# Patient Record
Sex: FEMALE | Race: WHITE | NOT HISPANIC OR LATINO | Employment: UNEMPLOYED | URBAN - METROPOLITAN AREA
[De-identification: names, ages, dates, MRNs, and addresses within clinical notes are randomized per-mention and may not be internally consistent; named-entity substitution may affect disease eponyms.]

---

## 2017-01-09 ENCOUNTER — HOSPITAL ENCOUNTER (EMERGENCY)
Facility: CLINIC | Age: 37
Discharge: HOME | End: 2017-01-09
Attending: EMERGENCY MEDICINE | Admitting: EMERGENCY MEDICINE
Payer: COMMERCIAL

## 2017-01-09 ENCOUNTER — APPOINTMENT (OUTPATIENT)
Dept: GENERAL RADIOLOGY | Facility: CLINIC | Age: 37
End: 2017-01-09
Attending: EMERGENCY MEDICINE
Payer: COMMERCIAL

## 2017-01-09 VITALS
TEMPERATURE: 98.6 F | HEART RATE: 75 BPM | DIASTOLIC BLOOD PRESSURE: 78 MMHG | SYSTOLIC BLOOD PRESSURE: 136 MMHG | RESPIRATION RATE: 18 BRPM | OXYGEN SATURATION: 100 %

## 2017-01-09 DIAGNOSIS — R07.0 THROAT PAIN: ICD-10-CM

## 2017-01-09 DIAGNOSIS — E07.9 THYROID DYSFUNCTION: ICD-10-CM

## 2017-01-09 LAB
DEPRECATED S PYO AG THROAT QL EIA: NORMAL
MICRO REPORT STATUS: NORMAL
SPECIMEN SOURCE: NORMAL

## 2017-01-09 PROCEDURE — 99284 EMERGENCY DEPT VISIT MOD MDM: CPT

## 2017-01-09 PROCEDURE — 87880 STREP A ASSAY W/OPTIC: CPT | Performed by: EMERGENCY MEDICINE

## 2017-01-09 PROCEDURE — 25000132 ZZH RX MED GY IP 250 OP 250 PS 637: Performed by: EMERGENCY MEDICINE

## 2017-01-09 PROCEDURE — 70360 X-RAY EXAM OF NECK: CPT

## 2017-01-09 PROCEDURE — 87081 CULTURE SCREEN ONLY: CPT | Performed by: EMERGENCY MEDICINE

## 2017-01-09 RX ORDER — PREDNISONE 20 MG/1
TABLET ORAL
Qty: 10 TABLET | Refills: 0 | Status: SHIPPED | OUTPATIENT
Start: 2017-01-09 | End: 2017-01-17

## 2017-01-09 RX ORDER — IBUPROFEN 200 MG
400 TABLET ORAL ONCE
Status: COMPLETED | OUTPATIENT
Start: 2017-01-09 | End: 2017-01-09

## 2017-01-09 RX ADMIN — IBUPROFEN 400 MG: 200 TABLET, FILM COATED ORAL at 19:39

## 2017-01-09 ASSESSMENT — ENCOUNTER SYMPTOMS
VOMITING: 0
TROUBLE SWALLOWING: 1
RHINORRHEA: 0
FEVER: 0
SINUS PRESSURE: 0
COUGH: 0
SORE THROAT: 1
SHORTNESS OF BREATH: 0
APNEA: 0
FACIAL SWELLING: 1
ABDOMINAL PAIN: 0
NAUSEA: 0
CHILLS: 0

## 2017-01-09 NOTE — ED AVS SNAPSHOT
[facility] Emergency Department    [address] [facility] [address]    Phone:  [phone]    Fax:  [phone]                                       Randee Millard   MRN: [id number]    Department:  [facility] Emergency Department   Date of Visit:  1/9/2017           Patient Information     Date Of Birth          1980        Your diagnoses for this visit were:     Throat pain     Thyroid dysfunction        You were seen by Smith Brown MD.      Follow-up Information     Follow up with Michelle Nascimento MD. Schedule an appointment as soon as possible for a visit in 3 days.    Specialty:  Internal Medicine    Contact information:    [facility]  [address] [facility] [address]  [phone]          Discharge Instructions       Discharge Instructions  Sore Throat  You were seen today for a sore throat.  Most sore throats are caused by a virus. Antibiotics do not help with viral infections, but you can fight off the virus on your own.  In this case, your sore throat would be treated with medications for your pain and fever.    Strep throat is a kind of sore throat caused by Group A streptococcus bacteria.  This type of sore throat is treated with antibiotics.  If you had a rapid test done today for strep throat and it did not show infection, we always do a culture. If the culture shows you have strep throat, we will call you and get you a prescription for antibiotics.    Return to the Emergency Department if:    If you have difficulty breathing.    If you are drooling because you are unable to swallow.    You become dehydrated due to difficulty drinking. Signs of dehydration include weakness, dry mouth, and urinating less than 3 times per day.    If you develop swelling of the neck or tongue.    If you develop a high fever with either headache or stiff neck.    Treatment:      Pain relief -- Non-prescription pain medications,  "such as Tylenol  (acetaminophen) or Motrin , Advil  (ibuprofen) are usually recommended for pain.  Do not use a medicine that you are allergic to, or if your doctor has told you not to use it.   If you have been given a narcotic such as Vicodin  (hydrocodone with acetaminophen), Percocet  (oxycodone with acetaminophen), codeine, do not drive for four hours after you have taken it.  If the narcotic contains Tylenol  (acetaminophen), do not take Tylenol  with it. All narcotics will cause constipation, so eat a high fiber diet.      If you have been placed on antibiotics, watch for signs of allergic reaction.  These include rash, lip swelling, difficulty breathing, wheezing, and dizziness.  If you develop any of these symptoms, stop the antibiotic immediately and go to an Emergency Department or Urgent Care for evaluation.    Probiotics: If you have been given an antibiotic, you may want to also take a probiotic pill or eat yogurt with live cultures. Probiotics have \"good bacteria\" to help your intestines stay healthy. Studies have shown that probiotics help prevent diarrhea and other intestine problems (including C. diff infection) when you take antibiotics. You can buy these without a prescription in the pharmacy section of the store.   If you were given a prescription for medicine here today, be sure to read all of the information (including the package insert) that comes with your prescription.  This will include important information about the medicine, its side effects, and any warnings that you need to know about.  The pharmacist who fills the prescription can provide more information and answer questions you may have about the medicine.  If you have questions or concerns that the pharmacist cannot address, please call or return to the Emergency Department.           Opioid Medication Information    Pain medications are among the most commonly prescribed medicines, so we are including this information for all " our patients. If you did not receive pain medication or get a prescription for pain medicine, you can ignore it.     You may have been given a prescription for an opioid (narcotic) pain medicine and/or have received a pain medicine while here in the Emergency Department. These medicines can make you drowsy or impaired. You must not drive, operate dangerous equipment, or engage in any other dangerous activities while taking these medications. If you drive while taking these medications, you could be arrested for DUI, or driving under the influence. Do not drink any alcohol while you are taking these medications.     Opioid pain medications can cause addiction. If you have a history of chemical dependency of any type, you are at a higher risk of becoming addicted to pain medications.  Only take these prescribed medications to treat your pain when all other options have been tried. Take it for as short a time and as few doses as possible. Store your pain pills in a secure place, as they are frequently stolen and provide a dangerous opportunity for children or visitors in your house to start abusing these powerful medications. We will not replace any lost or stolen medicine.  As soon as your pain is better, you should flush all your remaining medication.     Many prescription pain medications contain Tylenol  (acetaminophen), including Vicodin , Tylenol #3 , Norco , Lortab , and Percocet .  You should not take any extra pills of Tylenol  if you are using these prescription medications or you can get very sick.  Do not ever take more than 3000 mg of acetaminophen in any 24 hour period.    All opioids tend to cause constipation. Drink plenty of water and eat foods that have a lot of fiber, such as fruits, vegetables, prune juice, apple juice and high fiber cereal.  Take a laxative if you don t move your bowels at least every other day. Miralax , Milk of Magnesia, Colace , or Senna  can be used to keep you regular.       Remember that you can always come back to the Emergency Department if you are not able to see your regular doctor in the amount of time listed above, if you get any new symptoms, or if there is anything that worries you.          24 Hour Appointment Hotline       To make an appointment at any [facility], call [phone] ([phone]). If you don't have a family doctor or clinic, we will help you find one. [place] clinics are conveniently located to serve the needs of you and your family.             Review of your medicines      START taking        Dose / Directions Last dose taken    predniSONE 20 MG tablet   Commonly known as:  DELTASONE   Quantity:  10 tablet        Take two tablets (= 40mg) each day for 5 (five) days   Refills:  0          Our records show that you are taking the medicines listed below. If these are incorrect, please call your family doctor or clinic.        Dose / Directions Last dose taken    acetaminophen 500 MG tablet   Commonly known as:  TYLENOL   Dose:  500 mg   Quantity:  20 tablet        Take 1 tablet (500 mg) by mouth every 6 hours as needed for pain or fever   Refills:  0        ferrous sulfate 325 (65 FE) MG tablet   Commonly known as:  IRON   Dose:  325 mg   Quantity:  30 tablet        Take 1 tablet (325 mg) by mouth daily (with breakfast)   Refills:  2        folic acid 400 MCG tablet   Commonly known as:  FOLVITE   Dose:  400 mcg   Quantity:  30 tablet        Take 1 tablet (400 mcg) by mouth daily   Refills:  2        HYDROcodone-acetaminophen 5-325 MG per tablet   Commonly known as:  NORCO   Dose:  1-2 tablet        Take 1-2 tablets by mouth   Refills:  0        ibuprofen 200 MG tablet   Commonly known as:  ADVIL/MOTRIN   Dose:  600 mg   Quantity:  60 tablet        Take 3 tablets (600 mg) by mouth every 8 hours as needed for mild pain   Refills:  0                Prescriptions were sent or printed at these locations (1 Prescription)                   Other  Prescriptions                Printed at Department/Unit printer (1 of 1)         predniSONE (DELTASONE) 20 MG tablet                Procedures and tests performed during your visit     Beta strep group A culture    Neck soft tissue XR    Rapid strep screen      Orders Needing Specimen Collection     None      Pending Results     Date and Time Order Name Status Description    1/9/2017 2007 Beta strep group A culture In process             Pending Culture Results     Date and Time Order Name Status Description    1/9/2017 2007 Beta strep group A culture In process        Test Results from your hospital stay           1/9/2017  8:25 PM - Interface, Radiant Ib      Narrative     XR NECK SOFT TISSUE 1/9/2017 8:08 PM    HISTORY: pain, fullness, unimmunized, eval for epiglottis    COMPARISON: 4/11/2016        Impression     IMPRESSION:   Epiglottis appears normal. No convincing prevertebral swelling,  lateral cervical spine unremarkable..    DEBBIE RUSSELL MD         1/9/2017  8:27 PM - Interface, Flexilab Results      Component Results     Component    Specimen Description    Throat    Rapid Strep A Screen    NEGATIVE: No Group A streptococcal antigen detected by immunoassay, await   culture report.      Micro Report Status    FINAL 01/09/2017 1/9/2017  8:27 PM - Interface, Flexilab Results                Clinical Quality Measure: Blood Pressure Screening     Your blood pressure was checked while you were in the emergency department today. The last reading we obtained was  BP: 136/78 mmHg . Please read the guidelines below about what these numbers mean and what you should do about them.  If your systolic blood pressure (the top number) is less than 120 and your diastolic blood pressure (the bottom number) is less than 80, then your blood pressure is normal. There is nothing more that you need to do about it.  If your systolic blood pressure (the top number) is 120-139 or your diastolic blood pressure (the bottom  number) is 80-89, your blood pressure may be higher than it should be. You should have your blood pressure rechecked within a year by a primary care provider.  If your systolic blood pressure (the top number) is 140 or greater or your diastolic blood pressure (the bottom number) is 90 or greater, you may have high blood pressure. High blood pressure is treatable, but if left untreated over time it can put you at risk for heart attack, stroke, or kidney failure. You should have your blood pressure rechecked by a primary care provider within the next 4 weeks.  If your provider in the emergency department today gave you specific instructions to follow-up with your doctor or provider even sooner than that, you should follow that instruction and not wait for up to 4 weeks for your follow-up visit.        Thank you for choosing [facility]       Thank you for choosing [facility] for your care. Our goal is always to provide you with excellent care. Hearing back from our patients is one way we can continue to improve our services. Please take a few minutes to complete the written survey that you may receive in the mail after you visit with us. Thank you!        [employer] Information     [employer] gives you secure access to your electronic health record. If you see a primary care provider, you can also send messages to your care team and make appointments. If you have questions, please call your primary care clinic.  If you do not have a primary care provider, please call [phone] and they will assist you.        Care EveryWhere ID     This is your Care EveryWhere ID. This could be used by other organizations to access your [facility] medical records  [id number]        After Visit Summary       This is your record. Keep this with you and show to your community pharmacist(s) and doctor(s) at your next visit.

## 2017-01-09 NOTE — ED AVS SNAPSHOT
[facility] Emergency Department    [address]    [facility] [address]    Phone:  [phone]    Fax:  [phone]                                       Randee Millard   MRN: [id number]    Department:  [facility] Emergency Department   Date of Visit:  1/9/2017           After Visit Summary Signature Page     I have received my discharge instructions, and my questions have been answered. I have discussed any challenges I see with this plan with the nurse or doctor.    ..........................................................................................................................................  Patient/Patient Representative Signature      ..........................................................................................................................................  Patient Representative Print Name and Relationship to Patient    ..................................................               ................................................  Date                                            Time    ..........................................................................................................................................  Reviewed by Signature/Title    ...................................................              ..............................................  Date                                                            Time

## 2017-01-10 NOTE — DISCHARGE INSTRUCTIONS
Discharge Instructions  Sore Throat  You were seen today for a sore throat.  Most sore throats are caused by a virus. Antibiotics do not help with viral infections, but you can fight off the virus on your own.  In this case, your sore throat would be treated with medications for your pain and fever.    Strep throat is a kind of sore throat caused by Group A streptococcus bacteria.  This type of sore throat is treated with antibiotics.  If you had a rapid test done today for strep throat and it did not show infection, we always do a culture. If the culture shows you have strep throat, we will call you and get you a prescription for antibiotics.    Return to the Emergency Department if:    If you have difficulty breathing.    If you are drooling because you are unable to swallow.    You become dehydrated due to difficulty drinking. Signs of dehydration include weakness, dry mouth, and urinating less than 3 times per day.    If you develop swelling of the neck or tongue.    If you develop a high fever with either headache or stiff neck.    Treatment:      Pain relief -- Non-prescription pain medications, such as Tylenol  (acetaminophen) or Motrin , Advil  (ibuprofen) are usually recommended for pain.  Do not use a medicine that you are allergic to, or if your doctor has told you not to use it.   If you have been given a narcotic such as Vicodin  (hydrocodone with acetaminophen), Percocet  (oxycodone with acetaminophen), codeine, do not drive for four hours after you have taken it.  If the narcotic contains Tylenol  (acetaminophen), do not take Tylenol  with it. All narcotics will cause constipation, so eat a high fiber diet.      If you have been placed on antibiotics, watch for signs of allergic reaction.  These include rash, lip swelling, difficulty breathing, wheezing, and dizziness.  If you develop any of these symptoms, stop the antibiotic immediately and go to an Emergency Department or Urgent Care for  "evaluation.    Probiotics: If you have been given an antibiotic, you may want to also take a probiotic pill or eat yogurt with live cultures. Probiotics have \"good bacteria\" to help your intestines stay healthy. Studies have shown that probiotics help prevent diarrhea and other intestine problems (including C. diff infection) when you take antibiotics. You can buy these without a prescription in the pharmacy section of the store.   If you were given a prescription for medicine here today, be sure to read all of the information (including the package insert) that comes with your prescription.  This will include important information about the medicine, its side effects, and any warnings that you need to know about.  The pharmacist who fills the prescription can provide more information and answer questions you may have about the medicine.  If you have questions or concerns that the pharmacist cannot address, please call or return to the Emergency Department.           Opioid Medication Information    Pain medications are among the most commonly prescribed medicines, so we are including this information for all our patients. If you did not receive pain medication or get a prescription for pain medicine, you can ignore it.     You may have been given a prescription for an opioid (narcotic) pain medicine and/or have received a pain medicine while here in the Emergency Department. These medicines can make you drowsy or impaired. You must not drive, operate dangerous equipment, or engage in any other dangerous activities while taking these medications. If you drive while taking these medications, you could be arrested for DUI, or driving under the influence. Do not drink any alcohol while you are taking these medications.     Opioid pain medications can cause addiction. If you have a history of chemical dependency of any type, you are at a higher risk of becoming addicted to pain medications.  Only take these prescribed " medications to treat your pain when all other options have been tried. Take it for as short a time and as few doses as possible. Store your pain pills in a secure place, as they are frequently stolen and provide a dangerous opportunity for children or visitors in your house to start abusing these powerful medications. We will not replace any lost or stolen medicine.  As soon as your pain is better, you should flush all your remaining medication.     Many prescription pain medications contain Tylenol  (acetaminophen), including Vicodin , Tylenol #3 , Norco , Lortab , and Percocet .  You should not take any extra pills of Tylenol  if you are using these prescription medications or you can get very sick.  Do not ever take more than 3000 mg of acetaminophen in any 24 hour period.    All opioids tend to cause constipation. Drink plenty of water and eat foods that have a lot of fiber, such as fruits, vegetables, prune juice, apple juice and high fiber cereal.  Take a laxative if you don t move your bowels at least every other day. Miralax , Milk of Magnesia, Colace , or Senna  can be used to keep you regular.      Remember that you can always come back to the Emergency Department if you are not able to see your regular doctor in the amount of time listed above, if you get any new symptoms, or if there is anything that worries you.

## 2017-01-10 NOTE — ED PROVIDER NOTES
History     Chief Complaint:  Neck Pain    HPI   Randee Millard is a 36 year old female with no significant past medical history who presents with complaint of neck pain and fullness as well as throat pain. The patient reports that she has had pain in her neck for the past 3 days. She states she is scheduled to have her wisdom teeth extracted tomorrow and wanted to have her pain evaluated before she went into surgery. On arrival to the ED, the patient reports she has pain and fullness in her anterior neck including on both sides of her neck. She states she has had difficultly swallowing due to the pain and also notes some throat soreness. The patient states that there was a streak of blood in her saliva today but denies any other bleeding. She denies any fever, nausea, vomiting, cough, congestion, or difficulty breathing. The patient states she is from [place] and used to follow with a thyroid doctor but has not recently. Her thyroid function was normal 2 years ago. Of note, the patient is not fully immunized.     Allergies:  Amoxicillin  Clindamycin      Medications:    Norco  Tylenol  Ibuprofen  Folic acid  Iron    Past Medical History:    Reflux esophagitis  Anemia      Past Surgical History:    History reviewed. No pertinent surgical history.    Family History:    Hypertension  Diabetes     Social History:  Smoking status: No  Alcohol use: No  Marital Status:   [2]     Review of Systems   Constitutional: Negative for fever and chills.   HENT: Positive for facial swelling (neck ), sore throat and trouble swallowing. Negative for congestion, drooling, rhinorrhea and sinus pressure.         Neck pain   Respiratory: Negative for apnea, cough and shortness of breath.    Gastrointestinal: Negative for nausea, vomiting and abdominal pain.   All other systems reviewed and are negative.      Physical Exam   First Vitals:  BP: 136/78 mmHg  Pulse: 75  Heart Rate: 75  Temp: 98.6  F (37  C)  Resp: 18  SpO2: 100 %       Physical Exam  General: The patient is alert, in no respiratory distress.    HENT: Mucous membranes moist. Posterior pharynx normal.     Cardiovascular: Regular rate and rhythm. Good pulses in all four extremities. Normal capillary refill and skin turgor.     Respiratory: Lungs are clear. No nasal flaring. No retractions. No wheezing, no crackles.    Gastrointestinal: Abdomen soft. No guarding, no rebound. No palpable hernias.     Musculoskeletal: No gross deformity.     Skin: No rashes or petechiae. Mild fullness of the anterior neck.     Neurologic: The patient is alert and oriented x3. GCS 15. No testable cranial nerve deficit. Follows commands with clear and appropriate speech. Gives appropriate answers. Good strength in all extremities. No gross neurologic deficit. Gross sensation intact. Pupils are round and reactive. No meningismus.     Lymphatic: No cervical adenopathy. No lower extremity swelling.    Psychiatric: The patient is non-tearful.    Emergency Department Course   Imaging:  Radiographic findings were communicated with the patient who voiced understanding of the findings.    X-ray Neck soft tissue:  Epiglottis appears normal. No convincing prevertebral swelling,  lateral cervical spine unremarkable..  Result per radiology.     Laboratory:  Rapid strep: negative  Beta strep culture: in process    Interventions:  1939: Ibuprofen 400 mg oral     Emergency Department Course:  Past medical records, nursing notes, and vitals reviewed.  1932: I performed an exam of the patient and obtained history, as documented above.  Rapid strep ordered, results above.   The patient was sent for a neck soft tissue x-ray while in the emergency department, findings above.    2053: I rechecked the patient. Explained findings to the patient. Patient is agreeable with the plan for discharge.     I rechecked the patient.  Findings and plan explained to the Patient. Patient discharged home with instructions regarding  supportive care, medications, and reasons to return. The importance of close follow-up was reviewed.     Impression & Plan      Medical Decision Making:  The patient reports that when she lived in [place] she had been having thyroid problems but was not on medication and since she has moved to [place] has not been followed. I originally considered that potential causes for her sore throat could be things such as obstruction however she is having no problems swallowing here. I considered epiglottitis in that she is not immunized, pharyngitis, esophageal web, vascular issues. Meningitis would be unlikely as the pain is all in the front. Allergic reaction would be possible. Also considered external causes such as thyroid dysfunction. However, on her lateral neck film the airway is patent. I felt that most likely this is pharyngitis. I started her on steroids to help with this and encouraged her to follow up with her primary care doctor to get thyroid studies but she did not have symptoms to suggest hyperthyroidism and her airway was intact and she was discharged in good condition.     Diagnosis:    ICD-10-CM   1. Throat pain R07.0   2. Thyroid dysfunction E07.9     Disposition: Discharged to home    Discharge Medications:   Details   predniSONE (DELTASONE) 20 MG tablet Take two tablets (= 40mg) each day for 5 (five) days, Disp-10 tablet, R-0, Local Print     Amira Barfield  1/9/2017   [facility] EMERGENCY DEPARTMENT    I, Amira Barfield, am serving as a scribe at 7:32 PM on 1/9/2017 to document services personally performed by Smith Brown MD based on my observations and the provider's statements to me.       Smith Brown MD  01/10/17 0106

## 2017-01-10 NOTE — ED NOTES
Pt reports sore throat for three days with difficulty swallowing. Pt noticed some blood in mucous. Denies cough or fevers. ABCs intact.

## 2017-01-11 ENCOUNTER — HOSPITAL ENCOUNTER (EMERGENCY)
Facility: CLINIC | Age: 37
Discharge: HOME | End: 2017-01-11
Attending: EMERGENCY MEDICINE | Admitting: EMERGENCY MEDICINE
Payer: COMMERCIAL

## 2017-01-11 VITALS
RESPIRATION RATE: 18 BRPM | OXYGEN SATURATION: 99 % | SYSTOLIC BLOOD PRESSURE: 119 MMHG | DIASTOLIC BLOOD PRESSURE: 101 MMHG | TEMPERATURE: 97.3 F

## 2017-01-11 DIAGNOSIS — G89.18 ACUTE POST-OPERATIVE PAIN: ICD-10-CM

## 2017-01-11 LAB
BACTERIA SPEC CULT: NORMAL
MICRO REPORT STATUS: NORMAL
SPECIMEN SOURCE: NORMAL

## 2017-01-11 PROCEDURE — 99283 EMERGENCY DEPT VISIT LOW MDM: CPT

## 2017-01-11 PROCEDURE — 25000132 ZZH RX MED GY IP 250 OP 250 PS 637: Performed by: EMERGENCY MEDICINE

## 2017-01-11 RX ORDER — IBUPROFEN 200 MG
200 TABLET ORAL ONCE
Status: COMPLETED | OUTPATIENT
Start: 2017-01-11 | End: 2017-01-11

## 2017-01-11 RX ORDER — IBUPROFEN 200 MG
400 TABLET ORAL ONCE
Status: COMPLETED | OUTPATIENT
Start: 2017-01-11 | End: 2017-01-11

## 2017-01-11 RX ORDER — HYDROCODONE BITARTRATE AND ACETAMINOPHEN 5; 325 MG/1; MG/1
2 TABLET ORAL ONCE
Status: COMPLETED | OUTPATIENT
Start: 2017-01-11 | End: 2017-01-11

## 2017-01-11 RX ADMIN — IBUPROFEN 400 MG: 200 TABLET, FILM COATED ORAL at 15:57

## 2017-01-11 RX ADMIN — IBUPROFEN 200 MG: 200 TABLET, FILM COATED ORAL at 15:57

## 2017-01-11 RX ADMIN — HYDROCODONE BITARTRATE AND ACETAMINOPHEN 2 TABLET: 5; 325 TABLET ORAL at 16:26

## 2017-01-11 ASSESSMENT — ENCOUNTER SYMPTOMS
SHORTNESS OF BREATH: 0
COLOR CHANGE: 0
FACIAL SWELLING: 0
FEVER: 0

## 2017-01-11 NOTE — ED AVS SNAPSHOT
[facility] Emergency Department    [address]    [facility] [address]    Phone:  [phone]    Fax:  [phone]                                       Randee Millard   MRN: [id number]    Department:  [facility] Emergency Department   Date of Visit:  1/11/2017           After Visit Summary Signature Page     I have received my discharge instructions, and my questions have been answered. I have discussed any challenges I see with this plan with the nurse or doctor.    ..........................................................................................................................................  Patient/Patient Representative Signature      ..........................................................................................................................................  Patient Representative Print Name and Relationship to Patient    ..................................................               ................................................  Date                                            Time    ..........................................................................................................................................  Reviewed by Signature/Title    ...................................................              ..............................................  Date                                                            Time

## 2017-01-11 NOTE — DISCHARGE INSTRUCTIONS
Please follow up closely with your regular physician. Please return to the ED if your symptoms worsen or if you develop new or concerning symptoms.         Pain Management After Surgery  Once you re home after surgery, you may have some pain, since even minor surgery causes swelling and breakdown of tissue. When it comes to effective pain management, the tips you may have learned in the hospital also work at home. To get the best pain relief possible, remember these points:  Special note: Be sure to follow any specific post-operative instructions from your surgeon or nurse.     Use your medicine only as directed    If your pain is not relieved or if it gets worse, call your health care provider.    If pain lessens, try taking your medicine less often or in smaller doses.  Remember that medicines need time to work    Most pain relievers taken by mouth need at least 20 to 30 minutes to take effect. They may not reach their maximum effect for close to an hour.     Take pain medicine at regular times as directed. Don t wait until the pain gets bad to take it.  Time your medicine    Try to time your medicine so that you take it before beginning an activity, such as dressing or sitting at the table for dinner.    Taking your medicine at night may help you get a good night s rest.  Eat lots of fruit and vegetables    Constipation is a common side effect with some pain medicines. Eating fruit, vegetables, and other foods high in fiber can help.    Drink lots of fluids.    Talk to your health care provider about taking a preventive bowel regimen.  Avoid drinking alcohol while taking pain medicine    Mixing alcohol and pain medicine can cause dizziness and slow your respiratory system. It can even be fatal.  Avoid driving or operating machinery while taking pain medicines that can cause drowsiness.    5628-4726 [employer]. [address] [place] [address], [place] [address]. All rights reserved. This information is not  intended as a substitute for professional medical care. Always follow your healthcare professional's instructions.

## 2017-01-11 NOTE — ED AVS SNAPSHOT
[facility] Emergency Department    [address]    [facility] [address]    Phone:  [phone]    Fax:  [phone]                                       Randee Millard   MRN: [id number]    Department:  [facility] Emergency Department   Date of Visit:  1/11/2017           Patient Information     Date Of Birth          1980        Your diagnoses for this visit were:     Acute post-operative pain        You were seen by Christy Truong MD.      Follow-up Information     Follow up with Abe Hou MD In 3 days.    Specialty:  Internal Medicine    Contact information:    [facility]  [address]  [facility] [address]  [phone]          Discharge Instructions       Please follow up closely with your regular physician. Please return to the ED if your symptoms worsen or if you develop new or concerning symptoms.         Pain Management After Surgery  Once you re home after surgery, you may have some pain, since even minor surgery causes swelling and breakdown of tissue. When it comes to effective pain management, the tips you may have learned in the hospital also work at home. To get the best pain relief possible, remember these points:  Special note: Be sure to follow any specific post-operative instructions from your surgeon or nurse.     Use your medicine only as directed    If your pain is not relieved or if it gets worse, call your health care provider.    If pain lessens, try taking your medicine less often or in smaller doses.  Remember that medicines need time to work    Most pain relievers taken by mouth need at least 20 to 30 minutes to take effect. They may not reach their maximum effect for close to an hour.     Take pain medicine at regular times as directed. Don t wait until the pain gets bad to take it.  Time your medicine    Try to time your medicine so that you take it before beginning an activity, such as dressing or  sitting at the table for dinner.    Taking your medicine at night may help you get a good night s rest.  Eat lots of fruit and vegetables    Constipation is a common side effect with some pain medicines. Eating fruit, vegetables, and other foods high in fiber can help.    Drink lots of fluids.    Talk to your health care provider about taking a preventive bowel regimen.  Avoid drinking alcohol while taking pain medicine    Mixing alcohol and pain medicine can cause dizziness and slow your respiratory system. It can even be fatal.  Avoid driving or operating machinery while taking pain medicines that can cause drowsiness.    8591-7355 [employer]. [address] [place] [address]. All rights reserved. This information is not intended as a substitute for professional medical care. Always follow your healthcare professional's instructions.          Future Appointments        Provider Department Dept Phone Center    1/17/2017 1:20 PM Abe Hou MD [facility] [phone] [place]      24 Hour Appointment Hotline       To make an appointment at any [facility], call [phone] ([phone]). If you don't have a family doctor or clinic, we will help you find one. [facility] clinics are conveniently located to serve the needs of you and your family.             Review of your medicines      Our records show that you are taking the medicines listed below. If these are incorrect, please call your family doctor or clinic.        Dose / Directions Last dose taken    acetaminophen 500 MG tablet   Commonly known as:  TYLENOL   Dose:  500 mg   Quantity:  20 tablet        Take 1 tablet (500 mg) by mouth every 6 hours as needed for pain or fever   Refills:  0        ferrous sulfate 325 (65 FE) MG tablet   Commonly known as:  IRON   Dose:  325 mg   Quantity:  30 tablet        Take 1 tablet (325 mg) by mouth daily (with breakfast)   Refills:  2        folic acid 400 MCG  tablet   Commonly known as:  FOLVITE   Dose:  400 mcg   Quantity:  30 tablet        Take 1 tablet (400 mcg) by mouth daily   Refills:  2        HYDROcodone-acetaminophen 5-325 MG per tablet   Commonly known as:  NORCO   Dose:  1-2 tablet        Take 1-2 tablets by mouth   Refills:  0        ibuprofen 200 MG tablet   Commonly known as:  ADVIL/MOTRIN   Dose:  600 mg   Quantity:  60 tablet        Take 3 tablets (600 mg) by mouth every 8 hours as needed for mild pain   Refills:  0        predniSONE 20 MG tablet   Commonly known as:  DELTASONE   Quantity:  10 tablet        Take two tablets (= 40mg) each day for 5 (five) days   Refills:  0                Orders Needing Specimen Collection     None      Pending Results     No orders found from 1/10/2017 to 1/12/2017.            Pending Culture Results     No orders found from 1/10/2017 to 1/12/2017.             Test Results from your hospital stay            Clinical Quality Measure: Blood Pressure Screening     Your blood pressure was checked while you were in the emergency department today. The last reading we obtained was  BP: (!) 119/101 mmHg . Please read the guidelines below about what these numbers mean and what you should do about them.  If your systolic blood pressure (the top number) is less than 120 and your diastolic blood pressure (the bottom number) is less than 80, then your blood pressure is normal. There is nothing more that you need to do about it.  If your systolic blood pressure (the top number) is 120-139 or your diastolic blood pressure (the bottom number) is 80-89, your blood pressure may be higher than it should be. You should have your blood pressure rechecked within a year by a primary care provider.  If your systolic blood pressure (the top number) is 140 or greater or your diastolic blood pressure (the bottom number) is 90 or greater, you may have high blood pressure. High blood pressure is treatable, but if left untreated over time it can put  you at risk for heart attack, stroke, or kidney failure. You should have your blood pressure rechecked by a primary care provider within the next 4 weeks.  If your provider in the emergency department today gave you specific instructions to follow-up with your doctor or provider even sooner than that, you should follow that instruction and not wait for up to 4 weeks for your follow-up visit.        Thank you for choosing [facility]       Thank you for choosing [facility] for your care. Our goal is always to provide you with excellent care. Hearing back from our patients is one way we can continue to improve our services. Please take a few minutes to complete the written survey that you may receive in the mail after you visit with us. Thank you!        [employer] Information     [employer] gives you secure access to your electronic health record. If you see a primary care provider, you can also send messages to your care team and make appointments. If you have questions, please call your primary care clinic.  If you do not have a primary care provider, please call [phone] and they will assist you.        Care EveryWhere ID     This is your Care EveryWhere ID. This could be used by other organizations to access your [facility] medical records  [id number]        After Visit Summary       This is your record. Keep this with you and show to your community pharmacist(s) and doctor(s) at your next visit.

## 2017-01-11 NOTE — ED PROVIDER NOTES
History     Chief Complaint:  Dental Pain    HPI   Randee Millard is a 36 year old female who presents with dental pain. The patient had her wisdom teeth extracted approximately 2 hours ago. She presents with worsening pain. According to the patient's  they have not filled her prescription for vicodin or ibuprofen, and she has not taken any pain medication. She is afebrile, denies other physical issues or pain.     Allergies:  Amoxicillin  Clindamycin      Medications:    predniSONE (DELTASONE) 20 MG tablet   HYDROcodone-acetaminophen (NORCO) 5-325 MG per tablet   ibuprofen (ADVIL/MOTRIN) 200 MG tablet   acetaminophen (TYLENOL) 500 MG tablet   ferrous sulfate (IRON) 325 (65 FE) MG tablet   folic acid (FOLVITE) 400 MCG tablet     Past Medical History:    Reflux esophagitis  Anemia     Past Surgical History:    Us breast cyst aspiration initial rt   Ent surgery    Family History:    HTN  Diabetes     Social History:  The patient was accompanied to the ED by .  Smoking Status: No  Smokeless Tobacco: No  Alcohol Use: No   Marital Status:   [2]     Review of Systems   Constitutional: Negative for fever.   HENT: Positive for dental problem. Negative for facial swelling.    Respiratory: Negative for shortness of breath.    Skin: Negative for color change.   All other systems reviewed and are negative.    Physical Exam   Vitals:  Patient Vitals for the past 24 hrs:   BP Temp Temp src Heart Rate Resp SpO2   01/11/17 1551 (!) 119/101 mmHg 97.3  F (36.3  C) Temporal 74 24 99 %       Physical Exam  Constitutional: The patient is oriented to person, place, and time. Alert and cooperative.   HENT:   Right Ear: External ear normal.   Left Ear: External ear normal.   Nose: Nose normal.    Mouth: Recent R lower wisdom tooth extraction. No active bleeding. No purulent drainage. No significant swelling. The posterior orophharynx is without erythema. There is no tonsillar swelling or exudates. Uvula is  midline.  Eyes: Conjunctivae, EOM and lids are normal. Pupils are equal, round, and reactive to light.   Cardiovascular: RRR, no murmurs, rubs, or gallops  Pulmonary/Chest: Effort normal.   Musculoskeletal: Normal range of motion.  Neurological: Normal strength. No cranial nerve deficit or sensory deficit.   Skin: No rash noted.   Psychiatric:The patient has a normal mood and affect.      Emergency Department Course     Interventions:  1557 Ibuprofen 600 mg PO   1626 Norco 2 tablets PO     Emergency Department Course:  Nursing notes and vitals reviewed.  I performed an exam of the patient as documented above.     I discussed the treatment plan with the patient. They expressed understanding of this plan and consented to discharge. They will be discharged home with instructions for care and follow up. In addition, the patient will return to the emergency department if their symptoms persist, worsen, if new symptoms arise or if there is any concern.  All questions were answered.    I personally reviewed the laboratory results with the Patient and answered all related questions prior to discharge.    Impression & Plan      Medical Decision Making:  Rnadee Millard is a 36 year old female who presents to the emergency department today with right lower dental pain following recent wisdom tooth extraction. On presentation to the ED, the patient is non-toxic appearing. She is hypertensive, but vitals are otherwise within normal limits and stable. On exam, she is well appearing. She is alert and moves all extremities equally. Cardiopulmonary exam is unremarkable. On exam of the oropharynx, the patient does have a recent right lower wisdom tooth extraction. There is no active bleeding from the site. There is no signigicant swelling. The posterior oropharynx is unremarkable. The rest of the exam is as above. She was given a dose or norco. Given the patient's history and presentation I do believe her symptoms are likely due to her  wisdom tooth extraction that occurred earlier today. The patient denies filling her analgesics that she was prescribed by her dentist yet and has not taken this and therefore this is likely whey she is having pain. No evidence of infection at this time. There is no evidence of pharyngitis, peritonsillar abscess, retropharyngeal abscess, Taqueria's angina or other deep space infection. Overall, given that she is well appearing, I do feel that she can be discharged to home. I did discuss that I recommenced filling her pain medication prescribed by her dentist today along with close follow up with her dentist. She is in understanding and agreement with this plan. Return instructions were given. Patient was stable/improved at the time of discharge.     Diagnosis:    ICD-10-CM    1. Acute post-operative pain G89.18       Disposition:   Discharge to home    Scribe Disclosure:  Keyur CARRINGTON, am serving as a scribe at 4:12 PM on 1/11/2017 to document services personally performed by Christy Truong MD, based on my observations and the provider's statements to me.   1/11/2017   [facility] EMERGENCY DEPARTMENT        Christy Truong MD  01/12/17 0840

## 2017-01-12 ENCOUNTER — HOSPITAL ENCOUNTER (EMERGENCY)
Facility: CLINIC | Age: 37
Discharge: HOME | End: 2017-01-12
Attending: EMERGENCY MEDICINE | Admitting: EMERGENCY MEDICINE
Payer: COMMERCIAL

## 2017-01-12 VITALS
OXYGEN SATURATION: 99 % | RESPIRATION RATE: 16 BRPM | DIASTOLIC BLOOD PRESSURE: 78 MMHG | SYSTOLIC BLOOD PRESSURE: 134 MMHG | TEMPERATURE: 97.9 F | HEART RATE: 76 BPM

## 2017-01-12 DIAGNOSIS — K08.89 PAIN, DENTAL: ICD-10-CM

## 2017-01-12 PROCEDURE — 64400 NJX AA&/STRD TRIGEMINAL NRV: CPT

## 2017-01-12 PROCEDURE — 99283 EMERGENCY DEPT VISIT LOW MDM: CPT | Mod: 25

## 2017-01-12 RX ORDER — BUPIVACAINE HYDROCHLORIDE 5 MG/ML
INJECTION, SOLUTION PERINEURAL
Status: DISCONTINUED
Start: 2017-01-12 | End: 2017-01-12 | Stop reason: HOSPADM

## 2017-01-12 ASSESSMENT — ENCOUNTER SYMPTOMS: FEVER: 0

## 2017-01-12 NOTE — ED AVS SNAPSHOT
[facility] Emergency Department    [address] [place] [address]    Phone:  [phone]    Fax:  [phone]                                       Randee Millard   MRN: [id number]    Department:  [facility] Emergency Department   Date of Visit:  1/12/2017           Patient Information     Date Of Birth          1980        Your diagnoses for this visit were:     Pain, dental        You were seen by Moo Rodriguez MD.      Follow-up Information     Follow up with [facility] Emergency Department.    Specialty:  EMERGENCY MEDICINE    Why:  If symptoms worsen    Contact information:    [address] [place] [id number] [phone]        Discharge Instructions       Follow-up:  Please follow-up with Dentistry later today for re-evaluation and discussion of your visit to the emergency department today.    Home treatments:  Recommended home therapies include continue with your previously prescribed pain medications and follow-up with dentistry.    New prescriptions:  None    Return precautions:  Warning signs which should prompt you to return to the ER include worsening pain, swelling, fevers, vomiting, or any other new or troubling symptoms.  We are always happy to see you again.      Discharge Instructions  Dental Pain    You have been seen today for a toothache. Your pain may be caused by an exposed nerve, an infection (pulpitis), a root abscess, or other problems. You will need to see a dentist for a solution to your tooth problem. Emergency Department care is only to help control your problem until you can see a dentist.  Today, we did not find any sign that your toothache was caused by a serious condition, but sometimes symptoms develop over time and cannot be found during an emergency visit, so it is very important that you follow up with your dentist.      Return to the Emergency Department if:    You develop a fever over  101 degrees Fahrenheit.    You can t open your mouth normally, can t move your tongue well, or can t swallow.    You have new or increased swelling of your face or neck.    You develop drainage of pus or foul smelling material from around your tooth.  What can I do to help myself?    Take any antibiotic the doctor may have prescribed for you today.    Avoid very hot or very cold foods as both can cause pain.    Make an appointment to see a dentist as soon as possible. If you wish, we can provide you with a list of low-cost dental clinics.   If you were given a prescription for medicine here today, be sure to read all of the information (including the package insert) that comes with your prescription.  This will include important information about the medicine, its side effects, and any warnings that you need to know about.  The pharmacist who fills the prescription can provide more information and answer questions you may have about the medicine.  If you have questions or concerns that the pharmacist cannot address, please call or return to the Emergency Department.   Opioid Medication Information    Pain medications are among the most commonly prescribed medicines, so we are including this information for all our patients. If you did not receive pain medication or get a prescription for pain medicine, you can ignore it.     You may have been given a prescription for an opioid (narcotic) pain medicine and/or have received a pain medicine while here in the Emergency Department. These medicines can make you drowsy or impaired. You must not drive, operate dangerous equipment, or engage in any other dangerous activities while taking these medications. If you drive while taking these medications, you could be arrested for DUI, or driving under the influence. Do not drink any alcohol while you are taking these medications.     Opioid pain medications can cause addiction. If you have a history of chemical dependency of any  type, you are at a higher risk of becoming addicted to pain medications.  Only take these prescribed medications to treat your pain when all other options have been tried. Take it for as short a time and as few doses as possible. Store your pain pills in a secure place, as they are frequently stolen and provide a dangerous opportunity for children or visitors in your house to start abusing these powerful medications. We will not replace any lost or stolen medicine.  As soon as your pain is better, you should flush all your remaining medication.     Many prescription pain medications contain Tylenol  (acetaminophen), including Vicodin , Tylenol #3 , Norco , Lortab , and Percocet .  You should not take any extra pills of Tylenol  if you are using these prescription medications or you can get very sick.  Do not ever take more than 3000 mg of acetaminophen in any 24 hour period.    All opioids tend to cause constipation. Drink plenty of water and eat foods that have a lot of fiber, such as fruits, vegetables, prune juice, apple juice and high fiber cereal.  Take a laxative if you don t move your bowels at least every other day. Miralax , Milk of Magnesia, Colace , or Senna  can be used to keep you regular.      Remember that you can always come back to the Emergency Department if you are not able to see your regular doctor in the amount of time listed above, if you get any new symptoms, or if there is anything that worries you.        Future Appointments        Provider Department Dept Phone Center    1/17/2017 1:20 PM Abe Hou MD [facility] [phone] [place]      24 Hour Appointment Hotline       To make an appointment at any [facility], call [phone] ([phone]). If you don't have a family doctor or clinic, we will help you find one. [facility] are conveniently located to serve the needs of you and your family.             Review of your medicines      Our records  show that you are taking the medicines listed below. If these are incorrect, please call your family doctor or clinic.        Dose / Directions Last dose taken    acetaminophen 500 MG tablet   Commonly known as:  TYLENOL   Dose:  500 mg   Quantity:  20 tablet        Take 1 tablet (500 mg) by mouth every 6 hours as needed for pain or fever   Refills:  0        ferrous sulfate 325 (65 FE) MG tablet   Commonly known as:  IRON   Dose:  325 mg   Quantity:  30 tablet        Take 1 tablet (325 mg) by mouth daily (with breakfast)   Refills:  2        folic acid 400 MCG tablet   Commonly known as:  FOLVITE   Dose:  400 mcg   Quantity:  30 tablet        Take 1 tablet (400 mcg) by mouth daily   Refills:  2        HYDROcodone-acetaminophen 5-325 MG per tablet   Commonly known as:  NORCO   Dose:  1-2 tablet        Take 1-2 tablets by mouth   Refills:  0        ibuprofen 200 MG tablet   Commonly known as:  ADVIL/MOTRIN   Dose:  600 mg   Quantity:  60 tablet        Take 3 tablets (600 mg) by mouth every 8 hours as needed for mild pain   Refills:  0        predniSONE 20 MG tablet   Commonly known as:  DELTASONE   Quantity:  10 tablet        Take two tablets (= 40mg) each day for 5 (five) days   Refills:  0                Orders Needing Specimen Collection     None      Pending Results     No orders found from 1/11/2017 to 1/13/2017.            Pending Culture Results     No orders found from 1/11/2017 to 1/13/2017.             Test Results from your hospital stay            Clinical Quality Measure: Blood Pressure Screening     Your blood pressure was checked while you were in the emergency department today. The last reading we obtained was  BP: 121/64 mmHg . Please read the guidelines below about what these numbers mean and what you should do about them.  If your systolic blood pressure (the top number) is less than 120 and your diastolic blood pressure (the bottom number) is less than 80, then your blood pressure is normal. There  is nothing more that you need to do about it.  If your systolic blood pressure (the top number) is 120-139 or your diastolic blood pressure (the bottom number) is 80-89, your blood pressure may be higher than it should be. You should have your blood pressure rechecked within a year by a primary care provider.  If your systolic blood pressure (the top number) is 140 or greater or your diastolic blood pressure (the bottom number) is 90 or greater, you may have high blood pressure. High blood pressure is treatable, but if left untreated over time it can put you at risk for heart attack, stroke, or kidney failure. You should have your blood pressure rechecked by a primary care provider within the next 4 weeks.  If your provider in the emergency department today gave you specific instructions to follow-up with your doctor or provider even sooner than that, you should follow that instruction and not wait for up to 4 weeks for your follow-up visit.        Thank you for choosing [facility]       Thank you for choosing [facility] for your care. Our goal is always to provide you with excellent care. Hearing back from our patients is one way we can continue to improve our services. Please take a few minutes to complete the written survey that you may receive in the mail after you visit with us. Thank you!        [employer] Information     [facility] [employer] gives you secure access to your electronic health record. If you see a primary care provider, you can also send messages to your care team and make appointments. If you have questions, please call your primary care clinic.  If you do not have a primary care provider, please call [phone] and they will assist you.        Care EveryWhere ID     This is your Care EveryWhere ID. This could be used by other organizations to access your [facility] medical records  [id number]        After Visit Summary       This is your record. Keep this with you and show to your community pharmacist(s) and  doctor(s) at your next visit.

## 2017-01-12 NOTE — DISCHARGE INSTRUCTIONS
Follow-up:  Please follow-up with Dentistry later today for re-evaluation and discussion of your visit to the emergency department today.    Home treatments:  Recommended home therapies include continue with your previously prescribed pain medications and follow-up with dentistry.    New prescriptions:  None    Return precautions:  Warning signs which should prompt you to return to the ER include worsening pain, swelling, fevers, vomiting, or any other new or troubling symptoms.  We are always happy to see you again.      Discharge Instructions  Dental Pain    You have been seen today for a toothache. Your pain may be caused by an exposed nerve, an infection (pulpitis), a root abscess, or other problems. You will need to see a dentist for a solution to your tooth problem. Emergency Department care is only to help control your problem until you can see a dentist.  Today, we did not find any sign that your toothache was caused by a serious condition, but sometimes symptoms develop over time and cannot be found during an emergency visit, so it is very important that you follow up with your dentist.      Return to the Emergency Department if:    You develop a fever over 101 degrees Fahrenheit.    You can t open your mouth normally, can t move your tongue well, or can t swallow.    You have new or increased swelling of your face or neck.    You develop drainage of pus or foul smelling material from around your tooth.  What can I do to help myself?    Take any antibiotic the doctor may have prescribed for you today.    Avoid very hot or very cold foods as both can cause pain.    Make an appointment to see a dentist as soon as possible. If you wish, we can provide you with a list of low-cost dental clinics.   If you were given a prescription for medicine here today, be sure to read all of the information (including the package insert) that comes with your prescription.  This will include important information about the  medicine, its side effects, and any warnings that you need to know about.  The pharmacist who fills the prescription can provide more information and answer questions you may have about the medicine.  If you have questions or concerns that the pharmacist cannot address, please call or return to the Emergency Department.   Opioid Medication Information    Pain medications are among the most commonly prescribed medicines, so we are including this information for all our patients. If you did not receive pain medication or get a prescription for pain medicine, you can ignore it.     You may have been given a prescription for an opioid (narcotic) pain medicine and/or have received a pain medicine while here in the Emergency Department. These medicines can make you drowsy or impaired. You must not drive, operate dangerous equipment, or engage in any other dangerous activities while taking these medications. If you drive while taking these medications, you could be arrested for DUI, or driving under the influence. Do not drink any alcohol while you are taking these medications.     Opioid pain medications can cause addiction. If you have a history of chemical dependency of any type, you are at a higher risk of becoming addicted to pain medications.  Only take these prescribed medications to treat your pain when all other options have been tried. Take it for as short a time and as few doses as possible. Store your pain pills in a secure place, as they are frequently stolen and provide a dangerous opportunity for children or visitors in your house to start abusing these powerful medications. We will not replace any lost or stolen medicine.  As soon as your pain is better, you should flush all your remaining medication.     Many prescription pain medications contain Tylenol  (acetaminophen), including Vicodin , Tylenol #3 , Norco , Lortab , and Percocet .  You should not take any extra pills of Tylenol  if you are using these  prescription medications or you can get very sick.  Do not ever take more than 3000 mg of acetaminophen in any 24 hour period.    All opioids tend to cause constipation. Drink plenty of water and eat foods that have a lot of fiber, such as fruits, vegetables, prune juice, apple juice and high fiber cereal.  Take a laxative if you don t move your bowels at least every other day. Miralax , Milk of Magnesia, Colace , or Senna  can be used to keep you regular.      Remember that you can always come back to the Emergency Department if you are not able to see your regular doctor in the amount of time listed above, if you get any new symptoms, or if there is anything that worries you.

## 2017-01-12 NOTE — ED AVS SNAPSHOT
[facility] [place] Emergency Department    [address]    [facility] [address]    Phone:  [phone]    Fax:  [phone]                                       Randee Millard   MRN: [id number]    Department:  [facility] [place] Emergency Department   Date of Visit:  1/12/2017           After Visit Summary Signature Page     I have received my discharge instructions, and my questions have been answered. I have discussed any challenges I see with this plan with the nurse or doctor.    ..........................................................................................................................................  Patient/Patient Representative Signature      ..........................................................................................................................................  Patient Representative Print Name and Relationship to Patient    ..................................................               ................................................  Date                                            Time    ..........................................................................................................................................  Reviewed by Signature/Title    ...................................................              ..............................................  Date                                                            Time

## 2017-01-12 NOTE — ED PROVIDER NOTES
History     Chief Complaint:  Dental pain     HPI   Randee Millard is a healthy 36 year old female who presents for evaluation of dental pain. The patient states that she had her right lower wisdom tooth removed yesterday afternoon. Two hours after the procedure, she presented to [facility] complaining of significant pain. In the ED, she was given a 2 doses of norco and ibuprofen. Since discharge, she has been taking ibuprofen 600 mg every 6 hours and norco every 4 hours. Despite these interventions, she has continued to have unremitting pain, which prompted her visit to the ED this morning. She denies any fever. No swelling or discharge about the face.    Allergies:  Amoxicillin  Clindamycin      Medications:    Deltasone  Norco  Ibuprofen  Tylenol  Iron  Folvite      Past Medical History:    Reflux esophagitis     Past Surgical History:    Right breat cyst aspiration ENT surgery     Family History:    Hypertension (father)  Diabetes (father)     Social History:  Marital Status:   Presents to the ED alone  Tobacco Use: no  Alcohol Use: no  PCP: Abe Hou     Review of Systems   Constitutional: Negative for fever.   HENT: Positive for dental problem.    All other systems reviewed and are negative.      Physical Exam   First Vitals:  BP: 121/64 mmHg  Pulse: 76  Temp: 97.9  F (36.6  C)  Resp: 16  SpO2: 100 %      Physical Exam  General:              Well-nourished              Speaking in full sentences  Eyes:              Conjunctiva without injection or scleral icterus  ENT:              Tooth extracted from site #32              No bleeding or drainage              No fluctuance              Socket is open approximately 2 mm              Remainder of dentition reveal carious disease to tooth #1              No trismus              Controlling secretions without difficulty  Resp:              Even, non-labored respirations  CV:                    RRR  Skin:              Warm, dry, well  perfused              No rashes or open wounds on exposed skin  MSK:              Moves all extremities              No focal deformities or swelling  Neuro:              Alert              Answers questions appropriately              Moves all extremities equally              Gait stable  Psych:              Normal affect, normal mood      Emergency Department Course     Procedures:     Dental Block     PROCEDURE:  Inferior alveolar nerve block  LOCATION:  Right lower mandible  ANESTHESIA: Digital block using 0.5% bupivicaine, total of 3.5 mLs  PROCEDURE NOTE: I applied dry socket paste after the dental block. The patient tolerated the procedure well with good relief of discomfort and there were no complications.       Emergency Department Course:  Nursing notes and vitals reviewed.  I performed an exam of the patient as documented above. GCS 15.    I performed a dental block as documented above.    Findings and plan explained to the patient. Patient discharged home with instructions regarding supportive care, medications, and reasons to return. The importance of close follow-up was reviewed.       Impression & Plan      Medical Decision Making:  Randee Millard is a 36 year old female POD #1 from a right lower wisdom tooth extraction presenting to the ED with recurrent pain. History, exam and ED course as above. Pain likely secondary to post-operative pain versus dry socket. Physical exam reveals an opening ~2 mm from the previous dental extraction site. The patient was provided an inferior alveolar block for which she noted significant improvement in pain. Dry socket paste was also applied to the affected area with continued improvements in pain. I appreciate no current signs of infection, such as erythema, discharge, drainage of pus or fluctuance about the buccal or lingual gingiva. Clinical impression was discussed with the patient. I recommended follow-up with dentistry later today. She is already using ibuprofen  and hydrocodone as needed for pain, which I have counseled them to continue with, as well as discussed appropriate dosing. Both patient and  felt comfortable with this plan of care. They were discharged home in stable and improved condition. Return to ED with worsening pain, discharge, difficulty eating, swelling or any other concerns. All questions were answered prior to discharge.       Diagnosis:    ICD-10-CM    1. Pain, dental K08.89        Disposition:  discharged to home    IBruce, am serving as a scribe on 1/12/2017 at 6:57 AM to personally document services performed by Dr. Jennifer MD based on my observations and the provider's statements to me.     1/12/2017   [facility] EMERGENCY DEPARTMENT        Moo Rodriguez MD  01/12/17 1545

## 2017-01-12 NOTE — ED NOTES
Pt here yesterday for same pain. Had wisdom teeth removed and is in pain. norco not helping     Pt A&O x 3, CMS x 3, ABCD's adequate in triage

## 2017-01-12 NOTE — ED NOTES
Reports taking prescribed norco and  Ibuprofen as directed without relief. Reports not being seen by dentist after tooth being pulled for pain.  ABC intact. A/O x4  Patient educated on importance follow up with dentist for further evaluation. Patient verbalized understanding. Will continue to monitor

## 2017-01-17 ENCOUNTER — OFFICE VISIT (OUTPATIENT)
Dept: INTERNAL MEDICINE | Facility: CLINIC | Age: 37
End: 2017-01-17
Payer: COMMERCIAL

## 2017-01-17 VITALS
OXYGEN SATURATION: 98 % | WEIGHT: 204 LBS | SYSTOLIC BLOOD PRESSURE: 100 MMHG | BODY MASS INDEX: 30.92 KG/M2 | HEIGHT: 68 IN | HEART RATE: 100 BPM | DIASTOLIC BLOOD PRESSURE: 70 MMHG | TEMPERATURE: 97.9 F

## 2017-01-17 DIAGNOSIS — Z00.00 ENCOUNTER FOR ROUTINE ADULT HEALTH EXAMINATION WITHOUT ABNORMAL FINDINGS: Primary | ICD-10-CM

## 2017-01-17 PROCEDURE — 99395 PREV VISIT EST AGE 18-39: CPT | Performed by: INTERNAL MEDICINE

## 2017-01-17 NOTE — PROGRESS NOTES
SUBJECTIVE:     CC: Randee Millard is an 36 year old woman who presents for preventive health visit.     Healthy Habits:    Do you get at least three servings of calcium containing foods daily (dairy, green leafy vegetables, etc.)? yes    Amount of exercise or daily activities, outside of work: no    Problems taking medications regularly No    Medication side effects: No    Have you had an eye exam in the past two years? yes    Do you see a dentist twice per year? yes    Do you have sleep apnea, excessive snoring or daytime drowsiness?no       Pt recently had rt lower tooth extracted about 1 wk ago and was started on abx about 5 days ago,( not sure what abx) .  Had h/o thyroid dz when was in [place], but was advised to discontinue the med by her provider in [place]. .      Today's PHQ-2 Score:   PHQ-2 ( 1999 [employer]) 1/17/2017 9/2/2016   Q1: Little interest or pleasure in doing things 0 0   Q2: Feeling down, depressed or hopeless 0 0   PHQ-2 Score 0 0       Abuse: Current or Past(Physical, Sexual or Emotional)- No  Do you feel safe in your environment - Yes      Past Medical History   Diagnosis Date     Reflux esophagitis      Anemia        Past Surgical History   Procedure Laterality Date     Us breast cyst aspiration initial rt       Ent surgery         Current Outpatient Prescriptions   Medication Sig Dispense Refill     acetaminophen (TYLENOL) 500 MG tablet Take 1 tablet (500 mg) by mouth every 6 hours as needed for pain or fever 20 tablet 0     ferrous sulfate (IRON) 325 (65 FE) MG tablet Take 1 tablet (325 mg) by mouth daily (with breakfast) 30 tablet 2       Family History   Problem Relation Age of Onset     Hypertension Father      DIABETES Father 63     Family History Negative Mother        Social History   Substance Use Topics     Smoking status: Never Smoker      Smokeless tobacco: Never Used     Alcohol Use: No         Recent Labs   Lab Test  11/30/15   0929   CHOL  167   HDL  46*   LDL  106*   TRIG   "73   [facility]  121       Reviewed orders with patient.  Reviewed health maintenance and updated orders accordingly - Yes    Mammo Decision Support:  Mammogram not appropriate for this patient based on age.    History of abnormal Pap smear: NO - age 30- 65 PAP every 3 years recommended  All Histories reviewed and updated in Epic.      ROS:  C: NEGATIVE for fever, chills, change in weight  I: NEGATIVE for worrisome rashes, moles or lesions  E: NEGATIVE for vision changes or irritation  ENT: NEGATIVE for ear, mouth and throat problems  R: NEGATIVE for significant cough or SOB  B: NEGATIVE for masses, tenderness or discharge  CV: NEGATIVE for chest pain, palpitations or peripheral edema  GI: NEGATIVE for nausea, abdominal pain, heartburn, or change in bowel habits  : NEGATIVE for unusual urinary or vaginal symptoms. Periods are regular.  M: NEGATIVE for significant arthralgias or myalgia  N: NEGATIVE for weakness, dizziness or paresthesias  P: NEGATIVE for changes in mood or affect    Problem list, Medication list, Allergies, and Medical/Social/Surgical histories reviewed in [facility] and updated as appropriate.  OBJECTIVE:     /70 mmHg  Pulse 100  Temp(Src) 97.9  F (36.6  C) (Oral)  Ht 5' 8\" (1.727 m)  Wt 204 lb (92.534 kg)  BMI 31.03 kg/m2  SpO2 98%  LMP 01/14/2017  EXAM:  GENERAL: healthy, alert and no distress  EYES: Eyes grossly normal to inspection, PERRL and conjunctivae and sclerae normal  HENT: ear canals and TM's normal, nose and mouth without ulcers or lesions  RESP: lungs clear to auscultation - no rales, rhonchi or wheezes  BREAST: normal without masses, tenderness or nipple discharge and no palpable axillary masses or adenopathy  CV: regular rate and rhythm, normal S1 S2, no S3 or S4, no murmur, click or rub, no peripheral edema and peripheral pulses strong  ABDOMEN: soft, nontender, no hepatosplenomegaly, no masses and bowel sounds normal  MS: no gross musculoskeletal defects noted, no edema  NEURO: " "Normal strength and tone, mentation intact and speech normal  PSYCH: mentation appears normal, affect normal/bright    ASSESSMENT/PLAN:        (Z00.00) Encounter for routine adult health examination without abnormal findings  (primary encounter diagnosis)  Plan: CBC with platelets, Comprehensive metabolic         panel, Lipid panel reflex to direct LDL, TSH         with free T4 reflex            COUNSELING:   Reviewed preventive health counseling, as reflected in patient instructions       Regular exercise       Healthy diet/nutrition       reports that she has never smoked. She has never used smokeless tobacco.    Estimated body mass index is 31.03 kg/(m^2) as calculated from the following:    Height as of this encounter: 5' 8\" (1.727 m).    Weight as of this encounter: 204 lb (92.534 kg).   Weight management plan: Discussed healthy diet and exercise guidelines and patient will follow up in 12 months in clinic to re-evaluate.    Counseling Resources:  ATP IV Guidelines  Pooled Cohorts Equation Calculator  Breast Cancer Risk Calculator  FRAX Risk Assessment  ICSI Preventive Guidelines  Dietary Guidelines for Americans, 2010  USDA's MyPlate  ASA Prophylaxis  Lung CA Screening    Abe Hou MD  [facility]  "

## 2017-01-17 NOTE — NURSING NOTE
"Chief Complaint   Patient presents with     Physical       Initial /70 mmHg  Pulse 100  Temp(Src) 97.9  F (36.6  C) (Oral)  Ht 5' 8\" (1.727 m)  Wt 204 lb (92.534 kg)  BMI 31.03 kg/m2  SpO2 98%  LMP 01/14/2017 Estimated body mass index is 31.03 kg/(m^2) as calculated from the following:    Height as of this encounter: 5' 8\" (1.727 m).    Weight as of this encounter: 204 lb (92.534 kg).  BP completed using cuff size: large    "

## 2017-01-18 DIAGNOSIS — Z00.00 ENCOUNTER FOR ROUTINE ADULT HEALTH EXAMINATION WITHOUT ABNORMAL FINDINGS: ICD-10-CM

## 2017-01-18 LAB
ERYTHROCYTE [DISTWIDTH] IN BLOOD BY AUTOMATED COUNT: 17.5 % (ref 10–15)
HCT VFR BLD AUTO: 34.1 % (ref 35–47)
HGB BLD-MCNC: 10.9 G/DL (ref 11.7–15.7)
MCH RBC QN AUTO: 24.2 PG (ref 26.5–33)
MCHC RBC AUTO-ENTMCNC: 32 G/DL (ref 31.5–36.5)
MCV RBC AUTO: 76 FL (ref 78–100)
PLATELET # BLD AUTO: 360 10E9/L (ref 150–450)
RBC # BLD AUTO: 4.5 10E12/L (ref 3.8–5.2)
WBC # BLD AUTO: 7.3 10E9/L (ref 4–11)

## 2017-01-18 PROCEDURE — 36415 COLL VENOUS BLD VENIPUNCTURE: CPT | Performed by: INTERNAL MEDICINE

## 2017-01-18 PROCEDURE — 80061 LIPID PANEL: CPT | Performed by: INTERNAL MEDICINE

## 2017-01-18 PROCEDURE — 84443 ASSAY THYROID STIM HORMONE: CPT | Performed by: INTERNAL MEDICINE

## 2017-01-18 PROCEDURE — 85027 COMPLETE CBC AUTOMATED: CPT | Performed by: INTERNAL MEDICINE

## 2017-01-18 PROCEDURE — 80053 COMPREHEN METABOLIC PANEL: CPT | Performed by: INTERNAL MEDICINE

## 2017-01-19 LAB
ALBUMIN SERPL-MCNC: 3.8 G/DL (ref 3.4–5)
ALP SERPL-CCNC: 72 U/L (ref 40–150)
ALT SERPL W P-5'-P-CCNC: 17 U/L (ref 0–50)
ANION GAP SERPL CALCULATED.3IONS-SCNC: 12 MMOL/L (ref 3–14)
AST SERPL W P-5'-P-CCNC: 11 U/L (ref 0–45)
BILIRUB SERPL-MCNC: 0.2 MG/DL (ref 0.2–1.3)
BUN SERPL-MCNC: 9 MG/DL (ref 7–30)
CALCIUM SERPL-MCNC: 8.5 MG/DL (ref 8.5–10.1)
CHLORIDE SERPL-SCNC: 109 MMOL/L (ref 94–109)
CHOLEST SERPL-MCNC: 158 MG/DL
CO2 SERPL-SCNC: 21 MMOL/L (ref 20–32)
CREAT SERPL-MCNC: 0.67 MG/DL (ref 0.52–1.04)
GFR SERPL CREATININE-BSD FRML MDRD: NORMAL ML/MIN/1.7M2
GLUCOSE SERPL-MCNC: 84 MG/DL (ref 70–99)
HDLC SERPL-MCNC: 41 MG/DL
LDLC SERPL CALC-MCNC: 100 MG/DL
NONHDLC SERPL-MCNC: 117 MG/DL
POTASSIUM SERPL-SCNC: 3.8 MMOL/L (ref 3.4–5.3)
PROT SERPL-MCNC: 7.4 G/DL (ref 6.8–8.8)
SODIUM SERPL-SCNC: 142 MMOL/L (ref 133–144)
TRIGL SERPL-MCNC: 87 MG/DL
TSH SERPL DL<=0.005 MIU/L-ACNC: 1.65 MU/L (ref 0.4–4)

## 2017-02-07 ENCOUNTER — TRANSFERRED RECORDS (OUTPATIENT)
Dept: HEALTH INFORMATION MANAGEMENT | Facility: CLINIC | Age: 37
End: 2017-02-07

## 2017-02-07 ENCOUNTER — HOSPITAL ENCOUNTER (EMERGENCY)
Facility: CLINIC | Age: 37
Discharge: HOME | End: 2017-02-07
Attending: EMERGENCY MEDICINE | Admitting: EMERGENCY MEDICINE
Payer: COMMERCIAL

## 2017-02-07 ENCOUNTER — APPOINTMENT (OUTPATIENT)
Dept: GENERAL RADIOLOGY | Facility: CLINIC | Age: 37
End: 2017-02-07
Attending: EMERGENCY MEDICINE
Payer: COMMERCIAL

## 2017-02-07 VITALS
OXYGEN SATURATION: 100 % | HEIGHT: 68 IN | WEIGHT: 190 LBS | DIASTOLIC BLOOD PRESSURE: 110 MMHG | TEMPERATURE: 98.1 F | RESPIRATION RATE: 18 BRPM | BODY MASS INDEX: 28.79 KG/M2 | SYSTOLIC BLOOD PRESSURE: 132 MMHG

## 2017-02-07 DIAGNOSIS — R07.9 ACUTE CHEST PAIN: ICD-10-CM

## 2017-02-07 LAB
ANION GAP SERPL CALCULATED.3IONS-SCNC: 9 MMOL/L (ref 3–14)
BUN SERPL-MCNC: 10 MG/DL (ref 7–30)
CALCIUM SERPL-MCNC: 8.4 MG/DL (ref 8.5–10.1)
CHLORIDE SERPL-SCNC: 108 MMOL/L (ref 94–109)
CO2 SERPL-SCNC: 24 MMOL/L (ref 20–32)
CREAT SERPL-MCNC: 0.6 MG/DL (ref 0.52–1.04)
D DIMER PPP FEU-MCNC: NORMAL UG/ML FEU (ref 0–0.5)
GFR SERPL CREATININE-BSD FRML MDRD: ABNORMAL ML/MIN/1.7M2
GLUCOSE SERPL-MCNC: 81 MG/DL (ref 70–99)
HCG SERPL QL: NEGATIVE
POTASSIUM SERPL-SCNC: 3.8 MMOL/L (ref 3.4–5.3)
SODIUM SERPL-SCNC: 141 MMOL/L (ref 133–144)
TROPONIN I SERPL-MCNC: NORMAL UG/L (ref 0–0.04)

## 2017-02-07 PROCEDURE — 96374 THER/PROPH/DIAG INJ IV PUSH: CPT

## 2017-02-07 PROCEDURE — 85025 COMPLETE CBC W/AUTO DIFF WBC: CPT | Performed by: EMERGENCY MEDICINE

## 2017-02-07 PROCEDURE — 84484 ASSAY OF TROPONIN QUANT: CPT | Performed by: EMERGENCY MEDICINE

## 2017-02-07 PROCEDURE — 84703 CHORIONIC GONADOTROPIN ASSAY: CPT | Performed by: EMERGENCY MEDICINE

## 2017-02-07 PROCEDURE — 71020 XR CHEST 2 VW: CPT

## 2017-02-07 PROCEDURE — 93005 ELECTROCARDIOGRAM TRACING: CPT

## 2017-02-07 PROCEDURE — 80048 BASIC METABOLIC PNL TOTAL CA: CPT | Performed by: EMERGENCY MEDICINE

## 2017-02-07 PROCEDURE — 25000128 H RX IP 250 OP 636: Performed by: EMERGENCY MEDICINE

## 2017-02-07 PROCEDURE — 99285 EMERGENCY DEPT VISIT HI MDM: CPT | Mod: 25

## 2017-02-07 PROCEDURE — 85379 FIBRIN DEGRADATION QUANT: CPT | Performed by: EMERGENCY MEDICINE

## 2017-02-07 RX ORDER — IBUPROFEN 600 MG/1
600 TABLET, FILM COATED ORAL EVERY 6 HOURS
Qty: 30 TABLET | Refills: 0 | Status: SHIPPED | OUTPATIENT
Start: 2017-02-07 | End: 2017-02-10

## 2017-02-07 RX ORDER — KETOROLAC TROMETHAMINE 15 MG/ML
15 INJECTION, SOLUTION INTRAMUSCULAR; INTRAVENOUS ONCE
Status: COMPLETED | OUTPATIENT
Start: 2017-02-07 | End: 2017-02-07

## 2017-02-07 RX ADMIN — KETOROLAC TROMETHAMINE 15 MG: 15 INJECTION, SOLUTION INTRAMUSCULAR; INTRAVENOUS at 23:42

## 2017-02-07 ASSESSMENT — ENCOUNTER SYMPTOMS
CHILLS: 0
SHORTNESS OF BREATH: 0
FEVER: 0
COUGH: 0
PALPITATIONS: 0

## 2017-02-07 NOTE — ED AVS SNAPSHOT
[facility] Emergency Department    [address] [place] [address]    Phone:  [phone]    Fax:  [phone]                                       Randee Millard   MRN: [id number]    Department:  [facility] Emergency Department   Date of Visit:  2/7/2017           Patient Information     Date Of Birth          1980        Your diagnoses for this visit were:     Acute chest pain        You were seen by Presley Curtis MD.        Discharge Instructions       Discharge Instructions  Chest Pain    You have been seen today for chest pain or discomfort.  At this time, your doctor has found no signs that your chest pain is due to a serious or life-threatening condition, (or you have declined more testing and/or admission to the hospital). However, sometimes there is a serious problem that does not show up right away. Your evaluation today may not be complete and you may need further testing and evaluation.     You need to follow-up with your regular doctor within 3 days.    Return to the Emergency Department if:    Your chest pain changes, gets worse, starts to happen more often, or comes with less activity.    You are short of breath.    You get very weak or tired.    You pass out or faint.    You have any new symptoms, like fever, cough, numb legs, or you cough up blood.    You have anything else that worries you.    Until you follow-up with your regular doctor please do the following:    Take one aspirin daily unless you have an allergy or are told not to by your doctor.    If a stress test appointment has been made, go to the appointment.    If you have questions, contact your regular doctor.    If your doctor today has told you to follow-up with your regular doctor, it is very important that you make an appointment with your clinic and go to the appointment.  If you do not follow-up with your primary doctor, it may result in missing an important development  which could result in permanent injury or disability and/or lasting pain.  If there is any problem keeping your appointment, call your doctor or return to the Emergency Department.    If you were given a prescription for medicine here today, be sure to read all of the information (including the package insert) that comes with your prescription.  This will include important information about the medicine, its side effects, and any warnings that you need to know about.  The pharmacist who fills the prescription can provide more information and answer questions you may have about the medicine.  If you have questions or concerns that the pharmacist cannot address, please call or return to the Emergency Department.             24 Hour Appointment Hotline       To make an appointment at any [facility], call [phone] ([phone]). If you don't have a family doctor or clinic, we will help you find one. [facility] clinics are conveniently located to serve the needs of you and your family.             Review of your medicines      START taking        Dose / Directions Last dose taken    ibuprofen 600 MG tablet   Commonly known as:  ADVIL/MOTRIN   Dose:  600 mg   Quantity:  30 tablet        Take 1 tablet (600 mg) by mouth every 6 hours for 3 days Take every 6 hours with food for three days.   Refills:  0          Our records show that you are taking the medicines listed below. If these are incorrect, please call your family doctor or clinic.        Dose / Directions Last dose taken    acetaminophen 500 MG tablet   Commonly known as:  TYLENOL   Dose:  500 mg   Quantity:  20 tablet        Take 1 tablet (500 mg) by mouth every 6 hours as needed for pain or fever   Refills:  0        ferrous sulfate 325 (65 FE) MG tablet   Commonly known as:  IRON   Dose:  325 mg   Quantity:  30 tablet        Take 1 tablet (325 mg) by mouth daily (with breakfast)   Refills:  2                Prescriptions were sent or printed at these  locations (1 Prescription)                   Other Prescriptions                Printed at Department/Unit printer (1 of 1)         ibuprofen (ADVIL/MOTRIN) 600 MG tablet                Procedures and tests performed during your visit     Basic metabolic panel    CBC with platelets differential    D dimer quantitative    EKG 12 lead    HCG qualitative    Troponin I    XR Chest 2 Views      Orders Needing Specimen Collection     None      Pending Results     Date and Time Order Name Status Description    2/7/2017 6837 EKG 12 lead Preliminary             Pending Culture Results     No orders found from 2/6/2017 to 2/8/2017.       Test Results from your hospital stay           2/7/2017 10:12 PM - Interface, Flexilab Results      Component Results     Component Value Ref Range & Units Status    Sodium 141 133 - 144 mmol/L Final    Potassium 3.8 3.4 - 5.3 mmol/L Final    Chloride 108 94 - 109 mmol/L Final    Carbon Dioxide 24 20 - 32 mmol/L Final    Anion Gap 9 3 - 14 mmol/L Final    Glucose 81 70 - 99 mg/dL Final    Urea Nitrogen 10 7 - 30 mg/dL Final    Creatinine 0.60 0.52 - 1.04 mg/dL Final    GFR Estimate >90  Non  GFR Calc   >60 mL/min/1.7m2 Final    GFR Estimate If Black >90   GFR Calc   >60 mL/min/1.7m2 Final    Calcium 8.4 (L) 8.5 - 10.1 mg/dL Final         2/7/2017 10:12 PM - Interface, Flexilab Results      Component Results     Component Value Ref Range & Units Status    Troponin I ES  0.000 - 0.045 ug/L Final    <0.015  The 99th percentile for upper reference range is 0.045 ug/L.  Troponin values in   the range of 0.045 - 0.120 ug/L may be associated with risks of adverse   clinical events.           2/7/2017 10:42 PM - Interface, Flexilab Results      Component Results     Component Value Ref Range & Units Status    WBC 11.9 (H) 4.0 - 11.0 10e9/L Final    RBC Count 4.34 3.8 - 5.2 10e12/L Final    Hemoglobin 10.6 (L) 11.7 - 15.7 g/dL Final    Hematocrit 33.4 (L) 35.0 - 47.0 %  Final    MCV 77 (L) 78 - 100 fl Final    MCH 24.4 (L) 26.5 - 33.0 pg Final    MCHC 31.7 31.5 - 36.5 g/dL Final    RDW 16.9 (H) 10.0 - 15.0 % Final    Platelet Count 361 150 - 450 10e9/L Final    Diff Method Automated Method  Final    % Neutrophils 49.2 % Final    % Lymphocytes 44.4 % Final    % Monocytes 5.0 % Final    % Eosinophils 0.8 % Final    % Basophils 0.3 % Final    % Immature Granulocytes 0.3 % Final    Nucleated RBCs 0 0 /100 Final    Absolute Neutrophil 5.8 1.6 - 8.3 10e9/L Final    Absolute Lymphocytes 5.3 0.8 - 5.3 10e9/L Final    Absolute Monocytes 0.6 0.0 - 1.3 10e9/L Final    Absolute Eosinophils 0.1 0.0 - 0.7 10e9/L Final    Absolute Basophils 0.0 0.0 - 0.2 10e9/L Final    Abs Immature Granulocytes 0.0 0 - 0.4 10e9/L Final    Absolute Nucleated RBC 0.0  Final    Anisocytosis Slight  Final    Microcytes Present  Final    Platelet Estimate Normal  Final         2/7/2017 10:02 PM - Interface, Flexilab Results      Component Results     Component Value Ref Range & Units Status    D Dimer  0.0 - 0.50 ug/ml FEU Final    <0.3  This D-dimer assay is intended for use in conjuntion with a clinical pretest   probability assessment model to exclude pulmonary embolism (PE) and as an aid   in the diagnosis of deep venous thrombosis (DVT) in outpatients suspected of PE   or DVT. The cut-off value is 0.5 g/mL FEU.           2/7/2017 11:32 PM - Interface, Radiant Ib      Narrative     XR CHEST 2 VW  2/7/2017 11:30 PM      HISTORY: Chest pain.     COMPARISON: None.    FINDINGS: The heart size is normal. The lungs are clear. No  pneumothorax or pleural effusion.        Impression     IMPRESSION:  No acute abnormality.    VASILIY JOHNSON MD         2/7/2017 11:31 PM - Interface, Flexilab Results      Component Results     Component Value Ref Range & Units Status    HCG Qualitative Serum Negative NEG Final                Clinical Quality Measure: Blood Pressure Screening     Your blood pressure was checked while you  were in the emergency department today. The last reading we obtained was  BP: 125/83 mmHg . Please read the guidelines below about what these numbers mean and what you should do about them.  If your systolic blood pressure (the top number) is less than 120 and your diastolic blood pressure (the bottom number) is less than 80, then your blood pressure is normal. There is nothing more that you need to do about it.  If your systolic blood pressure (the top number) is 120-139 or your diastolic blood pressure (the bottom number) is 80-89, your blood pressure may be higher than it should be. You should have your blood pressure rechecked within a year by a primary care provider.  If your systolic blood pressure (the top number) is 140 or greater or your diastolic blood pressure (the bottom number) is 90 or greater, you may have high blood pressure. High blood pressure is treatable, but if left untreated over time it can put you at risk for heart attack, stroke, or kidney failure. You should have your blood pressure rechecked by a primary care provider within the next 4 weeks.  If your provider in the emergency department today gave you specific instructions to follow-up with your doctor or provider even sooner than that, you should follow that instruction and not wait for up to 4 weeks for your follow-up visit.        Thank you for choosing [facility]       Thank you for choosing [facility] for your care. Our goal is always to provide you with excellent care. Hearing back from our patients is one way we can continue to improve our services. Please take a few minutes to complete the written survey that you may receive in the mail after you visit with us. Thank you!        [employer] Information     [employer] gives you secure access to your electronic health record. If you see a primary care provider, you can also send messages to your care team and make appointments. If you have questions, please call your primary care clinic.  If  you do not have a primary care provider, please call [phone] and they will assist you.        Care EveryWhere ID     This is your Care EveryWhere ID. This could be used by other organizations to access your [facility] medical records  [id number]        After Visit Summary       This is your record. Keep this with you and show to your community pharmacist(s) and doctor(s) at your next visit.

## 2017-02-07 NOTE — ED AVS SNAPSHOT
[facility] Emergency Department    [address]    [facility] [address]    Phone:  [phone]    Fax:  [phone]                                       Randee Millard   MRN: [id number]    Department:  [facility] Emergency Department   Date of Visit:  2/7/2017           After Visit Summary Signature Page     I have received my discharge instructions, and my questions have been answered. I have discussed any challenges I see with this plan with the nurse or doctor.    ..........................................................................................................................................  Patient/Patient Representative Signature      ..........................................................................................................................................  Patient Representative Print Name and Relationship to Patient    ..................................................               ................................................  Date                                            Time    ..........................................................................................................................................  Reviewed by Signature/Title    ...................................................              ..............................................  Date                                                            Time

## 2017-02-08 LAB
ANISOCYTOSIS BLD QL SMEAR: SLIGHT
BASOPHILS # BLD AUTO: 0 10E9/L (ref 0–0.2)
BASOPHILS NFR BLD AUTO: 0.3 %
DIFFERENTIAL METHOD BLD: ABNORMAL
EOSINOPHIL # BLD AUTO: 0.1 10E9/L (ref 0–0.7)
EOSINOPHIL NFR BLD AUTO: 0.8 %
ERYTHROCYTE [DISTWIDTH] IN BLOOD BY AUTOMATED COUNT: 16.9 % (ref 10–15)
HCT VFR BLD AUTO: 33.4 % (ref 35–47)
HGB BLD-MCNC: 10.6 G/DL (ref 11.7–15.7)
IMM GRANULOCYTES # BLD: 0 10E9/L (ref 0–0.4)
IMM GRANULOCYTES NFR BLD: 0.3 %
INTERPRETATION ECG - MUSE: NORMAL
LYMPHOCYTES # BLD AUTO: 5.3 10E9/L (ref 0.8–5.3)
LYMPHOCYTES NFR BLD AUTO: 44.4 %
MCH RBC QN AUTO: 24.4 PG (ref 26.5–33)
MCHC RBC AUTO-ENTMCNC: 31.7 G/DL (ref 31.5–36.5)
MCV RBC AUTO: 77 FL (ref 78–100)
MICROCYTES BLD QL SMEAR: PRESENT
MONOCYTES # BLD AUTO: 0.6 10E9/L (ref 0–1.3)
MONOCYTES NFR BLD AUTO: 5 %
NEUTROPHILS # BLD AUTO: 5.8 10E9/L (ref 1.6–8.3)
NEUTROPHILS NFR BLD AUTO: 49.2 %
NRBC # BLD AUTO: 0 10*3/UL
NRBC BLD AUTO-RTO: 0 /100
PLATELET # BLD AUTO: 361 10E9/L (ref 150–450)
PLATELET # BLD EST: NORMAL 10*3/UL
RBC # BLD AUTO: 4.34 10E12/L (ref 3.8–5.2)
WBC # BLD AUTO: 11.9 10E9/L (ref 4–11)

## 2017-02-08 NOTE — ED PROVIDER NOTES
"  History     Chief Complaint:  Chest Pain      HPI   Randee Millard is a 36 year old female who presents with chest pain. The patient reports the onset of sharp left mid chest pain that radiates through to her left mid back yesterday. She took tylenol for the pain with no improvement. She was seen at Urgent Care, and sent here for further evaluation. She denies any fevers, chills, cough, shortness of breath, palpitations, or leg swelling.     Cardiac Risk Factors:  CAD:    Negative  Hypertension:   Negative  Hyperlipidemia:  Negative  Diabetes:   Negative  Tobacco use:   Negative  Gender:   Female  Age:    36  Familial Hx of CAD:  Negative    PE/DVT Risk Factors:   Hx of PE/DVT:   Negative  Hx of clotting disorder:  Negative  Hx of cancer:    Negative  Tobacco use:    Negative  Hormone therapy:   Negative  Pregnancy:    Negative  Prolonged immobilization:  Negative  Recent surgery:   Negative  Recent travel:    Negative  Familial Hx of PE/DVT:  Negative      Allergies:  Amoxicillin, rash   Clindamycin, rash       Medications:    The patient is currently on no regular medications.      Past Medical History:    Reflux esophagitis   Anemia      Past Surgical History:    Breast Cyst Aspiration   ENT surgery      Family History:    Father: HTN. Diabetes      Social History:  Tobacco: Negative  Alcohol: Negative   Marital Status:   [2]     Review of Systems   Constitutional: Negative for fever and chills.   Respiratory: Negative for cough and shortness of breath.    Cardiovascular: Positive for chest pain. Negative for palpitations and leg swelling.   All other systems reviewed and are negative.      Physical Exam   First Vitals:  BP: 116/63 mmHg  Heart Rate: 74  Temp: 98.1  F (36.7  C)  Resp: 18  Height: 172.7 cm (5' 8\")  Weight: 86.183 kg (190 lb)  SpO2: 100 %      Physical Exam  General: Patient is alert and interactive when I enter the room  Head:  The scalp, face, and head appear normal  Eyes:  The pupils are " equal, round, and reactive to light    Conjunctivae and sclerae are normal  ENT:    External acoustic canals are normal    The oropharynx is normal without erythema.     Uvula is in the midline  Neck:  Normal range of motion  CV:  Regular rate. S1/S2. No murmurs.   Resp:  Lungs are clear without wheezes or rales. No distress  GI:  Abdomen is soft, no rigidity, guarding, or rebound    No distension. No tenderness to palpation in any quadrant.     MS:  Normal tone. Joints grossly normal without effusions.     No asymmetric leg swelling, calf or thigh tenderness.      Normal motor assessment of all extremities.  Tender chest wall left, no crepitance.   Skin:  No rash or lesions noted. Normal capillary refill noted  Neuro:  Speech is normal and fluent. Face is symmetric.     Moving all extremities well.   Psych:  Awake. Alert.  Normal affect.  Appropriate interactions.  Lymph: No anterior cervical lymphadenopathy noted      Emergency Department Course   ECG:  Time: 2130  Vent. Rate 72 bpm. IN interval 166. QRS duration 80. QT/QTc 402/440. P-R-T axis 24 17 31. Normal sinus rhythm with sinus arrhythmia. Normal ECG. Read time: 2132     Imaging:  Radiographic findings were communicated with the patient who voiced understanding of the findings.    Chest XR:  No acute abnormality. As per Radiology.     Laboratory:  CBC:  WBC 11.9 (H), HGB 10.6 (L),   CMP: Glucose 81, Calcium 8.4 (L), o/w WNL (Creat 0.60)    2143 Troponin: <0.015     D Dimer: <0.3    Interventions:  2342 Toradol 15mg PO    Emergency Department Course:  Nursing notes and vitals reviewed.  I performed an exam of the patient as documented above.   EKG obtained in the ED, see results above.   Blood drawn. This was sent to the lab for further testing, results above.   The patient was sent for the following imaging studies while in the emergency department: Chest XR.     2345 I rechecked the patient.     Findings and plan explained to the Patient. Patient  discharged home with instructions regarding supportive care, medications, and reasons to return. The importance of close follow-up was reviewed.      Impression & Plan      Medical Decision Making:  Randee Millard is a 36 year old female who presents with chest pain.  The work up in the Emergency Department is negative.  I considered a broad differential diagnosis in this patient including life-threatening etiologies such as acute coronary syndrome, myocardial infarction, pulmonary embolism, acute aortic dissection, myocarditis, pericarditis, acute valvular insufficiency amongst others.  Other causes considered for this patient included pneumonia, pneumothorax, chest wall source, pericarditis, pleurisy, esophageal spasm, etc.  No serious etiology for the chest pain were detected today during this visit.  Close follow up with primary care is indicated should the pain continue, as further work up may be performed; this was made clear to the patient, who understands.     Diagnosis:    ICD-10-CM    1. Acute chest pain R07.9        Disposition:  Discharged home.     Discharge Medications:  New Prescriptions    IBUPROFEN (ADVIL/MOTRIN) 600 MG TABLET    Take 1 tablet (600 mg) by mouth every 6 hours for 3 days Take every 6 hours with food for three days.     I, Kelsea Simpson, am serving as a scribe on 2/7/2017 at 11:08 PM to personally document services performed by Dr. Curtis based on my observations and the provider's statements to me.     Kelsea Simpson  2/7/2017   [facility] EMERGENCY DEPARTMENT        Presley Curtis MD  02/07/17 5627

## 2017-02-08 NOTE — DISCHARGE INSTRUCTIONS
Discharge Instructions  Chest Pain    You have been seen today for chest pain or discomfort.  At this time, your doctor has found no signs that your chest pain is due to a serious or life-threatening condition, (or you have declined more testing and/or admission to the hospital). However, sometimes there is a serious problem that does not show up right away. Your evaluation today may not be complete and you may need further testing and evaluation.     You need to follow-up with your regular doctor within 3 days.    Return to the Emergency Department if:    Your chest pain changes, gets worse, starts to happen more often, or comes with less activity.    You are short of breath.    You get very weak or tired.    You pass out or faint.    You have any new symptoms, like fever, cough, numb legs, or you cough up blood.    You have anything else that worries you.    Until you follow-up with your regular doctor please do the following:    Take one aspirin daily unless you have an allergy or are told not to by your doctor.    If a stress test appointment has been made, go to the appointment.    If you have questions, contact your regular doctor.    If your doctor today has told you to follow-up with your regular doctor, it is very important that you make an appointment with your clinic and go to the appointment.  If you do not follow-up with your primary doctor, it may result in missing an important development which could result in permanent injury or disability and/or lasting pain.  If there is any problem keeping your appointment, call your doctor or return to the Emergency Department.    If you were given a prescription for medicine here today, be sure to read all of the information (including the package insert) that comes with your prescription.  This will include important information about the medicine, its side effects, and any warnings that you need to know about.  The pharmacist who fills the prescription can  provide more information and answer questions you may have about the medicine.  If you have questions or concerns that the pharmacist cannot address, please call or return to the Emergency Department.

## 2017-02-08 NOTE — ED NOTES
A&Ox4. ABC's intact. Pt c/o chest pain that radiates to her left shoulder and back. States she took tylenol earlier.  Pain 5/10 at this time. Denies SOB.    Seen at  and sent to ED.

## 2017-05-01 ENCOUNTER — APPOINTMENT (OUTPATIENT)
Dept: GENERAL RADIOLOGY | Facility: CLINIC | Age: 37
End: 2017-05-01
Attending: EMERGENCY MEDICINE
Payer: COMMERCIAL

## 2017-05-01 ENCOUNTER — HOSPITAL ENCOUNTER (EMERGENCY)
Facility: CLINIC | Age: 37
Discharge: HOME | End: 2017-05-01
Attending: EMERGENCY MEDICINE | Admitting: EMERGENCY MEDICINE
Payer: COMMERCIAL

## 2017-05-01 VITALS
BODY MASS INDEX: 31.21 KG/M2 | HEART RATE: 94 BPM | RESPIRATION RATE: 18 BRPM | SYSTOLIC BLOOD PRESSURE: 104 MMHG | OXYGEN SATURATION: 92 % | WEIGHT: 205.25 LBS | TEMPERATURE: 97.5 F | DIASTOLIC BLOOD PRESSURE: 47 MMHG

## 2017-05-01 DIAGNOSIS — R50.9 FEVER, UNSPECIFIED: ICD-10-CM

## 2017-05-01 DIAGNOSIS — R05.9 COUGH: ICD-10-CM

## 2017-05-01 LAB
DEPRECATED S PYO AG THROAT QL EIA: NORMAL
FLUAV+FLUBV AG SPEC QL: NEGATIVE
FLUAV+FLUBV AG SPEC QL: NORMAL
MICRO REPORT STATUS: NORMAL
SPECIMEN SOURCE: NORMAL
SPECIMEN SOURCE: NORMAL

## 2017-05-01 PROCEDURE — 87081 CULTURE SCREEN ONLY: CPT | Performed by: EMERGENCY MEDICINE

## 2017-05-01 PROCEDURE — 87804 INFLUENZA ASSAY W/OPTIC: CPT | Performed by: EMERGENCY MEDICINE

## 2017-05-01 PROCEDURE — 25000132 ZZH RX MED GY IP 250 OP 250 PS 637: Performed by: EMERGENCY MEDICINE

## 2017-05-01 PROCEDURE — 40000985 XR CHEST 2 VW

## 2017-05-01 PROCEDURE — 99284 EMERGENCY DEPT VISIT MOD MDM: CPT

## 2017-05-01 PROCEDURE — 87880 STREP A ASSAY W/OPTIC: CPT | Performed by: EMERGENCY MEDICINE

## 2017-05-01 RX ORDER — IBUPROFEN 600 MG/1
600 TABLET, FILM COATED ORAL ONCE
Status: COMPLETED | OUTPATIENT
Start: 2017-05-01 | End: 2017-05-01

## 2017-05-01 RX ORDER — IBUPROFEN 200 MG
600 TABLET ORAL EVERY 8 HOURS PRN
Qty: 20 TABLET | Refills: 0 | Status: SHIPPED | OUTPATIENT
Start: 2017-05-01 | End: 2017-06-12

## 2017-05-01 RX ORDER — BENZONATATE 100 MG/1
100 CAPSULE ORAL 3 TIMES DAILY PRN
Qty: 15 CAPSULE | Refills: 0 | Status: SHIPPED | OUTPATIENT
Start: 2017-05-01 | End: 2017-06-07

## 2017-05-01 RX ADMIN — IBUPROFEN 600 MG: 600 TABLET ORAL at 18:27

## 2017-05-01 ASSESSMENT — ENCOUNTER SYMPTOMS
SHORTNESS OF BREATH: 0
FEVER: 1
COUGH: 1
HEADACHES: 1
SORE THROAT: 1
APPETITE CHANGE: 0
MYALGIAS: 1

## 2017-05-01 NOTE — ED AVS SNAPSHOT
[facility]    [address] [facility] [address]    Phone:  [phone]    Fax:  [phone]                                       Randee Millard   MRN: [id number]    Department:  [facility]   Date of Visit:  5/1/2017           Patient Information     Date Of Birth          1980        Your diagnoses for this visit were:     Cough     Fever, unspecified        You were seen by Ignacio Conley MD.      Follow-up Information     Follow up with Abe Hou MD In 3 days.    Specialty:  Internal Medicine    Why:  As needed.  Return to the ER right away if you have any worsening symptoms, trouble breathing, fever over 102, coughing up blood or green sputum, or if you have any concerns    Contact information:    [facility]  [address] [facility] [address]  [phone]          Discharge Instructions       Discharge Instructions  Upper Respiratory Infection    The upper respiratory tract includes the sinuses, nasal passages, pharynx, and larynx. A URI, or upper respiratory infection, is an infection of any of the parts of the upper airway. Symptoms include runny nose, congestion, sore throat, cough, and fever. URIs are almost always caused by a virus. Antibiotics do not help with virus infections, so are not used for an ordinary URI. A URI is very contagious through coughing and nasal secretions; make sure you wash your hands often and clean surfaces after sneezing, coughing or touching them.  Viruses can live on surfaces for up to 3 days.      Return to the Emergency Department if:    Any of the symptoms you have get much worse.    You seem very sick, like being too weak to get up.    You have any new symptoms, especially serious things like chest pain.     You are short of breath.     You have a severe headache.    You are vomiting so much you can t keep fluids or medicines down.    You  have confusion or seem unusually drowsy.    You have a seizure or convulsion.    Follow-up:      You should start to improve in 3 - 5 days.  A cough can linger for up to six weeks, but overall you should be feeling much better.  See your doctor if you have a fever for more than 3 days, or if you are not feeling better within 5 days.      What can I do to help myself?    Fill any prescriptions the doctor gave you and take them right away    If you have a fever, get plenty of rest and drink lots of fluids, especially water. Using a humidifier or saline nose spray will also help loosen secretions.     What clothes or blankets you have on won t change your fever. Do what is comfortable for you.    Bathing or sponging in lukewarm water may help you feel better.    Tylenol  (acetaminophen), Motrin  (ibuprofen), or Advil  (ibuprofen) help bring fever down and may help you feel more comfortable. Be sure to read and follow the package directions, and ask your doctor if you have questions.    Do not drink alcohol.    Decongestants may help you feel better. You may use decongestant nose sprays Afrin  (oxymetazoline) or Diego-Synephrine  (phenylephrine hydrochloride) for up to 3 days, or may use a decongestant tablet like Sudafed  (pseudoephedrine).  If you were given a prescription for medicine here today, be sure to read all of the information (including the package insert) that comes with your prescription.  This will include important information about the medicine, its side effects, and any warnings that you need to know about.  The pharmacist who fills the prescription can provide more information and answer questions you may have about the medicine.  If you have questions or concerns that the pharmacist cannot address, please call or return to the Emergency Department.   Opioid Medication Information    Pain medications are among the most commonly prescribed medicines, so we are including this information for all our  patients. If you did not receive pain medication or get a prescription for pain medicine, you can ignore it.     You may have been given a prescription for an opioid (narcotic) pain medicine and/or have received a pain medicine while here in the Emergency Department. These medicines can make you drowsy or impaired. You must not drive, operate dangerous equipment, or engage in any other dangerous activities while taking these medications. If you drive while taking these medications, you could be arrested for DUI, or driving under the influence. Do not drink any alcohol while you are taking these medications.     Opioid pain medications can cause addiction. If you have a history of chemical dependency of any type, you are at a higher risk of becoming addicted to pain medications.  Only take these prescribed medications to treat your pain when all other options have been tried. Take it for as short a time and as few doses as possible. Store your pain pills in a secure place, as they are frequently stolen and provide a dangerous opportunity for children or visitors in your house to start abusing these powerful medications. We will not replace any lost or stolen medicine.  As soon as your pain is better, you should flush all your remaining medication.     Many prescription pain medications contain Tylenol  (acetaminophen), including Vicodin , Tylenol #3 , Norco , Lortab , and Percocet .  You should not take any extra pills of Tylenol  if you are using these prescription medications or you can get very sick.  Do not ever take more than 3000 mg of acetaminophen in any 24 hour period.    All opioids tend to cause constipation. Drink plenty of water and eat foods that have a lot of fiber, such as fruits, vegetables, prune juice, apple juice and high fiber cereal.  Take a laxative if you don t move your bowels at least every other day. Miralax , Milk of Magnesia, Colace , or Senna  can be used to keep you regular.       Remember that you can always come back to the Emergency Department if you are not able to see your regular doctor in the amount of time listed above, if you get any new symptoms, or if there is anything that worries you.          24 Hour Appointment Hotline       To make an appointment at any [facility], call [phone] ([phone]). If you don't have a family doctor or clinic, we will help you find one. [facility] clinics are conveniently located to serve the needs of you and your family.             Review of your medicines      START taking        Dose / Directions Last dose taken    benzonatate 100 MG capsule   Commonly known as:  TESSALON   Dose:  100 mg   Quantity:  15 capsule        Take 1 capsule (100 mg) by mouth 3 times daily as needed for cough   Refills:  0        ibuprofen 200 MG tablet   Commonly known as:  ADVIL/MOTRIN   Dose:  600 mg   Quantity:  20 tablet        Take 3 tablets (600 mg) by mouth every 8 hours as needed for mild pain   Refills:  0          Our records show that you are taking the medicines listed below. If these are incorrect, please call your family doctor or clinic.        Dose / Directions Last dose taken    acetaminophen 500 MG tablet   Commonly known as:  TYLENOL   Dose:  500 mg   Quantity:  20 tablet        Take 1 tablet (500 mg) by mouth every 6 hours as needed for pain or fever   Refills:  0        ferrous sulfate 325 (65 FE) MG tablet   Commonly known as:  IRON   Dose:  325 mg   Quantity:  30 tablet        Take 1 tablet (325 mg) by mouth daily (with breakfast)   Refills:  2                Prescriptions were sent or printed at these locations (2 Prescriptions)                   Other Prescriptions                Printed at Department/Unit printer (2 of 2)         benzonatate (TESSALON) 100 MG capsule               ibuprofen (ADVIL/MOTRIN) 200 MG tablet                Procedures and tests performed during your visit     Beta strep group A culture    Influenza A/B  antigen    Rapid strep screen    XR Chest 2 Views      Orders Needing Specimen Collection     None      Pending Results     Date and Time Order Name Status Description    5/1/2017 1750 Beta strep group A culture In process     5/1/2017 1731 XR Chest 2 Views Preliminary             Pending Culture Results     Date and Time Order Name Status Description    5/1/2017 1750 Beta strep group A culture In process             Test Results From Your Hospital Stay        5/1/2017  6:20 PM      Narrative     CHEST TWO VIEWS  5/1/2017 6:11 PM     COMPARISON: Two-view chest x-ray 2/7/2017.    HISTORY: Cough, fever.    FINDINGS: The cardiac silhouette, pulmonary vasculature, lungs and  pleural spaces are within normal limits.        Impression     IMPRESSION: Clear lungs.         5/1/2017  6:33 PM      Component Results     Component Value Ref Range & Units Status    Influenza A/B Agn Specimen Nasal  Final    Influenza A Negative NEG Final    Influenza B  NEG Final    Negative   Test results must be correlated with clinical data. If necessary, results   should be confirmed by a molecular assay or viral culture.           5/1/2017  6:23 PM      Component Results     Component    Specimen Description    Throat    Rapid Strep A Screen    NEGATIVE: No Group A streptococcal antigen detected by immunoassay, await   culture report.      Micro Report Status    FINAL 05/01/2017 5/1/2017  6:23 PM                Clinical Quality Measure: Blood Pressure Screening     Your blood pressure was checked while you were in the emergency department today. The last reading we obtained was  BP: 131/75 . Please read the guidelines below about what these numbers mean and what you should do about them.  If your systolic blood pressure (the top number) is less than 120 and your diastolic blood pressure (the bottom number) is less than 80, then your blood pressure is normal. There is nothing more that you need to do about it.  If your systolic blood  pressure (the top number) is 120-139 or your diastolic blood pressure (the bottom number) is 80-89, your blood pressure may be higher than it should be. You should have your blood pressure rechecked within a year by a primary care provider.  If your systolic blood pressure (the top number) is 140 or greater or your diastolic blood pressure (the bottom number) is 90 or greater, you may have high blood pressure. High blood pressure is treatable, but if left untreated over time it can put you at risk for heart attack, stroke, or kidney failure. You should have your blood pressure rechecked by a primary care provider within the next 4 weeks.  If your provider in the emergency department today gave you specific instructions to follow-up with your doctor or provider even sooner than that, you should follow that instruction and not wait for up to 4 weeks for your follow-up visit.        Thank you for choosing [facility]       Thank you for choosing [facility] for your care. Our goal is always to provide you with excellent care. Hearing back from our patients is one way we can continue to improve our services. Please take a few minutes to complete the written survey that you may receive in the mail after you visit with us. Thank you!        [facility] Information     [facility] gives you secure access to your electronic health record. If you see a primary care provider, you can also send messages to your care team and make appointments. If you have questions, please call your primary care clinic.  If you do not have a primary care provider, please call [phone] and they will assist you.        Care EveryWhere ID     This is your Care EveryWhere ID. This could be used by other organizations to access your [facility] medical records  [id number]        After Visit Summary       This is your record. Keep this with you and show to your community pharmacist(s) and doctor(s) at your next visit.

## 2017-05-01 NOTE — ED AVS SNAPSHOT
[facility] Emergency Department    [address]    [facility] [address]    Phone:  [phone]    Fax:  [phone]                                       Randee Millard   MRN: [id number]    Department:  [facility] Emergency Department   Date of Visit:  5/1/2017           After Visit Summary Signature Page     I have received my discharge instructions, and my questions have been answered. I have discussed any challenges I see with this plan with the nurse or doctor.    ..........................................................................................................................................  Patient/Patient Representative Signature      ..........................................................................................................................................  Patient Representative Print Name and Relationship to Patient    ..................................................               ................................................  Date                                            Time    ..........................................................................................................................................  Reviewed by Signature/Title    ...................................................              ..............................................  Date                                                            Time

## 2017-05-01 NOTE — ED PROVIDER NOTES
History     Chief Complaint:  Fever    HPI   Randee Millard is a 36 year old female with a history of Anemia who presents to the emergency department today for evaluation of a fever. The patient states that for the last 2 days he has been experiencing a cough, headache, fever, sore throat, and body aches. She also states that on Saturday she had some swelling in her lower extremities. The patient states that she does not know of anything specific that brought on her symptoms and has not been exposed to anyone who is sick. The patient denies any trouble breathing. The patient denies any chance that she could be pregnant. The patient has been taking Tylenol for her symptoms and her last dose was around 1400.    Allergies:  Amoxicillin, rash  Clindamycin, rash     Medications:    Tylenol  Iron     Past Medical History:    Anemia  Reflux Esophagitis    Past Surgical History:    ENT Surgery  US Breast Cyst Aspiration    Family History:    Father positive for Hypertension and Diabetes    Social History:  Smoking Status: negative  Smokeless Tobacco: negative  Alcohol Use: negative    Marital Status:   [2]     Review of Systems   Constitutional: Positive for fever. Negative for appetite change.   HENT: Positive for sore throat.    Respiratory: Positive for cough. Negative for shortness of breath.    Musculoskeletal: Positive for myalgias.   Neurological: Positive for headaches.   All other systems reviewed and are negative.      Physical Exam   First Vitals:  BP: 131/75  Pulse: 94  Temp: 97.5  F (36.4  C)  Resp: 18  Weight: 93.1 kg (205 lb 4 oz)  SpO2: 99 %    Physical Exam  Constitutional:  Appears well-developed and well-nourished. Alert. Conversant. Non toxic.  HENT:   Head: Atraumatic.   Nose: Nose normal.  Mucosa normal.  No purulent rhinorrhea.  TMs normal  Mouth/Throat: Oral mucosa is clear and moist. no trismus. Pharynx erythematous without exudates or petechiae.  Uvula midline.  Phonation normal.. Tonsils  symmetric.  Minimal, symmetric tonsillar enlargement.  Eyes: Conjunctivae normal. EOM normal. Pupils equal, round, and reactive to light. No scleral icterus.   Neck: Normal range of motion.  No stiffness or meningismus.  Neck supple. No tracheal deviation present.   Cardiovascular: Normal rate, regular rhythm. No gallop. No friction rub. No murmur heard. Symmetric radial artery pulses   Pulmonary/Chest: Effort normal. No stridor. No respiratory distress. No wheezes. No rales. No rhonchi . No tenderness.   Abdominal: Soft. Bowel sounds normal. No distension. No mass. No tenderness.  No hepatosplenomegaly.  No rebound. No guarding.   Musculoskeletal: Normal range of motion. No edema. No tenderness. No deformity   Lymph: No cervical adenopathy.   Neurological: Alert and oriented to person, place, and time. Normal strength. CN II-VII intact. No sensory deficit. GCS eye subscore is 4. GCS verbal subscore is 5. GCS motor subscore is 6. Normal coordination   Skin: Skin is warm and dry. No rash noted. No pallor. Normal capillary refill.  Psychiatric:  Normal mood. Normal affect.     Emergency Department Course   Imaging:  Radiographic findings were communicated with the patient who voiced understanding of the findings.    XR Chest 2 Views:  Clear lungs. As per radiology.    Laboratory:  Influenza A/B antigen: Both A/B negative  1823 Rapid Strep Screen: Negative  Beta strep group A culture (In process)    Interventions:  1827 Ibuprofen 600 mg Oral    Emergency Department Course:  Nursing notes and vitals reviewed. I performed an exam of the patient as documented above.     I swabbed the patient's throat to test for strep throat and Influenza. Lab results above.     The patient was sent for the following imaging studies while in the emergency department: Chest XR.    2024 I reevaluated the patient and provided an update in regards to her ED course.      Findings and plan explained to the Patient. Patient discharged home with  "instructions regarding supportive care, medications, and reasons to return. The importance of close follow-up was reviewed.     Impression & Plan    Medical Decision Making:  Randee Millard is a 36 year old female who presents for evaluation of cough fever and sore throat and body aches and headache.  This is consistent with an influenza-like illness or other respiratory tract infection.  There is no signs at this point of serious bacterial infection such as OM, RPA, epiglottitis, PTA, strep pharyngitis, pneumonia, sinusitis, meningitis, bacteremia, serious bacterial infection. CXR is negative for pneumonia.  Influenza screen is negative.  Although sensitivity of that test is not 100%, she would already be outside the \"window\" for treatment with Tamiflu.  She does have sore throat and clinical evidence for mild pharyngitis but no exudates or petechiae.  No peritonsillar abscess, uvulitis or airway compromise.  Rapid strep is negative.  Patient understands that strep culture is pending and if it comes back positive we'll call her in 1-2 days..   There are no gastrointestinal symptoms at this point and no signs of dehydration.  Close followup with primary care physician is indicated.  Return to ED for fever > 103, protracted vomiting, confusion.      Diagnosis:    ICD-10-CM    1. Cough R05    2. Fever, unspecified R50.9       Disposition:  Discharged to home.    Discharge Medications:  New Prescriptions    BENZONATATE (TESSALON) 100 MG CAPSULE    Take 1 capsule (100 mg) by mouth 3 times daily as needed for cough    IBUPROFEN (ADVIL/MOTRIN) 200 MG TABLET    Take 3 tablets (600 mg) by mouth every 8 hours as needed for mild pain     Aleida CARRINGTON, am serving as a scribe on 5/1/2017 at 5:44 PM to personally document services performed by Ignacio Conley, * based on my observations and the provider's statements to me.     Aleida Herndon  5/1/2017   [facility] EMERGENCY DEPARTMENT       Jarvis, " Ignacio Cuello MD  05/02/17 0126

## 2017-05-02 NOTE — DISCHARGE INSTRUCTIONS
Discharge Instructions  Upper Respiratory Infection    The upper respiratory tract includes the sinuses, nasal passages, pharynx, and larynx. A URI, or upper respiratory infection, is an infection of any of the parts of the upper airway. Symptoms include runny nose, congestion, sore throat, cough, and fever. URIs are almost always caused by a virus. Antibiotics do not help with virus infections, so are not used for an ordinary URI. A URI is very contagious through coughing and nasal secretions; make sure you wash your hands often and clean surfaces after sneezing, coughing or touching them.  Viruses can live on surfaces for up to 3 days.      Return to the Emergency Department if:    Any of the symptoms you have get much worse.    You seem very sick, like being too weak to get up.    You have any new symptoms, especially serious things like chest pain.     You are short of breath.     You have a severe headache.    You are vomiting so much you can t keep fluids or medicines down.    You have confusion or seem unusually drowsy.    You have a seizure or convulsion.    Follow-up:      You should start to improve in 3 - 5 days.  A cough can linger for up to six weeks, but overall you should be feeling much better.  See your doctor if you have a fever for more than 3 days, or if you are not feeling better within 5 days.      What can I do to help myself?    Fill any prescriptions the doctor gave you and take them right away    If you have a fever, get plenty of rest and drink lots of fluids, especially water. Using a humidifier or saline nose spray will also help loosen secretions.     What clothes or blankets you have on won t change your fever. Do what is comfortable for you.    Bathing or sponging in lukewarm water may help you feel better.    Tylenol  (acetaminophen), Motrin  (ibuprofen), or Advil  (ibuprofen) help bring fever down and may help you feel more comfortable. Be sure to read and follow the package  directions, and ask your doctor if you have questions.    Do not drink alcohol.    Decongestants may help you feel better. You may use decongestant nose sprays Afrin  (oxymetazoline) or Diego-Synephrine  (phenylephrine hydrochloride) for up to 3 days, or may use a decongestant tablet like Sudafed  (pseudoephedrine).  If you were given a prescription for medicine here today, be sure to read all of the information (including the package insert) that comes with your prescription.  This will include important information about the medicine, its side effects, and any warnings that you need to know about.  The pharmacist who fills the prescription can provide more information and answer questions you may have about the medicine.  If you have questions or concerns that the pharmacist cannot address, please call or return to the Emergency Department.   Opioid Medication Information    Pain medications are among the most commonly prescribed medicines, so we are including this information for all our patients. If you did not receive pain medication or get a prescription for pain medicine, you can ignore it.     You may have been given a prescription for an opioid (narcotic) pain medicine and/or have received a pain medicine while here in the Emergency Department. These medicines can make you drowsy or impaired. You must not drive, operate dangerous equipment, or engage in any other dangerous activities while taking these medications. If you drive while taking these medications, you could be arrested for DUI, or driving under the influence. Do not drink any alcohol while you are taking these medications.     Opioid pain medications can cause addiction. If you have a history of chemical dependency of any type, you are at a higher risk of becoming addicted to pain medications.  Only take these prescribed medications to treat your pain when all other options have been tried. Take it for as short a time and as few doses as possible.  Store your pain pills in a secure place, as they are frequently stolen and provide a dangerous opportunity for children or visitors in your house to start abusing these powerful medications. We will not replace any lost or stolen medicine.  As soon as your pain is better, you should flush all your remaining medication.     Many prescription pain medications contain Tylenol  (acetaminophen), including Vicodin , Tylenol #3 , Norco , Lortab , and Percocet .  You should not take any extra pills of Tylenol  if you are using these prescription medications or you can get very sick.  Do not ever take more than 3000 mg of acetaminophen in any 24 hour period.    All opioids tend to cause constipation. Drink plenty of water and eat foods that have a lot of fiber, such as fruits, vegetables, prune juice, apple juice and high fiber cereal.  Take a laxative if you don t move your bowels at least every other day. Miralax , Milk of Magnesia, Colace , or Senna  can be used to keep you regular.      Remember that you can always come back to the Emergency Department if you are not able to see your regular doctor in the amount of time listed above, if you get any new symptoms, or if there is anything that worries you.

## 2017-05-03 LAB
BACTERIA SPEC CULT: NORMAL
MICRO REPORT STATUS: NORMAL
SPECIMEN SOURCE: NORMAL

## 2017-06-07 ENCOUNTER — OFFICE VISIT (OUTPATIENT)
Dept: INTERNAL MEDICINE | Facility: CLINIC | Age: 37
End: 2017-06-07
Payer: COMMERCIAL

## 2017-06-07 VITALS
BODY MASS INDEX: 30.46 KG/M2 | OXYGEN SATURATION: 98 % | HEIGHT: 68 IN | HEART RATE: 101 BPM | TEMPERATURE: 98.7 F | SYSTOLIC BLOOD PRESSURE: 110 MMHG | DIASTOLIC BLOOD PRESSURE: 72 MMHG | WEIGHT: 201 LBS

## 2017-06-07 DIAGNOSIS — R35.0 URINARY FREQUENCY: ICD-10-CM

## 2017-06-07 DIAGNOSIS — M54.41 ACUTE RIGHT-SIDED LOW BACK PAIN WITH RIGHT-SIDED SCIATICA: Primary | ICD-10-CM

## 2017-06-07 DIAGNOSIS — D64.89 ANEMIA DUE TO OTHER CAUSE: ICD-10-CM

## 2017-06-07 PROBLEM — D64.9 LOW HEMOGLOBIN: Status: ACTIVE | Noted: 2017-06-07

## 2017-06-07 LAB
ALBUMIN UR-MCNC: NEGATIVE MG/DL
APPEARANCE UR: CLEAR
BILIRUB UR QL STRIP: NEGATIVE
COLOR UR AUTO: YELLOW
ERYTHROCYTE [DISTWIDTH] IN BLOOD BY AUTOMATED COUNT: 17.4 % (ref 10–15)
GLUCOSE UR STRIP-MCNC: NEGATIVE MG/DL
HCT VFR BLD AUTO: 32.9 % (ref 35–47)
HGB BLD-MCNC: 10.4 G/DL (ref 11.7–15.7)
HGB UR QL STRIP: NEGATIVE
KETONES UR STRIP-MCNC: NEGATIVE MG/DL
LEUKOCYTE ESTERASE UR QL STRIP: NEGATIVE
MCH RBC QN AUTO: 23.6 PG (ref 26.5–33)
MCHC RBC AUTO-ENTMCNC: 31.6 G/DL (ref 31.5–36.5)
MCV RBC AUTO: 75 FL (ref 78–100)
NITRATE UR QL: NEGATIVE
PH UR STRIP: 5 PH (ref 5–7)
PLATELET # BLD AUTO: 327 10E9/L (ref 150–450)
RBC # BLD AUTO: 4.4 10E12/L (ref 3.8–5.2)
RBC #/AREA URNS AUTO: NORMAL /HPF (ref 0–2)
SP GR UR STRIP: 1.02 (ref 1–1.03)
URN SPEC COLLECT METH UR: NORMAL
UROBILINOGEN UR STRIP-ACNC: 0.2 EU/DL (ref 0.2–1)
WBC # BLD AUTO: 9.1 10E9/L (ref 4–11)
WBC #/AREA URNS AUTO: NORMAL /HPF (ref 0–2)

## 2017-06-07 PROCEDURE — 99214 OFFICE O/P EST MOD 30 MIN: CPT | Performed by: INTERNAL MEDICINE

## 2017-06-07 PROCEDURE — 85027 COMPLETE CBC AUTOMATED: CPT | Performed by: INTERNAL MEDICINE

## 2017-06-07 PROCEDURE — 36415 COLL VENOUS BLD VENIPUNCTURE: CPT | Performed by: INTERNAL MEDICINE

## 2017-06-07 PROCEDURE — 82306 VITAMIN D 25 HYDROXY: CPT | Performed by: INTERNAL MEDICINE

## 2017-06-07 PROCEDURE — 81001 URINALYSIS AUTO W/SCOPE: CPT | Performed by: INTERNAL MEDICINE

## 2017-06-07 RX ORDER — METHYLPREDNISOLONE 4 MG
TABLET, DOSE PACK ORAL
Qty: 21 TABLET | Refills: 0 | Status: SHIPPED | OUTPATIENT
Start: 2017-06-07 | End: 2017-06-11

## 2017-06-07 NOTE — NURSING NOTE
"Chief Complaint   Patient presents with     Back Pain     for 2 weeks, low mid back pain, left front pain, right hip pain       Initial /72 (BP Location: Right arm, Cuff Size: Adult Regular)  Pulse 101  Temp 98.7  F (37.1  C) (Oral)  Ht 5' 8\" (1.727 m)  Wt 201 lb (91.2 kg)  LMP 05/31/2017  SpO2 98%  Breastfeeding? No  BMI 30.56 kg/m2 Estimated body mass index is 30.56 kg/(m^2) as calculated from the following:    Height as of this encounter: 5' 8\" (1.727 m).    Weight as of this encounter: 201 lb (91.2 kg).  Medication Reconciliation: complete   Bibiana Fonseca CMA      "

## 2017-06-07 NOTE — PROGRESS NOTES
"  SUBJECTIVE:                                                    Randee Millard is a 36 year old female who presents to clinic today for the following health issues:    Pt is a 36 year old female who is seen here to day  with complains of low back pain since 2 wks, radiating to bilateral hips but more to Rt and also radiating to rt leg , There is no numbness/tingling in bilateral LE.No bladder or bowel incontinence.no weakness,  pt taking occ Tylenol/ibupreofen for the pain.    Pt c/o frequency of urination since 1wk, no dysuria, no blood in urine. No urgency. No fever/chills.     Pt is also here to recheck on hemoglobin, pt did not increase her iron pills as recommended in 01/17 as pt did not check the result note in My chart, pt does have heavy bleeding during menses.    Requesting Vit D check.      Past Medical History:   Diagnosis Date     Anemia      Reflux esophagitis             Current Outpatient Prescriptions   Medication Sig Dispense Refill     ibuprofen (ADVIL/MOTRIN) 200 MG tablet Take 3 tablets (600 mg) by mouth every 8 hours as needed for mild pain 20 tablet 0     acetaminophen (TYLENOL) 500 MG tablet Take 1 tablet (500 mg) by mouth every 6 hours as needed for pain or fever 20 tablet 0     ferrous sulfate (IRON) 325 (65 FE) MG tablet Take 1 tablet (325 mg) by mouth daily (with breakfast) 30 tablet 2         Ros;  General;negative  MS;back pain.  ; frequency of urination   Resp;negative  Cns;negative    Blood pressure 110/72, pulse 101, temperature 98.7  F (37.1  C), temperature source Oral, height 5' 8\" (1.727 m), weight 201 lb (91.2 kg), last menstrual period 05/31/2017, SpO2 98 %, not currently breastfeeding.  GENERAL:healthy, alert and no distress  HEENT-pupils equal and reactive to light and accommodation, oropharynx clear  RESP: Normal - Clear to auscultation without rales, rhonchi, or wheezing.  CV: regular rate and rhythm  with normal S1, S2 ; no murmur, rub or gallops  MS:Lumbo-sacral spine " area reveals no tenderness on lumbar spine.Tenderness present on Rt gluteus muscles.Straight leg raise positive at 40 degrees rt side   CNS: DTR's 2+, motor strength and sensation normal,  Ext: no LE edema, no calf tenderness.Peripheral pulses are palpable.       ASSESSMENT AND PLAN:        (M54.41) Acute right-sided low back pain with right-sided sciatica  (primary encounter diagnosis)  Plan:explained about the condition started on methylPREDNISolone (MEDROL DOSEPAK) 4 MG tablet as directed.explained clearly about the medication,insructions and side effects.   advised OTC Ibuprofen as directed.Intermittent rest, proper lifting with avoidance of heavy lifting and intermittent use of heat discussed - avoid sleeping on a heating pad.  Call or return to clinic prn if these symtoms worsen, fail to improve as anticipated, or if new symptoms develop.  Check for  Vitamin D Deficiency           (R35.0) Urinary frequency  Plan: UA with Microscopic reflex to Culture.pt was told I will contact her after results and proceed accordingly.    (D64.89) Anemia due to other cause  Plan: check CBC.pt was told I will contact her after results and proceed accordingly.

## 2017-06-07 NOTE — MR AVS SNAPSHOT
"              After Visit Summary   6/7/2017    Randee Millard    MRN: [id number]           Patient Information     Date Of Birth          1980        Visit Information        Provider Department      6/7/2017 1:40 PM Abe Hou MD [facility]        Today's Diagnoses     Acute right-sided low back pain with right-sided sciatica    -  1    Urinary frequency        Anemia due to other cause           Follow-ups after your visit        Who to contact     If you have questions or need follow up information about today's clinic visit or your schedule please contact [facility] directly at [phone].  Normal or non-critical lab and imaging results will be communicated to you by [employer], letter or phone within 4 business days after the clinic has received the results. If you do not hear from us within 7 days, please contact the clinic through [employer] or phone. If you have a critical or abnormal lab result, we will notify you by phone as soon as possible.  Submit refill requests through [employer] or call your pharmacy and they will forward the refill request to us. Please allow 3 business days for your refill to be completed.          Additional Information About Your Visit        MyChart Information     [employer] gives you secure access to your electronic health record. If you see a primary care provider, you can also send messages to your care team and make appointments. If you have questions, please call your primary care clinic.  If you do not have a primary care provider, please call [phone] and they will assist you.        Care EveryWhere ID     This is your Care EveryWhere ID. This could be used by other organizations to access your [facility] medical records  [id number]        Your Vitals Were     Pulse Temperature Height Last Period Pulse Oximetry Breastfeeding?    101 98.7  F (37.1  C) (Oral) 5' 8\" (1.727 m) 05/31/2017 98% No    BMI (Body Mass Index)       "             30.56 kg/m2            Blood Pressure from Last 3 Encounters:   06/07/17 110/72   05/01/17 104/47   02/07/17 (!) 132/110    Weight from Last 3 Encounters:   06/07/17 201 lb (91.2 kg)   05/01/17 205 lb 4 oz (93.1 kg)   02/07/17 190 lb (86.2 kg)              We Performed the Following     CBC with platelets     UA with Microscopic reflex to Culture     Vitamin D Deficiency          Today's Medication Changes          These changes are accurate as of: 6/7/17  2:19 PM.  If you have any questions, ask your nurse or doctor.               Start taking these medicines.        Dose/Directions    methylPREDNISolone 4 MG tablet   Commonly known as:  MEDROL DOSEPAK   Used for:  Acute right-sided low back pain with right-sided sciatica   Started by:  Abe Hou MD        Follow package instructions   Quantity:  21 tablet   Refills:  0            Where to get your medicines      These medications were sent to [id number]  [place], [place] - [address] AT [place] [address]  [address], [place] [address]     Phone:  [phone]     methylPREDNISolone 4 MG tablet                Primary Care Provider Office Phone # Fax #    Abe Hou [phone]       [facility] [address] [place] [address]        Thank you!     Thank you for choosing [facility]  for your care. Our goal is always to provide you with excellent care. Hearing back from our patients is one way we can continue to improve our services. Please take a few minutes to complete the written survey that you may receive in the mail after your visit with us. Thank you!             Your Updated Medication List - Protect others around you: Learn how to safely use, store and throw away your medicines at www.disposemymeds.org.          This list is accurate as of: 6/7/17  2:19 PM.  Always use your most recent med list.                   Brand Name Dispense  Instructions for use    acetaminophen 500 MG tablet    TYLENOL    20 tablet    Take 1 tablet (500 mg) by mouth every 6 hours as needed for pain or fever       ferrous sulfate 325 (65 FE) MG tablet    IRON    30 tablet    Take 1 tablet (325 mg) by mouth daily (with breakfast)       ibuprofen 200 MG tablet    ADVIL/MOTRIN    20 tablet    Take 3 tablets (600 mg) by mouth every 8 hours as needed for mild pain       methylPREDNISolone 4 MG tablet    MEDROL DOSEPAK    21 tablet    Follow package instructions

## 2017-06-08 LAB — DEPRECATED CALCIDIOL+CALCIFEROL SERPL-MC: 19 UG/L (ref 20–75)

## 2017-06-11 ENCOUNTER — HOSPITAL ENCOUNTER (EMERGENCY)
Facility: CLINIC | Age: 37
Discharge: HOME | End: 2017-06-12
Attending: EMERGENCY MEDICINE | Admitting: EMERGENCY MEDICINE
Payer: COMMERCIAL

## 2017-06-11 DIAGNOSIS — R11.0 NAUSEA: ICD-10-CM

## 2017-06-11 DIAGNOSIS — R07.89 ATYPICAL CHEST PAIN: ICD-10-CM

## 2017-06-11 LAB — TROPONIN I BLD-MCNC: 0 UG/L (ref 0–0.1)

## 2017-06-11 PROCEDURE — 83690 ASSAY OF LIPASE: CPT | Performed by: EMERGENCY MEDICINE

## 2017-06-11 PROCEDURE — 99285 EMERGENCY DEPT VISIT HI MDM: CPT

## 2017-06-11 PROCEDURE — 80048 BASIC METABOLIC PNL TOTAL CA: CPT | Performed by: EMERGENCY MEDICINE

## 2017-06-11 PROCEDURE — 96374 THER/PROPH/DIAG INJ IV PUSH: CPT

## 2017-06-11 PROCEDURE — 84484 ASSAY OF TROPONIN QUANT: CPT

## 2017-06-11 PROCEDURE — 81001 URINALYSIS AUTO W/SCOPE: CPT | Performed by: EMERGENCY MEDICINE

## 2017-06-11 PROCEDURE — 85025 COMPLETE CBC W/AUTO DIFF WBC: CPT | Performed by: EMERGENCY MEDICINE

## 2017-06-11 RX ORDER — KETOROLAC TROMETHAMINE 30 MG/ML
30 INJECTION, SOLUTION INTRAMUSCULAR; INTRAVENOUS ONCE
Status: COMPLETED | OUTPATIENT
Start: 2017-06-11 | End: 2017-06-12

## 2017-06-11 ASSESSMENT — ENCOUNTER SYMPTOMS
ABDOMINAL PAIN: 1
DYSURIA: 1
NAUSEA: 1
FREQUENCY: 1

## 2017-06-11 NOTE — ED AVS SNAPSHOT
[facility] Emergency Department    [address]    [facility] [address]    Phone:  [phone]    Fax:  [phone]                                       Randee Millard   MRN: [id number]    Department:  [facility] Emergency Department   Date of Visit:  6/11/2017           After Visit Summary Signature Page     I have received my discharge instructions, and my questions have been answered. I have discussed any challenges I see with this plan with the nurse or doctor.    ..........................................................................................................................................  Patient/Patient Representative Signature      ..........................................................................................................................................  Patient Representative Print Name and Relationship to Patient    ..................................................               ................................................  Date                                            Time    ..........................................................................................................................................  Reviewed by Signature/Title    ...................................................              ..............................................  Date                                                            Time

## 2017-06-11 NOTE — ED AVS SNAPSHOT
[facility] Emergency Department    [address]    [facility] [address]    Phone:  [phone]    Fax:  [phone]                                       Randee Millard   MRN: [id number]    Department:  [facility] Emergency Department   Date of Visit:  6/11/2017           Patient Information     Date Of Birth          1980        Your diagnoses for this visit were:     Atypical chest pain     Nausea        You were seen by Remington Multani MD.      Follow-up Information     Follow up with Abe Hou MD In 2 days.    Specialty:  Internal Medicine    Contact information:    [facility]  [address] [facility] [address]  [phone]          Follow up with [facility] Emergency Department.    Specialty:  EMERGENCY MEDICINE    Why:  If symptoms worsen    Contact information:    [address] [facility] [address]  [phone]        Discharge Instructions         Chest Wall Pain: Costochondritis    The chest pain that you have had today is caused by costochondritis. This condition is caused by an inflammation of the cartilage joining your ribs to your breastbone. It is not caused by heart or lung problems. The inflammation may have been brought on by a blow to the chest, lifting heavy objects, intense exercise, or an illness that made you cough and sneeze. It often occurs during times of emotional stress. It can be painful, but it is not dangerous. It usually goes away in 1 to 2 weeks. But it may happen again. Rarely, a more serious condition may cause symptoms similar to costochondritis. That s why it s important to watch for the warning signs listed below.  Home care  Follow these guidelines when caring for yourself at home:    If you feel that emotional stress is a cause of your condition, try to figure out the sources of that stress. It may not be obvious! Learn ways to deal with the stress in your  life. This can include regular exercise, muscle relaxation, meditation, or simply taking time out for yourself. For more information about this, talk with your health care provider. Or go to your local library and look at books on  stress reduction.     You may use acetaminophen or ibuprofen to control pain, unless another pain medicine was prescribed. If you have liver disease or ever had a stomach ulcer, talk with your health care provider before using these medicines.    You can also help ease pain by using a hot, wet compress or heating pad. Use this with or without a medicated skin cream that helps relieves pain.    Do stretching exercise as advised by your provider.    Take any prescribed medicines as directed.  Follow-up care  Follow up with your health care provider, or as advised, if you do not start to get better in the next 2 days.  When to seek medical advice  Call your health care provider right away if any of these occur:    A change in the type of pain. Call if it feels different, becomes more serious, lasts longer, or spreads into your shoulder, arm, neck, jaw, or back.    Shortness of breath or pain gets worse when you breathe    Weakness, dizziness, or fainting    Cough with dark-colored sputum (phlegm) or blood    Abdominal pain    Dark red or black stools    Fever of 100.4 F (38 C) or higher, or as directed by your health care provider    3478-3521 The "[employer] [address] [place], [place] [address]. All rights reserved. This information is not intended as a substitute for professional medical care. Always follow your healthcare professional's instructions.          Discharge References/Attachments     NAUSEA AND VOMITING, HOW TO CONTROL (ENGLISH)      24 Hour Appointment Hotline       To make an appointment at any [facility], call [phone] ([phone]). If you don't have a family doctor or clinic, we will help you find one. [facility] are conveniently  located to serve the needs of you and your family.             Review of your medicines      START taking        Dose / Directions Last dose taken    ondansetron 4 MG tablet   Commonly known as:  ZOFRAN   Dose:  4 mg   Quantity:  8 tablet        Take 1 tablet (4 mg) by mouth every 6 hours   Refills:  0          CONTINUE these medicines which may have CHANGED, or have new prescriptions. If we are uncertain of the size of tablets/capsules you have at home, strength may be listed as something that might have changed.        Dose / Directions Last dose taken    ibuprofen 600 MG tablet   Commonly known as:  ADVIL/MOTRIN   Dose:  600 mg   What changed:    - medication strength  - when to take this  - reasons to take this   Quantity:  20 tablet        Take 1 tablet (600 mg) by mouth every 6 hours as needed for moderate pain   Refills:  1          Our records show that you are taking the medicines listed below. If these are incorrect, please call your family doctor or clinic.        Dose / Directions Last dose taken    acetaminophen 500 MG tablet   Commonly known as:  TYLENOL   Dose:  500 mg   Quantity:  20 tablet        Take 1 tablet (500 mg) by mouth every 6 hours as needed for pain or fever   Refills:  0        ferrous sulfate 325 (65 FE) MG tablet   Commonly known as:  IRON   Dose:  325 mg   Quantity:  30 tablet        Take 1 tablet (325 mg) by mouth daily (with breakfast)   Refills:  2                Prescriptions were sent or printed at these locations (2 Prescriptions)                   Other Prescriptions                Printed at Department/Unit printer (2 of 2)         ibuprofen (ADVIL/MOTRIN) 600 MG tablet               ondansetron (ZOFRAN) 4 MG tablet                Procedures and tests performed during your visit     Basic metabolic panel (BMP)    CBC + differential    EKG 12 lead    ISTAT troponin    IV access    Lipase    Troponin POCT    UA with Microscopic    XR Chest 2 Views      Orders Needing Specimen  Collection     None      Pending Results     Date and Time Order Name Status Description    6/11/2017 2324 EKG 12 lead Preliminary             Pending Culture Results     No orders found for last 3 day(s).            Pending Results Instructions     If you had any lab results that were not finalized at the time of your Discharge, you can call the ED Lab Result RN at [phone]. You will be contacted by this team for any positive Lab results or changes in treatment. The nurses are available 7 days a week from 10A to 6:30P.  You can leave a message 24 hours per day and they will return your call.        Test Results From Your Hospital Stay        6/12/2017 12:22 AM      Component Results     Component Value Ref Range & Units Status    WBC 13.5 (H) 4.0 - 11.0 10e9/L Final    RBC Count 4.28 3.8 - 5.2 10e12/L Final    Hemoglobin 10.2 (L) 11.7 - 15.7 g/dL Final    Hematocrit 32.5 (L) 35.0 - 47.0 % Final    MCV 76 (L) 78 - 100 fl Final    MCH 23.8 (L) 26.5 - 33.0 pg Final    MCHC 31.4 (L) 31.5 - 36.5 g/dL Final    RDW 17.1 (H) 10.0 - 15.0 % Final    Platelet Count 351 150 - 450 10e9/L Final    Diff Method Automated Method  Final    % Neutrophils 55.3 % Final    % Lymphocytes 37.6 % Final    % Monocytes 5.8 % Final    % Eosinophils 0.7 % Final    % Basophils 0.3 % Final    % Immature Granulocytes 0.3 % Final    Nucleated RBCs 0 0 /100 Final    Absolute Neutrophil 7.4 1.6 - 8.3 10e9/L Final    Absolute Lymphocytes 5.1 0.8 - 5.3 10e9/L Final    Absolute Monocytes 0.8 0.0 - 1.3 10e9/L Final    Absolute Eosinophils 0.1 0.0 - 0.7 10e9/L Final    Absolute Basophils 0.0 0.0 - 0.2 10e9/L Final    Abs Immature Granulocytes 0.0 0 - 0.4 10e9/L Final    Absolute Nucleated RBC 0.0  Final    Anisocytosis Slight  Final    Elliptocytes Slight  Final    Platelet Estimate Normal  Final         6/12/2017 12:01 AM      Component Results     Component Value Ref Range & Units Status    Sodium 137 133 - 144 mmol/L Final    Potassium 3.4 3.4 -  5.3 mmol/L Final    Chloride 107 94 - 109 mmol/L Final    Carbon Dioxide 25 20 - 32 mmol/L Final    Anion Gap 5 3 - 14 mmol/L Final    Glucose 87 70 - 99 mg/dL Final    Urea Nitrogen 8 7 - 30 mg/dL Final    Creatinine 0.77 0.52 - 1.04 mg/dL Final    GFR Estimate 84 >60 mL/min/1.7m2 Final    Non  GFR Calc    GFR Estimate If Black >90   GFR Calc   >60 mL/min/1.7m2 Final    Calcium 8.6 8.5 - 10.1 mg/dL Final         6/12/2017 12:43 AM      Narrative     XR CHEST 2 VW  6/12/2017 12:40 AM      HISTORY: Chest Pain, Shortness of Breath     COMPARISON: 5/1/2017.    FINDINGS: The heart size is normal. The lungs are clear. No  pneumothorax or pleural effusion.        Impression     IMPRESSION:  No acute abnormality.    VASILIY JOHNSON MD         6/12/2017 12:37 AM      Component Results     Component Value Ref Range & Units Status    Color Urine Straw  Final    Appearance Urine Clear  Final    Glucose Urine Negative NEG mg/dL Final    Bilirubin Urine Negative NEG Final    Ketones Urine Negative NEG mg/dL Final    Specific Gravity Urine 1.000 (L) 1.003 - 1.035 Final    Blood Urine Negative NEG Final    pH Urine 7.0 5.0 - 7.0 pH Final    Protein Albumin Urine Negative NEG mg/dL Final    Urobilinogen mg/dL 0.0 0.0 - 2.0 mg/dL Final    Nitrite Urine Negative NEG Final    Leukocyte Esterase Urine Small (A) NEG Final    Source Midstream Urine  Final    WBC Urine 1 0 - 2 /HPF Final    RBC Urine <1 0 - 2 /HPF Final    Squamous Epithelial /HPF Urine 2 (H) 0 - 1 /HPF Final         6/12/2017 12:01 AM      Component Results     Component Value Ref Range & Units Status    Lipase 127 73 - 393 U/L Final         6/11/2017 11:55 PM      Component Results     Component Value Ref Range & Units Status    Troponin I 0.00 0.00 - 0.10 ug/L Final                Clinical Quality Measure: Blood Pressure Screening     Your blood pressure was checked while you were in the emergency department today. The last reading we  obtained was  BP: 107/52 . Please read the guidelines below about what these numbers mean and what you should do about them.  If your systolic blood pressure (the top number) is less than 120 and your diastolic blood pressure (the bottom number) is less than 80, then your blood pressure is normal. There is nothing more that you need to do about it.  If your systolic blood pressure (the top number) is 120-139 or your diastolic blood pressure (the bottom number) is 80-89, your blood pressure may be higher than it should be. You should have your blood pressure rechecked within a year by a primary care provider.  If your systolic blood pressure (the top number) is 140 or greater or your diastolic blood pressure (the bottom number) is 90 or greater, you may have high blood pressure. High blood pressure is treatable, but if left untreated over time it can put you at risk for heart attack, stroke, or kidney failure. You should have your blood pressure rechecked by a primary care provider within the next 4 weeks.  If your provider in the emergency department today gave you specific instructions to follow-up with your doctor or provider even sooner than that, you should follow that instruction and not wait for up to 4 weeks for your follow-up visit.        Thank you for choosing [facility]       Thank you for choosing [facility] for your care. Our goal is always to provide you with excellent care. Hearing back from our patients is one way we can continue to improve our services. Please take a few minutes to complete the written survey that you may receive in the mail after you visit with us. Thank you!        [employer] Information     [facility] gives you secure access to your electronic health record. If you see a primary care provider, you can also send messages to your care team and make appointments. If you have questions, please call your primary care clinic.  If you do not have a primary care provider, please call [phone]  and they will assist you.        Care EveryWhere ID     This is your Care EveryWhere ID. This could be used by other organizations to access your [facility] medical records  [id number]        After Visit Summary       This is your record. Keep this with you and show to your community pharmacist(s) and doctor(s) at your next visit.

## 2017-06-12 ENCOUNTER — APPOINTMENT (OUTPATIENT)
Dept: GENERAL RADIOLOGY | Facility: CLINIC | Age: 37
End: 2017-06-12
Attending: EMERGENCY MEDICINE
Payer: COMMERCIAL

## 2017-06-12 VITALS
DIASTOLIC BLOOD PRESSURE: 52 MMHG | SYSTOLIC BLOOD PRESSURE: 110 MMHG | RESPIRATION RATE: 18 BRPM | WEIGHT: 200 LBS | HEART RATE: 86 BPM | TEMPERATURE: 98.2 F | OXYGEN SATURATION: 99 % | BODY MASS INDEX: 30.31 KG/M2 | HEIGHT: 68 IN

## 2017-06-12 LAB
ALBUMIN UR-MCNC: NEGATIVE MG/DL
ANION GAP SERPL CALCULATED.3IONS-SCNC: 5 MMOL/L (ref 3–14)
ANISOCYTOSIS BLD QL SMEAR: SLIGHT
APPEARANCE UR: CLEAR
BASOPHILS # BLD AUTO: 0 10E9/L (ref 0–0.2)
BASOPHILS NFR BLD AUTO: 0.3 %
BILIRUB UR QL STRIP: NEGATIVE
BUN SERPL-MCNC: 8 MG/DL (ref 7–30)
CALCIUM SERPL-MCNC: 8.6 MG/DL (ref 8.5–10.1)
CHLORIDE SERPL-SCNC: 107 MMOL/L (ref 94–109)
CO2 SERPL-SCNC: 25 MMOL/L (ref 20–32)
COLOR UR AUTO: ABNORMAL
CREAT SERPL-MCNC: 0.77 MG/DL (ref 0.52–1.04)
DIFFERENTIAL METHOD BLD: ABNORMAL
ELLIPTOCYTES BLD QL SMEAR: SLIGHT
EOSINOPHIL # BLD AUTO: 0.1 10E9/L (ref 0–0.7)
EOSINOPHIL NFR BLD AUTO: 0.7 %
ERYTHROCYTE [DISTWIDTH] IN BLOOD BY AUTOMATED COUNT: 17.1 % (ref 10–15)
GFR SERPL CREATININE-BSD FRML MDRD: 84 ML/MIN/1.7M2
GLUCOSE SERPL-MCNC: 87 MG/DL (ref 70–99)
GLUCOSE UR STRIP-MCNC: NEGATIVE MG/DL
HCT VFR BLD AUTO: 32.5 % (ref 35–47)
HGB BLD-MCNC: 10.2 G/DL (ref 11.7–15.7)
HGB UR QL STRIP: NEGATIVE
IMM GRANULOCYTES # BLD: 0 10E9/L (ref 0–0.4)
IMM GRANULOCYTES NFR BLD: 0.3 %
INTERPRETATION ECG - MUSE: NORMAL
KETONES UR STRIP-MCNC: NEGATIVE MG/DL
LEUKOCYTE ESTERASE UR QL STRIP: ABNORMAL
LIPASE SERPL-CCNC: 127 U/L (ref 73–393)
LYMPHOCYTES # BLD AUTO: 5.1 10E9/L (ref 0.8–5.3)
LYMPHOCYTES NFR BLD AUTO: 37.6 %
MCH RBC QN AUTO: 23.8 PG (ref 26.5–33)
MCHC RBC AUTO-ENTMCNC: 31.4 G/DL (ref 31.5–36.5)
MCV RBC AUTO: 76 FL (ref 78–100)
MONOCYTES # BLD AUTO: 0.8 10E9/L (ref 0–1.3)
MONOCYTES NFR BLD AUTO: 5.8 %
NEUTROPHILS # BLD AUTO: 7.4 10E9/L (ref 1.6–8.3)
NEUTROPHILS NFR BLD AUTO: 55.3 %
NITRATE UR QL: NEGATIVE
NRBC # BLD AUTO: 0 10*3/UL
NRBC BLD AUTO-RTO: 0 /100
PH UR STRIP: 7 PH (ref 5–7)
PLATELET # BLD AUTO: 351 10E9/L (ref 150–450)
PLATELET # BLD EST: NORMAL 10*3/UL
POTASSIUM SERPL-SCNC: 3.4 MMOL/L (ref 3.4–5.3)
RBC # BLD AUTO: 4.28 10E12/L (ref 3.8–5.2)
RBC #/AREA URNS AUTO: <1 /HPF (ref 0–2)
SODIUM SERPL-SCNC: 137 MMOL/L (ref 133–144)
SP GR UR STRIP: 1 (ref 1–1.03)
SQUAMOUS #/AREA URNS AUTO: 2 /HPF (ref 0–1)
URN SPEC COLLECT METH UR: ABNORMAL
UROBILINOGEN UR STRIP-MCNC: 0 MG/DL (ref 0–2)
WBC # BLD AUTO: 13.5 10E9/L (ref 4–11)
WBC #/AREA URNS AUTO: 1 /HPF (ref 0–2)

## 2017-06-12 PROCEDURE — 71020 XR CHEST 2 VW: CPT

## 2017-06-12 PROCEDURE — 96374 THER/PROPH/DIAG INJ IV PUSH: CPT

## 2017-06-12 PROCEDURE — 25000128 H RX IP 250 OP 636: Performed by: EMERGENCY MEDICINE

## 2017-06-12 RX ORDER — ONDANSETRON 4 MG/1
4 TABLET, FILM COATED ORAL EVERY 6 HOURS
Qty: 8 TABLET | Refills: 0 | Status: SHIPPED | OUTPATIENT
Start: 2017-06-12 | End: 2018-04-11

## 2017-06-12 RX ORDER — IBUPROFEN 600 MG/1
600 TABLET, FILM COATED ORAL EVERY 6 HOURS PRN
Qty: 20 TABLET | Refills: 1 | Status: SHIPPED | OUTPATIENT
Start: 2017-06-12 | End: 2018-04-11

## 2017-06-12 RX ADMIN — KETOROLAC TROMETHAMINE 30 MG: 30 INJECTION, SOLUTION INTRAMUSCULAR at 00:03

## 2017-06-12 NOTE — ED NOTES
Pt arrives to the ED for evaluation of chest pain/back pain as well as abdominal pain. Pt has been fasting and states feeling nauseous but could not throw up anything. Pt states that in the last week she has been having pain with urination and has had to go to the bathroom more frequently. ABCD's intact. Pt has not taken anything for pain.

## 2017-06-12 NOTE — ED PROVIDER NOTES
"  History     Chief Complaint:  Chest Pain    HPI   Randee Millard is a 36 year old female who presents with chest pain since yesterday. The patient states that she had an onset of left side chest pain, located under her breast, yesterday that radiates to her back. The patient has associated epigastric abdominal pain. The patient has been fasting and feeling nauseous. The patient notes that she did break her fast this evening. The patient also complains of increased urinary frequency.  She has not had any vomiting, diarrhea, cough, shortness of breath, or fever.    Cardiac Risk Factors   Sex: Female   Tobacco: Negative   Hypertension: Negative  Diabetes: Negative  Hyperlipidemia: Negative  Family History: Positive    Allergies:  Amoxicillin  Clindamycin     Medications:    Ibuprofen  Tylenol  Ferrous sulfate     Past Medical History:    Anemia  Reflux esophagitis    Past Surgical History:    US breast cyst aspiration initial rt    Family History:    Father: Hypertension, diabetes    Social History:  Marital Status:   Presents to the ED alone  PCP: Abe Hou     Review of Systems   Cardiovascular: Positive for chest pain.   Gastrointestinal: Positive for abdominal pain and nausea.   Genitourinary: Positive for dysuria and frequency.   All other systems reviewed and are negative.    Physical Exam   First Vitals:  BP: (!) 129/109  Pulse: 86  Heart Rate: 86  Temp: 98.2  F (36.8  C)  Resp: 16  Height: 172.7 cm (5' 8\")  Weight: 90.7 kg (200 lb)  SpO2: 100 %    Physical Exam  Vital signs and nursing notes reviewed.     Constitutional: laying on gurney appears comfortable  HENT: Oropharynx is clear and moist  Eyes: Conjunctivae are normal bilaterally. Pupils equal  Neck: normal range of motion  Cardiovascular: Normal rate, regular rhythm, normal heart sounds. Strong radial pulses bilaterally. No lower leg swelling.  Pulmonary/Chest: Effort normal and breath sounds normal and equal bilaterally. No " respiratory distress. No rales or rhonchi.  Abdominal: Soft. Bowel sounds are normal. No tenderness to palpation. No rebound or guarding.   Musculoskeletal: No joint swelling or edema. No calf tenderness or unilateral leg swelling  Neurological: Alert and oriented. No focal weakness  Skin: Skin is warm and dry. No rash noted.   Psych: normal affect    Emergency Department Course   ECG:  Performed at 2320 / Indication: Chest pain  Vent. Rate 80 bpm.  ND interval 162 ms.  QRS duration 80 ms.  QT/QTc 370/426 ms.  P-R-T axis 28 24 36.     Normal Sinus Rhythm.  Normal ECG.  Read @ 2322 by Dr. Multani.    Imaging:  Radiographic findings were communicated with the patient who voiced understanding of the findings.    XR Chest 2 Views   Final Result   IMPRESSION:  No acute abnormality.      VASILIY JOHNSON MD        All Readings Per Radiology Unless Otherwise Noted.    Laboratory:  CBC: WBC 13.5 (H), HGB 10.2 (L),   BMP: Rest WNL (Creatinine 0.77)  Lipase: 127  (2342) Troponin POCT: 0.00    UA: Clear yellow urine, Specific gravity 1.000 (L), Leukocyte Esterase Small, Squamous Epithelial Cells/HPF 2 (H), otherwise WNL    Interventions:  0003: Toradol 30 mg, IV injection    ED Course:  Nursing notes and past medical history reviewed.   I performed a physical examination of the patient as documented above.  I explained the plan with the patient who consents to this.   EKG was done, interpretation as above.  The patient was sent for a chest x-ray while in the emergency department, findings above.  Blood was drawn from the patient. This was sent for laboratory testing, findings above.   Urine sample was obtained and sent for laboratory analysis, findings above.   I personally reviewed the laboratory and imaging results with the patient and answered all related questions prior to discharge.  Findings and plan explained to the patient. Patient discharged home with instructions regarding supportive care, medications, and  reasons to return. The importance of close follow-up was reviewed.     Impression & Plan    Medical Decision Making:  Randee Millard is a 36 year old female who presents with symptoms of almost pleuritic sharp pain in her left chest just below her breast and also in her back. She has no hypoxia, tachycardia, or concerning findings, she has not had any infectious symptoms either. She is currently fasting for Ramadan but states that she has had this pain even since yesterday and it is fairly consistent.  Her EKG, Troponin, and chest x-ray all appear unremarkable, there is no indication that she has a pulmonary embolism as she is low risk with Wells criteria and is PERC negative. I suspect this is likely a musculoskeletal type pain and I recommended antiinflammatories and nausea medication as needed. She is to avoid any heavy lifting or bending in case this is a musculoskeletal pain. Patient had improvement of her symptoms here in the ED, she did have a mildly elevated white blood cell count of 13,500 of uncertain significance but otherwise her tests are unremarkable. Patient is to follow up with her primary care physician and is discharged home.    Diagnosis:    ICD-10-CM    1. Atypical chest pain R07.89    2. Nausea R11.0        Disposition:   Discharge to home.     Discharge Medications:   New Prescriptions    IBUPROFEN (ADVIL/MOTRIN) 600 MG TABLET    Take 1 tablet (600 mg) by mouth every 6 hours as needed for moderate pain    ONDANSETRON (ZOFRAN) 4 MG TABLET    Take 1 tablet (4 mg) by mouth every 6 hours         Bryce CARRINGTON, omid serving as a scribe on 6/11/2017 at 11:34 PM to personally document services performed by Remington Multani MD, based on my observations and the provider's statements to me.     Remington Multani MD  06/12/17 [id number]

## 2017-06-12 NOTE — DISCHARGE INSTRUCTIONS
Chest Wall Pain: Costochondritis    The chest pain that you have had today is caused by costochondritis. This condition is caused by an inflammation of the cartilage joining your ribs to your breastbone. It is not caused by heart or lung problems. The inflammation may have been brought on by a blow to the chest, lifting heavy objects, intense exercise, or an illness that made you cough and sneeze. It often occurs during times of emotional stress. It can be painful, but it is not dangerous. It usually goes away in 1 to 2 weeks. But it may happen again. Rarely, a more serious condition may cause symptoms similar to costochondritis. That s why it s important to watch for the warning signs listed below.  Home care  Follow these guidelines when caring for yourself at home:    If you feel that emotional stress is a cause of your condition, try to figure out the sources of that stress. It may not be obvious! Learn ways to deal with the stress in your life. This can include regular exercise, muscle relaxation, meditation, or simply taking time out for yourself. For more information about this, talk with your health care provider. Or go to your local library and look at books on  stress reduction.     You may use acetaminophen or ibuprofen to control pain, unless another pain medicine was prescribed. If you have liver disease or ever had a stomach ulcer, talk with your health care provider before using these medicines.    You can also help ease pain by using a hot, wet compress or heating pad. Use this with or without a medicated skin cream that helps relieves pain.    Do stretching exercise as advised by your provider.    Take any prescribed medicines as directed.  Follow-up care  Follow up with your health care provider, or as advised, if you do not start to get better in the next 2 days.  When to seek medical advice  Call your health care provider right away if any of these occur:    A change in the type of pain. Call  if it feels different, becomes more serious, lasts longer, or spreads into your shoulder, arm, neck, jaw, or back.    Shortness of breath or pain gets worse when you breathe    Weakness, dizziness, or fainting    Cough with dark-colored sputum (phlegm) or blood    Abdominal pain    Dark red or black stools    Fever of 100.4 F (38 C) or higher, or as directed by your health care provider    5679-7917 [employer]. [address] [place] [address], [place] [id number]. All rights reserved. This information is not intended as a substitute for professional medical care. Always follow your healthcare professional's instructions.

## 2017-06-12 NOTE — ED NOTES
A/O. VSS. PIV removed. Pt verbalized understanding of d/c instructions and ambulated to lobby steady gait.   Script rx ZOFRAN AND ibuprofen given to pt at time of d/c

## 2017-08-22 ENCOUNTER — OFFICE VISIT (OUTPATIENT)
Dept: INTERNAL MEDICINE | Facility: CLINIC | Age: 37
End: 2017-08-22
Payer: COMMERCIAL

## 2017-08-22 VITALS
HEIGHT: 68 IN | SYSTOLIC BLOOD PRESSURE: 100 MMHG | BODY MASS INDEX: 31.01 KG/M2 | HEART RATE: 109 BPM | DIASTOLIC BLOOD PRESSURE: 72 MMHG | TEMPERATURE: 98.2 F | OXYGEN SATURATION: 97 % | WEIGHT: 204.6 LBS

## 2017-08-22 DIAGNOSIS — M79.10 MYALGIA: Primary | ICD-10-CM

## 2017-08-22 DIAGNOSIS — D64.89 ANEMIA DUE TO OTHER CAUSE: ICD-10-CM

## 2017-08-22 DIAGNOSIS — R53.83 OTHER FATIGUE: ICD-10-CM

## 2017-08-22 DIAGNOSIS — E55.9 VITAMIN D DEFICIENCY: ICD-10-CM

## 2017-08-22 LAB
ERYTHROCYTE [DISTWIDTH] IN BLOOD BY AUTOMATED COUNT: 17.3 % (ref 10–15)
ERYTHROCYTE [SEDIMENTATION RATE] IN BLOOD BY WESTERGREN METHOD: 37 MM/H (ref 0–20)
HCT VFR BLD AUTO: 31 % (ref 35–47)
HGB BLD-MCNC: 9.8 G/DL (ref 11.7–15.7)
MCH RBC QN AUTO: 23.3 PG (ref 26.5–33)
MCHC RBC AUTO-ENTMCNC: 31.6 G/DL (ref 31.5–36.5)
MCV RBC AUTO: 74 FL (ref 78–100)
PLATELET # BLD AUTO: 367 10E9/L (ref 150–450)
RBC # BLD AUTO: 4.21 10E12/L (ref 3.8–5.2)
WBC # BLD AUTO: 10 10E9/L (ref 4–11)

## 2017-08-22 PROCEDURE — 86038 ANTINUCLEAR ANTIBODIES: CPT | Performed by: INTERNAL MEDICINE

## 2017-08-22 PROCEDURE — 85652 RBC SED RATE AUTOMATED: CPT | Performed by: INTERNAL MEDICINE

## 2017-08-22 PROCEDURE — 85027 COMPLETE CBC AUTOMATED: CPT | Performed by: INTERNAL MEDICINE

## 2017-08-22 PROCEDURE — 99000 SPECIMEN HANDLING OFFICE-LAB: CPT | Performed by: INTERNAL MEDICINE

## 2017-08-22 PROCEDURE — 86747 PARVOVIRUS ANTIBODY: CPT | Mod: 90 | Performed by: INTERNAL MEDICINE

## 2017-08-22 PROCEDURE — 36415 COLL VENOUS BLD VENIPUNCTURE: CPT | Performed by: INTERNAL MEDICINE

## 2017-08-22 PROCEDURE — 82306 VITAMIN D 25 HYDROXY: CPT | Performed by: INTERNAL MEDICINE

## 2017-08-22 PROCEDURE — 83540 ASSAY OF IRON: CPT | Performed by: INTERNAL MEDICINE

## 2017-08-22 PROCEDURE — 83550 IRON BINDING TEST: CPT | Performed by: INTERNAL MEDICINE

## 2017-08-22 PROCEDURE — 99214 OFFICE O/P EST MOD 30 MIN: CPT | Performed by: INTERNAL MEDICINE

## 2017-08-22 PROCEDURE — 82550 ASSAY OF CK (CPK): CPT | Performed by: INTERNAL MEDICINE

## 2017-08-22 PROCEDURE — 84443 ASSAY THYROID STIM HORMONE: CPT | Performed by: INTERNAL MEDICINE

## 2017-08-22 NOTE — MR AVS SNAPSHOT
"              After Visit Summary   8/22/2017    Randee Millard    MRN: [id number]           Patient Information     Date Of Birth          1980        Visit Information        Provider Department      8/22/2017 1:40 PM [facility]        Today's Diagnoses     Myalgia    -  1    Other fatigue        Anemia due to other cause        Vitamin D deficiency           Follow-ups after your visit        Who to contact     If you have questions or need follow up information about today's clinic visit or your schedule please contact [facility] directly at [phone].  Normal or non-critical lab and imaging results will be communicated to you by [employer], letter or phone within 4 business days after the clinic has received the results. If you do not hear from us within 7 days, please contact the clinic through [employer] or phone. If you have a critical or abnormal lab result, we will notify you by phone as soon as possible.  Submit refill requests through [employer] or call your pharmacy and they will forward the refill request to us. Please allow 3 business days for your refill to be completed.          Additional Information About Your Visit        MyChart Information     [employer] gives you secure access to your electronic health record. If you see a primary care provider, you can also send messages to your care team and make appointments. If you have questions, please call your primary care clinic.  If you do not have a primary care provider, please call [phone] and they will assist you.        Care EveryWhere ID     This is your Care EveryWhere ID. This could be used by other organizations to access your [facility] medical records  [id number]        Your Vitals Were     Pulse Temperature Height Last Period Pulse Oximetry BMI (Body Mass Index)    109 98.2  F (36.8  C) (Oral) 5' 8\" (1.727 m) 08/17/2017 97% 31.11 kg/m2       Blood Pressure from Last 3 " Encounters:   08/22/17 100/72   06/12/17 110/52   06/07/17 110/72    Weight from Last 3 Encounters:   08/22/17 204 lb 9.6 oz (92.8 kg)   06/11/17 200 lb (90.7 kg)   06/07/17 201 lb (91.2 kg)              We Performed the Following     Antinuclear antibody screen by EIA     CBC with platelets     CK total     ESR: Erythrocyte sedimentation rate     Iron and iron binding capacity     Parvovirus B19 antibodies IgG IgM     TSH with free T4 reflex     Vitamin D Deficiency          Today's Medication Changes          These changes are accurate as of: 8/22/17 11:59 PM.  If you have any questions, ask your nurse or doctor.               Start taking these medicines.        Dose/Directions    cholecalciferol 68632 UNITS capsule   Commonly known as:  VITAMIN D3   Used for:  Vitamin D deficiency   Started by:  Abe Hou MD        Dose:  1 capsule   Take 1 capsule (50,000 Units) by mouth once a week   Quantity:  8 capsule   Refills:  0       ferrous sulfate 325 (65 FE) MG tablet   Commonly known as:  IRON   Used for:  Anemia due to other cause   Started by:  Abe Hou MD        Dose:  325 mg   Take 1 tablet (325 mg) by mouth daily (with breakfast)   Quantity:  90 tablet   Refills:  1            Where to get your medicines      These medications were sent to [id number] - [place], [place] - [address] AT [facility] [address], [place] [address]     Phone:  [phone]     cholecalciferol 97389 UNITS capsule    ferrous sulfate 325 (65 FE) MG tablet                Primary Care Provider Office Phone # Fax #    Abe Hou [phone]       [address] [facility] [place] [address]        Equal Access to Services     VALERIE MCKEON AH: Francesco Addison, cameron garcia, qaanthonyta kaalsmiley alarcon, apple kaplan. [facility] [phone].    ATENCIÓN: Si habla español, tiene a santiago disposición servicios  ishaan de asistencia lingüística. Tim little [phone].    We comply with applicable federal civil rights laws and [place] laws. We do not discriminate on the basis of race, color, national origin, age, disability sex, sexual orientation or gender identity.            Thank you!     Thank you for choosing [facility]  for your care. Our goal is always to provide you with excellent care. Hearing back from our patients is one way we can continue to improve our services. Please take a few minutes to complete the written survey that you may receive in the mail after your visit with us. Thank you!             Your Updated Medication List - Protect others around you: Learn how to safely use, store and throw away your medicines at www.disposemymeds.org.          This list is accurate as of: 8/22/17 11:59 PM.  Always use your most recent med list.                   Brand Name Dispense Instructions for use Diagnosis    acetaminophen 500 MG tablet    TYLENOL    20 tablet    Take 1 tablet (500 mg) by mouth every 6 hours as needed for pain or fever        cholecalciferol 92935 UNITS capsule    VITAMIN D3    8 capsule    Take 1 capsule (50,000 Units) by mouth once a week    Vitamin D deficiency       ferrous sulfate 325 (65 FE) MG tablet    IRON    90 tablet    Take 1 tablet (325 mg) by mouth daily (with breakfast)    Anemia due to other cause       ibuprofen 600 MG tablet    ADVIL/MOTRIN    20 tablet    Take 1 tablet (600 mg) by mouth every 6 hours as needed for moderate pain        ondansetron 4 MG tablet    ZOFRAN    8 tablet    Take 1 tablet (4 mg) by mouth every 6 hours

## 2017-08-22 NOTE — NURSING NOTE
"Chief Complaint   Patient presents with     Pain     Feeling pain throughout whole body for about two weeks - all pt wants to do is lay down       Initial /72 (BP Location: Left arm, Patient Position: Chair, Cuff Size: Adult Large)  Pulse 109  Temp 98.2  F (36.8  C) (Oral)  Ht 5' 8\" (1.727 m)  Wt 204 lb 9.6 oz (92.8 kg)  LMP 08/17/2017  SpO2 97%  BMI 31.11 kg/m2 Estimated body mass index is 31.11 kg/(m^2) as calculated from the following:    Height as of this encounter: 5' 8\" (1.727 m).    Weight as of this encounter: 204 lb 9.6 oz (92.8 kg).  Medication Reconciliation: complete   Citlaly Carreon MA    "

## 2017-08-22 NOTE — PROGRESS NOTES
SUBJECTIVE:   Randee Millard is a 37 year old female who presents to clinic today for the following health issues:     Pt is a 37 year old female who is seen here to day with c/o fatigue, feeling sleepy all the time since 2 mths, pt also c/o pain in all of her bones, muscles and joints sine 2 mths. No wt gain, no constipation. Pt is taking OTC Tylenol prn. Pt has regular periods and LMP was 08/17/17  Pt had TSH in 04/17 ans was normal.   Pt has h/o anemia but has not been taking iron pills.   Has h/o vit D deficiency but has not been taking any vit D supplements.       Patient Active Problem List   Diagnosis     NO ACTIVE PROBLEMS     Anemia due to other cause     Past Surgical History:   Procedure Laterality Date     US BREAST CYST ASPIRATION INITIAL RT         Social History   Substance Use Topics     Smoking status: Never Smoker     Smokeless tobacco: Never Used     Alcohol use No     Family History   Problem Relation Age of Onset     Hypertension Father      DIABETES Father 63     Family History Negative Mother          Current Outpatient Prescriptions   Medication Sig Dispense Refill     acetaminophen (TYLENOL) 500 MG tablet Take 1 tablet (500 mg) by mouth every 6 hours as needed for pain or fever 20 tablet 0     ibuprofen (ADVIL/MOTRIN) 600 MG tablet Take 1 tablet (600 mg) by mouth every 6 hours as needed for moderate pain (Patient not taking: Reported on 8/22/2017) 20 tablet 1     ondansetron (ZOFRAN) 4 MG tablet Take 1 tablet (4 mg) by mouth every 6 hours (Patient not taking: Reported on 8/22/2017) 8 tablet 0         Reviewed and updated as needed this visit by clinical staffTobacco  Allergies  Med Hx  Surg Hx  Fam Hx  Soc Hx      Reviewed and updated as needed this visit by Provider         ROS:  C: NEGATIVE for fever, chills, change in weight  E/M: NEGATIVE for ear, mouth and throat problems  R: NEGATIVE for significant cough or SOB  CV: NEGATIVE for chest pain, palpitations or peripheral  "edema  MUSCULOSKELETAL: muscle aches and joint aches,   NEURO: NEGATIVE for weakness, dizziness or paresthesias  ENDOCRINE: fatigue and sleepiness  Heme; anemia      OBJECTIVE:                                                    /72 (BP Location: Left arm, Patient Position: Chair, Cuff Size: Adult Large)  Pulse 109  Temp 98.2  F (36.8  C) (Oral)  Ht 5' 8\" (1.727 m)  Wt 204 lb 9.6 oz (92.8 kg)  LMP 08/17/2017  SpO2 97%  BMI 31.11 kg/m2  Body mass index is 31.11 kg/(m^2).   GENERAL: healthy, alert, well nourished, well hydrated, no distress  EYES: Eyes grossly normal to inspection, extraocular movements - intact, and PERRL  HENT: ear canals- normal; TMs- normal; Nose- normal; Mouth- no ulcers, no lesions  NECK: no tenderness, no adenopathy, no asymmetry, no masses, no stiffness; thyroid- normal to palpation  RESP: lungs clear to auscultation - no rales, no rhonchi, no wheezes  CV: regular rates and rhythm, normal S1 S2, no S3 or S4 and no murmur, no click or rub -  MS: no joint swellings, no joint tenderness, has pain all muscles.   Extremities- no gross deformities noted, no edema  NEURO: strength and tone- normal, sensory exam- grossly normal, mentation- intact, speech- normal, reflexes- symmetric       ASSESSMENT/PLAN:                                                      1. Myalgia  - CK total  - ESR: Erythrocyte sedimentation rate  - Antinuclear antibody screen by EIA  - Parvovirus B19 antibodies IgG IgM    2. Other fatigue  - Vitamin D Deficiency  - TSH with free T4 reflex    3. Anemia due to other cause  - CBC with platelets  - Iron and iron binding capacity     4.Vitamin D deficiency  --check Vit D level. pt was told I will contact her after results and proceed accordingly.  Vit D 18 , started on  vit D 50,000 units po q wk for 8 wks and then OTC 2000 units daily . Rpt Vit D level in 3 mths. .      Abe Hou MD  [facility]    "

## 2017-08-23 LAB
ANA SER QL IA: <1
B19V IGG SER IA-ACNC: 0.31 IV
B19V IGM SER IA-ACNC: 0.16 IV
CK SERPL-CCNC: 94 U/L (ref 30–225)
DEPRECATED CALCIDIOL+CALCIFEROL SERPL-MC: 18 UG/L (ref 20–75)
IRON SATN MFR SERPL: 10 % (ref 15–46)
IRON SERPL-MCNC: 41 UG/DL (ref 35–180)
TIBC SERPL-MCNC: 393 UG/DL (ref 240–430)
TSH SERPL DL<=0.005 MIU/L-ACNC: 1.79 MU/L (ref 0.4–4)

## 2017-08-24 ENCOUNTER — TELEPHONE (OUTPATIENT)
Dept: INTERNAL MEDICINE | Facility: CLINIC | Age: 37
End: 2017-08-24

## 2017-08-24 DIAGNOSIS — E55.9 VITAMIN D DEFICIENCY: Primary | ICD-10-CM

## 2017-08-24 DIAGNOSIS — D64.89 ANEMIA DUE TO OTHER CAUSE: ICD-10-CM

## 2017-08-24 RX ORDER — FERROUS SULFATE 325(65) MG
325 TABLET ORAL
Qty: 90 TABLET | Refills: 1 | Status: SHIPPED | OUTPATIENT
Start: 2017-08-24 | End: 2018-04-11

## 2017-08-25 NOTE — TELEPHONE ENCOUNTER
Pt calls back. Advised. Patient understands, agrees to plan of care, and has no further questions.

## 2017-08-25 NOTE — TELEPHONE ENCOUNTER
Has anemia, hemoglobin lower than before, I have faxed Iron tab 1 tab daily and repeat hemoglobin in 3 mths.   Also Has vit D deficiency. Please  start vit D 50,000 units by mouth once a week for 8 wks and then over the counter 2000 units daily . Rpt Vit D level and CBC in 3 mths. Rx faxed.

## 2017-12-06 ENCOUNTER — HOSPITAL ENCOUNTER (EMERGENCY)
Facility: CLINIC | Age: 37
Discharge: HOME | End: 2017-12-06
Attending: EMERGENCY MEDICINE | Admitting: EMERGENCY MEDICINE
Payer: COMMERCIAL

## 2017-12-06 VITALS
DIASTOLIC BLOOD PRESSURE: 74 MMHG | SYSTOLIC BLOOD PRESSURE: 121 MMHG | RESPIRATION RATE: 18 BRPM | OXYGEN SATURATION: 98 % | HEART RATE: 75 BPM | TEMPERATURE: 98.9 F

## 2017-12-06 DIAGNOSIS — R10.13 ABDOMINAL PAIN, EPIGASTRIC: ICD-10-CM

## 2017-12-06 LAB
ALBUMIN SERPL-MCNC: 3.4 G/DL (ref 3.4–5)
ALBUMIN UR-MCNC: NEGATIVE MG/DL
ALP SERPL-CCNC: 71 U/L (ref 40–150)
ALT SERPL W P-5'-P-CCNC: 17 U/L (ref 0–50)
ANION GAP SERPL CALCULATED.3IONS-SCNC: 8 MMOL/L (ref 3–14)
ANISOCYTOSIS BLD QL SMEAR: SLIGHT
APPEARANCE UR: CLEAR
AST SERPL W P-5'-P-CCNC: 13 U/L (ref 0–45)
BASOPHILS # BLD AUTO: 0.1 10E9/L (ref 0–0.2)
BASOPHILS NFR BLD AUTO: 0.3 %
BILIRUB SERPL-MCNC: 0.3 MG/DL (ref 0.2–1.3)
BILIRUB UR QL STRIP: NEGATIVE
BUN SERPL-MCNC: 8 MG/DL (ref 7–30)
CALCIUM SERPL-MCNC: 8 MG/DL (ref 8.5–10.1)
CHLORIDE SERPL-SCNC: 107 MMOL/L (ref 94–109)
CO2 SERPL-SCNC: 24 MMOL/L (ref 20–32)
COLOR UR AUTO: COLORLESS
CREAT SERPL-MCNC: 0.69 MG/DL (ref 0.52–1.04)
DIFFERENTIAL METHOD BLD: ABNORMAL
EOSINOPHIL # BLD AUTO: 0.1 10E9/L (ref 0–0.7)
EOSINOPHIL NFR BLD AUTO: 0.8 %
ERYTHROCYTE [DISTWIDTH] IN BLOOD BY AUTOMATED COUNT: 16.2 % (ref 10–15)
GFR SERPL CREATININE-BSD FRML MDRD: >90 ML/MIN/1.7M2
GLUCOSE SERPL-MCNC: 87 MG/DL (ref 70–99)
GLUCOSE UR STRIP-MCNC: NEGATIVE MG/DL
HCG UR QL: NEGATIVE
HCT VFR BLD AUTO: 35.4 % (ref 35–47)
HGB BLD-MCNC: 11.4 G/DL (ref 11.7–15.7)
HGB UR QL STRIP: NEGATIVE
IMM GRANULOCYTES # BLD: 0.1 10E9/L (ref 0–0.4)
IMM GRANULOCYTES NFR BLD: 0.5 %
KETONES UR STRIP-MCNC: NEGATIVE MG/DL
LEUKOCYTE ESTERASE UR QL STRIP: NEGATIVE
LIPASE SERPL-CCNC: 160 U/L (ref 73–393)
LYMPHOCYTES # BLD AUTO: 5.5 10E9/L (ref 0.8–5.3)
LYMPHOCYTES NFR BLD AUTO: 35.7 %
MCH RBC QN AUTO: 25 PG (ref 26.5–33)
MCHC RBC AUTO-ENTMCNC: 32.2 G/DL (ref 31.5–36.5)
MCV RBC AUTO: 78 FL (ref 78–100)
MICROCYTES BLD QL SMEAR: PRESENT
MONOCYTES # BLD AUTO: 0.9 10E9/L (ref 0–1.3)
MONOCYTES NFR BLD AUTO: 5.6 %
NEUTROPHILS # BLD AUTO: 8.9 10E9/L (ref 1.6–8.3)
NEUTROPHILS NFR BLD AUTO: 57.1 %
NITRATE UR QL: NEGATIVE
NRBC # BLD AUTO: 0 10*3/UL
NRBC BLD AUTO-RTO: 0 /100
PH UR STRIP: 7 PH (ref 5–7)
PLATELET # BLD AUTO: 385 10E9/L (ref 150–450)
PLATELET # BLD EST: NORMAL 10*3/UL
POTASSIUM SERPL-SCNC: 3.5 MMOL/L (ref 3.4–5.3)
PROT SERPL-MCNC: 7.8 G/DL (ref 6.8–8.8)
RBC # BLD AUTO: 4.56 10E12/L (ref 3.8–5.2)
RBC #/AREA URNS AUTO: 1 /HPF (ref 0–2)
SODIUM SERPL-SCNC: 139 MMOL/L (ref 133–144)
SOURCE: ABNORMAL
SP GR UR STRIP: 1 (ref 1–1.03)
SQUAMOUS #/AREA URNS AUTO: 2 /HPF (ref 0–1)
UROBILINOGEN UR STRIP-MCNC: 0 MG/DL (ref 0–2)
WBC # BLD AUTO: 15.5 10E9/L (ref 4–11)
WBC #/AREA URNS AUTO: 1 /HPF (ref 0–2)

## 2017-12-06 PROCEDURE — 96360 HYDRATION IV INFUSION INIT: CPT

## 2017-12-06 PROCEDURE — 25000125 ZZHC RX 250: Performed by: PHYSICIAN ASSISTANT

## 2017-12-06 PROCEDURE — 83690 ASSAY OF LIPASE: CPT | Performed by: PHYSICIAN ASSISTANT

## 2017-12-06 PROCEDURE — 99284 EMERGENCY DEPT VISIT MOD MDM: CPT | Mod: 25

## 2017-12-06 PROCEDURE — 25000132 ZZH RX MED GY IP 250 OP 250 PS 637: Performed by: PHYSICIAN ASSISTANT

## 2017-12-06 PROCEDURE — 85025 COMPLETE CBC W/AUTO DIFF WBC: CPT | Performed by: PHYSICIAN ASSISTANT

## 2017-12-06 PROCEDURE — 25000128 H RX IP 250 OP 636: Performed by: PHYSICIAN ASSISTANT

## 2017-12-06 PROCEDURE — 81001 URINALYSIS AUTO W/SCOPE: CPT | Performed by: PHYSICIAN ASSISTANT

## 2017-12-06 PROCEDURE — 81025 URINE PREGNANCY TEST: CPT | Performed by: PHYSICIAN ASSISTANT

## 2017-12-06 PROCEDURE — 80053 COMPREHEN METABOLIC PANEL: CPT | Performed by: PHYSICIAN ASSISTANT

## 2017-12-06 RX ORDER — SUCRALFATE ORAL 1 G/10ML
1 SUSPENSION ORAL 4 TIMES DAILY
Qty: 420 ML | Refills: 1 | Status: SHIPPED | OUTPATIENT
Start: 2017-12-06 | End: 2018-04-11

## 2017-12-06 RX ORDER — SUCRALFATE 1 G/1
1 TABLET ORAL ONCE
Status: COMPLETED | OUTPATIENT
Start: 2017-12-06 | End: 2017-12-06

## 2017-12-06 RX ADMIN — LIDOCAINE HYDROCHLORIDE 30 ML: 20 SOLUTION ORAL; TOPICAL at 17:21

## 2017-12-06 RX ADMIN — SODIUM CHLORIDE 1000 ML: 9 INJECTION, SOLUTION INTRAVENOUS at 17:21

## 2017-12-06 RX ADMIN — SUCRALFATE 1 G: 1 TABLET ORAL at 18:34

## 2017-12-06 ASSESSMENT — ENCOUNTER SYMPTOMS
DYSURIA: 1
FREQUENCY: 0
FEVER: 0
ABDOMINAL PAIN: 1
VOMITING: 1
BLOOD IN STOOL: 0
NAUSEA: 1
FLANK PAIN: 1
DIARRHEA: 0
CONSTIPATION: 0

## 2017-12-06 NOTE — ED PROVIDER NOTES
Emergency Department Attending Supervision Note  12/6/2017  4:58 PM      I evaluated this patient in conjunction with AIDA Hood JUSTIN Millard is a 37 year old female presenting to the ED with a 1 day history of epigastric pain. The pain occurred after eating. It is a sharp pain that is constant and non-radiating. Pain is aggravated by eating or drinking. She denies any other associated symptoms.    On examination, she has focal tenderness to the epigastrium with no RUQ tenderness. No rebound, guarding, or rigidity.     Patient's labs were reassuring. She has no concerning features for biliary colic. She was given a GI cocktail with significant improvement of her pain. Her findings are most consistent with GERD, peptic ulcer disease, or gastritis. Patient will be stated on Carafate with her continued use of Zantac. She will follow up with her PCP to ensure resolution of pain.     Diagnosis    ICD-10-CM   1. Abdominal pain, epigastric R10.13       I, Nida Helms, am serving as a scribe on 12/6/2017 at 4:58 PM to personally document services performed by Vinnie Larson MD based on my observations and the provider's statements to me.      Vinnie Larson MD Matthews, Jeremiah R, MD  12/06/17 2048

## 2017-12-06 NOTE — ED AVS SNAPSHOT
[facility] Emergency Department    [address]    [facility] [address]    Phone:  [phone]    Fax:  [phone]                                       Randee Millard   MRN: [id number]    Department:  [facility] Emergency Department   Date of Visit:  12/6/2017           After Visit Summary Signature Page     I have received my discharge instructions, and my questions have been answered. I have discussed any challenges I see with this plan with the nurse or doctor.    ..........................................................................................................................................  Patient/Patient Representative Signature      ..........................................................................................................................................  Patient Representative Print Name and Relationship to Patient    ..................................................               ................................................  Date                                            Time    ..........................................................................................................................................  Reviewed by Signature/Title    ...................................................              ..............................................  Date                                                            Time

## 2017-12-06 NOTE — ED NOTES
"Abd pain began yesterday, \"the pain was so bad I was vomiting\". Pt alert and oriented, ABCs intact.   "

## 2017-12-06 NOTE — ED AVS SNAPSHOT
[facility]    [address]    [facility] [address]    Phone:  [phone]    Fax:  [phone]                                       Randee Millard   MRN: [id number]    Department:  [facility]   Date of Visit:  12/6/2017           Patient Information     Date Of Birth          1980        Your diagnoses for this visit were:     Abdominal pain, epigastric        You were seen by Vinnie Larson MD.      Follow-up Information     Follow up with Abe Hou MD. Schedule an appointment as soon as possible for a visit in 2 days.    Specialty:  Internal Medicine    Why:  recheck    Contact information:    [address] [facility] [address]  [phone]          Follow up with [facility].    Specialty:  EMERGENCY MEDICINE    Why:  If symptoms worsen    Contact information:    [address]  [facility] [address] [phone]        Discharge Instructions       Continue taking Zantac and add Carafate. Follow up close with PCP.     Discharge Instructions  Abdominal Pain    Abdominal pain (belly pain) can be caused by many things. Your evaluation today does not show the exact cause for your pain. Your provider today has decided that it is unlikely your pain is due to a life threatening problem, or a problem requiring surgery or hospital admission. Sometimes those problems cannot be found right away, so it is very important that you follow up as directed.  Sometimes only the changes which occur over time allow the cause of your pain to be found.    Generally, every Emergency Department visit should have a follow-up clinic visit with either a primary or a specialty clinic/provider. Please follow-up as instructed by your emergency provider today. With abdominal pain, we often recommend very close follow-up, such as the following day.    ADULTS:  Return to the  Emergency Department right away if:      You get an oral temperature above 102oF or as directed by your provider.    You have blood in your stools. This may be bright red or appear as black, tarry stools.      You keep vomiting (throwing up) or cannot drink liquids.    You see blood when you vomit.     You cannot have a bowel movement or you cannot pass gas.    Your stomach gets bloated or bigger.    Your skin or the whites of your eyes look yellow.    You faint.    You have bloody, frequent or painful urination (peeing).    You have new symptoms or anything that worries you.    CHILDREN:  Return to the Emergency Department right away if your child has any of the above-listed symptoms or the following:      Pushes your hand away or screams/cries when his/her belly is touched.    You notice your child is very fussy or weak.    Your child is very tired and is too tired to eat or drink.    Your child is dehydrated.  Signs of dehydration can be:  o Significant change in the amount of wet diapers/urine.  o Your infant or child starts to have dry mouth and lips, or no saliva (spit) or tears.    PREGNANT WOMEN:  Return to the Emergency Department right away if you have any of the above-listed symptoms or the following:      You have bleeding, leaking fluid or passing tissue from the vagina.    You have worse pain or cramping, or pain in your shoulder or back.    You have vomiting that will not stop.    You have a temperature of 100oF or more.    Your baby is not moving as much as usual.    You faint.    You get a bad headache with or without eye problems and abdominal pain.    You have a seizure.    You have unusual discharge from your vagina and abdominal pain.    Abdominal pain is pretty common during pregnancy.  Your pain may or may not be related to your pregnancy. You should follow-up closely with your OB provider so they can evaluate you and your baby.  Until you follow-up with your regular provider, do the  "following:       Avoid sex and do not put anything in your vagina.    Drink clear fluids.    Only take medications approved by your provider.    MORE INFORMATION:    Appendicitis:  A possible cause of abdominal pain in any person who still has their appendix is acute appendicitis. Appendicitis is often hard to diagnose.  Testing does not always rule out early appendicitis or other causes of abdominal pain. Close follow-up with your provider and re-evaluations may be needed to figure out the reason for your abdominal pain.    Follow-up:  It is very important that you make an appointment with your clinic and go to the appointment.  If you do not follow-up with your primary provider, it may result in missing an important development which could result in permanent injury or disability and/or lasting pain.  If there is any problem keeping your appointment, call your provider or return to the Emergency Department.    Medications:  Take your medications as directed by your provider today.  Before using over-the-counter medications, ask your provider and make sure to take the medications as directed.  If you have any questions about medications, ask your provider.    Diet:  Resume your normal diet as much as possible, but do not eat fried, fatty or spicy foods while you have pain.  Do not drink alcohol or have caffeine.  Do not smoke tobacco.    Probiotics: If you have been given an antibiotic, you may want to also take a probiotic pill or eat yogurt with live cultures. Probiotics have \"good bacteria\" to help your intestines stay healthy. Studies have shown that probiotics help prevent diarrhea (loose stools) and other intestine problems (including C. diff infection) when you take antibiotics. You can buy these without a prescription in the pharmacy section of the store.     If you were given a prescription for medicine here today, be sure to read all of the information (including the package insert) that comes with your " prescription.  This will include important information about the medicine, its side effects, and any warnings that you need to know about.  The pharmacist who fills the prescription can provide more information and answer questions you may have about the medicine.  If you have questions or concerns that the pharmacist cannot address, please call or return to the Emergency Department.       Remember that you can always come back to the Emergency Department if you are not able to see your regular provider in the amount of time listed above, if you get any new symptoms, or if there is anything that worries you.    Gastritis (Adult)    Gastritis is inflammation and irritation of the stomach lining. It can be present for a short time (acute) or be long lasting (chronic). Gastritis is often caused by infection with bacteria called H pylori. More than a third of people in the [place] have this bacteria in their bodies. In many cases, H pylori causes no problems or symptoms. In some people, though, the infection irritates the stomach lining and causes gastritis. Other causes of stomach irritation include drinking alcohol or taking pain-relieving medicines called NSAIDs (such as aspirin or ibuprofen).   Symptoms of gastritis can include:    Abdominal pain or bloating    Loss of appetite    Nausea or vomiting    Vomiting blood or having black stools    Feeling more tired than usual  An inflamed and irritated stomach lining is more likely to develop a sore called an ulcer. To help prevent this, gastritis should be treated.  Home care  If needed, medicines may be prescribed. If you have H pylori infection, treating it will likely relieve your symptoms. Other changes can help reduce stomach irritation and help it heal.    If you have been prescribed medicines for H pylori infection, take them as directed. Take all of the medicine until it is finished or your healthcare provider tells you to stop, even if you feel better.    Your  healthcare provider may recommend avoiding NSAIDs. If you take daily aspirin for your heart or other medical reasons, do not stop without talking to your healthcare provider first.    Avoid drinking alcohol.    Stop smoking. Smoking can irritate the stomach and delay healing. As much as possible, stay away from second hand smoke.  Follow-up care  Follow up with your healthcare provider, or as advised by our staff. Testing may be needed to check for inflammation or an ulcer.  When to seek medical advice  Call your healthcare provider for any of the following:    Stomach pain that gets worse or moves to the lower right abdomen (appendix area)    Chest pain that appears or gets worse, or spreads to the back, neck, shoulder, or arm    Frequent vomiting (can t keep down liquids)    Blood in the stool or vomit (red or black in color)    Feeling weak or dizzy    Fever of 100.4 F (38 C) or higher, or as directed by your healthcare provider  Date Last Reviewed: 6/22/2015 2000-2017 [employer]. [address] [place] [address]. All rights reserved. This information is not intended as a substitute for professional medical care. Always follow your healthcare professional's instructions.          Future Appointments        Provider Department Dept Phone Center    12/21/2017 12:30 PM Poly Mack MD [facility] [phone] [place]      24 Hour Appointment Hotline       To make an appointment at any [facility], call [phone] ([phone]). If you don't have a family doctor or clinic, we will help you find one. [facility] (formerly [facility]) are conveniently located to serve the needs of you and your family.             Review of your medicines      START taking        Dose / Directions Last dose taken    sucralfate 1 GM/10ML suspension   Commonly known as:  CARAFATE   Dose:  1 g   Quantity:  420 mL        Take 10 mLs (1 g) by mouth 4 times daily   Refills:  1          Our records show  that you are taking the medicines listed below. If these are incorrect, please call your family doctor or clinic.        Dose / Directions Last dose taken    acetaminophen 500 MG tablet   Commonly known as:  TYLENOL   Dose:  500 mg   Quantity:  20 tablet        Take 1 tablet (500 mg) by mouth every 6 hours as needed for pain or fever   Refills:  0        cholecalciferol 92615 UNITS capsule   Commonly known as:  VITAMIN D3   Dose:  1 capsule   Quantity:  8 capsule        Take 1 capsule (50,000 Units) by mouth once a week   Refills:  0        ferrous sulfate 325 (65 FE) MG tablet   Commonly known as:  IRON   Dose:  325 mg   Quantity:  90 tablet        Take 1 tablet (325 mg) by mouth daily (with breakfast)   Refills:  1        ibuprofen 600 MG tablet   Commonly known as:  ADVIL/MOTRIN   Dose:  600 mg   Quantity:  20 tablet        Take 1 tablet (600 mg) by mouth every 6 hours as needed for moderate pain   Refills:  1        ondansetron 4 MG tablet   Commonly known as:  ZOFRAN   Dose:  4 mg   Quantity:  8 tablet        Take 1 tablet (4 mg) by mouth every 6 hours   Refills:  0                Prescriptions were sent or printed at these locations (1 Prescription)                   Other Prescriptions                Printed at Department/Unit printer (1 of 1)         sucralfate (CARAFATE) 1 GM/10ML suspension                Procedures and tests performed during your visit     CBC with platelets differential    Comprehensive metabolic panel    HCG qualitative urine    Lipase    UA with Microscopic reflex to Culture      Orders Needing Specimen Collection     None      Pending Results     No orders found from 12/4/2017 to 12/7/2017.            Pending Culture Results     No orders found from 12/4/2017 to 12/7/2017.            Pending Results Instructions     If you had any lab results that were not finalized at the time of your Discharge, you can call the ED Lab Result RN at [phone]. You will be contacted by this team for  any positive Lab results or changes in treatment. The nurses are available 7 days a week from 10A to 6:30P.  You can leave a message 24 hours per day and they will return your call.        Test Results From Your Hospital Stay        12/6/2017  6:02 PM      Component Results     Component Value Ref Range & Units Status    WBC 15.5 (H) 4.0 - 11.0 10e9/L Final    RBC Count 4.56 3.8 - 5.2 10e12/L Final    Hemoglobin 11.4 (L) 11.7 - 15.7 g/dL Final    Hematocrit 35.4 35.0 - 47.0 % Final    MCV 78 78 - 100 fl Final    MCH 25.0 (L) 26.5 - 33.0 pg Final    MCHC 32.2 31.5 - 36.5 g/dL Final    RDW 16.2 (H) 10.0 - 15.0 % Final    Platelet Count 385 150 - 450 10e9/L Final    Diff Method Automated Method  Final    % Neutrophils 57.1 % Final    % Lymphocytes 35.7 % Final    % Monocytes 5.6 % Final    % Eosinophils 0.8 % Final    % Basophils 0.3 % Final    % Immature Granulocytes 0.5 % Final    Nucleated RBCs 0 0 /100 Final    Absolute Neutrophil 8.9 (H) 1.6 - 8.3 10e9/L Final    Absolute Lymphocytes 5.5 (H) 0.8 - 5.3 10e9/L Final    Absolute Monocytes 0.9 0.0 - 1.3 10e9/L Final    Absolute Eosinophils 0.1 0.0 - 0.7 10e9/L Final    Absolute Basophils 0.1 0.0 - 0.2 10e9/L Final    Abs Immature Granulocytes 0.1 0 - 0.4 10e9/L Final    Absolute Nucleated RBC 0.0  Final    Anisocytosis Slight  Final    Microcytes Present  Final    Platelet Estimate Normal  Final         12/6/2017  5:38 PM      Component Results     Component Value Ref Range & Units Status    Sodium 139 133 - 144 mmol/L Final    Potassium 3.5 3.4 - 5.3 mmol/L Final    Chloride 107 94 - 109 mmol/L Final    Carbon Dioxide 24 20 - 32 mmol/L Final    Anion Gap 8 3 - 14 mmol/L Final    Glucose 87 70 - 99 mg/dL Final    Urea Nitrogen 8 7 - 30 mg/dL Final    Creatinine 0.69 0.52 - 1.04 mg/dL Final    GFR Estimate >90 >60 mL/min/1.7m2 Final    Non  GFR Calc    GFR Estimate If Black >90 >60 mL/min/1.7m2 Final    African American GFR Calc    Calcium 8.0 (L) 8.5 -  10.1 mg/dL Final    Bilirubin Total 0.3 0.2 - 1.3 mg/dL Final    Albumin 3.4 3.4 - 5.0 g/dL Final    Protein Total 7.8 6.8 - 8.8 g/dL Final    Alkaline Phosphatase 71 40 - 150 U/L Final    ALT 17 0 - 50 U/L Final    AST 13 0 - 45 U/L Final         12/6/2017  5:38 PM      Component Results     Component Value Ref Range & Units Status    Lipase 160 73 - 393 U/L Final         12/6/2017  6:13 PM      Component Results     Component Value Ref Range & Units Status    Color Urine Colorless  Final    Appearance Urine Clear  Final    Glucose Urine Negative NEG^Negative mg/dL Final    Bilirubin Urine Negative NEG^Negative Final    Ketones Urine Negative NEG^Negative mg/dL Final    Specific Gravity Urine 1.003 1.003 - 1.035 Final    Blood Urine Negative NEG^Negative Final    pH Urine 7.0 5.0 - 7.0 pH Final    Protein Albumin Urine Negative NEG^Negative mg/dL Final    Urobilinogen mg/dL 0.0 0.0 - 2.0 mg/dL Final    Nitrite Urine Negative NEG^Negative Final    Leukocyte Esterase Urine Negative NEG^Negative Final    Source Midstream Urine  Final    WBC Urine 1 0 - 2 /HPF Final    RBC Urine 1 0 - 2 /HPF Final    Squamous Epithelial /HPF Urine 2 (H) 0 - 1 /HPF Final         12/6/2017  6:13 PM      Component Results     Component Value Ref Range & Units Status    HCG Qual Urine Negative NEG^Negative Final    This test is for screening purposes.  Results should be interpreted along with   the clinical picture.  Confirmation testing is available if warranted by   ordering [id number], HCG Quantitative Pregnancy.                  Clinical Quality Measure: Blood Pressure Screening     Your blood pressure was checked while you were in the emergency department today. The last reading we obtained was  BP: 121/74 . Please read the guidelines below about what these numbers mean and what you should do about them.  If your systolic blood pressure (the top number) is less than 120 and your diastolic blood pressure (the bottom number) is less than  80, then your blood pressure is normal. There is nothing more that you need to do about it.  If your systolic blood pressure (the top number) is 120-139 or your diastolic blood pressure (the bottom number) is 80-89, your blood pressure may be higher than it should be. You should have your blood pressure rechecked within a year by a primary care provider.  If your systolic blood pressure (the top number) is 140 or greater or your diastolic blood pressure (the bottom number) is 90 or greater, you may have high blood pressure. High blood pressure is treatable, but if left untreated over time it can put you at risk for heart attack, stroke, or kidney failure. You should have your blood pressure rechecked by a primary care provider within the next 4 weeks.  If your provider in the emergency department today gave you specific instructions to follow-up with your doctor or provider even sooner than that, you should follow that instruction and not wait for up to 4 weeks for your follow-up visit.        Thank you for choosing [facility]       Thank you for choosing [facility] for your care. Our goal is always to provide you with excellent care. Hearing back from our patients is one way we can continue to improve our services. Please take a few minutes to complete the written survey that you may receive in the mail after you visit with us. Thank you!        [facility] Information     [email or web address] gives you secure access to your electronic health record. If you see a primary care provider, you can also send messages to your care team and make appointments. If you have questions, please call your primary care clinic.  If you do not have a primary care provider, please call [phone] and they will assist you.        Care EveryWhere ID     This is your Care EveryWhere ID. This could be used by other organizations to access your [facility] medical records  [id number]        Equal Access to Services     VALERIE GAN: Francesco de  cameron Addison, elizabeth bishopmaapple lockhart. So [facility] [phone].    ATENCIÓN: Si habla español, tiene a santiago disposición servicios gratuitos de asistencia lingüística. Llame al [phone].    We comply with applicable federal civil rights laws and [place] laws. We do not discriminate on the basis of race, color, national origin, age, disability, sex, sexual orientation, or gender identity.            After Visit Summary       This is your record. Keep this with you and show to your community pharmacist(s) and doctor(s) at your next visit.

## 2017-12-06 NOTE — ED PROVIDER NOTES
History     Chief Complaint:  Flank Pain    HPI   Randee Millard is a 37 year old female with a history of reflux esophagitis who presents to the ED for evaluation of abdominal pain. Yesterday, she had 3 episodes of vomiting(none today) and developed some epigastric abdominal pain that radiates to her back. She denies bloody emesis. Abdominal pain has been constant and worse with eating and is sharp/stabbing in nature. She denies any other aggravating/alleviating factors. She notes this feels different from her past reflux. She also notes some dysuria for the last week, but denies frequency. BMs have been normal without blood. LMP was 2 weeks ago and normal in timing and duration. She does not think she is pregnant. She is sexually active, but uses condoms. No recent illness or travel. She states she has had chronic LLQ abdominal pain and has been told it was an ovarian cyst. No abdominal surgeries. She is not concerned about this today and this is unchanged from baseline. She denies a fever, chest pain, shortness of breath, vaginal discharge or vaginal bleeding.     Allergies:  Amoxicillin  Clindamycin    Medications:    Zofran  Zantac    Past Medical History:    Anemia  Reflux esophagitis    Past Surgical History:    US Breast Cyst aspiration    Family History:    HTN  Diabetes    Social History:  Marital Status:   [2]  Negative for tobacco use.  Negative for alcohol use.    Review of Systems   Constitutional: Negative for fever.   Gastrointestinal: Positive for abdominal pain, nausea and vomiting. Negative for blood in stool, constipation and diarrhea.   Genitourinary: Positive for dysuria and flank pain. Negative for frequency, vaginal bleeding and vaginal discharge.   All other systems reviewed and are negative.    Physical Exam   First Vitals:  BP: 121/74  Pulse: 75  Heart Rate: 75  Temp: 98.9  F (37.2  C)  Resp: 18  SpO2: 98 %    Physical Exam  General: Resting comfortably.  Alert and oriented.    Head:  The scalp, face, and head appear normal   Eyes:  Conjunctivae are normal    ENT:    The oropharynx is normal    Uvula is in the midline     Moist mucous membranes   CV:  Regular rate and rhythm     Normal S1/S2    No pathological murmur detected   Resp:  Lungs are clear to auscultation    Non-labored    No rales or wheezing   GI:  Abdomen is soft, non-distended    No rebound tenderness     Normal bowel sounds     Mild LLQ abdominal tenderness without rebound or guarding. Mild epigastric abdominal tenderness without rebound or guarding. No RUQ tenderness.   MS:  Normal muscular tone   Skin:  No rash or acute skin lesions noted   Neuro: Speech is normal and fluent.     Emergency Department Course     Laboratory:  CBC: WBC: 15.5 (H), HGB: 11.4 (L), PLT: 385  CMP: Calcium 8.0 (L), o/w WNL (Creatinine: 0.69)    Lipase: 160    HCG: negative    UA with Microscopic: squamous epithelial 2 (H), o/w WNL    Interventions:  1721 GI cocktail 30 mg PO   NS 1L IV  1834 Carafate 1 g PO    Emergency Department Course:  Nursing notes and vitals reviewed. 1657 I performed an exam of the patient as documented above.     IV inserted. Medicine administered as documented above. Blood drawn. This was sent to the lab for further testing, results above.    The patient provided a urine sample here in the emergency department. This was sent for laboratory testing, findings above.     1823 I rechecked the patient and discussed the results of her workup thus far.     Findings and plan explained to the Patient. Patient discharged home with instructions regarding supportive care, medications, and reasons to return. The importance of close follow-up was reviewed. The patient was prescribed Carafate.    I personally reviewed the laboratory results with the Patient and answered all related questions prior to discharge.     Impression & Plan      Medical Decision Making:  Randee Millard is a 37 year old who presents for evaluation of  epigastric abdominal pain and vomiting. I considered a broad differential diagnosis for this patient including gastritis, GERD, cholecystitis, choledocolithiasis, biliary colic, pancreatitis, colonic or small bowel pathology, vascular etiologies including aneurysm and ulcer. Signs and symptoms here are consistent with gastritis. No peritoneal signs. Tolerates PO. Labs are unremarkable.  I do not think CT imaging is needed at this time given her benign abdominal exam and reassuring vitals.  UA without infection and urine Hcg negative. Patient experienced relief here with above treatment. There are no signs of any serious etiologies as mentioned above or intraabdominal catastrophes. Doubt perforated ulcer at this point based on exam and history. Follow up with PCP or GI in 7-14 days. Consider endoscopy if further symptoms despite this. Gastritis precautions given for home. Patient will be discharged to home with a prescription for Carafate and to continue taking her at home ranitidine. All questions were answered prior to discharge. The patient understands and agrees to this plan.      Diagnosis:    ICD-10-CM   1. Abdominal pain, epigastric R10.13     Disposition:  discharged to home    Discharge Medications:  New Prescriptions    SUCRALFATE (CARAFATE) 1 GM/10ML SUSPENSION    Take 10 mLs (1 g) by mouth 4 times daily     INida, am serving as a scribe on 12/6/2017 at 4:50 PM to personally document services performed by Vinnie Larson MD based on my observations and the provider's statements to me.     Nida Helms  12/6/2017   [facility] EMERGENCY DEPARTMENT       Silvia Longo PA-C  12/06/17 [id number]

## 2017-12-07 NOTE — DISCHARGE INSTRUCTIONS
Continue taking Zantac and add Carafate. Follow up close with PCP.     Discharge Instructions  Abdominal Pain    Abdominal pain (belly pain) can be caused by many things. Your evaluation today does not show the exact cause for your pain. Your provider today has decided that it is unlikely your pain is due to a life threatening problem, or a problem requiring surgery or hospital admission. Sometimes those problems cannot be found right away, so it is very important that you follow up as directed.  Sometimes only the changes which occur over time allow the cause of your pain to be found.    Generally, every Emergency Department visit should have a follow-up clinic visit with either a primary or a specialty clinic/provider. Please follow-up as instructed by your emergency provider today. With abdominal pain, we often recommend very close follow-up, such as the following day.    ADULTS:  Return to the Emergency Department right away if:      You get an oral temperature above 102oF or as directed by your provider.    You have blood in your stools. This may be bright red or appear as black, tarry stools.      You keep vomiting (throwing up) or cannot drink liquids.    You see blood when you vomit.     You cannot have a bowel movement or you cannot pass gas.    Your stomach gets bloated or bigger.    Your skin or the whites of your eyes look yellow.    You faint.    You have bloody, frequent or painful urination (peeing).    You have new symptoms or anything that worries you.    CHILDREN:  Return to the Emergency Department right away if your child has any of the above-listed symptoms or the following:      Pushes your hand away or screams/cries when his/her belly is touched.    You notice your child is very fussy or weak.    Your child is very tired and is too tired to eat or drink.    Your child is dehydrated.  Signs of dehydration can be:  o Significant change in the amount of wet diapers/urine.  o Your infant or child  starts to have dry mouth and lips, or no saliva (spit) or tears.    PREGNANT WOMEN:  Return to the Emergency Department right away if you have any of the above-listed symptoms or the following:      You have bleeding, leaking fluid or passing tissue from the vagina.    You have worse pain or cramping, or pain in your shoulder or back.    You have vomiting that will not stop.    You have a temperature of 100oF or more.    Your baby is not moving as much as usual.    You faint.    You get a bad headache with or without eye problems and abdominal pain.    You have a seizure.    You have unusual discharge from your vagina and abdominal pain.    Abdominal pain is pretty common during pregnancy.  Your pain may or may not be related to your pregnancy. You should follow-up closely with your OB provider so they can evaluate you and your baby.  Until you follow-up with your regular provider, do the following:       Avoid sex and do not put anything in your vagina.    Drink clear fluids.    Only take medications approved by your provider.    MORE INFORMATION:    Appendicitis:  A possible cause of abdominal pain in any person who still has their appendix is acute appendicitis. Appendicitis is often hard to diagnose.  Testing does not always rule out early appendicitis or other causes of abdominal pain. Close follow-up with your provider and re-evaluations may be needed to figure out the reason for your abdominal pain.    Follow-up:  It is very important that you make an appointment with your clinic and go to the appointment.  If you do not follow-up with your primary provider, it may result in missing an important development which could result in permanent injury or disability and/or lasting pain.  If there is any problem keeping your appointment, call your provider or return to the Emergency Department.    Medications:  Take your medications as directed by your provider today.  Before using over-the-counter medications, ask  "your provider and make sure to take the medications as directed.  If you have any questions about medications, ask your provider.    Diet:  Resume your normal diet as much as possible, but do not eat fried, fatty or spicy foods while you have pain.  Do not drink alcohol or have caffeine.  Do not smoke tobacco.    Probiotics: If you have been given an antibiotic, you may want to also take a probiotic pill or eat yogurt with live cultures. Probiotics have \"good bacteria\" to help your intestines stay healthy. Studies have shown that probiotics help prevent diarrhea (loose stools) and other intestine problems (including C. diff infection) when you take antibiotics. You can buy these without a prescription in the pharmacy section of the store.     If you were given a prescription for medicine here today, be sure to read all of the information (including the package insert) that comes with your prescription.  This will include important information about the medicine, its side effects, and any warnings that you need to know about.  The pharmacist who fills the prescription can provide more information and answer questions you may have about the medicine.  If you have questions or concerns that the pharmacist cannot address, please call or return to the Emergency Department.       Remember that you can always come back to the Emergency Department if you are not able to see your regular provider in the amount of time listed above, if you get any new symptoms, or if there is anything that worries you.    Gastritis (Adult)    Gastritis is inflammation and irritation of the stomach lining. It can be present for a short time (acute) or be long lasting (chronic). Gastritis is often caused by infection with bacteria called H pylori. More than a third of people in the [place] have this bacteria in their bodies. In many cases, H pylori causes no problems or symptoms. In some people, though, the infection irritates the stomach lining and " causes gastritis. Other causes of stomach irritation include drinking alcohol or taking pain-relieving medicines called NSAIDs (such as aspirin or ibuprofen).   Symptoms of gastritis can include:    Abdominal pain or bloating    Loss of appetite    Nausea or vomiting    Vomiting blood or having black stools    Feeling more tired than usual  An inflamed and irritated stomach lining is more likely to develop a sore called an ulcer. To help prevent this, gastritis should be treated.  Home care  If needed, medicines may be prescribed. If you have H pylori infection, treating it will likely relieve your symptoms. Other changes can help reduce stomach irritation and help it heal.    If you have been prescribed medicines for H pylori infection, take them as directed. Take all of the medicine until it is finished or your healthcare provider tells you to stop, even if you feel better.    Your healthcare provider may recommend avoiding NSAIDs. If you take daily aspirin for your heart or other medical reasons, do not stop without talking to your healthcare provider first.    Avoid drinking alcohol.    Stop smoking. Smoking can irritate the stomach and delay healing. As much as possible, stay away from second hand smoke.  Follow-up care  Follow up with your healthcare provider, or as advised by our staff. Testing may be needed to check for inflammation or an ulcer.  When to seek medical advice  Call your healthcare provider for any of the following:    Stomach pain that gets worse or moves to the lower right abdomen (appendix area)    Chest pain that appears or gets worse, or spreads to the back, neck, shoulder, or arm    Frequent vomiting (can t keep down liquids)    Blood in the stool or vomit (red or black in color)    Feeling weak or dizzy    Fever of 100.4 F (38 C) or higher, or as directed by your healthcare provider  Date Last Reviewed: 6/22/2015 2000-2017 [employer]. [address], [place]  [address]. All rights reserved. This information is not intended as a substitute for professional medical care. Always follow your healthcare professional's instructions.

## 2017-12-21 ENCOUNTER — OFFICE VISIT (OUTPATIENT)
Dept: ENDOCRINOLOGY | Facility: CLINIC | Age: 37
End: 2017-12-21
Payer: COMMERCIAL

## 2017-12-21 VITALS
SYSTOLIC BLOOD PRESSURE: 110 MMHG | BODY MASS INDEX: 31.63 KG/M2 | DIASTOLIC BLOOD PRESSURE: 72 MMHG | WEIGHT: 208.7 LBS | HEART RATE: 119 BPM | TEMPERATURE: 98.2 F | OXYGEN SATURATION: 97 % | HEIGHT: 68 IN

## 2017-12-21 DIAGNOSIS — E01.0 THYROMEGALY: ICD-10-CM

## 2017-12-21 DIAGNOSIS — R53.82 CHRONIC FATIGUE: Primary | ICD-10-CM

## 2017-12-21 PROCEDURE — 99244 OFF/OP CNSLTJ NEW/EST MOD 40: CPT | Performed by: INTERNAL MEDICINE

## 2017-12-21 PROCEDURE — 36415 COLL VENOUS BLD VENIPUNCTURE: CPT | Performed by: INTERNAL MEDICINE

## 2017-12-21 PROCEDURE — 86376 MICROSOMAL ANTIBODY EACH: CPT | Performed by: INTERNAL MEDICINE

## 2017-12-21 PROCEDURE — 84443 ASSAY THYROID STIM HORMONE: CPT | Performed by: INTERNAL MEDICINE

## 2017-12-21 NOTE — NURSING NOTE
"Chief Complaint   Patient presents with     Referral     Dr Hou for thyroid.        Initial /72 (BP Location: Left arm, Patient Position: Chair, Cuff Size: Adult Large)  Pulse 119  Temp 98.2  F (36.8  C) (Oral)  Ht 1.727 m (5' 8\")  Wt 94.7 kg (208 lb 11.2 oz)  LMP 2017  SpO2 97%  BMI 31.73 kg/m2 Estimated body mass index is 31.73 kg/(m^2) as calculated from the following:    Height as of this encounter: 1.727 m (5' 8\").    Weight as of this encounter: 94.7 kg (208 lb 11.2 oz).  Medication Reconciliation: complete     ENDOCRINOLOGY INTAKE FORM    Patient Name:  Randee Millard  :  1980    Is patient Diabetic?   No  Does patient have non-diabetic or other endocrine issues?  Yes: thyroid    Vitals: /72 (BP Location: Left arm, Patient Position: Chair, Cuff Size: Adult Large)  Pulse 119  Temp 98.2  F (36.8  C) (Oral)  Ht 1.727 m (5' 8\")  Wt 94.7 kg (208 lb 11.2 oz)  LMP 2017  SpO2 97%  BMI 31.73 kg/m2  BMI= Body mass index is 31.73 kg/(m^2).    Flu vaccine:  No  Pneumonia vaccine:  No    Smoking and Alcohol use:  Social History   Substance Use Topics     Smoking status: Never Smoker     Smokeless tobacco: Never Used     Alcohol use No       Staff Signature:  Hoda Osei CMA ([facility])        "

## 2017-12-21 NOTE — PROGRESS NOTES
ENDOCRINOLOGY CLINIC NOTE:    Name: Randee Millard  Self referred for thyroid dysfunction?  Chief Complaint   Patient presents with     Referral     Dr Hou for thyroid.      HPI:  Randee Millard is a 37 year old female who presents for the evaluation of :    #1 Hypothyroidism.  Was diagnosed with Thyroid problem ? at age 27 and plan was to continue to monitor labs at that time.  This was done in [place].  Also complaining of tenderness in neck-- feels like something is stuck in throat X 1-2 months.  When in [place] she also had thyroid US and plan was to continue to monitor.  Never been on thyroid hormone replacement.  Feels tired.    Palpitations:  Yes: sometimes. On and off  Changes to hair or skin: No  Diarrhea/Constipation:Yes: + constipation  Changes in menses: Yes: regular. 5 kids. No problems with pregnancies.  Changes in vision:No  Diplopia/Blurryness:No  Dysphagia or Shortness of breath:No  Muscle aches or pain:Yes: sometimes  Tremors:No  Difficulty sleeping:not sleeping much.  Changes in weight: Yes: + wt gain  Wt Readings from Last 2 Encounters:   12/21/17 94.7 kg (208 lb 11.2 oz)   08/22/17 92.8 kg (204 lb 9.6 oz)     Heat or cold intolerance: + cold intolerance  History of Lithium or Amiodarone use:No  Head or neck surgery/radiation:No  Family History of Thyroid Problems: no  PMH/PSH:  Past Medical History:   Diagnosis Date     Anemia      Reflux esophagitis      Past Surgical History:   Procedure Laterality Date     US BREAST CYST ASPIRATION INITIAL RT       Family Hx:  Family History   Problem Relation Age of Onset     Hypertension Father      DIABETES Father 63     Family History Negative Mother      Social Hx:  Social History     Social History     Marital status:      Spouse name: N/A     Number of children: N/A     Years of education: N/A     Occupational History     Not on file.     Social History Main Topics     Smoking status: Never Smoker     Smokeless tobacco: Never Used     Alcohol  "use No     Drug use: No     Sexual activity: Yes     Partners: Male     Other Topics Concern     Not on file     Social History Narrative          MEDICATIONS:  has a current medication list which includes the following prescription(s): cholecalciferol, ferrous sulfate, sucralfate, ibuprofen, ondansetron, and acetaminophen.    ROS   ROS: 10 point ROS neg other than the symptoms noted above in the HPI.    Physical Exam   VS: /72 (BP Location: Left arm, Patient Position: Chair, Cuff Size: Adult Large)  Pulse 119  Temp 98.2  F (36.8  C) (Oral)  Ht 1.727 m (5' 8\")  Wt 94.7 kg (208 lb 11.2 oz)  LMP 11/29/2017  SpO2 97%  BMI 31.73 kg/m2  GENERAL: AXOX3, NAD, well dressed, answering questions appropriately, appears stated age.  HEENT: No exopthalmous, no proptosis, EOMI, no lig lag, no retraction  NECK: Thyroid moderately enlarged, non tender, no nodules were palpated.  CV: RRR, no rubs, gallops, no murmurs  LUNGS: CTAB, no wheezes, rales, or ronchi  ABDOMEN: soft, nontender, nondistended, +BS, no organomegaly  EXTREMITIES: no edema, +pulses, no rashes, no lesions  NEUROLOGY: CN grossly intact,  + DTR upper and lower extremity, no tremors  MSK: grossly intact  SKIN: no rashes, no lesions    LABS:  TFTs:  ENDO THYROID LABS-[facility] Latest Ref Rng & Units 8/22/2017 1/18/2017   TSH 0.40 - 4.00 mU/L 1.79 1.65   T4 FREE 0.76 - 1.46 ng/dL       ENDO THYROID LABS-[facility] Latest Ref Rng & Units 11/8/2016 5/18/2016   TSH 0.40 - 4.00 mU/L 3.10 1.87   T4 FREE 0.76 - 1.46 ng/dL       ENDO THYROID LABS-[facility] Latest Ref Rng & Units 11/30/2015   TSH 0.40 - 4.00 mU/L 3.89   T4 FREE 0.76 - 1.46 ng/dL 1.00     TG/TPO: NA    All pertinent notes, labs, and images personally reviewed by me.     A/P  Ms.Nimo JUSTIN Millard is a 37 year old here for the evaluation of hypothyroidism:    #1. Thyroid dysfunction?:  -- I discussed that thyroid hormone levels are in normal range.  TSH has been in normal range as noted above.  Clinically looks euthyroid. "  Chronic fatigue can be multifactorial.  Has moderate the enlarged thyroid  Plan  Repeat thyroid function test along with screening for Hashimoto B TPO antibodies  Thyroid ultrasound to evaluate thyroid size and anatomy further.    #2. Thyromegaly:  Moderately enlarged thyroid gland on physical examination  Sometimes get difficulty with swallowing  No history of radiation  No family history of thyroid cancer  Plan to get thyroid ultrasound to evaluate further.      Follow-up:  Based on labs    Poly Mack MD  Endocrinology  [facility]/[place]  CC: Abe Hou    More than 50% of face to face time spent with Ms. Millard on counseling / coordinating her care.  Total appointment times was 40 minutes.      All questions were answered.  The patient indicates understanding of the above issues and agrees with the plan set forth.

## 2017-12-21 NOTE — MR AVS SNAPSHOT
After Visit Summary   2017    Randee Millard    MRN: [id number]           Patient Information     Date Of Birth          1980        Visit Information        Provider Department      2017 12:30 PM Poly Mack MD [facility]        Care Instructions    [facility] & [place] locations   Dr Mack, Endocrinology Department      [facility]   [address]  [place] [address]  Appointment [phone]  Fax: [phone]  [place]: Monday and Tuesday         [facility]   [address]  [place] [address]  Appointment [phone]  Fax: [phone]  [place]: Wednesday and Thursday            Labs today  Follow up with radiology for thyroid ultrasound  Follow up based on that.     [facility] radiology scheduleing  [phone]   [facility] Radiology scheduling  [phone]     Please call and schedule the recommended test as discussed in clinic visit. These are the numbers to call.            Follow-ups after your visit        Who to contact     If you have questions or need follow up information about today's clinic visit or your schedule please contact [facility] directly at [phone].  Normal or non-critical lab and imaging results will be communicated to you by MyChart, letter or phone within 4 business days after the clinic has received the results. If you do not hear from us within 7 days, please contact the clinic through MyChart or phone. If you have a critical or abnormal lab result, we will notify you by phone as soon as possible.  Submit refill requests through [employer] or call your pharmacy and they will forward the refill request to us. Please allow 3 business days for your refill to be completed.          Additional Information About Your Visit        [employer] Information     [employer] gives you secure  "access to your electronic health record. If you see a primary care provider, you can also send messages to your care team and make appointments. If you have questions, please call your primary care clinic.  If you do not have a primary care provider, please call [phone] and they will assist you.        Care EveryWhere ID     This is your Care EveryWhere ID. This could be used by other organizations to access your [place] medical records  [id number]        Your Vitals Were     Pulse Temperature Height Last Period Pulse Oximetry BMI (Body Mass Index)    119 98.2  F (36.8  C) (Oral) 1.727 m (5' 8\") 11/29/2017 97% 31.73 kg/m2       Blood Pressure from Last 3 Encounters:   12/21/17 110/72   12/06/17 121/74   08/22/17 100/72    Weight from Last 3 Encounters:   12/21/17 94.7 kg (208 lb 11.2 oz)   08/22/17 92.8 kg (204 lb 9.6 oz)   06/11/17 90.7 kg (200 lb)              Today, you had the following     No orders found for display       Primary Care Provider Office Phone # Fax #    [facility]jenjoses[facility]izabeth[facility] KELSEY Hou [phone]       [address]  [facility] [address]        Equal Access to Services     VALERIE MCKEON : Hadii aad ku hadasho Soomaali, waaxda luqadaha, qaybta kaalmada adeegyada, apple idiin haynadian christo dolan . So [facility] [phone].    ATENCIÓN: Si habla español, tiene a santiago disposición servicios gratuitos de asistencia lingüística. Llame al [phone].    We comply with applicable federal civil rights laws and [place] laws. We do not discriminate on the basis of race, color, national origin, age, disability, sex, sexual orientation, or gender identity.            Thank you!     Thank you for choosing [facility]  for your care. Our goal is always to provide you with excellent care. Hearing back from our patients is one way we can continue to improve our services. Please take a few minutes to complete the written survey that you may receive in the mail after " your visit with us. Thank you!             Your Updated Medication List - Protect others around you: Learn how to safely use, store and throw away your medicines at www.disposemymeds.org.          This list is accurate as of: 12/21/17 12:59 PM.  Always use your most recent med list.                   Brand Name Dispense Instructions for use Diagnosis    acetaminophen 500 MG tablet    TYLENOL    20 tablet    Take 1 tablet (500 mg) by mouth every 6 hours as needed for pain or fever        cholecalciferol 74302 UNITS capsule    VITAMIN D3    8 capsule    Take 1 capsule (50,000 Units) by mouth once a week    Vitamin D deficiency       ferrous sulfate 325 (65 FE) MG tablet    IRON    90 tablet    Take 1 tablet (325 mg) by mouth daily (with breakfast)    Anemia due to other cause       ibuprofen 600 MG tablet    ADVIL/MOTRIN    20 tablet    Take 1 tablet (600 mg) by mouth every 6 hours as needed for moderate pain        ondansetron 4 MG tablet    ZOFRAN    8 tablet    Take 1 tablet (4 mg) by mouth every 6 hours        sucralfate 1 GM/10ML suspension    CARAFATE    420 mL    Take 10 mLs (1 g) by mouth 4 times daily

## 2017-12-21 NOTE — LETTER
12/21/2017         RE: Randee Millard  [address] [facility] [address]        Dear Colleague,    Thank you for referring your patient, Randee Millard, to the [facility]. Please see a copy of my visit note below.    ENDOCRINOLOGY CLINIC NOTE:    Name: Randee Millard  Self referred for thyroid dysfunction?  Chief Complaint   Patient presents with     Referral     Dr Hou for thyroid.      HPI:  Randee Millard is a 37 year old female who presents for the evaluation of :    #1 Hypothyroidism.  Was diagnosed with Thyroid problem ? at age 27 and plan was to continue to monitor labs at that time.  This was done in [place].  Also complaining of tenderness in neck-- feels like something is stuck in throat X 1-2 months.  When in [place] she also had thyroid US and plan was to continue to monitor.  Never been on thyroid hormone replacement.  Feels tired.    Palpitations:  Yes: sometimes. On and off  Changes to hair or skin: No  Diarrhea/Constipation:Yes: + constipation  Changes in menses: Yes: regular. 5 kids. No problems with pregnancies.  Changes in vision:No  Diplopia/Blurryness:No  Dysphagia or Shortness of breath:No  Muscle aches or pain:Yes: sometimes  Tremors:No  Difficulty sleeping:not sleeping much.  Changes in weight: Yes: + wt gain  Wt Readings from Last 2 Encounters:   12/21/17 94.7 kg (208 lb 11.2 oz)   08/22/17 92.8 kg (204 lb 9.6 oz)     Heat or cold intolerance: + cold intolerance  History of Lithium or Amiodarone use:No  Head or neck surgery/radiation:No  Family History of Thyroid Problems: no  PMH/PSH:  Past Medical History:   Diagnosis Date     Anemia      Reflux esophagitis      Past Surgical History:   Procedure Laterality Date     US BREAST CYST ASPIRATION INITIAL RT       Family Hx:  Family History   Problem Relation Age of Onset     Hypertension Father      DIABETES Father 63     Family History Negative Mother      Social Hx:  Social History     Social History     Marital status:  "     Spouse name: N/A     Number of children: N/A     Years of education: N/A     Occupational History     Not on file.     Social History Main Topics     Smoking status: Never Smoker     Smokeless tobacco: Never Used     Alcohol use No     Drug use: No     Sexual activity: Yes     Partners: Male     Other Topics Concern     Not on file     Social History Narrative          MEDICATIONS:  has a current medication list which includes the following prescription(s): cholecalciferol, ferrous sulfate, sucralfate, ibuprofen, ondansetron, and acetaminophen.    ROS   ROS: 10 point ROS neg other than the symptoms noted above in the HPI.    Physical Exam   VS: /72 (BP Location: Left arm, Patient Position: Chair, Cuff Size: Adult Large)  Pulse 119  Temp 98.2  F (36.8  C) (Oral)  Ht 1.727 m (5' 8\")  Wt 94.7 kg (208 lb 11.2 oz)  LMP 11/29/2017  SpO2 97%  BMI 31.73 kg/m2  GENERAL: AXOX3, NAD, well dressed, answering questions appropriately, appears stated age.  HEENT: No exopthalmous, no proptosis, EOMI, no lig lag, no retraction  NECK: Thyroid moderately enlarged, non tender, no nodules were palpated.  CV: RRR, no rubs, gallops, no murmurs  LUNGS: CTAB, no wheezes, rales, or ronchi  ABDOMEN: soft, nontender, nondistended, +BS, no organomegaly  EXTREMITIES: no edema, +pulses, no rashes, no lesions  NEUROLOGY: CN grossly intact,  + DTR upper and lower extremity, no tremors  MSK: grossly intact  SKIN: no rashes, no lesions    LABS:  TFTs:  ENDO THYROID LABS-P Latest Ref Rng & Units 8/22/2017 1/18/2017   TSH 0.40 - 4.00 mU/L 1.79 1.65   T4 FREE 0.76 - 1.46 ng/dL       ENDO THYROID LABS-RUST Latest Ref Rng & Units 11/8/2016 5/18/2016   TSH 0.40 - 4.00 mU/L 3.10 1.87   T4 FREE 0.76 - 1.46 ng/dL       ENDO THYROID LABS-RUST Latest Ref Rng & Units 11/30/2015   TSH 0.40 - 4.00 mU/L 3.89   T4 FREE 0.76 - 1.46 ng/dL 1.00     TG/TPO: NA    All pertinent notes, labs, and images personally reviewed by me.     A/P   S " Freeman is a 37 year old here for the evaluation of hypothyroidism:    #1. Thyroid dysfunction?:  -- I discussed that thyroid hormone levels are in normal range.  TSH has been in normal range as noted above.  Clinically looks euthyroid.  Chronic fatigue can be multifactorial.  Has moderate the enlarged thyroid  Plan  Repeat thyroid function test along with screening for Hashimoto B TPO antibodies  Thyroid ultrasound to evaluate thyroid size and anatomy further.    #2. Thyromegaly:  Moderately enlarged thyroid gland on physical examination  Sometimes get difficulty with swallowing  No history of radiation  No family history of thyroid cancer  Plan to get thyroid ultrasound to evaluate further.      Follow-up:  Based on labs    Poly Mack MD  Endocrinology  [facility]  CC: Abe Hou    More than 50% of face to face time spent with Ms. Millard on counseling / coordinating her care.  Total appointment times was 40 minutes.      All questions were answered.  The patient indicates understanding of the above issues and agrees with the plan set forth.         Again, thank you for allowing me to participate in the care of your patient.        Sincerely,        Poly Mack MD

## 2017-12-21 NOTE — PATIENT INSTRUCTIONS
[facility]   Dr Mack, Endocrinology Department      [facility]   [address]  [place] [address]  Appointment [phone]  Fax: [phone]  [place]: Monday and Tuesday         [facility]   [address]  [place] [address]  Appointment [phone]  Fax: [phone]  [place]: Wednesday and Thursday            Labs today  Follow up with radiology for thyroid ultrasound  Follow up based on that.     [facility] radiology scheduleing  [phone]   [facility] Radiology scheduling  [phone]     Please call and schedule the recommended test as discussed in clinic visit. These are the numbers to call.

## 2017-12-22 LAB
THYROPEROXIDASE AB SERPL-ACNC: <10 IU/ML
TSH SERPL DL<=0.005 MIU/L-ACNC: 1.47 MU/L (ref 0.4–4)

## 2018-01-10 ENCOUNTER — HOSPITAL ENCOUNTER (OUTPATIENT)
Dept: ULTRASOUND IMAGING | Facility: CLINIC | Age: 38
Discharge: HOME | End: 2018-01-10
Attending: INTERNAL MEDICINE | Admitting: INTERNAL MEDICINE
Payer: COMMERCIAL

## 2018-01-10 DIAGNOSIS — E01.0 THYROMEGALY: ICD-10-CM

## 2018-01-10 PROCEDURE — 76536 US EXAM OF HEAD AND NECK: CPT

## 2018-01-17 ENCOUNTER — TELEPHONE (OUTPATIENT)
Dept: INTERNAL MEDICINE | Facility: CLINIC | Age: 38
End: 2018-01-17

## 2018-01-17 NOTE — TELEPHONE ENCOUNTER
Randee Millard is a 37 year old female  who calls with abdominal pain.    NURSING ASSESSMENT:  The pain began 3 days ago.    Pain scale (0-10): 7/10  LMP  was  1/8/18.  The pain is described as aching and pressure and is located LLQ, which is without radiation.  Symptom associated with the abdominal pain: nausea and possible dysuria--unable to clarify whether the abdominal pain is worse or she actually has dysuria.  Denies fever.  Last BM last evening normal color, good sized.  Denies urinary frequency or urgency.    Patient has not had previous abdominal surgery, including none.  Pain is aggravated by sitting up and urination, and relieved by nothing.  Allergies:   Allergies   Allergen Reactions     Amoxicillin Rash     Clindamycin Rash       MEDICATIONS:   Taking medication(s) as prescribed? Yes  Taking over the counter medication(s)? No  Any medication side effects? No significant side effects    Any barriers to taking medication(s) as prescribed?  No  Medication(s) improving/managing symptoms?  N/A  Medication reconciliation completed: No    NURSING PLAN: Routed to provider Yes and Nursing advice to patient Be seen today.  Scheduled to be seen in clinbic today.    RECOMMENDED DISPOSITION:  Scheduled to be seen today in clinic  Will comply with recommendation: Yes  If further questions/concerns or if symptoms do not improve, worsen or new symptoms develop, call your PCP or [facility] as soon as possible.    Guideline used:  Telephone Triage Protocols for Nurses, Third Edition, Johanna Whitehead RN

## 2018-01-18 ENCOUNTER — OFFICE VISIT (OUTPATIENT)
Dept: INTERNAL MEDICINE | Facility: CLINIC | Age: 38
End: 2018-01-18
Payer: COMMERCIAL

## 2018-01-18 VITALS
OXYGEN SATURATION: 96 % | TEMPERATURE: 98.8 F | HEART RATE: 84 BPM | HEIGHT: 68 IN | BODY MASS INDEX: 31.78 KG/M2 | SYSTOLIC BLOOD PRESSURE: 94 MMHG | DIASTOLIC BLOOD PRESSURE: 66 MMHG | WEIGHT: 209.7 LBS

## 2018-01-18 DIAGNOSIS — R10.32 LLQ ABDOMINAL PAIN: ICD-10-CM

## 2018-01-18 DIAGNOSIS — N94.9 VAGINAL DISCOMFORT: Primary | ICD-10-CM

## 2018-01-18 LAB
ALBUMIN UR-MCNC: NEGATIVE MG/DL
APPEARANCE UR: CLEAR
BILIRUB UR QL STRIP: NEGATIVE
COLOR UR AUTO: YELLOW
GLUCOSE UR STRIP-MCNC: NEGATIVE MG/DL
HGB UR QL STRIP: NEGATIVE
KETONES UR STRIP-MCNC: 15 MG/DL
LEUKOCYTE ESTERASE UR QL STRIP: NEGATIVE
NITRATE UR QL: NEGATIVE
PH UR STRIP: 6 PH (ref 5–7)
SOURCE: ABNORMAL
SP GR UR STRIP: 1.02 (ref 1–1.03)
SPECIMEN SOURCE: NORMAL
UROBILINOGEN UR STRIP-ACNC: 0.2 EU/DL (ref 0.2–1)
WET PREP SPEC: NORMAL

## 2018-01-18 PROCEDURE — 87210 SMEAR WET MOUNT SALINE/INK: CPT | Performed by: INTERNAL MEDICINE

## 2018-01-18 PROCEDURE — 99214 OFFICE O/P EST MOD 30 MIN: CPT | Performed by: INTERNAL MEDICINE

## 2018-01-18 PROCEDURE — 87591 N.GONORRHOEAE DNA AMP PROB: CPT | Performed by: INTERNAL MEDICINE

## 2018-01-18 PROCEDURE — 87491 CHLMYD TRACH DNA AMP PROBE: CPT | Performed by: INTERNAL MEDICINE

## 2018-01-18 PROCEDURE — 81003 URINALYSIS AUTO W/O SCOPE: CPT | Performed by: INTERNAL MEDICINE

## 2018-01-18 RX ORDER — TRAMADOL HYDROCHLORIDE 50 MG/1
50 TABLET ORAL EVERY 6 HOURS PRN
Qty: 20 TABLET | Refills: 0 | Status: SHIPPED | OUTPATIENT
Start: 2018-01-18 | End: 2018-04-11

## 2018-01-18 NOTE — PATIENT INSTRUCTIONS
Plan:  1. Tramadol 50 mg every 6 hours as needed for severe pain  2. Continue Tylenol and Ibuprofen for pain  3. Pelvic ultrasound - To schedule this test you may call Scheduling center at [phone]

## 2018-01-18 NOTE — MR AVS SNAPSHOT
After Visit Summary   1/18/2018    Randee Millard    MRN: [id number]           Patient Information     Date Of Birth          1980        Visit Information        Provider Department      1/18/2018 3:20 PM Michelle Nascimento MD [facility]        Today's Diagnoses     Abdominal pain    -  1    Vaginal discomfort        LLQ abdominal pain          Care Instructions    Plan:  1. Tramadol 50 mg every 6 hours as needed for severe pain  2. Continue Tylenol and Ibuprofen for pain  3. Pelvic ultrasound - To schedule this test you may call Scheduling center at [phone]            Follow-ups after your visit        Future tests that were ordered for you today     Open Future Orders        Priority Expected Expires Ordered    US Pelvic Complete w Transvaginal Routine  1/18/2019 1/18/2018            Who to contact     If you have questions or need follow up information about today's clinic visit or your schedule please contact [facility] directly at [phone].  Normal or non-critical lab and imaging results will be communicated to you by YoBuckohart, letter or phone within 4 business days after the clinic has received the results. If you do not hear from us within 7 days, please contact the clinic through Wordinairet or phone. If you have a critical or abnormal lab result, we will notify you by phone as soon as possible.  Submit refill requests through [employer] or call your pharmacy and they will forward the refill request to us. Please allow 3 business days for your refill to be completed.          Additional Information About Your Visit        MyChart Information     [employer] gives you secure access to your electronic health record. If you see a primary care provider, you can also send messages to your care team and make appointments. If you have questions, please call your primary care clinic.  If you do not have a primary care provider, please call [phone]  "and they will assist you.        Care EveryWhere ID     This is your Care EveryWhere ID. This could be used by other organizations to access your [place] medical records  [id number]        Your Vitals Were     Pulse Temperature Height Pulse Oximetry Breastfeeding? BMI (Body Mass Index)    84 98.8  F (37.1  C) (Oral) 5' 8\" (1.727 m) 96% No 31.88 kg/m2       Blood Pressure from Last 3 Encounters:   01/18/18 94/66   12/21/17 110/72   12/06/17 121/74    Weight from Last 3 Encounters:   01/18/18 209 lb 11.2 oz (95.1 kg)   12/21/17 208 lb 11.2 oz (94.7 kg)   08/22/17 204 lb 9.6 oz (92.8 kg)              We Performed the Following     Chlamydia trachomatis PCR     Neisseria gonorrhoeae PCR     UA reflex to Microscopic and Culture     Wet prep          Today's Medication Changes          These changes are accurate as of: 1/18/18  4:19 PM.  If you have any questions, ask your nurse or doctor.               Start taking these medicines.        Dose/Directions    traMADol 50 MG tablet   Commonly known as:  ULTRAM   Used for:  Vaginal discomfort, LLQ abdominal pain   Started by:  Michelle Nascimento MD        Dose:  50 mg   Take 1 tablet (50 mg) by mouth every 6 hours as needed for pain maximum 4 tablet(s) per day   Quantity:  20 tablet   Refills:  0            Where to get your medicines      Some of these will need a paper prescription and others can be bought over the counter.  Ask your nurse if you have questions.     Bring a paper prescription for each of these medications     traMADol 50 MG tablet                Primary Care Provider Office Phone # Fax #    Krishnakumari G MD Savi [phone]       [address] [facility] [address]        Equal Access to Services     GAB MCKEON AH: Francesco Addison, cameron garcia, elizabeth kagregorio alarcon, apple kaplan. So [facility] [phone].    ATENCIÓN: Si habla español, tiene a santiago disposición servicios " ishaan de asistencia lingüística. Tim little [phone].    We comply with applicable federal civil rights laws and [place] laws. We do not discriminate on the basis of race, color, national origin, age, disability, sex, sexual orientation, or gender identity.            Thank you!     Thank you for choosing [facility]  for your care. Our goal is always to provide you with excellent care. Hearing back from our patients is one way we can continue to improve our services. Please take a few minutes to complete the written survey that you may receive in the mail after your visit with us. Thank you!             Your Updated Medication List - Protect others around you: Learn how to safely use, store and throw away your medicines at www.disposemymeds.org.          This list is accurate as of: 1/18/18  4:19 PM.  Always use your most recent med list.                   Brand Name Dispense Instructions for use Diagnosis    acetaminophen 500 MG tablet    TYLENOL    20 tablet    Take 1 tablet (500 mg) by mouth every 6 hours as needed for pain or fever        cholecalciferol 54600 UNITS capsule    VITAMIN D3    8 capsule    Take 1 capsule (50,000 Units) by mouth once a week    Vitamin D deficiency       ferrous sulfate 325 (65 FE) MG tablet    IRON    90 tablet    Take 1 tablet (325 mg) by mouth daily (with breakfast)    Anemia due to other cause       ibuprofen 600 MG tablet    ADVIL/MOTRIN    20 tablet    Take 1 tablet (600 mg) by mouth every 6 hours as needed for moderate pain        ondansetron 4 MG tablet    ZOFRAN    8 tablet    Take 1 tablet (4 mg) by mouth every 6 hours        sucralfate 1 GM/10ML suspension    CARAFATE    420 mL    Take 10 mLs (1 g) by mouth 4 times daily        traMADol 50 MG tablet    ULTRAM    20 tablet    Take 1 tablet (50 mg) by mouth every 6 hours as needed for pain maximum 4 tablet(s) per day    Vaginal discomfort, LLQ abdominal pain

## 2018-01-18 NOTE — PROGRESS NOTES
Dr Acosta's note      Patient's instructions / PLAN:                                                        Plan:  1. Tramadol 50 mg every 6 hours as needed for severe pain  2. Continue Tylenol and Ibuprofen for pain  3. Pelvic ultrasound - To schedule this test you may call Scheduling center at [phone]        ASSESSMENT & PLAN:                                                          (R10.9) Abdominal pain  (primary encounter diagnosis)  Comment: I suspect ruptured ovarian cyst since ( 14 day since the period)  Plan: UA reflex to Microscopic and Culture, Wet prep,        Neisseria gonorrhoeae PCR, Chlamydia         trachomatis PCR            (N94.9) Vaginal discomfort  Comment:   Plan: UA reflex to Microscopic and Culture, Wet prep,        Neisseria gonorrhoeae PCR, Chlamydia         trachomatis PCR, traMADol (ULTRAM) 50 MG tablet            (R10.32) LLQ abdominal pain  Comment:   Plan: traMADol (ULTRAM) 50 MG tablet, US Pelvic         Complete w Transvaginal               Chief complaint:                                                      Low abd pain    SUBJECTIVE:   Randee Millard is a 37 year old female who presents to clinic today for the following health issues:        *Abdominal Pain-  -- Pain in both right and left side of lower abdomen, radiates down her thighs. X 3 days. See 1-17-18 telephone encounter.     The pain became so severe that kept her awake at night. She has urine frequency, no blood in urine, no pain or burning with urination, She feels like sharp painful sensation in vagina. She has white discharge. No fever, but she feels chills. She has regular BM taking MIralax. No blood in stools. No pain with BM.      with 5 children. Noticed some white discharge.     Today is the 14th day of the cycle. Tylenol and ibuprofen didn't help much    Pelvic exam: circumsized, no abn discharge, cervix looks normal. Very tender vaginal wall     Review of Systems:                                    "                   ROS: negative for fever, chills, cough, wheezes, chest pain, shortness of breath, vomiting, leg swelling   Positive for abdominal pain, as above    OBJECTIVE:             Physical exam:  Blood pressure 94/66, pulse 84, temperature 98.8  F (37.1  C), temperature source Oral, height 5' 8\" (1.727 m), weight 209 lb 11.2 oz (95.1 kg), SpO2 96 %, not currently breastfeeding.   NAD, appears comfortable  Skin: no rashes   Neck: supple, no JVD, No thyroidmegaly. Lymph nodes nonpalpable cervical and supraclavicular.  Chest: clear to auscultation bilaterally, good respiratory effort  Heart: S1 S2, RRR, no mgr appreciated  Abdomen: soft,  Tender in the lower part , no hepatosplenomegaly or masses appreciated, no abdominal bruit, present bowel sounds  Extremities: no edema,  Neurologic: A, Ox3, no focal signs appreciated  Pelvic exam: Circumcised, otherwise external genitals, with no lesions.  Cervix looks clinically normal, vaginal discharge looks normal, I took samples.  On bimanual exam she is very tender on the vaginal walls especially posterior.    PMHx: reviewed  Past Medical History:   Diagnosis Date     Anemia      Reflux esophagitis       PSHx: reviewed  Past Surgical History:   Procedure Laterality Date     US BREAST CYST ASPIRATION INITIAL RT          Meds: reviewed  Current Outpatient Prescriptions   Medication Sig Dispense Refill     cholecalciferol (VITAMIN D3) 53650 UNITS capsule Take 1 capsule (50,000 Units) by mouth once a week 8 capsule 0     ferrous sulfate (IRON) 325 (65 FE) MG tablet Take 1 tablet (325 mg) by mouth daily (with breakfast) 90 tablet 1     sucralfate (CARAFATE) 1 GM/10ML suspension Take 10 mLs (1 g) by mouth 4 times daily (Patient not taking: Reported on 12/21/2017) 420 mL 1     ibuprofen (ADVIL/MOTRIN) 600 MG tablet Take 1 tablet (600 mg) by mouth every 6 hours as needed for moderate pain (Patient not taking: Reported on 8/22/2017) 20 tablet 1     ondansetron (ZOFRAN) 4 MG " tablet Take 1 tablet (4 mg) by mouth every 6 hours (Patient not taking: Reported on 8/22/2017) 8 tablet 0     acetaminophen (TYLENOL) 500 MG tablet Take 1 tablet (500 mg) by mouth every 6 hours as needed for pain or fever (Patient not taking: Reported on 12/21/2017) 20 tablet 0       Soc Hx: reviewed  Fam Hx: reviewed          Michelle Acosta MD  Internal Medicine

## 2018-01-18 NOTE — NURSING NOTE
"Chief Complaint   Patient presents with     Abdominal Pain       Initial BP 94/66 (BP Location: Right arm, Patient Position: Chair, Cuff Size: Adult Large)  Pulse 84  Temp 98.8  F (37.1  C) (Oral)  Ht 5' 8\" (1.727 m)  Wt 209 lb 11.2 oz (95.1 kg)  SpO2 96%  Breastfeeding? No  BMI 31.88 kg/m2 Estimated body mass index is 31.88 kg/(m^2) as calculated from the following:    Height as of this encounter: 5' 8\" (1.727 m).    Weight as of this encounter: 209 lb 11.2 oz (95.1 kg).  Medication Reconciliation: complete   Tamanna Mejias CMA    "

## 2018-01-19 LAB
C TRACH DNA SPEC QL NAA+PROBE: NEGATIVE
N GONORRHOEA DNA SPEC QL NAA+PROBE: NEGATIVE
SPECIMEN SOURCE: NORMAL
SPECIMEN SOURCE: NORMAL

## 2018-01-22 ENCOUNTER — HOSPITAL ENCOUNTER (EMERGENCY)
Facility: CLINIC | Age: 38
Discharge: HOME | End: 2018-01-22
Attending: INTERNAL MEDICINE | Admitting: INTERNAL MEDICINE
Payer: COMMERCIAL

## 2018-01-22 VITALS
TEMPERATURE: 100 F | SYSTOLIC BLOOD PRESSURE: 109 MMHG | DIASTOLIC BLOOD PRESSURE: 61 MMHG | RESPIRATION RATE: 16 BRPM | OXYGEN SATURATION: 100 % | HEART RATE: 105 BPM

## 2018-01-22 DIAGNOSIS — R10.13 EPIGASTRIC PAIN: ICD-10-CM

## 2018-01-22 DIAGNOSIS — J10.1 INFLUENZA A: ICD-10-CM

## 2018-01-22 LAB
FLUAV+FLUBV AG SPEC QL: NEGATIVE
FLUAV+FLUBV AG SPEC QL: POSITIVE
SPECIMEN SOURCE: ABNORMAL

## 2018-01-22 PROCEDURE — 87804 INFLUENZA ASSAY W/OPTIC: CPT | Mod: 91 | Performed by: EMERGENCY MEDICINE

## 2018-01-22 PROCEDURE — 99283 EMERGENCY DEPT VISIT LOW MDM: CPT

## 2018-01-22 PROCEDURE — 25000125 ZZHC RX 250: Performed by: INTERNAL MEDICINE

## 2018-01-22 PROCEDURE — 25000132 ZZH RX MED GY IP 250 OP 250 PS 637: Performed by: EMERGENCY MEDICINE

## 2018-01-22 RX ORDER — ONDANSETRON 4 MG/1
4 TABLET, ORALLY DISINTEGRATING ORAL ONCE
Status: COMPLETED | OUTPATIENT
Start: 2018-01-22 | End: 2018-01-22

## 2018-01-22 RX ORDER — ACETAMINOPHEN 500 MG
1000 TABLET ORAL EVERY 4 HOURS PRN
Status: DISCONTINUED | OUTPATIENT
Start: 2018-01-22 | End: 2018-01-22 | Stop reason: HOSPADM

## 2018-01-22 RX ORDER — OSELTAMIVIR PHOSPHATE 75 MG/1
75 CAPSULE ORAL 2 TIMES DAILY
Qty: 10 CAPSULE | Refills: 0 | Status: SHIPPED | OUTPATIENT
Start: 2018-01-22 | End: 2018-01-27

## 2018-01-22 RX ORDER — ONDANSETRON 4 MG/1
4 TABLET, ORALLY DISINTEGRATING ORAL EVERY 8 HOURS PRN
Qty: 10 TABLET | Refills: 0 | Status: SHIPPED | OUTPATIENT
Start: 2018-01-22 | End: 2018-01-25

## 2018-01-22 RX ADMIN — ONDANSETRON 4 MG: 4 TABLET, ORALLY DISINTEGRATING ORAL at 16:43

## 2018-01-22 RX ADMIN — ACETAMINOPHEN 1000 MG: 500 TABLET, FILM COATED ORAL at 15:20

## 2018-01-22 ASSESSMENT — ENCOUNTER SYMPTOMS
VOMITING: 1
COUGH: 1
ABDOMINAL PAIN: 1
FEVER: 1
BACK PAIN: 1

## 2018-01-22 NOTE — ED PROVIDER NOTES
History     Chief Complaint:    Flu Symptoms      HPI   Randee Millard is a 37 year old female who presents to the ED today with flu-like symptoms. The patient states that last night she had a fever, body aches, and a cough. This morning, however, she reports that her symptoms are worse. She also reports to some abdominal pain and one episode of vomiting this morning. The patient reports to a sharp pain that starts in her chest and radiates all the way to her back as well.     Allergies:  Amoxicillin - rash   Clindamycin - rash      Medications:    Ultram   Carafate   Zofran      Past Medical History:    Anemia   Reflux esophagitis     Past Surgical History:    Breast cyst aspiration     Family History:    Hypertension - father   Diabetes - father     Social History:  Marital Status:    Presents to the ED with    Tobacco Use: never smoker   Alcohol Use: no   PCP: Abe Hou     Review of Systems   Constitutional: Positive for fever.   Respiratory: Positive for cough.    Cardiovascular: Positive for chest pain.   Gastrointestinal: Positive for abdominal pain and vomiting.   Musculoskeletal: Positive for back pain.   All other systems reviewed and are negative.    Physical Exam   First Vitals:  BP: 140/73  Pulse: 105  Temp: 102.6  F (39.2  C)  Resp: 16  SpO2: 99 %      Physical Exam   Constitutional: She is cooperative.   HENT:   Right Ear: Tympanic membrane normal.   Left Ear: Tympanic membrane normal.   Mouth/Throat: Oropharynx is clear and moist and mucous membranes are normal. No trismus in the jaw. No uvula swelling. No tonsillar abscesses.   Neck: Phonation normal.   Cardiovascular: Normal heart sounds.    Pulmonary/Chest: Breath sounds normal. No stridor. She has no wheezes. She has no rhonchi.   Abdominal: There is tenderness in the epigastric area. There is no rebound and no guarding.   Lymphadenopathy:     She has no cervical adenopathy.   Neurological: She is alert.        Emergency Department Course   Laboratory:  Influenza A/B Antigen: Influenza A positive, Influenza B negative     Interventions:  (1520) Tylenol 1,000 mg, PO   (1643) Zofran 4 mg, PO     Emergency Department Course:  Nursing notes and vitals reviewed.  (1621) I performed an exam of the patient as documented above.    Findings and plan explained to the patient. Patient discharged home with instructions regarding supportive care, medications, and reasons to return. The importance of close follow-up was reviewed. The patient was prescribed Zofran, Tamiflu, and Zantac.    Impression & Plan    Medical Decision Making:  Randee Millard is a 37 year old female who is ill for about 12 hours with fever, prostration, and myalgias with influenza which is confirmed by antigen swab. She also mentions epigastric pain and nausea. This is improved, but not resolved with Zofran. These symptoms have been common in people with a diagnosis of influenza. She does no have any surgical findings on exam and I do not think needs any immediate testing in this regard. Given the short duration of symptoms, I offered her Tamiflu. I will discharge her with Zantac and Zofran to use as needed. Influenza instructions given, rerun if worse, or abdominal pain is not improved by tomorrow.      Diagnosis:    ICD-10-CM    1. Influenza A J10.1    2. Epigastric pain R10.13        Disposition:  discharged to home    Discharge Medications:  New Prescriptions    ONDANSETRON (ZOFRAN ODT) 4 MG ODT TAB    Take 1 tablet (4 mg) by mouth every 8 hours as needed for nausea    OSELTAMIVIR (TAMIFLU) 75 MG CAPSULE    Take 1 capsule (75 mg) by mouth 2 times daily for 5 days    RANITIDINE (ZANTAC) 150 MG TABLET    Take 1 tablet (150 mg) by mouth 2 times daily As needed for upper abdominal pain       IKirstin, am serving as a scribe on 1/22/2018 at 4:21 PM to personally document services performed by Dr. Cr based on my observations and the provider's  statements to me.    1/22/2018   [facility] EMERGENCY DEPARTMENT       Tamica Cr MD  01/22/18 7129

## 2018-01-22 NOTE — ED AVS SNAPSHOT
[facility] Emergency Department    [address]    [facility] [address]    Phone:  [phone]    Fax:  [phone]                                       Randee Millard   MRN: [id number]    Department:  [facility] Emergency Department   Date of Visit:  1/22/2018           After Visit Summary Signature Page     I have received my discharge instructions, and my questions have been answered. I have discussed any challenges I see with this plan with the nurse or doctor.    ..........................................................................................................................................  Patient/Patient Representative Signature      ..........................................................................................................................................  Patient Representative Print Name and Relationship to Patient    ..................................................               ................................................  Date                                            Time    ..........................................................................................................................................  Reviewed by Signature/Title    ...................................................              ..............................................  Date                                                            Time

## 2018-01-22 NOTE — ED NOTES
Pt arrives with fever, body aches cough ongoing 2 days. Flu swab sent tylenol given in triage. A/ox  3. ABC's intact.

## 2018-01-22 NOTE — ED AVS SNAPSHOT
[facility] Emergency Department    [address]    [facility] [address]    Phone:  [phone]    Fax:  [phone]                                       Randee Millard   MRN: [id number]    Department:  [facility] Emergency Department   Date of Visit:  1/22/2018           Patient Information     Date Of Birth          1980        Your diagnoses for this visit were:     Influenza A     Epigastric pain        You were seen by Tamica Cr MD.        Discharge Instructions       Discharge Instructions  Influenza    You were diagnosed today with influenza or influenza like illness.  Influenza is a respiratory illness caused by influenza A or B viruses.  Influenza causes fever, headache, muscle aches, and fatigue.  These symptoms start one to four days after you have been around a person with this illness.  People with influenza commonly have a dry cough, sore throat, and a runny nose.   Influenza is spread through sneezing and coughing and can live on surfaces for several days.  It is usually contagious for 5 days but in some cases up to 10 days and often affects several family members. If you have a family member who is less than 2 years old, older than 65 years old, pregnant or has a serious medical condition, they should be seen right away by a physician to decide if they should take preventative medications.      Return to the Emergency Department if:    You have trouble breathing.    You develop pain in your chest.    You have signs of being dehydrated, such as dizziness or unable to urinate at least three times daily.    You feel confused.    You cannot stop vomiting or you cannot drink enough fluids.    In children, you should seek help if the child has any of the above or if child:    Has blue or purplish skin color.    Is so irritable that he or she does not want to be held.    Does not have tears when crying (in infants) or does not urinate at least three  times daily.    Does not wake up easily.    Follow-up with your doctor if you are not improving after 5 days.    What can I do to help myself?    Rest.    Fluids -- Drink hydrating solutions such as Gatorade  or Pedialyte  as often as you can. If you are drinking enough, you should pass urine at least every eight hours.    Tylenol  (acetaminophen) and Advil  (ibuprofen) can relieve fever, headache, and muscle aches. Do not give aspirin to children under 18 years old.     Antiviral treatment -- Antiviral medicines do not make the flu symptoms go away immediately.  They have only been shown to make the symptoms go away 12 to 24 hours sooner than they would without treatment.       Antibiotics -- Antibiotics are NOT useful for treating viral illnesses such as influenza. Antibiotics should only be used if there is a bacterial complication of the flu such as bacterial pneumonia, ear infection, or sinusitis.    Because you were diagnosed with a flu like illness you are very contagious.  This means you cannot work, attend school or  for at least 24 hours or until you no longer have a fever.  If you were given a prescription for medicine here today, be sure to read all of the information (including the package insert) that comes with your prescription.  This will include important information about the medicine, its side effects, and any warnings that you need to know about.  The pharmacist who fills the prescription can provide more information and answer questions you may have about the medicine.  If you have questions or concerns that the pharmacist cannot address, please call or return to the Emergency Department.     Remember that you can always come back to the Emergency Department if you are not able to see your regular doctor in the amount of time listed above, if you get any new symptoms, or if there is anything that worries you.      Future Appointments        Provider Department Dept Phone Center     2/6/2018 2:00 PM [facility] ULTRASOUND ROOM 1 [facility] [phone] [place]      24 Hour Appointment Hotline       To make an appointment at any [facility], call [phone] ([phone]). If you don't have a family doctor or clinic, we will help you find one. [facility] are conveniently located to serve the needs of you and your family.             Review of your medicines      START taking        Dose / Directions Last dose taken    ondansetron 4 MG ODT tab   Commonly known as:  ZOFRAN ODT   Dose:  4 mg   Quantity:  10 tablet        Take 1 tablet (4 mg) by mouth every 8 hours as needed for nausea   Refills:  0        oseltamivir 75 MG capsule   Commonly known as:  TAMIFLU   Dose:  75 mg   Quantity:  10 capsule        Take 1 capsule (75 mg) by mouth 2 times daily for 5 days   Refills:  0        ranitidine 150 MG tablet   Commonly known as:  ZANTAC   Dose:  150 mg   Quantity:  15 tablet        Take 1 tablet (150 mg) by mouth 2 times daily As needed for upper abdominal pain   Refills:  0          Our records show that you are taking the medicines listed below. If these are incorrect, please call your family doctor or clinic.        Dose / Directions Last dose taken    acetaminophen 500 MG tablet   Commonly known as:  TYLENOL   Dose:  500 mg   Quantity:  20 tablet        Take 1 tablet (500 mg) by mouth every 6 hours as needed for pain or fever   Refills:  0        cholecalciferol 91719 UNITS capsule   Commonly known as:  VITAMIN D3   Dose:  1 capsule   Quantity:  8 capsule        Take 1 capsule (50,000 Units) by mouth once a week   Refills:  0        ferrous sulfate 325 (65 FE) MG tablet   Commonly known as:  IRON   Dose:  325 mg   Quantity:  90 tablet        Take 1 tablet (325 mg) by mouth daily (with breakfast)   Refills:  1        ibuprofen 600 MG tablet   Commonly known as:  ADVIL/MOTRIN   Dose:  600 mg   Quantity:  20 tablet        Take 1 tablet (600 mg) by mouth every 6  hours as needed for moderate pain   Refills:  1        ondansetron 4 MG tablet   Commonly known as:  ZOFRAN   Dose:  4 mg   Quantity:  8 tablet        Take 1 tablet (4 mg) by mouth every 6 hours   Refills:  0        sucralfate 1 GM/10ML suspension   Commonly known as:  CARAFATE   Dose:  1 g   Quantity:  420 mL        Take 10 mLs (1 g) by mouth 4 times daily   Refills:  1        traMADol 50 MG tablet   Commonly known as:  ULTRAM   Dose:  50 mg   Quantity:  20 tablet        Take 1 tablet (50 mg) by mouth every 6 hours as needed for pain maximum 4 tablet(s) per day   Refills:  0                Prescriptions were sent or printed at these locations (3 Prescriptions)                   Other Prescriptions                Printed at Department/Unit printer (3 of 3)         oseltamivir (TAMIFLU) 75 MG capsule               ondansetron (ZOFRAN ODT) 4 MG ODT tab               ranitidine (ZANTAC) 150 MG tablet                Procedures and tests performed during your visit     Influenza A/B antigen      Orders Needing Specimen Collection     None      Pending Results     No orders found from 1/20/2018 to 1/23/2018.            Pending Culture Results     No orders found from 1/20/2018 to 1/23/2018.            Pending Results Instructions     If you had any lab results that were not finalized at the time of your Discharge, you can call the ED Lab Result RN at [phone]. You will be contacted by this team for any positive Lab results or changes in treatment. The nurses are available 7 days a week from 10A to 6:30P.  You can leave a message 24 hours per day and they will return your call.        Test Results From Your Hospital Stay        1/22/2018  4:14 PM      Component Results     Component Value Ref Range & Units Status    Influenza A/B Agn Specimen Nasopharyngeal  Final    Influenza A Positive (A) NEG^Negative Final    Influenza B Negative NEG^Negative Final    Test results must be correlated with clinical data. If  necessary, results   should be confirmed by a molecular assay or viral culture.                  Clinical Quality Measure: Blood Pressure Screening     Your blood pressure was checked while you were in the emergency department today. The last reading we obtained was  BP: 140/73 . Please read the guidelines below about what these numbers mean and what you should do about them.  If your systolic blood pressure (the top number) is less than 120 and your diastolic blood pressure (the bottom number) is less than 80, then your blood pressure is normal. There is nothing more that you need to do about it.  If your systolic blood pressure (the top number) is 120-139 or your diastolic blood pressure (the bottom number) is 80-89, your blood pressure may be higher than it should be. You should have your blood pressure rechecked within a year by a primary care provider.  If your systolic blood pressure (the top number) is 140 or greater or your diastolic blood pressure (the bottom number) is 90 or greater, you may have high blood pressure. High blood pressure is treatable, but if left untreated over time it can put you at risk for heart attack, stroke, or kidney failure. You should have your blood pressure rechecked by a primary care provider within the next 4 weeks.  If your provider in the emergency department today gave you specific instructions to follow-up with your doctor or provider even sooner than that, you should follow that instruction and not wait for up to 4 weeks for your follow-up visit.        Thank you for choosing [facility]       Thank you for choosing [facility] for your care. Our goal is always to provide you with excellent care. Hearing back from our patients is one way we can continue to improve our services. Please take a few minutes to complete the written survey that you may receive in the mail after you visit with us. Thank you!        [employer] Information     [employer] gives you secure access to your  electronic health record. If you see a primary care provider, you can also send messages to your care team and make appointments. If you have questions, please call your primary care clinic.  If you do not have a primary care provider, please call [phone] and they will assist you.        Care EveryWhere ID     This is your Care EveryWhere ID. This could be used by other organizations to access your [place] medical records  [id number]        Equal Access to Services     VALERIE MCKEON : Francesco Addison, cameron garcia, elizabeth alarcon, apple dolan . So [facility] [phone].    ATENCIÓN: Si habla español, tiene a santiago disposición servicios gratuitos de asistencia lingüística. Llame al [phone].    We comply with applicable federal civil rights laws and [place] laws. We do not discriminate on the basis of race, color, national origin, age, disability, sex, sexual orientation, or gender identity.            After Visit Summary       This is your record. Keep this with you and show to your community pharmacist(s) and doctor(s) at your next visit.

## 2018-04-11 ENCOUNTER — OFFICE VISIT (OUTPATIENT)
Dept: INTERNAL MEDICINE | Facility: CLINIC | Age: 38
End: 2018-04-11
Payer: COMMERCIAL

## 2018-04-11 VITALS
SYSTOLIC BLOOD PRESSURE: 108 MMHG | RESPIRATION RATE: 17 BRPM | BODY MASS INDEX: 27.63 KG/M2 | WEIGHT: 182.3 LBS | HEART RATE: 114 BPM | HEIGHT: 68 IN | OXYGEN SATURATION: 98 % | DIASTOLIC BLOOD PRESSURE: 68 MMHG | TEMPERATURE: 98.1 F

## 2018-04-11 DIAGNOSIS — Z00.00 ENCOUNTER FOR ROUTINE ADULT HEALTH EXAMINATION WITHOUT ABNORMAL FINDINGS: Primary | ICD-10-CM

## 2018-04-11 PROCEDURE — 99395 PREV VISIT EST AGE 18-39: CPT | Performed by: INTERNAL MEDICINE

## 2018-04-11 NOTE — MR AVS SNAPSHOT
After Visit Summary   4/11/2018    Randee Millard    MRN: [id number]           Patient Information     Date Of Birth          1980        Visit Information        Provider Department      4/11/2018 3:00 PM Abe Hou MD [facility]        Today's Diagnoses     Encounter for routine adult health examination without abnormal findings    -  1      Care Instructions      Preventive Health Recommendations  Female Ages 26 - 39  Yearly exam:   See your health care provider every year in order to    Review health changes.     Discuss preventive care.      Review your medicines if you your doctor has prescribed any.    Until age 30: Get a Pap test every three years (more often if you have had an abnormal result).    After age 30: Talk to your doctor about whether you should have a Pap test every 3 years or have a Pap test with HPV screening every 5 years.   You do not need a Pap test if your uterus was removed (hysterectomy) and you have not had cancer.  You should be tested each year for STDs (sexually transmitted diseases), if you're at risk.   Talk to your provider about how often to have your cholesterol checked.  If you are at risk for diabetes, you should have a diabetes test (fasting glucose).  Shots: Get a flu shot each year. Get a tetanus shot every 10 years.   Nutrition:     Eat at least 5 servings of fruits and vegetables each day.    Eat whole-grain bread, whole-wheat pasta and brown rice instead of white grains and rice.    Talk to your provider about Calcium and Vitamin D.     Lifestyle    Exercise at least 150 minutes a week (30 minutes a day, 5 days of the week). This will help you control your weight and prevent disease.    Limit alcohol to one drink per day.    No smoking.     Wear sunscreen to prevent skin cancer.    See your dentist every six months for an exam and cleaning.            Follow-ups after your visit        Who to contact     If you have  "questions or need follow up information about today's clinic visit or your schedule please contact [facility] directly at [phone].  Normal or non-critical lab and imaging results will be communicated to you by [employer], letter or phone within 4 business days after the clinic has received the results. If you do not hear from us within 7 days, please contact the clinic through [employer] or phone. If you have a critical or abnormal lab result, we will notify you by phone as soon as possible.  Submit refill requests through [employer] or call your pharmacy and they will forward the refill request to us. Please allow 3 business days for your refill to be completed.          Additional Information About Your Visit        [employer] Information     [employer] gives you secure access to your electronic health record. If you see a primary care provider, you can also send messages to your care team and make appointments. If you have questions, please call your primary care clinic.  If you do not have a primary care provider, please call [phone] and they will assist you.        Care EveryWhere ID     This is your Care EveryWhere ID. This could be used by other organizations to access your [place] medical records  [id number]        Your Vitals Were     Pulse Temperature Respirations Height Last Period Pulse Oximetry    114 98.1  F (36.7  C) (Oral) 17 5' 8\" (1.727 m) 03/21/2018 98%    BMI (Body Mass Index)                   27.72 kg/m2            Blood Pressure from Last 3 Encounters:   04/11/18 108/68   01/22/18 109/61   01/18/18 94/66    Weight from Last 3 Encounters:   04/11/18 182 lb 4.8 oz (82.7 kg)   01/18/18 209 lb 11.2 oz (95.1 kg)   12/21/17 208 lb 11.2 oz (94.7 kg)              We Performed the Following     CBC with platelets     Comprehensive metabolic panel     Lipid panel reflex to direct LDL Fasting     TSH with free T4 reflex     Vitamin D Deficiency          Today's Medication Changes        "   These changes are accurate as of 4/11/18  3:20 PM.  If you have any questions, ask your nurse or doctor.               Stop taking these medicines if you haven't already. Please contact your care team if you have questions.     acetaminophen 500 MG tablet   Commonly known as:  TYLENOL   Stopped by:  Abe Hou MD           ondansetron 4 MG tablet   Commonly known as:  ZOFRAN   Stopped by:  Abe Hou MD           traMADol 50 MG tablet   Commonly known as:  ULTRAM   Stopped by:  Abe Hou MD                    Primary Care Provider Office Phone # Fax #    Abe Hou [phone]       [address] [facility] [address]        Equal Access to Services     [facility] HospitalFANNY : Hadii aad ku hadasho Soomaali, waaxda luqadaha, qaybta kaalmada adeegyada, apple dolan . So [facility] [phone].    ATENCIÓN: Si habla español, tiene a santiago disposición servicios gratuitos de asistencia lingüística. Llame al [phone].    We comply with applicable federal civil rights laws and [place] laws. We do not discriminate on the basis of race, color, national origin, age, disability, sex, sexual orientation, or gender identity.            Thank you!     Thank you for choosing [facility]  for your care. Our goal is always to provide you with excellent care. Hearing back from our patients is one way we can continue to improve our services. Please take a few minutes to complete the written survey that you may receive in the mail after your visit with us. Thank you!             Your Updated Medication List - Protect others around you: Learn how to safely use, store and throw away your medicines at www.disposemymeds.org.      Notice  As of 4/11/2018  3:20 PM    You have not been prescribed any medications.

## 2018-04-11 NOTE — NURSING NOTE
"Chief Complaint   Patient presents with     Physical       Initial /68  Pulse 114  Temp 98.1  F (36.7  C) (Oral)  Resp 17  Ht 5' 8\" (1.727 m)  Wt 182 lb 4.8 oz (82.7 kg)  LMP 03/21/2018  SpO2 98%  BMI 27.72 kg/m2 Estimated body mass index is 27.72 kg/(m^2) as calculated from the following:    Height as of this encounter: 5' 8\" (1.727 m).    Weight as of this encounter: 182 lb 4.8 oz (82.7 kg).  Medication Reconciliation: complete     More Mendieta CMA      "

## 2018-04-11 NOTE — PROGRESS NOTES
SUBJECTIVE:   CC: Randee Millard is an 37 year old woman who presents for preventive health visit.     Physical   Annual:     Getting at least 3 servings of Calcium per day::  Yes    Bi-annual eye exam::  Yes    Dental care twice a year::  Yes    Sleep apnea or symptoms of sleep apnea::  None             Today's PHQ-2 Score:   PHQ-2 ( 1999 [employer]) 4/11/2018   Q1: Little interest or pleasure in doing things 0   Q2: Feeling down, depressed or hopeless 0   PHQ-2 Score 0   Q1: Little interest or pleasure in doing things Not at all   Q2: Feeling down, depressed or hopeless Not at all   PHQ-2 Score 0       Abuse: Current or Past(Physical, Sexual or Emotional)- No  Do you feel safe in your environment - Yes      Past Medical History:   Diagnosis Date     Anemia      Reflux esophagitis        Past Surgical History:   Procedure Laterality Date     US BREAST CYST ASPIRATION INITIAL RT         No current outpatient prescriptions on file.       Family History   Problem Relation Age of Onset     Hypertension Father      DIABETES Father 63     Family History Negative Mother        Social History   Substance Use Topics     Smoking status: Never Smoker     Smokeless tobacco: Never Used     Alcohol use No     Alcohol Use 4/11/2018   If you drink alcohol do you typically have greater than 3 drinks per day OR greater than 7 drinks per week? No   No flowsheet data found.    Reviewed orders with patient.  Reviewed health maintenance and updated orders accordingly - Yes    Mammogram not appropriate for this patient based on age.   .  History of abnormal Pap smear: NO - age 30- 65 PAP every 3 years recommended    Reviewed and updated as needed this visit by clinical staff  Tobacco  Allergies  Meds  Med Hx  Surg Hx  Fam Hx  Soc Hx        Reviewed and updated as needed this visit by Provider            Review of Systems  C: NEGATIVE for fever, chills, change in weight  I: NEGATIVE for worrisome rashes, moles or lesions  E: NEGATIVE  "for vision changes or irritation  ENT: NEGATIVE for ear, mouth and throat problems  R: NEGATIVE for significant cough or SOB  B: NEGATIVE for masses, tenderness or discharge  CV: NEGATIVE for chest pain, palpitations or peripheral edema  GI: NEGATIVE for nausea, abdominal pain, heartburn, or change in bowel habits  : NEGATIVE for unusual urinary or vaginal symptoms. Periods are regular.  M: NEGATIVE for significant arthralgias or myalgia  N: NEGATIVE for weakness, dizziness or paresthesias  P: NEGATIVE for changes in mood or affect     OBJECTIVE:   /68  Pulse 114  Temp 98.1  F (36.7  C) (Oral)  Resp 17  Ht 5' 8\" (1.727 m)  Wt 182 lb 4.8 oz (82.7 kg)  LMP 03/21/2018  SpO2 98%  BMI 27.72 kg/m2  Physical Exam  GENERAL: healthy, alert and no distress  EYES: Eyes grossly normal to inspection, PERRL and conjunctivae and sclerae normal  HENT: ear canals and TM's normal, nose and mouth without ulcers or lesions  NECK: no adenopathy, no asymmetry, masses, or scars and thyroid normal to palpation  RESP: lungs clear to auscultation - no rales, rhonchi or wheezes  BREAST: normal without masses, tenderness or nipple discharge and no palpable axillary masses or adenopathy  CV: regular rate and rhythm, normal S1 S2, no S3 or S4, no murmur, click or rub, no peripheral edema and peripheral pulses strong  ABDOMEN: soft, nontender, no hepatosplenomegaly, no masses and bowel sounds normal  MS: no gross musculoskeletal defects noted, no edema  NEURO: Normal strength and tone, mentation intact and speech normal  PSYCH: mentation appears normal, affect normal/bright    ASSESSMENT/PLAN:       (Z00.00) Encounter for routine adult health examination without abnormal findings  (primary encounter diagnosis)  Plan: CBC with platelets, Comprehensive metabolic         panel, Lipid panel reflex to direct LDL         Fasting, TSH with free T4 reflex, Vitamin D         Deficiency            COUNSELING:  Reviewed preventive health " "counseling, as reflected in patient instructions       Regular exercise       Healthy diet/nutrition         reports that she has never smoked. She has never used smokeless tobacco.    Estimated body mass index is 27.72 kg/(m^2) as calculated from the following:    Height as of this encounter: 5' 8\" (1.727 m).    Weight as of this encounter: 182 lb 4.8 oz (82.7 kg).   Weight management plan: Discussed healthy diet and exercise guidelines and patient will follow up in 12 months in clinic to re-evaluate.    Counseling Resources:  ATP IV Guidelines  Pooled Cohorts Equation Calculator  Breast Cancer Risk Calculator  FRAX Risk Assessment  ICSI Preventive Guidelines  Dietary Guidelines for Americans, 2010  USDA's MyPlate  ASA Prophylaxis  Lung CA Screening    Abe Hou MD  [facility]  Answers for HPI/ROS submitted by the patient on 4/11/2018   PHQ-2 Score: 0    "

## 2018-04-13 DIAGNOSIS — Z00.00 ENCOUNTER FOR ROUTINE ADULT HEALTH EXAMINATION WITHOUT ABNORMAL FINDINGS: ICD-10-CM

## 2018-04-13 LAB
ERYTHROCYTE [DISTWIDTH] IN BLOOD BY AUTOMATED COUNT: 18 % (ref 10–15)
HCT VFR BLD AUTO: 34.2 % (ref 35–47)
HGB BLD-MCNC: 10.9 G/DL (ref 11.7–15.7)
MCH RBC QN AUTO: 25.1 PG (ref 26.5–33)
MCHC RBC AUTO-ENTMCNC: 31.9 G/DL (ref 31.5–36.5)
MCV RBC AUTO: 79 FL (ref 78–100)
PLATELET # BLD AUTO: 309 10E9/L (ref 150–450)
RBC # BLD AUTO: 4.34 10E12/L (ref 3.8–5.2)
WBC # BLD AUTO: 8.1 10E9/L (ref 4–11)

## 2018-04-13 PROCEDURE — 36415 COLL VENOUS BLD VENIPUNCTURE: CPT | Performed by: INTERNAL MEDICINE

## 2018-04-13 PROCEDURE — 85027 COMPLETE CBC AUTOMATED: CPT | Performed by: INTERNAL MEDICINE

## 2018-04-13 PROCEDURE — 82306 VITAMIN D 25 HYDROXY: CPT | Performed by: INTERNAL MEDICINE

## 2018-04-13 PROCEDURE — 80053 COMPREHEN METABOLIC PANEL: CPT | Performed by: INTERNAL MEDICINE

## 2018-04-13 PROCEDURE — 84443 ASSAY THYROID STIM HORMONE: CPT | Performed by: INTERNAL MEDICINE

## 2018-04-13 PROCEDURE — 80061 LIPID PANEL: CPT | Performed by: INTERNAL MEDICINE

## 2018-04-14 LAB
ALBUMIN SERPL-MCNC: 3.7 G/DL (ref 3.4–5)
ALP SERPL-CCNC: 45 U/L (ref 40–150)
ALT SERPL W P-5'-P-CCNC: 12 U/L (ref 0–50)
ANION GAP SERPL CALCULATED.3IONS-SCNC: 8 MMOL/L (ref 3–14)
AST SERPL W P-5'-P-CCNC: 8 U/L (ref 0–45)
BILIRUB SERPL-MCNC: 0.4 MG/DL (ref 0.2–1.3)
BUN SERPL-MCNC: 5 MG/DL (ref 7–30)
CALCIUM SERPL-MCNC: 8.7 MG/DL (ref 8.5–10.1)
CHLORIDE SERPL-SCNC: 114 MMOL/L (ref 94–109)
CHOLEST SERPL-MCNC: 151 MG/DL
CO2 SERPL-SCNC: 21 MMOL/L (ref 20–32)
CREAT SERPL-MCNC: 0.68 MG/DL (ref 0.52–1.04)
GFR SERPL CREATININE-BSD FRML MDRD: >90 ML/MIN/1.7M2
GLUCOSE SERPL-MCNC: 74 MG/DL (ref 70–99)
HDLC SERPL-MCNC: 37 MG/DL
LDLC SERPL CALC-MCNC: 102 MG/DL
NONHDLC SERPL-MCNC: 114 MG/DL
POTASSIUM SERPL-SCNC: 4 MMOL/L (ref 3.4–5.3)
PROT SERPL-MCNC: 6.9 G/DL (ref 6.8–8.8)
SODIUM SERPL-SCNC: 143 MMOL/L (ref 133–144)
TRIGL SERPL-MCNC: 59 MG/DL
TSH SERPL DL<=0.005 MIU/L-ACNC: 1.22 MU/L (ref 0.4–4)

## 2018-04-16 LAB — DEPRECATED CALCIDIOL+CALCIFEROL SERPL-MC: 26 UG/L (ref 20–75)

## 2018-05-08 ENCOUNTER — TELEPHONE (OUTPATIENT)
Dept: INTERNAL MEDICINE | Facility: CLINIC | Age: 38
End: 2018-05-08

## 2018-05-08 NOTE — TELEPHONE ENCOUNTER
Reason for Call:  Request for results:Results    Name of test or procedure: Visit labs    Date of test of procedure: 04/13/18      Location of the test or procedure: [facility]    OK to leave the result message on voice mail or with a family member? YES    Phone number Patient can be reached at:  Cell number on file:    Telephone Information:   [facility] [phone]       Additional comments: Pt wants to be called with results    Call taken on 5/8/2018 at 12:51 PM by STEPHANIE BAILEY

## 2018-05-23 ENCOUNTER — HOSPITAL ENCOUNTER (EMERGENCY)
Facility: CLINIC | Age: 38
Discharge: LEFT BEFORE DISCHARGE | End: 2018-05-23
Payer: COMMERCIAL

## 2018-05-23 VITALS
RESPIRATION RATE: 16 BRPM | BODY MASS INDEX: 26.82 KG/M2 | OXYGEN SATURATION: 97 % | SYSTOLIC BLOOD PRESSURE: 123 MMHG | WEIGHT: 176.37 LBS | DIASTOLIC BLOOD PRESSURE: 72 MMHG | TEMPERATURE: 98.1 F

## 2018-05-23 NOTE — ED TRIAGE NOTES
Pt presents with generalized body aches x1 week and headache x2 days. Denies nausea/ vomiting. States has no energy either for past week. Denies fevers at home. ABCs intact.    Last medication was tylenol yesterday.    Pt denies ibuprofen out in triage d/t fasting.

## 2018-05-24 ENCOUNTER — HOSPITAL ENCOUNTER (EMERGENCY)
Facility: CLINIC | Age: 38
Discharge: HOME | End: 2018-05-24
Attending: EMERGENCY MEDICINE | Admitting: EMERGENCY MEDICINE
Payer: COMMERCIAL

## 2018-05-24 VITALS
HEART RATE: 85 BPM | TEMPERATURE: 98.4 F | SYSTOLIC BLOOD PRESSURE: 108 MMHG | RESPIRATION RATE: 16 BRPM | OXYGEN SATURATION: 94 % | DIASTOLIC BLOOD PRESSURE: 77 MMHG

## 2018-05-24 DIAGNOSIS — R42 DIZZINESS: ICD-10-CM

## 2018-05-24 DIAGNOSIS — R53.83 FATIGUE, UNSPECIFIED TYPE: ICD-10-CM

## 2018-05-24 LAB
ALBUMIN SERPL-MCNC: 3.4 G/DL (ref 3.4–5)
ALBUMIN UR-MCNC: NEGATIVE MG/DL
ALP SERPL-CCNC: 56 U/L (ref 40–150)
ALT SERPL W P-5'-P-CCNC: 12 U/L (ref 0–50)
ANION GAP SERPL CALCULATED.3IONS-SCNC: 7 MMOL/L (ref 3–14)
APPEARANCE UR: ABNORMAL
AST SERPL W P-5'-P-CCNC: 12 U/L (ref 0–45)
BASOPHILS # BLD AUTO: 0 10E9/L (ref 0–0.2)
BASOPHILS NFR BLD AUTO: 0.2 %
BILIRUB SERPL-MCNC: 0.2 MG/DL (ref 0.2–1.3)
BILIRUB UR QL STRIP: NEGATIVE
BUN SERPL-MCNC: 5 MG/DL (ref 7–30)
CALCIUM SERPL-MCNC: 8.8 MG/DL (ref 8.5–10.1)
CHLORIDE SERPL-SCNC: 110 MMOL/L (ref 94–109)
CO2 SERPL-SCNC: 24 MMOL/L (ref 20–32)
COLOR UR AUTO: YELLOW
CREAT SERPL-MCNC: 0.56 MG/DL (ref 0.52–1.04)
DIFFERENTIAL METHOD BLD: ABNORMAL
EOSINOPHIL # BLD AUTO: 0.1 10E9/L (ref 0–0.7)
EOSINOPHIL NFR BLD AUTO: 0.8 %
ERYTHROCYTE [DISTWIDTH] IN BLOOD BY AUTOMATED COUNT: 16.4 % (ref 10–15)
GFR SERPL CREATININE-BSD FRML MDRD: >90 ML/MIN/1.7M2
GLUCOSE SERPL-MCNC: 82 MG/DL (ref 70–99)
GLUCOSE UR STRIP-MCNC: NEGATIVE MG/DL
HCG UR QL: NEGATIVE
HCT VFR BLD AUTO: 34.7 % (ref 35–47)
HGB BLD-MCNC: 11 G/DL (ref 11.7–15.7)
HGB UR QL STRIP: NEGATIVE
HYALINE CASTS #/AREA URNS LPF: 1 /LPF (ref 0–2)
IMM GRANULOCYTES # BLD: 0 10E9/L (ref 0–0.4)
IMM GRANULOCYTES NFR BLD: 0.3 %
KETONES UR STRIP-MCNC: NEGATIVE MG/DL
LEUKOCYTE ESTERASE UR QL STRIP: NEGATIVE
LYMPHOCYTES # BLD AUTO: 3.8 10E9/L (ref 0.8–5.3)
LYMPHOCYTES NFR BLD AUTO: 38.5 %
MCH RBC QN AUTO: 26.4 PG (ref 26.5–33)
MCHC RBC AUTO-ENTMCNC: 31.7 G/DL (ref 31.5–36.5)
MCV RBC AUTO: 83 FL (ref 78–100)
MONOCYTES # BLD AUTO: 0.5 10E9/L (ref 0–1.3)
MONOCYTES NFR BLD AUTO: 5.2 %
MUCOUS THREADS #/AREA URNS LPF: PRESENT /LPF
NEUTROPHILS # BLD AUTO: 5.4 10E9/L (ref 1.6–8.3)
NEUTROPHILS NFR BLD AUTO: 55 %
NITRATE UR QL: NEGATIVE
NRBC # BLD AUTO: 0 10*3/UL
NRBC BLD AUTO-RTO: 0 /100
PH UR STRIP: 5 PH (ref 5–7)
PLATELET # BLD AUTO: 300 10E9/L (ref 150–450)
POTASSIUM SERPL-SCNC: 3.7 MMOL/L (ref 3.4–5.3)
PROT SERPL-MCNC: 6.6 G/DL (ref 6.8–8.8)
RBC # BLD AUTO: 4.17 10E12/L (ref 3.8–5.2)
RBC #/AREA URNS AUTO: <1 /HPF (ref 0–2)
SODIUM SERPL-SCNC: 141 MMOL/L (ref 133–144)
SOURCE: ABNORMAL
SP GR UR STRIP: 1.01 (ref 1–1.03)
SQUAMOUS #/AREA URNS AUTO: 4 /HPF (ref 0–1)
UROBILINOGEN UR STRIP-MCNC: 0 MG/DL (ref 0–2)
WBC # BLD AUTO: 9.8 10E9/L (ref 4–11)
WBC #/AREA URNS AUTO: 1 /HPF (ref 0–5)

## 2018-05-24 PROCEDURE — 96361 HYDRATE IV INFUSION ADD-ON: CPT

## 2018-05-24 PROCEDURE — 96374 THER/PROPH/DIAG INJ IV PUSH: CPT

## 2018-05-24 PROCEDURE — 25000128 H RX IP 250 OP 636: Performed by: EMERGENCY MEDICINE

## 2018-05-24 PROCEDURE — 99284 EMERGENCY DEPT VISIT MOD MDM: CPT | Mod: 25

## 2018-05-24 PROCEDURE — 81025 URINE PREGNANCY TEST: CPT | Performed by: EMERGENCY MEDICINE

## 2018-05-24 PROCEDURE — 85025 COMPLETE CBC W/AUTO DIFF WBC: CPT | Performed by: EMERGENCY MEDICINE

## 2018-05-24 PROCEDURE — 80053 COMPREHEN METABOLIC PANEL: CPT | Performed by: EMERGENCY MEDICINE

## 2018-05-24 PROCEDURE — 81001 URINALYSIS AUTO W/SCOPE: CPT | Performed by: EMERGENCY MEDICINE

## 2018-05-24 RX ORDER — KETOROLAC TROMETHAMINE 15 MG/ML
15 INJECTION, SOLUTION INTRAMUSCULAR; INTRAVENOUS ONCE
Status: COMPLETED | OUTPATIENT
Start: 2018-05-24 | End: 2018-05-24

## 2018-05-24 RX ADMIN — KETOROLAC TROMETHAMINE 15 MG: 15 INJECTION, SOLUTION INTRAMUSCULAR; INTRAVENOUS at 15:46

## 2018-05-24 RX ADMIN — SODIUM CHLORIDE 2000 ML: 9 INJECTION, SOLUTION INTRAVENOUS at 15:46

## 2018-05-24 ASSESSMENT — ENCOUNTER SYMPTOMS
COUGH: 0
FEVER: 0
HEADACHES: 1
MYALGIAS: 1
DIZZINESS: 1
VOMITING: 0
RHINORRHEA: 0
NAUSEA: 0
DIARRHEA: 0

## 2018-05-24 NOTE — ED TRIAGE NOTES
Pt with dizziness and body aches for the past 5 days, along with headache. Denies cough or sore throat. ABC's intact, alert and oriented X3.

## 2018-05-24 NOTE — DISCHARGE INSTRUCTIONS
There are no signs that your symptoms are due to an emergency today. We suspect that your symptoms may be due to your fasting. We recommended starting with drinking water during the day, but it may be necessary to break the fast if symptoms aren't improving. Please return to your doctor or the emergency department if your symptoms are getting worse, you develop chest pain, shortness of breath, fevers, or for any other complaints.

## 2018-05-24 NOTE — ED PROVIDER NOTES
History     Chief Complaint:  Dizziness and general body aches.    TIFFANY Millard is a 37 year old female who presents with dizziness and generalized body aches. Starting 5 days ago, the patient began experiencing dizziness, generalized body aches, as well as a headache. Since the symptoms have not improved since that time, she decided to come to the ED for evaluation this afternoon. Upon arrival here, the patient describes experiencing these same symptoms, but denies any fever, nausea, vomiting, cough, runny nose, or any other symptoms at this time. She reports that the headache is the worst in the back of her head and radiates to the top and sides. Of note, the patient has been fasting for 5 days which is a similar time frame from when her symptoms began.     Allergies:  Amoxicillin  Clindamycin     Medications:    The patient is not currently taking any prescribed medications.    Past Medical History:    Anemia  Reflux    Past Surgical History:    US breast cyst aspiration initial RT    Family History:    Hypertension  Diabetes    Social History:  Smoking Status: No  Smokeless Tobacco: No  Alcohol Use: No  Marital Status:       Review of Systems   Constitutional: Negative for fever.   HENT: Negative for rhinorrhea.    Respiratory: Negative for cough.    Gastrointestinal: Negative for diarrhea, nausea and vomiting.   Musculoskeletal: Positive for myalgias.   Neurological: Positive for dizziness and headaches.   All other systems reviewed and are negative.    Physical Exam   Vitals:  Patient Vitals for the past 24 hrs:   BP Temp Temp src Pulse Heart Rate Resp SpO2   05/24/18 1340 108/77 98.4  F (36.9  C) Oral 85 85 16 94 %     Physical Exam  Nursing note and vitals reviewed.  Constitutional: Cooperative.   HENT:   Mouth/Throat: Moist mucous membranes.   Eyes: EOMI, nonicteric sclera  Cardiovascular: Normal rate, regular rhythm, no murmurs, rubs, or gallops  Pulmonary/Chest: Effort normal and breath  sounds normal. No respiratory distress. No wheezes. No rales.   Abdominal: Soft. Nontender, nondistended, no guarding or rigidity. BS present.   Musculoskeletal: Normal range of motion.   Neurological: Alert. Moves all extremities spontaneously.   Skin: Skin is warm and dry. No rash noted.   Psychiatric: Normal mood and affect.       Emergency Department Course     Laboratory:  Laboratory findings were communicated with the patient who voiced understanding of the findings.  CBC: HGB: 11.0 (L), o/w WNL. (WBC 9.8, )   CMP: Chloride: 110 (H), BUN: 5 (L), Protein total: 6.6 (L), o/w WNL (Creatinine 0.56)  UA with micro: Squamous Epithelial: 4 (H), Mucous: Present (A)  HCG Qualitative Urine: Negative    Interventions:  1546 Normal Saline 1000 mL IV  1546 Toradol, 15 mg IV     Emergency Department Course:  Nursing notes and vitals reviewed.  1520 I had my initial encounter with the patient.  I performed an exam of the patient as documented above.   IV was inserted and blood was drawn for laboratory testing, results above.  The patient provided a urine sample here in the emergency department. This was sent for laboratory testing, findings above.  1714 I rechecked the patient's condition, spoke with her  who arrived here in the ED, and updated her on the results of her labs.     1725 I discussed the treatment plan with the patient. They expressed understanding of this plan and consented to discharge. They will be discharged home with instructions for care and follow up. In addition, the patient will return to the emergency department with any new or worsening symptoms.  All questions were answered.  Impression & Plan      Medical Decision Making:  Pt presents with nonspecific complaints. Workup in ED negative. Pt feels symptomatically improved after IVF. No signs infection or metabolic derangement. Suspect pt's fasting is playing a role in her symptoms since symptoms began around the same time she started  fasting. Symptoms also seem to be worse late in the day. Discussed with pt that she's going to continue feeling like this during the remainder of the fast and recommended that she break her fast. She reports she isn't going to do that at this time, but will start drinking water during the day which I think is an important first step. Discussed with pt's  as well, and fasting was his suspicion for his wife's symptoms as well. No signs of any emergent ongoing process. She is in stable condition at the time of discharge, indications for return to the ED were discussed as well as follow up. All questions were answered and she and her  are in agreement with the plan.      Diagnosis:    ICD-10-CM    1. Dizziness R42    2. Fatigue, unspecified type R53.83      Disposition:   Discharge    Scribe Disclosure:  I, Negro Hidalgo, am serving as a scribe at 4:00 PM on 5/24/2018 to document services personally performed by Remington Jim MD, based on my observations and the provider's statements to me.  5/24/2018   [facility] EMERGENCY DEPARTMENT       Remington Jim MD  05/26/18 0250

## 2018-05-24 NOTE — ED NOTES
All patient questions have been answered, no further questions at this time. Discharge paperwork was gone over with patient. Patient verbalizes understanding of discharge teaching, medications and the importance of follow up.  Patient verbalizes feeling safe returning home at this time.

## 2018-05-24 NOTE — ED AVS SNAPSHOT
[facility] Emergency Department    [address]    [facility] [address]    Phone:  [phone]    Fax:  [phone]                                       Randee Millard   MRN: [id number]    Department:  [facility] Emergency Department   Date of Visit:  5/24/2018           Patient Information     Date Of Birth          1980        Your diagnoses for this visit were:     Dizziness     Fatigue, unspecified type        You were seen by Valeria Mercado MD and Remington Jim MD.      Follow-up Information     Follow up with Abe Hou MD. Schedule an appointment as soon as possible for a visit in 3 days.    Specialty:  Internal Medicine    Contact information:    [address] [facility] [address]  [phone]          Follow up with [facility] Emergency Department.    Specialty:  EMERGENCY MEDICINE    Why:  As needed, If symptoms worsen    Contact information:    [facility] [address] [phone]        Discharge Instructions       There are no signs that your symptoms are due to an emergency today. We suspect that your symptoms may be due to your fasting. We recommended starting with drinking water during the day, but it may be necessary to break the fast if symptoms aren't improving. Please return to your doctor or the emergency department if your symptoms are getting worse, you develop chest pain, shortness of breath, fevers, or for any other complaints.     24 Hour Appointment Hotline       To make an appointment at any [facility], call [phone] ([phone]). If you don't have a family doctor or clinic, we will help you find one. [facility] are conveniently located to serve the needs of you and your family.             Review of your medicines      Notice     You have not been prescribed any medications.            Procedures and tests performed during your visit      CBC with platelets differential    Comprehensive metabolic panel    HCG qualitative urine    UA with Microscopic reflex to Culture      Orders Needing Specimen Collection     None      Pending Results     No orders found from 5/22/2018 to 5/25/2018.            Pending Culture Results     No orders found from 5/22/2018 to 5/25/2018.            Pending Results Instructions     If you had any lab results that were not finalized at the time of your Discharge, you can call the ED Lab Result RN at [phone]. You will be contacted by this team for any positive Lab results or changes in treatment. The nurses are available 7 days a week from 10A to 6:30P.  You can leave a message 24 hours per day and they will return your call.        Test Results From Your Hospital Stay        5/24/2018  4:01 PM      Component Results     Component Value Ref Range & Units Status    WBC 9.8 4.0 - 11.0 10e9/L Final    RBC Count 4.17 3.8 - 5.2 10e12/L Final    Hemoglobin 11.0 (L) 11.7 - 15.7 g/dL Final    Hematocrit 34.7 (L) 35.0 - 47.0 % Final    MCV 83 78 - 100 fl Final    MCH 26.4 (L) 26.5 - 33.0 pg Final    MCHC 31.7 31.5 - 36.5 g/dL Final    RDW 16.4 (H) 10.0 - 15.0 % Final    Platelet Count 300 150 - 450 10e9/L Final    Diff Method Automated Method  Final    % Neutrophils 55.0 % Final    % Lymphocytes 38.5 % Final    % Monocytes 5.2 % Final    % Eosinophils 0.8 % Final    % Basophils 0.2 % Final    % Immature Granulocytes 0.3 % Final    Nucleated RBCs 0 0 /100 Final    Absolute Neutrophil 5.4 1.6 - 8.3 10e9/L Final    Absolute Lymphocytes 3.8 0.8 - 5.3 10e9/L Final    Absolute Monocytes 0.5 0.0 - 1.3 10e9/L Final    Absolute Eosinophils 0.1 0.0 - 0.7 10e9/L Final    Absolute Basophils 0.0 0.0 - 0.2 10e9/L Final    Abs Immature Granulocytes 0.0 0 - 0.4 10e9/L Final    Absolute Nucleated RBC 0.0  Final         5/24/2018  4:18 PM      Component Results     Component Value Ref Range & Units Status    Sodium 141 133 - 144 mmol/L Final     Potassium 3.7 3.4 - 5.3 mmol/L Final    Chloride 110 (H) 94 - 109 mmol/L Final    Carbon Dioxide 24 20 - 32 mmol/L Final    Anion Gap 7 3 - 14 mmol/L Final    Glucose 82 70 - 99 mg/dL Final    Urea Nitrogen 5 (L) 7 - 30 mg/dL Final    Creatinine 0.56 0.52 - 1.04 mg/dL Final    GFR Estimate >90 >60 mL/min/1.7m2 Final    Non  GFR Calc    GFR Estimate If Black >90 >60 mL/min/1.7m2 Final    African American GFR Calc    Calcium 8.8 8.5 - 10.1 mg/dL Final    Bilirubin Total 0.2 0.2 - 1.3 mg/dL Final    Albumin 3.4 3.4 - 5.0 g/dL Final    Protein Total 6.6 (L) 6.8 - 8.8 g/dL Final    Alkaline Phosphatase 56 40 - 150 U/L Final    ALT 12 0 - 50 U/L Final    AST 12 0 - 45 U/L Final         5/24/2018  4:13 PM      Component Results     Component Value Ref Range & Units Status    Color Urine Yellow  Final    Appearance Urine Slightly Cloudy  Final    Glucose Urine Negative NEG^Negative mg/dL Final    Bilirubin Urine Negative NEG^Negative Final    Ketones Urine Negative NEG^Negative mg/dL Final    Specific Gravity Urine 1.014 1.003 - 1.035 Final    Blood Urine Negative NEG^Negative Final    pH Urine 5.0 5.0 - 7.0 pH Final    Protein Albumin Urine Negative NEG^Negative mg/dL Final    Urobilinogen mg/dL 0.0 0.0 - 2.0 mg/dL Final    Nitrite Urine Negative NEG^Negative Final    Leukocyte Esterase Urine Negative NEG^Negative Final    Source Midstream Urine  Final    WBC Urine 1 0 - 5 /HPF Final    RBC Urine <1 0 - 2 /HPF Final    Squamous Epithelial /HPF Urine 4 (H) 0 - 1 /HPF Final    Mucous Urine Present (A) NEG^Negative /LPF Final    Hyaline Casts 1 0 - 2 /LPF Final         5/24/2018  4:12 PM      Component Results     Component Value Ref Range & Units Status    HCG Qual Urine Negative NEG^Negative Final    This test is for screening purposes.  Results should be interpreted along with   the clinical picture.  Confirmation testing is available if warranted by   ordering [id number], HCG Quantitative Pregnancy.                   Clinical Quality Measure: Blood Pressure Screening     Your blood pressure was checked while you were in the emergency department today. The last reading we obtained was  BP: 108/77 . Please read the guidelines below about what these numbers mean and what you should do about them.  If your systolic blood pressure (the top number) is less than 120 and your diastolic blood pressure (the bottom number) is less than 80, then your blood pressure is normal. There is nothing more that you need to do about it.  If your systolic blood pressure (the top number) is 120-139 or your diastolic blood pressure (the bottom number) is 80-89, your blood pressure may be higher than it should be. You should have your blood pressure rechecked within a year by a primary care provider.  If your systolic blood pressure (the top number) is 140 or greater or your diastolic blood pressure (the bottom number) is 90 or greater, you may have high blood pressure. High blood pressure is treatable, but if left untreated over time it can put you at risk for heart attack, stroke, or kidney failure. You should have your blood pressure rechecked by a primary care provider within the next 4 weeks.  If your provider in the emergency department today gave you specific instructions to follow-up with your doctor or provider even sooner than that, you should follow that instruction and not wait for up to 4 weeks for your follow-up visit.        Thank you for choosing [facility]       Thank you for choosing [facility] for your care. Our goal is always to provide you with excellent care. Hearing back from our patients is one way we can continue to improve our services. Please take a few minutes to complete the written survey that you may receive in the mail after you visit with us. Thank you!        [employer] Information     [employer] gives you secure access to your electronic health record. If you see a primary care provider, you can also send messages to  your care team and make appointments. If you have questions, please call your primary care clinic.  If you do not have a primary care provider, please call [phone] and they will assist you.        Care EveryWhere ID     This is your Care EveryWhere ID. This could be used by other organizations to access your [facility] medical records  [id number]        Equal Access to Services     VALERIE MCKEON : Francesco Addison, cameron garcia, apple jordan . So [facility] [phone].    ATENCIÓN: Si habla español, tiene a santiago disposición servicios gratuitos de asistencia lingüística. Llame al [phone].    We comply with applicable federal civil rights laws and [place] laws. We do not discriminate on the basis of race, color, national origin, age, disability, sex, sexual orientation, or gender identity.            After Visit Summary       This is your record. Keep this with you and show to your community pharmacist(s) and doctor(s) at your next visit.

## 2018-05-24 NOTE — ED AVS SNAPSHOT
[facility] Emergency Department    [address]    [facility] [address]    Phone:  [phone]    Fax:  [phone]                                       Randee Millard   MRN: [id number]    Department:  [facility] Emergency Department   Date of Visit:  5/24/2018           After Visit Summary Signature Page     I have received my discharge instructions, and my questions have been answered. I have discussed any challenges I see with this plan with the nurse or doctor.    ..........................................................................................................................................  Patient/Patient Representative Signature      ..........................................................................................................................................  Patient Representative Print Name and Relationship to Patient    ..................................................               ................................................  Date                                            Time    ..........................................................................................................................................  Reviewed by Signature/Title    ...................................................              ..............................................  Date                                                            Time

## 2018-07-01 ENCOUNTER — APPOINTMENT (OUTPATIENT)
Dept: ULTRASOUND IMAGING | Facility: CLINIC | Age: 38
End: 2018-07-01
Attending: EMERGENCY MEDICINE
Payer: COMMERCIAL

## 2018-07-01 ENCOUNTER — HOSPITAL ENCOUNTER (EMERGENCY)
Facility: CLINIC | Age: 38
Discharge: HOME | End: 2018-07-01
Attending: EMERGENCY MEDICINE | Admitting: EMERGENCY MEDICINE
Payer: COMMERCIAL

## 2018-07-01 VITALS
DIASTOLIC BLOOD PRESSURE: 64 MMHG | BODY MASS INDEX: 25.76 KG/M2 | OXYGEN SATURATION: 100 % | WEIGHT: 170 LBS | HEIGHT: 68 IN | TEMPERATURE: 98.3 F | RESPIRATION RATE: 16 BRPM | SYSTOLIC BLOOD PRESSURE: 104 MMHG

## 2018-07-01 DIAGNOSIS — R10.2 PELVIC PAIN IN FEMALE: ICD-10-CM

## 2018-07-01 LAB
ALBUMIN UR-MCNC: NEGATIVE MG/DL
ANION GAP SERPL CALCULATED.3IONS-SCNC: 5 MMOL/L (ref 3–14)
APPEARANCE UR: CLEAR
BASOPHILS # BLD AUTO: 0 10E9/L (ref 0–0.2)
BASOPHILS NFR BLD AUTO: 0.3 %
BILIRUB UR QL STRIP: NEGATIVE
BUN SERPL-MCNC: 7 MG/DL (ref 7–30)
CALCIUM SERPL-MCNC: 7.9 MG/DL (ref 8.5–10.1)
CHLORIDE SERPL-SCNC: 111 MMOL/L (ref 94–109)
CO2 SERPL-SCNC: 25 MMOL/L (ref 20–32)
COLOR UR AUTO: COLORLESS
CREAT SERPL-MCNC: 0.54 MG/DL (ref 0.52–1.04)
DIFFERENTIAL METHOD BLD: ABNORMAL
EOSINOPHIL # BLD AUTO: 0.1 10E9/L (ref 0–0.7)
EOSINOPHIL NFR BLD AUTO: 1.2 %
ERYTHROCYTE [DISTWIDTH] IN BLOOD BY AUTOMATED COUNT: 15.3 % (ref 10–15)
GFR SERPL CREATININE-BSD FRML MDRD: >90 ML/MIN/1.7M2
GLUCOSE SERPL-MCNC: 88 MG/DL (ref 70–99)
GLUCOSE UR STRIP-MCNC: NEGATIVE MG/DL
HCG UR QL: NEGATIVE
HCT VFR BLD AUTO: 31.7 % (ref 35–47)
HGB BLD-MCNC: 10 G/DL (ref 11.7–15.7)
HGB UR QL STRIP: NEGATIVE
IMM GRANULOCYTES # BLD: 0 10E9/L (ref 0–0.4)
IMM GRANULOCYTES NFR BLD: 0.3 %
KETONES UR STRIP-MCNC: NEGATIVE MG/DL
LEUKOCYTE ESTERASE UR QL STRIP: NEGATIVE
LYMPHOCYTES # BLD AUTO: 4.1 10E9/L (ref 0.8–5.3)
LYMPHOCYTES NFR BLD AUTO: 42 %
MCH RBC QN AUTO: 26.1 PG (ref 26.5–33)
MCHC RBC AUTO-ENTMCNC: 31.5 G/DL (ref 31.5–36.5)
MCV RBC AUTO: 83 FL (ref 78–100)
MONOCYTES # BLD AUTO: 0.7 10E9/L (ref 0–1.3)
MONOCYTES NFR BLD AUTO: 6.8 %
NEUTROPHILS # BLD AUTO: 4.8 10E9/L (ref 1.6–8.3)
NEUTROPHILS NFR BLD AUTO: 49.4 %
NITRATE UR QL: NEGATIVE
NRBC # BLD AUTO: 0 10*3/UL
NRBC BLD AUTO-RTO: 0 /100
PH UR STRIP: 6 PH (ref 5–7)
PLATELET # BLD AUTO: 312 10E9/L (ref 150–450)
POTASSIUM SERPL-SCNC: 3.7 MMOL/L (ref 3.4–5.3)
RBC # BLD AUTO: 3.83 10E12/L (ref 3.8–5.2)
SODIUM SERPL-SCNC: 141 MMOL/L (ref 133–144)
SOURCE: ABNORMAL
SP GR UR STRIP: 1 (ref 1–1.03)
SPECIMEN SOURCE: NORMAL
UROBILINOGEN UR STRIP-MCNC: 0 MG/DL (ref 0–2)
WBC # BLD AUTO: 9.6 10E9/L (ref 4–11)
WET PREP SPEC: NORMAL

## 2018-07-01 PROCEDURE — 85025 COMPLETE CBC W/AUTO DIFF WBC: CPT | Performed by: EMERGENCY MEDICINE

## 2018-07-01 PROCEDURE — 87591 N.GONORRHOEAE DNA AMP PROB: CPT | Performed by: EMERGENCY MEDICINE

## 2018-07-01 PROCEDURE — 96374 THER/PROPH/DIAG INJ IV PUSH: CPT

## 2018-07-01 PROCEDURE — 81003 URINALYSIS AUTO W/O SCOPE: CPT | Performed by: EMERGENCY MEDICINE

## 2018-07-01 PROCEDURE — 96375 TX/PRO/DX INJ NEW DRUG ADDON: CPT

## 2018-07-01 PROCEDURE — 87210 SMEAR WET MOUNT SALINE/INK: CPT | Performed by: EMERGENCY MEDICINE

## 2018-07-01 PROCEDURE — 25000128 H RX IP 250 OP 636: Performed by: EMERGENCY MEDICINE

## 2018-07-01 PROCEDURE — 93976 VASCULAR STUDY: CPT | Mod: XS

## 2018-07-01 PROCEDURE — 99285 EMERGENCY DEPT VISIT HI MDM: CPT | Mod: 25

## 2018-07-01 PROCEDURE — 81025 URINE PREGNANCY TEST: CPT | Performed by: EMERGENCY MEDICINE

## 2018-07-01 PROCEDURE — 87086 URINE CULTURE/COLONY COUNT: CPT | Performed by: EMERGENCY MEDICINE

## 2018-07-01 PROCEDURE — 80048 BASIC METABOLIC PNL TOTAL CA: CPT | Performed by: EMERGENCY MEDICINE

## 2018-07-01 PROCEDURE — 87491 CHLMYD TRACH DNA AMP PROBE: CPT | Performed by: EMERGENCY MEDICINE

## 2018-07-01 RX ORDER — KETOROLAC TROMETHAMINE 15 MG/ML
15 INJECTION, SOLUTION INTRAMUSCULAR; INTRAVENOUS ONCE
Status: COMPLETED | OUTPATIENT
Start: 2018-07-01 | End: 2018-07-01

## 2018-07-01 RX ORDER — HYDROMORPHONE HYDROCHLORIDE 1 MG/ML
0.5 INJECTION, SOLUTION INTRAMUSCULAR; INTRAVENOUS; SUBCUTANEOUS
Status: DISCONTINUED | OUTPATIENT
Start: 2018-07-01 | End: 2018-07-01 | Stop reason: HOSPADM

## 2018-07-01 RX ORDER — HYDROCODONE BITARTRATE AND ACETAMINOPHEN 5; 325 MG/1; MG/1
1-2 TABLET ORAL EVERY 6 HOURS PRN
Qty: 10 TABLET | Refills: 0 | Status: SHIPPED | OUTPATIENT
Start: 2018-07-01 | End: 2018-08-10

## 2018-07-01 RX ORDER — ONDANSETRON 2 MG/ML
4 INJECTION INTRAMUSCULAR; INTRAVENOUS ONCE
Status: COMPLETED | OUTPATIENT
Start: 2018-07-01 | End: 2018-07-01

## 2018-07-01 RX ADMIN — KETOROLAC TROMETHAMINE 15 MG: 15 INJECTION, SOLUTION INTRAMUSCULAR; INTRAVENOUS at 14:29

## 2018-07-01 RX ADMIN — Medication 0.5 MG: at 14:29

## 2018-07-01 RX ADMIN — ONDANSETRON 4 MG: 2 INJECTION INTRAMUSCULAR; INTRAVENOUS at 14:29

## 2018-07-01 ASSESSMENT — ENCOUNTER SYMPTOMS
FLANK PAIN: 1
FEVER: 0
BACK PAIN: 1
FREQUENCY: 1
ABDOMINAL PAIN: 0
NAUSEA: 1
CHILLS: 1
DYSURIA: 1

## 2018-07-01 NOTE — ED AVS SNAPSHOT
[facility] Emergency Department    [address]    [facility] [address]    Phone:  [phone]    Fax:  [phone]                                       Randee Millard   MRN: [id number]    Department:  [facility] Emergency Department   Date of Visit:  7/1/2018           After Visit Summary Signature Page     I have received my discharge instructions, and my questions have been answered. I have discussed any challenges I see with this plan with the nurse or doctor.    ..........................................................................................................................................  Patient/Patient Representative Signature      ..........................................................................................................................................  Patient Representative Print Name and Relationship to Patient    ..................................................               ................................................  Date                                            Time    ..........................................................................................................................................  Reviewed by Signature/Title    ...................................................              ..............................................  Date                                                            Time

## 2018-07-01 NOTE — DISCHARGE INSTRUCTIONS
Take ibuprofen 600 mg 3x per day.  This will provide both pain control and fight against inflammation.  You were given a prescription for norco (hydrocodone and acetaminophen).  This is a strong, narcotic pain medication.  It may cause drowsiness and mild changes in cognition.  Do not drive or operate machinery while taking this medication.  Do not mix this medication with alcohol or other sedating medications.  This medication contains tylenol (acetaminophen) therefore do not take additional tylenol (acetaminophen) while taking norco.  This medication can cause constipation.  The use of over the counter laxatives and stool softeners can help prevent this.

## 2018-07-01 NOTE — ED PROVIDER NOTES
"  History     Chief Complaint:  Urinary Frequency; Back Pain; Nausea; and Chills    HPI   Randee Millard is a 38 year old female who presents to the emergency department today for evaluation of urinary frequency, back pain, nausea, and chills. The patient reports an onset of dysuria with back pain, flank pain, pelvic pain, chills, frequency, urgency, and nausea for the past week.  She notes that she often has painful periods but pain today is different from baseline.  She denies vaginal bleeding, vaginal discharge, fever, chest pain, shortness of breath, constipation, diarrhea, abdominal pain, recent falls or injuries, and concerns for sexual infections. Of note, the patient does have painful and irregular periods.  She has a hx of prior kidney stone remotely as well as prior ovarian cyst.    Allergies:  Amoxicillin  Clindamycin    Medications:    The patient is currently on no regular medications.    Past Medical History:    Anemia  Reflux esophagitis  Chronic fatigue  Vitamin D deficiency    Past Surgical History:    US breast cyst aspiration initial right    Family History:    Hypertension  Diabetes    Social History:  The patient was accompanied to the ED by .  Smoking Status: Never  Smokeless Tobacco: Never  Alcohol Use: no  Marital Status:       Review of Systems   Constitutional: Positive for chills. Negative for fever.   Cardiovascular: Negative for chest pain.   Gastrointestinal: Positive for nausea. Negative for abdominal pain.   Genitourinary: Positive for dysuria, flank pain, frequency, pelvic pain and urgency. Negative for vaginal bleeding and vaginal discharge.   Musculoskeletal: Positive for back pain.     Physical Exam     Patient Vitals for the past 24 hrs:   BP Temp Temp src Heart Rate Resp SpO2 Height Weight   07/01/18 1308 104/64 98.3  F (36.8  C) Oral 88 16 98 % 1.727 m (5' 8\") 77.1 kg (170 lb)         Physical Exam  Gen: alert  HEENT: PERRL, oropharynx clear  Neck: normal ROM  CV: " RRR, no murmurs  Pulm: breath sounds equal, lungs clear  Abd: Soft, diffuse tenderness across the pelvis and in the left lower quadrant  : chaperoned pelvic exam, normal external genitalia, normal cervical mucosa, no discharge, No CMT, no right adnexal tenderness, and + left adnexal tenderness, cervical os closed   Back: no evidence of injury, no cva tenderness  MSK: no deformity, moves all extremities  Skin: no rash  Neuro: alert, appropriate conversation and interaction    Emergency Department Course   Imaging:  Radiology findings were communicated with the patient and family who voiced understanding of the findings.  US Pelvic Complete w Transvaginal and abdomen/pelvis duplex limited  IMPRESSION:   1. Normal blood flow is seen in the ovaries. No evidence for ovarian  torsion.  2. Small amount of free fluid is seen in the left adnexa and  cul-de-sac. Etiology is uncertain but it could be due to a ruptured  follicle.  Report per radiology     Laboratory:  Laboratory findings were communicated with the patient and family who voiced understanding of the findings.  CBC: HGB 10.0 (L) o/w WNL. (WBC 9.6, )   BMP: chloride 111 (H), calcium 7.9 (L) o/w WNL (Creatinine 0.54)  Wet prep: WNL  UA: clear, colorless urine, specific gravity 1.002 (L) o/w WNL  HCG Qualitative Urine: negative    Neisseria gonorrhea PCR: Pending  Chlamydia trachomatis PCR: Pending  Urine Culture Aerobic Bacterial: Pending    Interventions:  1429 Zofran 4mg IV  1429 Toradol 15 mg IV  1429 Dilaudid 0.5mg IV     Emergency Department Course:  Nursing notes and vitals reviewed.  IV was inserted and blood was drawn for laboratory testing, results above.  The patient provided a urine sample here in the emergency department. This was sent for laboratory testing, findings above.  The patient was sent for a US Pelvic Complete w Transvaginal and abdomen/pelvis duplex limited while in the emergency department, results above.   1400: I performed an exam  of the patient as documented above.   1418: I performed a pelvic exam with a female chaperone.   1613: Patient rechecked and updated. Tenderness is focal in the left lower pelvis. I discussed with her to have a CT to evaluate for a stone, and she declines this. She would like to follow up with her OB.   Findings and plan explained to the Patient and spouse. Patient discharged home with instructions regarding supportive care, medications, and reasons to return. The importance of close follow-up was reviewed. The patient was prescribed Norco.  I personally reviewed the laboratory and imaging results with the Patient and spouse and answered all related questions prior to discharge.    Impression & Plan    Medical Decision Making:  Randee Millard is a 38 year old female who presents for left-sided pelvic pain and abdominal pain.  Evaluations today fails to reveal a discrete cause for her symptoms.  Laboratory studies are unremarkable.  Her pain is focally low in the left pelvis.  Her pelvic exam showed tenderness over the left adnexal however no cervicitis, discharge, or cervical motion tenderness to suggest PID.  Pelvic ultrasound shows some minimal fluid around the left ovary, however no discrete cyst or torsion.  Pain was improved with the above interventions.  I discussed with the patient my preference to obtain a CT scan given her dysuria and history of kidney stones in the setting of otherwise normal workup.  I discussed the possibility of diverticulitis as well.  The patient declines CT at this time and prefers discharge home with follow-up with her OB/GYN.  Patient does have a significant history of dysmenorrhea and pelvic pain during periods and recently more irregular periods, so this certainly could be related to her symptoms.  I discussed with her to return with fever, progressive pain, or other worsening symptoms.  Discussed GC and chlam pending.    Diagnosis:    ICD-10-CM    1. Pelvic pain in female R10.2         Disposition:  discharged to home    Discharge Medications:  New Prescriptions    HYDROCODONE-ACETAMINOPHEN (NORCO) 5-325 MG PER TABLET    Take 1-2 tablets by mouth every 6 hours as needed for severe pain         Scribe Disclosure:  I, Tetoera Streeter, am serving as a scribe at 1:35 PM on 7/1/2018 to document services personally performed by Светлана Ford MD based on my observations and the provider's statements to me.     7/1/2018   [facility] EMERGENCY DEPARTMENT       Светлана Ford MD  07/01/18 9281

## 2018-07-01 NOTE — ED AVS SNAPSHOT
[facility] Emergency Department    [address]    [facility] [address]    Phone:  [phone]    Fax:  [phone]                                       Randee Millard   MRN: [id number]    Department:  [facility] Emergency Department   Date of Visit:  7/1/2018           Patient Information     Date Of Birth          1980        Your diagnoses for this visit were:     Pelvic pain in female        You were seen by Светлана Ford MD.      Follow-up Information     Follow up with Your gyn within 2 days.        Follow up with [facility] Emergency Department.    Specialty:  EMERGENCY MEDICINE    Why:  If symptoms worsen    Contact information:    [address]  [facility] [address] [phone]        Discharge Instructions       Take ibuprofen 600 mg 3x per day.  This will provide both pain control and fight against inflammation.  You were given a prescription for norco (hydrocodone and acetaminophen).  This is a strong, narcotic pain medication.  It may cause drowsiness and mild changes in cognition.  Do not drive or operate machinery while taking this medication.  Do not mix this medication with alcohol or other sedating medications.  This medication contains tylenol (acetaminophen) therefore do not take additional tylenol (acetaminophen) while taking norco.  This medication can cause constipation.  The use of over the counter laxatives and stool softeners can help prevent this.      Discharge References/Attachments     PELVIC PAIN, UNKNOWN CAUSE (ENGLISH)      24 Hour Appointment Hotline       To make an appointment at any [place] clinic, call [phone] ([phone]). If you don't have a family doctor or clinic, we will help you find one. [facility] clinics are conveniently located to serve the needs of you and your family.             Review of your medicines      START taking        Dose / Directions Last dose  taken    HYDROcodone-acetaminophen 5-325 MG per tablet   Commonly known as:  NORCO   Dose:  1-2 tablet   Quantity:  10 tablet        Take 1-2 tablets by mouth every 6 hours as needed for severe pain   Refills:  0                Information about OPIOIDS     PRESCRIPTION OPIOIDS: WHAT YOU NEED TO KNOW   We gave you an opioid (narcotic) pain medicine. It is important to manage your pain, but opioids are not always the best choice. You should first try all the other options your care team gave you. Take this medicine for as short a time (and as few doses) as possible.     These medicines have risks:    DO NOT drive when on new or higher doses of pain medicine. These medicines can affect your alertness and reaction times, and you could be arrested for driving under the influence (DUI). If you need to use opioids long-term, talk to your care team about driving.    DO NOT operate heave machinery    DO NOT do any other dangerous activities while taking these medicines.     DO NOT drink any alcohol while taking these medicines.      If the opioid prescribed includes acetaminophen, DO NOT take with any other medicines that contain acetaminophen. Read all labels carefully. Look for the word  acetaminophen  or  Tylenol.  Ask your pharmacist if you have questions or are unsure.    You can get addicted to pain medicines, especially if you have a history of addiction (chemical, alcohol or substance dependence). Talk to your care team about ways to reduce this risk.    Store your pills in a secure place, locked if possible. We will not replace any lost or stolen medicine. If you don t finish your medicine, please throw away (dispose) as directed by your pharmacist. The [place] Pollution Control Agency has more information about safe disposal: https://www.pca.[facility].[place].us/living-green/managing-unwanted-medications.     All opioids tend to cause constipation. Drink plenty of water and eat foods that have a lot of fiber, such as  fruits, vegetables, prune juice, apple juice and high-fiber cereal. Take a laxative (Miralax, milk of magnesia, Colace, Senna) if you don t move your bowels at least every other day.         Prescriptions were sent or printed at these locations (1 Prescription)                   Other Prescriptions                Printed at Department/Unit printer (1 of 1)         HYDROcodone-acetaminophen (NORCO) 5-325 MG per tablet                Procedures and tests performed during your visit     Basic metabolic panel    CBC + differential    Chlamydia trachomatis PCR    HCG qualitative urine    Neisseria gonorrhoeae PCR    UA reflex to Microscopic    US Pelvic Complete w Transvaginal & Abd/Pel Duplex Limited    Urine Culture Aerobic Bacterial    Wet prep      Orders Needing Specimen Collection     None      Pending Results     Date and Time Order Name Status Description    7/1/2018 1423 Urine Culture Aerobic Bacterial Preliminary     7/1/2018 1408 Chlamydia trachomatis PCR In process     7/1/2018 1408 Neisseria gonorrhoeae PCR In process     7/1/2018 1408 US Pelvic Complete w Transvaginal & Abd/Pel Duplex Limited Preliminary             Pending Culture Results     Date and Time Order Name Status Description    7/1/2018 1423 Urine Culture Aerobic Bacterial Preliminary     7/1/2018 1408 Chlamydia trachomatis PCR In process     7/1/2018 1408 Neisseria gonorrhoeae PCR In process             Pending Results Instructions     If you had any lab results that were not finalized at the time of your Discharge, you can call the ED Lab Result RN at [phone]. You will be contacted by this team for any positive Lab results or changes in treatment. The nurses are available 7 days a week from 10A to 6:30P.  You can leave a message 24 hours per day and they will return your call.        Test Results From Your Hospital Stay        7/1/2018  2:00 PM      Component Results     Component Value Ref Range & Units Status    Color Urine Colorless   Final    Appearance Urine Clear  Final    Glucose Urine Negative NEG^Negative mg/dL Final    Bilirubin Urine Negative NEG^Negative Final    Ketones Urine Negative NEG^Negative mg/dL Final    Specific Gravity Urine 1.002 (L) 1.003 - 1.035 Final    Blood Urine Negative NEG^Negative Final    pH Urine 6.0 5.0 - 7.0 pH Final    Protein Albumin Urine Negative NEG^Negative mg/dL Final    Urobilinogen mg/dL 0.0 0.0 - 2.0 mg/dL Final    Nitrite Urine Negative NEG^Negative Final    Leukocyte Esterase Urine Negative NEG^Negative Final    Source Midstream Urine  Final         7/1/2018  2:07 PM      Component Results     Component Value Ref Range & Units Status    HCG Qual Urine Negative NEG^Negative Final    This test is for screening purposes.  Results should be interpreted along with   the clinical picture.  Confirmation testing is available if warranted by   ordering [id number], HCG Quantitative Pregnancy.           7/1/2018  2:47 PM      Component Results     Component Value Ref Range & Units Status    WBC 9.6 4.0 - 11.0 10e9/L Final    RBC Count 3.83 3.8 - 5.2 10e12/L Final    Hemoglobin 10.0 (L) 11.7 - 15.7 g/dL Final    Hematocrit 31.7 (L) 35.0 - 47.0 % Final    MCV 83 78 - 100 fl Final    MCH 26.1 (L) 26.5 - 33.0 pg Final    MCHC 31.5 31.5 - 36.5 g/dL Final    RDW 15.3 (H) 10.0 - 15.0 % Final    Platelet Count 312 150 - 450 10e9/L Final    Diff Method Automated Method  Final    % Neutrophils 49.4 % Final    % Lymphocytes 42.0 % Final    % Monocytes 6.8 % Final    % Eosinophils 1.2 % Final    % Basophils 0.3 % Final    % Immature Granulocytes 0.3 % Final    Nucleated RBCs 0 0 /100 Final    Absolute Neutrophil 4.8 1.6 - 8.3 10e9/L Final    Absolute Lymphocytes 4.1 0.8 - 5.3 10e9/L Final    Absolute Monocytes 0.7 0.0 - 1.3 10e9/L Final    Absolute Eosinophils 0.1 0.0 - 0.7 10e9/L Final    Absolute Basophils 0.0 0.0 - 0.2 10e9/L Final    Abs Immature Granulocytes 0.0 0 - 0.4 10e9/L Final    Absolute Nucleated RBC 0.0  Final          7/1/2018  3:01 PM      Component Results     Component Value Ref Range & Units Status    Sodium 141 133 - 144 mmol/L Final    Potassium 3.7 3.4 - 5.3 mmol/L Final    Chloride 111 (H) 94 - 109 mmol/L Final    Carbon Dioxide 25 20 - 32 mmol/L Final    Anion Gap 5 3 - 14 mmol/L Final    Glucose 88 70 - 99 mg/dL Final    Urea Nitrogen 7 7 - 30 mg/dL Final    Creatinine 0.54 0.52 - 1.04 mg/dL Final    GFR Estimate >90 >60 mL/min/1.7m2 Final    Non  GFR Calc    GFR Estimate If Black >90 >60 mL/min/1.7m2 Final    African American GFR Calc    Calcium 7.9 (L) 8.5 - 10.1 mg/dL Final         7/1/2018  4:06 PM      Narrative     ULTRASOUND PELVIS DOPPLER   WITH TRANSVAGINAL IMAGING  7/1/2018 4:01  PM     HISTORY: left pelvic pain;     COMPARISON: None.    TECHNIQUE: Transvaginal images were performed to better evaluate the  patient's uterus, ovaries and endometrial stripe. Grayscale, color  Doppler, and Doppler spectral waveform analysis performed.    FINDINGS:  The uterus measures 7.3 x 6.2 x 6.4 cm. There is a  subserosal fibroid off the left uterine body measuring 2.4 x 13.8 x  2.8 cm. The endometrium measures 0.9 cm in thickness. The right ovary  measures 3.6 x 2.1 x 1.8 cm. Arterial and venous blood flow is seen  with normal waveforms. Left ovary measures 4.7 x 2.1 x 2.4 cm.  Arterial and oblique venous blood flow is identified with normal  waveforms. There is a small amount of free peritoneal fluid in the  left adnexa and in the cul-de-sac.        Impression     IMPRESSION:   1. Normal blood flow is seen in the ovaries. No evidence for ovarian  torsion.  2. Small amount of free fluid is seen in the left adnexa and  cul-de-sac. Etiology is uncertain but it could be due to a ruptured  follicle.         7/1/2018  2:46 PM      Component Results     Component    Specimen Description    Vagina    Wet Prep    Few  PMNs seen      Wet Prep    No Trichomonas seen    Wet Prep    No clue cells seen    Wet Prep     No yeast seen         7/1/2018  2:23 PM         7/1/2018  2:23 PM         7/1/2018  3:51 PM      Component Results     Component    Specimen Description    Midstream Urine    Special Requests    Specimen received in preservative    Culture Micro    PENDING                Clinical Quality Measure: Blood Pressure Screening     Your blood pressure was checked while you were in the emergency department today. The last reading we obtained was  BP: 104/64 . Please read the guidelines below about what these numbers mean and what you should do about them.  If your systolic blood pressure (the top number) is less than 120 and your diastolic blood pressure (the bottom number) is less than 80, then your blood pressure is normal. There is nothing more that you need to do about it.  If your systolic blood pressure (the top number) is 120-139 or your diastolic blood pressure (the bottom number) is 80-89, your blood pressure may be higher than it should be. You should have your blood pressure rechecked within a year by a primary care provider.  If your systolic blood pressure (the top number) is 140 or greater or your diastolic blood pressure (the bottom number) is 90 or greater, you may have high blood pressure. High blood pressure is treatable, but if left untreated over time it can put you at risk for heart attack, stroke, or kidney failure. You should have your blood pressure rechecked by a primary care provider within the next 4 weeks.  If your provider in the emergency department today gave you specific instructions to follow-up with your doctor or provider even sooner than that, you should follow that instruction and not wait for up to 4 weeks for your follow-up visit.        Thank you for choosing [facility]       Thank you for choosing [facility] for your care. Our goal is always to provide you with excellent care. Hearing back from our patients is one way we can continue to improve our services. Please take a few minutes to  complete the written survey that you may receive in the mail after you visit with us. Thank you!        [employer] Information     [employer] gives you secure access to your electronic health record. If you see a primary care provider, you can also send messages to your care team and make appointments. If you have questions, please call your primary care clinic.  If you do not have a primary care provider, please call [phone] and they will assist you.        Care EveryWhere ID     This is your Care EveryWhere ID. This could be used by other organizations to access your [place] medical records  [id number]        Equal Access to Services     [facility] HospitalFANNY : Francesco Addison, cameron garcia, apple jordan. So [facility] [phone].    ATENCIÓN: Si habla español, tiene a santiago disposición servicios gratuitos de asistencia lingüística. Llame al [phone].    We comply with applicable federal civil rights laws and [place] laws. We do not discriminate on the basis of race, color, national origin, age, disability, sex, sexual orientation, or gender identity.            After Visit Summary       This is your record. Keep this with you and show to your community pharmacist(s) and doctor(s) at your next visit.

## 2018-07-02 LAB
BACTERIA SPEC CULT: NORMAL
BACTERIA SPEC CULT: NORMAL
C TRACH DNA SPEC QL NAA+PROBE: NEGATIVE
Lab: NORMAL
N GONORRHOEA DNA SPEC QL NAA+PROBE: NEGATIVE
SPECIMEN SOURCE: NORMAL

## 2018-07-08 ENCOUNTER — APPOINTMENT (OUTPATIENT)
Dept: GENERAL RADIOLOGY | Facility: CLINIC | Age: 38
End: 2018-07-08
Attending: EMERGENCY MEDICINE
Payer: COMMERCIAL

## 2018-07-08 ENCOUNTER — HOSPITAL ENCOUNTER (EMERGENCY)
Facility: CLINIC | Age: 38
Discharge: HOME | End: 2018-07-08
Attending: EMERGENCY MEDICINE | Admitting: EMERGENCY MEDICINE
Payer: COMMERCIAL

## 2018-07-08 VITALS
DIASTOLIC BLOOD PRESSURE: 65 MMHG | BODY MASS INDEX: 25.01 KG/M2 | OXYGEN SATURATION: 93 % | WEIGHT: 165 LBS | SYSTOLIC BLOOD PRESSURE: 114 MMHG | HEIGHT: 68 IN | TEMPERATURE: 98.5 F | RESPIRATION RATE: 27 BRPM | HEART RATE: 80 BPM

## 2018-07-08 DIAGNOSIS — M62.830 SPASM OF THORACIC BACK MUSCLE: ICD-10-CM

## 2018-07-08 DIAGNOSIS — R07.89 MUSCULOSKELETAL CHEST PAIN: ICD-10-CM

## 2018-07-08 LAB
ALBUMIN SERPL-MCNC: 3.4 G/DL (ref 3.4–5)
ALP SERPL-CCNC: 71 U/L (ref 40–150)
ALT SERPL W P-5'-P-CCNC: 16 U/L (ref 0–50)
ANION GAP SERPL CALCULATED.3IONS-SCNC: 5 MMOL/L (ref 3–14)
AST SERPL W P-5'-P-CCNC: 11 U/L (ref 0–45)
BASOPHILS # BLD AUTO: 0 10E9/L (ref 0–0.2)
BASOPHILS NFR BLD AUTO: 0.4 %
BILIRUB DIRECT SERPL-MCNC: <0.1 MG/DL (ref 0–0.2)
BILIRUB SERPL-MCNC: 0.3 MG/DL (ref 0.2–1.3)
BUN SERPL-MCNC: 8 MG/DL (ref 7–30)
CALCIUM SERPL-MCNC: 8.4 MG/DL (ref 8.5–10.1)
CHLORIDE SERPL-SCNC: 109 MMOL/L (ref 94–109)
CO2 SERPL-SCNC: 27 MMOL/L (ref 20–32)
CREAT SERPL-MCNC: 0.55 MG/DL (ref 0.52–1.04)
D DIMER PPP FEU-MCNC: <0.3 UG/ML FEU (ref 0–0.5)
DIFFERENTIAL METHOD BLD: ABNORMAL
EOSINOPHIL # BLD AUTO: 0.1 10E9/L (ref 0–0.7)
EOSINOPHIL NFR BLD AUTO: 1.1 %
ERYTHROCYTE [DISTWIDTH] IN BLOOD BY AUTOMATED COUNT: 15 % (ref 10–15)
GFR SERPL CREATININE-BSD FRML MDRD: >90 ML/MIN/1.7M2
GLUCOSE SERPL-MCNC: 88 MG/DL (ref 70–99)
HCT VFR BLD AUTO: 33.6 % (ref 35–47)
HGB BLD-MCNC: 10.5 G/DL (ref 11.7–15.7)
IMM GRANULOCYTES # BLD: 0 10E9/L (ref 0–0.4)
IMM GRANULOCYTES NFR BLD: 0.3 %
LIPASE SERPL-CCNC: 122 U/L (ref 73–393)
LYMPHOCYTES # BLD AUTO: 4.5 10E9/L (ref 0.8–5.3)
LYMPHOCYTES NFR BLD AUTO: 45 %
MCH RBC QN AUTO: 26 PG (ref 26.5–33)
MCHC RBC AUTO-ENTMCNC: 31.3 G/DL (ref 31.5–36.5)
MCV RBC AUTO: 83 FL (ref 78–100)
MONOCYTES # BLD AUTO: 0.7 10E9/L (ref 0–1.3)
MONOCYTES NFR BLD AUTO: 7.3 %
NEUTROPHILS # BLD AUTO: 4.5 10E9/L (ref 1.6–8.3)
NEUTROPHILS NFR BLD AUTO: 45.9 %
NRBC # BLD AUTO: 0 10*3/UL
NRBC BLD AUTO-RTO: 0 /100
PLATELET # BLD AUTO: 348 10E9/L (ref 150–450)
POTASSIUM SERPL-SCNC: 3.5 MMOL/L (ref 3.4–5.3)
PROT SERPL-MCNC: 7 G/DL (ref 6.8–8.8)
RBC # BLD AUTO: 4.04 10E12/L (ref 3.8–5.2)
SODIUM SERPL-SCNC: 141 MMOL/L (ref 133–144)
WBC # BLD AUTO: 9.9 10E9/L (ref 4–11)

## 2018-07-08 PROCEDURE — 83690 ASSAY OF LIPASE: CPT | Performed by: EMERGENCY MEDICINE

## 2018-07-08 PROCEDURE — 25000128 H RX IP 250 OP 636: Performed by: EMERGENCY MEDICINE

## 2018-07-08 PROCEDURE — 99284 EMERGENCY DEPT VISIT MOD MDM: CPT

## 2018-07-08 PROCEDURE — 80048 BASIC METABOLIC PNL TOTAL CA: CPT | Performed by: EMERGENCY MEDICINE

## 2018-07-08 PROCEDURE — 93005 ELECTROCARDIOGRAM TRACING: CPT

## 2018-07-08 PROCEDURE — 80076 HEPATIC FUNCTION PANEL: CPT | Performed by: EMERGENCY MEDICINE

## 2018-07-08 PROCEDURE — 25000132 ZZH RX MED GY IP 250 OP 250 PS 637: Performed by: EMERGENCY MEDICINE

## 2018-07-08 PROCEDURE — 71046 X-RAY EXAM CHEST 2 VIEWS: CPT

## 2018-07-08 PROCEDURE — 85379 FIBRIN DEGRADATION QUANT: CPT | Performed by: EMERGENCY MEDICINE

## 2018-07-08 PROCEDURE — 85025 COMPLETE CBC W/AUTO DIFF WBC: CPT | Performed by: EMERGENCY MEDICINE

## 2018-07-08 PROCEDURE — 96374 THER/PROPH/DIAG INJ IV PUSH: CPT

## 2018-07-08 PROCEDURE — 25000125 ZZHC RX 250: Performed by: EMERGENCY MEDICINE

## 2018-07-08 RX ORDER — METHOCARBAMOL 750 MG/1
750 TABLET, FILM COATED ORAL ONCE
Status: COMPLETED | OUTPATIENT
Start: 2018-07-08 | End: 2018-07-08

## 2018-07-08 RX ORDER — METHOCARBAMOL 750 MG/1
750 TABLET, FILM COATED ORAL 4 TIMES DAILY PRN
Qty: 20 TABLET | Refills: 0 | Status: SHIPPED | OUTPATIENT
Start: 2018-07-08 | End: 2018-08-10

## 2018-07-08 RX ORDER — KETOROLAC TROMETHAMINE 15 MG/ML
15 INJECTION, SOLUTION INTRAMUSCULAR; INTRAVENOUS ONCE
Status: COMPLETED | OUTPATIENT
Start: 2018-07-08 | End: 2018-07-08

## 2018-07-08 RX ORDER — IBUPROFEN 600 MG/1
600 TABLET, FILM COATED ORAL EVERY 6 HOURS PRN
Qty: 20 TABLET | Refills: 1 | Status: SHIPPED | OUTPATIENT
Start: 2018-07-08 | End: 2019-03-11

## 2018-07-08 RX ADMIN — LIDOCAINE HYDROCHLORIDE 30 ML: 20 SOLUTION ORAL; TOPICAL at 12:16

## 2018-07-08 RX ADMIN — METHOCARBAMOL 750 MG: 750 TABLET ORAL at 12:15

## 2018-07-08 RX ADMIN — KETOROLAC TROMETHAMINE 15 MG: 15 INJECTION, SOLUTION INTRAMUSCULAR; INTRAVENOUS at 12:16

## 2018-07-08 ASSESSMENT — ENCOUNTER SYMPTOMS
COUGH: 0
APPETITE CHANGE: 0
FEVER: 0
CHEST TIGHTNESS: 1
ABDOMINAL PAIN: 0
BACK PAIN: 1

## 2018-07-08 NOTE — DISCHARGE INSTRUCTIONS
Back Spasm (No Trauma)    Spasm of the back muscles can occur after a sudden forceful twisting or bending force (such as in a car accident), after a simple awkward movement, or after lifting something heavy with poor body positioning. In any case, muscle spasm adds to the pain. Sleeping in an awkward position or on a poor quality mattress can also cause this. Some people respond to emotional stress by tensing the muscles of their back.  Pain that continues may need further evaluation or other types of treatment such as physical therapy.  You don't always need X-rays for the initial evaluation of back pain, unless you had a physical injury such as from a car accident or fall. If your pain continues and doesn't respond to medical treatment, X-rays and other tests may then be done.   Home care    As soon as possible, start sitting or walking again to avoid problems from prolonged bed rest (muscle weakness, worsening back stiffness and pain, blood clots in the legs).    When in bed, try to find a position of comfort. A firm mattress is best. Try lying flat on your back with pillows under your knees. You can also try lying on your side with your knees bent up toward your chest and a pillow between your knees.    Avoid prolonged sitting, long car rides, or travel. This puts more stress on the lower back than standing or walking.     During the first 24 to 72 hours after an injury or flare-up, apply an ice pack to the painful area for 20 minutes, then remove it for 20 minutes. Do this over a period of 60 to 90 minutes or several times a day. This will reduce swelling and pain. Always wrap ice packs in a thin towel.    You can start with ice, then switch to heat. Heat (hot shower, hot bath, or heating pad) reduces pain, and works well for muscle spasms. Apply heat to the painful area for 20 minutes, then remove it for 20 minutes. Do this over a period of 60 to 90 minutes or several times a day. Do not sleep on a heating  pad as it can burn or damage skin.    Alternate ice and heat therapies.    Be aware of safe lifting methods and do not lift anything over 15 pounds until all the pain is gone.  Gentle stretching will help your back heal faster. Do this simple routine 2 to 3 times a day until your back is feeling better.    Lie on your back with your knees bent and both feet on the ground    Slowly raise your left knee to your chest as you flatten your lower back against the floor. Hold for 20 to 30 seconds.    Relax and repeat the exercise with your right knee.    Do 2 to 3 of these exercises for each leg.    Repeat, hugging both knees to your chest at the same time.    Do not bounce, but use a gentle pull.  Medicines  Talk to your doctor before using medicine, especially if you have other medical problems or are taking other medicines.  You may use acetaminophen or ibuprofen to control pain, unless your healthcare provider prescribed another pain medicine. If you have a chronic condition such as diabetes, liver or kidney disease, stomach ulcer, or gastrointestinal bleeding, or are taking blood thinners, talk with your healthcare provider before taking any medicines.  Be careful if you are given prescription pain medicine, narcotics, or medicine for muscle spasm. They can cause drowsiness, affect your coordination, reflexes, or judgment. Do not drive or operate heavy machinery when taking these medicines. Take pain medicine only as prescribed by your healthcare provider.  Follow-up care  Follow up with your doctor, or as advised. Physical therapy or further tests may be needed.  If X-rays were taken, they may be reviewed by a radiologist. You will be notified of any new findings that may affect your care.  Call 911  Call 911 if any of these occur:    Trouble breathing    Confusion    Drowsiness or trouble awakening    Fainting or loss of consciousness    Rapid or very slow heart rate    Loss of bowel or bladder control  When to seek  medical advice  Call your healthcare provider right away if any of these occur:    Pain becomes worse or spreads to your legs    Weakness or numbness in one or both legs    Numbness in the groin or genital area    Fever of 100.4 F (38 C) or higher, or as directed by your healthcare provider    Burning or pain when passing urine  Date Last Reviewed: 6/1/2016 2000-2017 [employer]. [address] [place] [address]. All rights reserved. This information is not intended as a substitute for professional medical care. Always follow your healthcare professional's instructions.      Discharge Instructions  Chest Pain    You have been seen today for chest pain or discomfort.  At this time, your provider has found no signs that your chest pain is due to a serious or life-threatening condition, (or you have declined more testing and/or admission to the hospital). However, sometimes there is a serious problem that does not show up right away. Your evaluation today may not be complete and you may need further testing and evaluation.     Generally, every Emergency Department visit should have a follow-up clinic visit with either a primary or a specialty clinic/provider. Please follow-up as instructed by your emergency provider today.  Return to the Emergency Department if:    Your chest pain changes, gets worse, starts to happen more often, or comes with less activity.    You are newly short of breath.    You get very weak or tired.    You pass out or faint.    You have any new symptoms, like fever, cough, numb legs, or you cough up blood.    You have anything else that worries you.    Until you follow-up with your regular provider, please do the following:    Follow-up with your regular provider/clinic as directed; this is very important.    If you were given a prescription for medicine here today, be sure to read all of the information (including the package insert) that comes with your prescription.   This will include important information about the medicine, its side effects, and any warnings that you need to know about.  The pharmacist who fills the prescription can provide more information and answer questions you may have about the medicine.  If you have questions or concerns that the pharmacist cannot address, please call or return to the Emergency Department.       Remember that you can always come back to the Emergency Department if you are not able to see your regular provider in the amount of time listed above, if you get any new symptoms, or if there is anything that worries you.

## 2018-07-08 NOTE — ED PROVIDER NOTES
"  History     Chief Complaint:  Chest and Back Pain    HPI   Randee Millard is a 38 year old anemic female who presents with chest and back pain. She started to notice an unprovoked chest pain last night, but became more severe this morning, prompting her to call the EMS for herself. The EMS shared how her vital signs are normal, and that they provided her with nitro and aspirin before bringing her to the ED. The patient stated that the pain is sharp and tight, and that it radiates to her back. The patient denies any cough, fever, abdominal pain, change of appetite, and leg swelling/tenderness.     Cardiac/PE/DVT Risk Factors:  History of hypertension - Yes  History of hyperlipidemia - No  History of diabetes - Yes  History of smoking - No  Personal history of PE/DVT - No  Family history of PE/DVT - No  Family history of heart complications - No  Recent travel - No  Recent surgery - No  Other immobilizations - No  Cancer - No    Allergies:  Amoxicillin  Clindamycin    Medications:    Norco    Past Medical History:    Anemia  Reflux Esophagitis    Past Surgical History:    US right breast cyst aspiration initial     Family History:    Hypertension  Diabetes    Social History:  Marital Status:    Smoking Status: Never  Alcohol Use: No    Review of Systems   Constitutional: Negative for appetite change and fever.   Respiratory: Positive for chest tightness. Negative for cough.    Cardiovascular: Positive for chest pain. Negative for leg swelling.   Gastrointestinal: Negative for abdominal pain.   Musculoskeletal: Positive for back pain.         Physical Exam     Patient Vitals for the past 24 hrs:   BP Temp Temp src Pulse Heart Rate Resp SpO2 Height Weight   07/08/18 1245 114/65 - - - 70 27 93 % - -   07/08/18 1230 112/59 - - - 78 14 100 % - -   07/08/18 1149 101/70 98.5  F (36.9  C) Oral 80 80 14 100 % 1.727 m (5' 8\") 74.8 kg (165 lb)     Physical Exam  Vital signs and nursing notes reviewed. "     Constitutional: laying on gurney appears comfortable  HENT: Oropharynx is clear and moist  Eyes: Conjunctivae are normal bilaterally. Pupils equal  Neck: normal range of motion  Cardiovascular: Normal rate, regular rhythm, normal heart sounds. No murmurs, no friction rub.  Normal strong radial pulses  Pulmonary/Chest: Effort normal and breath sounds normal. No respiratory distress. Lungs are clear, bilaterally.   Abdominal: Soft. Bowel sounds are normal. No tenderness to palpation. No rebound or guarding.   Musculoskeletal: No joint swelling or edema. No calf tenderness or leg swelling.  Neurological: Alert and oriented. No focal weakness  Skin: Skin is warm and dry. No rash noted.    Psych: normal affect    Emergency Department Course   ECG:  ECG taken at 1154, ECG read at 1201  Normal sinus rhythm with sinus arrhythmia  Normal ECG  Rate 78 bpm. OH interval 164. QRS duration 80. QT/QTc 414/471. P-R-T axes 32 20 38.    Imaging:  Radiographic findings were communicated with the patient who voiced understanding of the findings.    Chest XR, PA & LAT:  Negative   Per Radiologist.     Laboratory:  D Dimer: <0.3  CBC: WNL. (WBC 9.9, HGB 10.5, )   BMP: Calcium 8.4 (L), o/w WNL. (Creatinine 0.55)  Lipase: 122  Hepatic Panel: Bilirubin Direct <0.1, Bilirubin Total 0.3, ALBUMIN 3.4, Protein 7.0, ALKPHOS 71, ALT 16, AST 11    Interventions:  1215: 750 mg Robaxin Po  1216: 15 mg Toradol IV  1216: 30 mLs GI Cocktail PO    Emergency Department Course:  The patient arrived in the emergency department via EMS.  Past medical records, nursing notes, and vitals reviewed.  1149: I performed an exam of the patient and obtained history, as documented above.    .EKG obtained in the ED, see results above.   IV inserted and blood drawn.  The patient was taken for chest XR, see imaging results above.     I rechecked the patient. Findings and plan explained to the Patient. Patient discharged home with instructions regarding  supportive care, medications, and reasons to return. The importance of close follow-up was reviewed.         Impression & Plan        Medical Decision Making:  Randee Millard is a 38 year old female who presents to the ED for evaluation of sharp intermittent tight, sharp pains in her chest and upper back. On the examination she had normal vital signs, EKG and troponin is normal. Also the D dimer which is negative. She is not hypoxic or tachycardic, there's no suggestion she has a PE causing her symptoms. Chest XR shows no widening of the mediastinum, infiltrates, or pneumothorax.   The patient does have a history of some\what similar presentation complaints in the past. After treatment, her pain improved with antiinflammatory and muscle relaxes. I suspect her symptoms like or more related to musculoskeletal pain and likely thoracic muscle spasm. I advise her to follow up with her PCP. Return if symptoms worsen. Patient understands the plan, has no questions, and was discharged home.      Diagnosis:    ICD-10-CM   1. Musculoskeletal chest pain R07.89   2. Spasm of thoracic back muscle M62.830       Disposition:  discharged to home    Discharge Medications:  Discharge Medication List as of 7/8/2018  2:21 PM      START taking these medications    Details   ibuprofen (ADVIL/MOTRIN) 600 MG tablet Take 1 tablet (600 mg) by mouth every 6 hours as needed for moderate pain, Disp-20 tablet, R-1, Local Print      methocarbamol (ROBAXIN) 750 MG tablet Take 1 tablet (750 mg) by mouth 4 times daily as needed for muscle spasms, Disp-20 tablet, R-0, Local Print               Tom Palacios  7/8/2018   [facility] EMERGENCY DEPARTMENT    I, Tom Palacios, am serving as a scribe at 11:49 AM on 7/8/2018 to document services personally performed by Remington Multani MD based on my observations and the provider's statements to me.          Remington Multani MD  07/09/18 [id number]

## 2018-07-08 NOTE — ED TRIAGE NOTES
pt comes in from home with chest pain that goes straight through to the back, pain started yesterday and has progressivly gotten worse today. pain is worse with taking a deep breath. pt got 1 nitro and 4 asa 81 mg from ems without any change in pain. pt was here 1 week ago with abd pain and had a US done without any specific dx given. vss

## 2018-07-08 NOTE — ED AVS SNAPSHOT
[facility] Emergency Department    [address]    [facility] [address]    Phone:  [phone]    Fax:  [phone]                                       Randee Millard   MRN: [id number]    Department:  [facility] Emergency Department   Date of Visit:  7/8/2018           Patient Information     Date Of Birth          1980        Your diagnoses for this visit were:     Musculoskeletal chest pain     Spasm of thoracic back muscle        You were seen by Remington Multani MD.      Follow-up Information     Follow up with Abe Hou MD. Schedule an appointment as soon as possible for a visit in 2 days.    Specialty:  Internal Medicine    Contact information:    [address] [facility] [address]  [phone]          Follow up with [facility] Emergency Department.    Specialty:  EMERGENCY MEDICINE    Why:  If symptoms worsen    Contact information:    [address]  [facility] [address]  [phone]        Discharge Instructions         Back Spasm (No Trauma)    Spasm of the back muscles can occur after a sudden forceful twisting or bending force (such as in a car accident), after a simple awkward movement, or after lifting something heavy with poor body positioning. In any case, muscle spasm adds to the pain. Sleeping in an awkward position or on a poor quality mattress can also cause this. Some people respond to emotional stress by tensing the muscles of their back.  Pain that continues may need further evaluation or other types of treatment such as physical therapy.  You don't always need X-rays for the initial evaluation of back pain, unless you had a physical injury such as from a car accident or fall. If your pain continues and doesn't respond to medical treatment, X-rays and other tests may then be done.   Home care    As soon as possible, start sitting or walking again to avoid problems from prolonged bed rest  (muscle weakness, worsening back stiffness and pain, blood clots in the legs).    When in bed, try to find a position of comfort. A firm mattress is best. Try lying flat on your back with pillows under your knees. You can also try lying on your side with your knees bent up toward your chest and a pillow between your knees.    Avoid prolonged sitting, long car rides, or travel. This puts more stress on the lower back than standing or walking.     During the first 24 to 72 hours after an injury or flare-up, apply an ice pack to the painful area for 20 minutes, then remove it for 20 minutes. Do this over a period of 60 to 90 minutes or several times a day. This will reduce swelling and pain. Always wrap ice packs in a thin towel.    You can start with ice, then switch to heat. Heat (hot shower, hot bath, or heating pad) reduces pain, and works well for muscle spasms. Apply heat to the painful area for 20 minutes, then remove it for 20 minutes. Do this over a period of 60 to 90 minutes or several times a day. Do not sleep on a heating pad as it can burn or damage skin.    Alternate ice and heat therapies.    Be aware of safe lifting methods and do not lift anything over 15 pounds until all the pain is gone.  Gentle stretching will help your back heal faster. Do this simple routine 2 to 3 times a day until your back is feeling better.    Lie on your back with your knees bent and both feet on the ground    Slowly raise your left knee to your chest as you flatten your lower back against the floor. Hold for 20 to 30 seconds.    Relax and repeat the exercise with your right knee.    Do 2 to 3 of these exercises for each leg.    Repeat, hugging both knees to your chest at the same time.    Do not bounce, but use a gentle pull.  Medicines  Talk to your doctor before using medicine, especially if you have other medical problems or are taking other medicines.  You may use acetaminophen or ibuprofen to control pain, unless your  healthcare provider prescribed another pain medicine. If you have a chronic condition such as diabetes, liver or kidney disease, stomach ulcer, or gastrointestinal bleeding, or are taking blood thinners, talk with your healthcare provider before taking any medicines.  Be careful if you are given prescription pain medicine, narcotics, or medicine for muscle spasm. They can cause drowsiness, affect your coordination, reflexes, or judgment. Do not drive or operate heavy machinery when taking these medicines. Take pain medicine only as prescribed by your healthcare provider.  Follow-up care  Follow up with your doctor, or as advised. Physical therapy or further tests may be needed.  If X-rays were taken, they may be reviewed by a radiologist. You will be notified of any new findings that may affect your care.  Call 911  Call 911 if any of these occur:    Trouble breathing    Confusion    Drowsiness or trouble awakening    Fainting or loss of consciousness    Rapid or very slow heart rate    Loss of bowel or bladder control  When to seek medical advice  Call your healthcare provider right away if any of these occur:    Pain becomes worse or spreads to your legs    Weakness or numbness in one or both legs    Numbness in the groin or genital area    Fever of 100.4 F (38 C) or higher, or as directed by your healthcare provider    Burning or pain when passing urine  Date Last Reviewed: 6/1/2016 2000-2017 [employer]. [address] [place] [id number]. All rights reserved. This information is not intended as a substitute for professional medical care. Always follow your healthcare professional's instructions.      Discharge Instructions  Chest Pain    You have been seen today for chest pain or discomfort.  At this time, your provider has found no signs that your chest pain is due to a serious or life-threatening condition, (or you have declined more testing and/or admission to the hospital). However,  sometimes there is a serious problem that does not show up right away. Your evaluation today may not be complete and you may need further testing and evaluation.     Generally, every Emergency Department visit should have a follow-up clinic visit with either a primary or a specialty clinic/provider. Please follow-up as instructed by your emergency provider today.  Return to the Emergency Department if:    Your chest pain changes, gets worse, starts to happen more often, or comes with less activity.    You are newly short of breath.    You get very weak or tired.    You pass out or faint.    You have any new symptoms, like fever, cough, numb legs, or you cough up blood.    You have anything else that worries you.    Until you follow-up with your regular provider, please do the following:    Follow-up with your regular provider/clinic as directed; this is very important.    If you were given a prescription for medicine here today, be sure to read all of the information (including the package insert) that comes with your prescription.  This will include important information about the medicine, its side effects, and any warnings that you need to know about.  The pharmacist who fills the prescription can provide more information and answer questions you may have about the medicine.  If you have questions or concerns that the pharmacist cannot address, please call or return to the Emergency Department.       Remember that you can always come back to the Emergency Department if you are not able to see your regular provider in the amount of time listed above, if you get any new symptoms, or if there is anything that worries you.    24 Hour Appointment Hotline       To make an appointment at any [facility], call [phone] ([phone]). If you don't have a family doctor or clinic, we will help you find one. [facility] are conveniently located to serve the needs of you and your family.             Review  of your medicines      START taking        Dose / Directions Last dose taken    ibuprofen 600 MG tablet   Commonly known as:  ADVIL/MOTRIN   Dose:  600 mg   Quantity:  20 tablet        Take 1 tablet (600 mg) by mouth every 6 hours as needed for moderate pain   Refills:  1        methocarbamol 750 MG tablet   Commonly known as:  ROBAXIN   Dose:  750 mg   Quantity:  20 tablet        Take 1 tablet (750 mg) by mouth 4 times daily as needed for muscle spasms   Refills:  0          Our records show that you are taking the medicines listed below. If these are incorrect, please call your family doctor or clinic.        Dose / Directions Last dose taken    HYDROcodone-acetaminophen 5-325 MG per tablet   Commonly known as:  NORCO   Dose:  1-2 tablet   Quantity:  10 tablet        Take 1-2 tablets by mouth every 6 hours as needed for severe pain   Refills:  0        IRON SUPPLEMENT PO        Refills:  0                Prescriptions were sent or printed at these locations (2 Prescriptions)                   Other Prescriptions                Printed at Department/Unit printer (2 of 2)         ibuprofen (ADVIL/MOTRIN) 600 MG tablet               methocarbamol (ROBAXIN) 750 MG tablet                Procedures and tests performed during your visit     Basic metabolic panel (BMP)    CBC + differential    Chest XR,  PA & LAT    D dimer quantitative    EKG 12 lead    Hepatic panel    IV access    Lipase      Orders Needing Specimen Collection     None      Pending Results     Date and Time Order Name Status Description    7/8/2018 1151 EKG 12 lead Preliminary     7/8/2018 1151 Chest XR,  PA & LAT Preliminary             Pending Culture Results     No orders found from 7/6/2018 to 7/9/2018.            Pending Results Instructions     If you had any lab results that were not finalized at the time of your Discharge, you can call the ED Lab Result RN at [phone]. You will be contacted by this team for any positive Lab results or  changes in treatment. The nurses are available 7 days a week from 10A to 6:30P.  You can leave a message 24 hours per day and they will return your call.        Test Results From Your Hospital Stay        7/8/2018 12:47 PM      Component Results     Component Value Ref Range & Units Status    WBC 9.9 4.0 - 11.0 10e9/L Final    RBC Count 4.04 3.8 - 5.2 10e12/L Final    Hemoglobin 10.5 (L) 11.7 - 15.7 g/dL Final    Hematocrit 33.6 (L) 35.0 - 47.0 % Final    MCV 83 78 - 100 fl Final    MCH 26.0 (L) 26.5 - 33.0 pg Final    MCHC 31.3 (L) 31.5 - 36.5 g/dL Final    RDW 15.0 10.0 - 15.0 % Final    Platelet Count 348 150 - 450 10e9/L Final    Diff Method Automated Method  Final    % Neutrophils 45.9 % Final    % Lymphocytes 45.0 % Final    % Monocytes 7.3 % Final    % Eosinophils 1.1 % Final    % Basophils 0.4 % Final    % Immature Granulocytes 0.3 % Final    Nucleated RBCs 0 0 /100 Final    Absolute Neutrophil 4.5 1.6 - 8.3 10e9/L Final    Absolute Lymphocytes 4.5 0.8 - 5.3 10e9/L Final    Absolute Monocytes 0.7 0.0 - 1.3 10e9/L Final    Absolute Eosinophils 0.1 0.0 - 0.7 10e9/L Final    Absolute Basophils 0.0 0.0 - 0.2 10e9/L Final    Abs Immature Granulocytes 0.0 0 - 0.4 10e9/L Final    Absolute Nucleated RBC 0.0  Final         7/8/2018  1:04 PM      Component Results     Component Value Ref Range & Units Status    Sodium 141 133 - 144 mmol/L Final    Potassium 3.5 3.4 - 5.3 mmol/L Final    Chloride 109 94 - 109 mmol/L Final    Carbon Dioxide 27 20 - 32 mmol/L Final    Anion Gap 5 3 - 14 mmol/L Final    Glucose 88 70 - 99 mg/dL Final    Urea Nitrogen 8 7 - 30 mg/dL Final    Creatinine 0.55 0.52 - 1.04 mg/dL Final    GFR Estimate >90 >60 mL/min/1.7m2 Final    Non  GFR Calc    GFR Estimate If Black >90 >60 mL/min/1.7m2 Final    African American GFR Calc    Calcium 8.4 (L) 8.5 - 10.1 mg/dL Final         7/8/2018  1:04 PM      Component Results     Component Value Ref Range & Units Status    Lipase 122 73 -  393 U/L Final         7/8/2018  1:04 PM      Component Results     Component Value Ref Range & Units Status    Bilirubin Direct <0.1 0.0 - 0.2 mg/dL Final    Bilirubin Total 0.3 0.2 - 1.3 mg/dL Final    Albumin 3.4 3.4 - 5.0 g/dL Final    Protein Total 7.0 6.8 - 8.8 g/dL Final    Alkaline Phosphatase 71 40 - 150 U/L Final    ALT 16 0 - 50 U/L Final    AST 11 0 - 45 U/L Final         7/8/2018  1:28 PM      Narrative     CHEST TWO VIEWS   7/8/2018 1:02 PM     HISTORY: Pain, chest and upper back.     COMPARISON: 6/12/2017 chest x-ray.        Impression     IMPRESSION: Negative.         7/8/2018  1:00 PM      Component Results     Component Value Ref Range & Units Status    D Dimer <0.3 0.0 - 0.50 ug/ml FEU Final    This D-dimer assay is intended for use in conjunction with a clinical pretest   probability assessment model to exclude pulmonary embolism (PE) and deep   venous thrombosis (DVT) in outpatients suspected of PE or DVT. The cut-off   value is 0.5 ug/mL FEU.                  Clinical Quality Measure: Blood Pressure Screening     Your blood pressure was checked while you were in the emergency department today. The last reading we obtained was  BP: 114/65 . Please read the guidelines below about what these numbers mean and what you should do about them.  If your systolic blood pressure (the top number) is less than 120 and your diastolic blood pressure (the bottom number) is less than 80, then your blood pressure is normal. There is nothing more that you need to do about it.  If your systolic blood pressure (the top number) is 120-139 or your diastolic blood pressure (the bottom number) is 80-89, your blood pressure may be higher than it should be. You should have your blood pressure rechecked within a year by a primary care provider.  If your systolic blood pressure (the top number) is 140 or greater or your diastolic blood pressure (the bottom number) is 90 or greater, you may have high blood pressure. High  blood pressure is treatable, but if left untreated over time it can put you at risk for heart attack, stroke, or kidney failure. You should have your blood pressure rechecked by a primary care provider within the next 4 weeks.  If your provider in the emergency department today gave you specific instructions to follow-up with your doctor or provider even sooner than that, you should follow that instruction and not wait for up to 4 weeks for your follow-up visit.        Thank you for choosing [facility]       Thank you for choosing [facility] for your care. Our goal is always to provide you with excellent care. Hearing back from our patients is one way we can continue to improve our services. Please take a few minutes to complete the written survey that you may receive in the mail after you visit with us. Thank you!        [employer] Information     [employer] gives you secure access to your electronic health record. If you see a primary care provider, you can also send messages to your care team and make appointments. If you have questions, please call your primary care clinic.  If you do not have a primary care provider, please call [phone] and they will assist you.        Care EveryWhere ID     This is your Care EveryWhere ID. This could be used by other organizations to access your [facility] medical records  [id number]        Equal Access to Services     VALERIE MCKEON : Hadii khanh Addison, cameron garcia, elizabeth alarcon, apple kaplan. So [facility] [phone].    ATENCIÓN: Si habla español, tiene a santiago disposición servicios gratuitos de asistencia lingüística. Tim al [phone].    We comply with applicable federal civil rights laws and [place] laws. We do not discriminate on the basis of race, color, national origin, age, disability, sex, sexual orientation, or gender identity.            After Visit Summary       This is your record. Keep this with you and show to  your community pharmacist(s) and doctor(s) at your next visit.

## 2018-07-08 NOTE — ED AVS SNAPSHOT
[facility] Emergency Department    [address]    [facility] [address]    Phone:  [phone]    Fax:  [phone]                                       Radnee Millard   MRN: [id number]    Department:  [facility] Emergency Department   Date of Visit:  7/8/2018           After Visit Summary Signature Page     I have received my discharge instructions, and my questions have been answered. I have discussed any challenges I see with this plan with the nurse or doctor.    ..........................................................................................................................................  Patient/Patient Representative Signature      ..........................................................................................................................................  Patient Representative Print Name and Relationship to Patient    ..................................................               ................................................  Date                                            Time    ..........................................................................................................................................  Reviewed by Signature/Title    ...................................................              ..............................................  Date                                                            Time

## 2018-07-09 LAB — INTERPRETATION ECG - MUSE: NORMAL

## 2018-07-31 ENCOUNTER — TELEPHONE (OUTPATIENT)
Dept: INTERNAL MEDICINE | Facility: CLINIC | Age: 38
End: 2018-07-31

## 2018-07-31 NOTE — TELEPHONE ENCOUNTER
Randee Millard is a 38 year old female  who calls with abdominal pain.    NURSING ASSESSMENT:  The pain began 1 week ago.    Pain scale (0-10): 8/10  LMP  was  7/30/18.  The pain is described as cramping and is located suprapubic, which is with radiation to back.  Symptom associated with the abdominal pain: dysuria, nausea chills and cramping.  Patient has not had previous abdominal surgery, including none.  Pain is aggravated by activity, and relieved by nothing.  Allergies:   Allergies   Allergen Reactions     Amoxicillin Rash     Clindamycin Rash       NURSING PLAN: Nursing advice to patient need to be seen tonight.    RECOMMENDED DISPOSITION:  To ED/UC for evaluation, another person to drive - Recommended ED due to level of pain.   Will comply with recommendation: Yes  If further questions/concerns or if symptoms do not improve, worsen or new symptoms develop, call your PCP or [facility] as soon as possible.    Guideline used:  Telephone Triage Protocols for Nurses, Fourth Edition, Johanna Miranda RN

## 2018-08-10 ENCOUNTER — OFFICE VISIT (OUTPATIENT)
Dept: INTERNAL MEDICINE | Facility: CLINIC | Age: 38
End: 2018-08-10
Payer: COMMERCIAL

## 2018-08-10 VITALS
HEART RATE: 104 BPM | DIASTOLIC BLOOD PRESSURE: 62 MMHG | HEIGHT: 68 IN | BODY MASS INDEX: 27.43 KG/M2 | SYSTOLIC BLOOD PRESSURE: 108 MMHG | WEIGHT: 181 LBS | RESPIRATION RATE: 16 BRPM | TEMPERATURE: 99.2 F | OXYGEN SATURATION: 99 %

## 2018-08-10 DIAGNOSIS — R39.15 URINARY URGENCY: ICD-10-CM

## 2018-08-10 DIAGNOSIS — R10.32 LLQ ABDOMINAL PAIN: Primary | ICD-10-CM

## 2018-08-10 LAB
ALBUMIN UR-MCNC: NEGATIVE MG/DL
APPEARANCE UR: CLEAR
BILIRUB UR QL STRIP: NEGATIVE
COLOR UR AUTO: YELLOW
GLUCOSE UR STRIP-MCNC: NEGATIVE MG/DL
HGB UR QL STRIP: NEGATIVE
KETONES UR STRIP-MCNC: NEGATIVE MG/DL
LEUKOCYTE ESTERASE UR QL STRIP: NEGATIVE
NITRATE UR QL: NEGATIVE
PH UR STRIP: 5.5 PH (ref 5–7)
SOURCE: NORMAL
SP GR UR STRIP: 1.01 (ref 1–1.03)
UROBILINOGEN UR STRIP-ACNC: 0.2 EU/DL (ref 0.2–1)

## 2018-08-10 PROCEDURE — 99214 OFFICE O/P EST MOD 30 MIN: CPT | Performed by: INTERNAL MEDICINE

## 2018-08-10 PROCEDURE — 81003 URINALYSIS AUTO W/O SCOPE: CPT | Performed by: INTERNAL MEDICINE

## 2018-08-10 NOTE — PROGRESS NOTES
".  SUBJECTIVE:   Randee Millard is a 38 year old female who presents to clinic today for the following health issues:    Pt  is a 38 year old female who is seen here to day with lower abd pain since 1 mth, daily,pain is mainly located in lt lower abdomen and suprapubic area, has nausea, no vomiting , has been taking OTC Ibuprofen w/o much relief  , . Has h/o kidney stone about 2 yrs ago    pt was seen in UC and had UA done and had WBC of 5-9 and RBC > 100 ( pt had menses)  LMP 07/29/18.. pt was treated with Bactrim for 7 days,  Pt still has frequency and  urgency , no blood in uirne, no fever/chill.        Past Medical History:   Diagnosis Date     Anemia      Reflux esophagitis        Current Outpatient Prescriptions   Medication Sig Dispense Refill     Ferrous Sulfate (IRON SUPPLEMENT PO)        ibuprofen (ADVIL/MOTRIN) 600 MG tablet Take 1 tablet (600 mg) by mouth every 6 hours as needed for moderate pain 20 tablet 1          Reviewed and updated as needed this visit by clinical staff  Tobacco  Med Hx  Surg Hx  Fam Hx  Soc Hx      Reviewed and updated as needed this visit by Provider         ROS:  CONSTITUTIONAL: NEGATIVE for fever, chills, change in weight  ENT/MOUTH: NEGATIVE for ear, mouth and throat problems  RESP: NEGATIVE for significant cough or SOB  CV: NEGATIVE for chest pain, palpitations or peripheral edema  ABD; lower abd pain  : frequency and urgency    OBJECTIVE:                                                    /62  Pulse 104  Temp 99.2  F (37.3  C) (Oral)  Resp 16  Ht 5' 8\" (1.727 m)  Wt 181 lb (82.1 kg)  LMP 07/29/2018  SpO2 99%  BMI 27.52 kg/m2  Body mass index is 27.52 kg/(m^2).   GENERAL: healthy, alert, well nourished, well hydrated, no distress  EYES: Eyes grossly normal to inspection, extraocular movements - intact, and PERRL  HENT: Mouth- no ulcers, no lesions  NECK: no tenderness, no adenopathy, no asymmetry, no masses, no stiffness   RESP: lungs clear to auscultation " - no rales, no rhonchi, no wheezes  CV: regular rates and rhythm,    ABDOMEN: soft, has tenderness in suprapubic and LLQ area, no  hepatosplenomegaly, no masses, normal bowel sounds  MS: extremities- no gross deformities noted, no edema        ASSESSMENT/PLAN:                                                       (R10.32) LLQ abdominal pain  (primary encounter diagnosis)  Plan: need to r/o diverticulitis/ Kidney stones, will get  CT Abdomen Pelvis w/o Contrast.pt was told I will contact her after results and proceed accordingly.      (R39.15) Urinary urgency  Plan: *UA reflex to Microscopic and Culture ([place]         and [facility] (except [place] and         [place])              Abe Hou MD  [facility]

## 2018-08-10 NOTE — MR AVS SNAPSHOT
"              After Visit Summary   8/10/2018    Randee Millard    MRN: [id number]           Patient Information     Date Of Birth          1980        Visit Information        Provider Department      8/10/2018 3:00 PM Abe Hou MD [facility]        Today's Diagnoses     LLQ abdominal pain    -  1    Urinary urgency           Follow-ups after your visit        Who to contact     If you have questions or need follow up information about today's clinic visit or your schedule please contact [facility] directly at [phone].  Normal or non-critical lab and imaging results will be communicated to you by ICU Metrixhart, letter or phone within 4 business days after the clinic has received the results. If you do not hear from us within 7 days, please contact the clinic through Fastlyt or phone. If you have a critical or abnormal lab result, we will notify you by phone as soon as possible.  Submit refill requests through [employer] or call your pharmacy and they will forward the refill request to us. Please allow 3 business days for your refill to be completed.          Additional Information About Your Visit        MyChart Information     [employer] gives you secure access to your electronic health record. If you see a primary care provider, you can also send messages to your care team and make appointments. If you have questions, please call your primary care clinic.  If you do not have a primary care provider, please call [phone] and they will assist you.        Care EveryWhere ID     This is your Care EveryWhere ID. This could be used by other organizations to access your [facility] medical records  [id number]        Your Vitals Were     Pulse Temperature Respirations Height Last Period Pulse Oximetry    104 99.2  F (37.3  C) (Oral) 16 5' 8\" (1.727 m) 07/29/2018 99%    BMI (Body Mass Index)                   27.52 kg/m2            Blood Pressure from Last 3 Encounters: "   08/10/18 108/62   07/08/18 114/65   07/01/18 104/64    Weight from Last 3 Encounters:   08/10/18 181 lb (82.1 kg)   07/08/18 165 lb (74.8 kg)   07/01/18 170 lb (77.1 kg)              We Performed the Following     *UA reflex to Microscopic and Culture ([place] and [facility] (except [place] and [place])        Primary Care Provider Office Phone # Fax Peng COLE MD Savi [phone]       [address] [facility] [address]        Equal Access to Services     [facility] Hospital[facility]NY : Hadii aad leo hadasho Sokee, waaxda luqadaha, qaybta kaalmada adepegyada, apple dolan . So [facility] [phone].    ATENCIÓN: Si habla español, tiene a santiago disposición servicios gratuitos de asistencia lingüística. Llame al [phone].    We comply with applicable federal civil rights laws and [place] laws. We do not discriminate on the basis of race, color, national origin, age, disability, sex, sexual orientation, or gender identity.            Thank you!     Thank you for choosing [facility]  for your care. Our goal is always to provide you with excellent care. Hearing back from our patients is one way we can continue to improve our services. Please take a few minutes to complete the written survey that you may receive in the mail after your visit with us. Thank you!             Your Updated Medication List - Protect others around you: Learn how to safely use, store and throw away your medicines at www.disposemymeds.org.          This list is accurate as of 8/10/18 11:59 PM.  Always use your most recent med list.                   Brand Name Dispense Instructions for use Diagnosis    ibuprofen 600 MG tablet    ADVIL/MOTRIN    20 tablet    Take 1 tablet (600 mg) by mouth every 6 hours as needed for moderate pain        IRON SUPPLEMENT PO

## 2018-08-10 NOTE — NURSING NOTE
"Chief Complaint   Patient presents with     Abdominal Pain     pt c/o lower abdominal pain x 1 month     initial /62  Pulse 104  Temp 99.2  F (37.3  C) (Oral)  Resp 16  Ht 5' 8\" (1.727 m)  Wt 181 lb (82.1 kg)  SpO2 99%  BMI 27.52 kg/m2 Estimated body mass index is 27.52 kg/(m^2) as calculated from the following:    Height as of this encounter: 5' 8\" (1.727 m).    Weight as of this encounter: 181 lb (82.1 kg)..  bp completed using cuff size large  ZE BROWN LPN  "

## 2018-08-14 ENCOUNTER — HOSPITAL ENCOUNTER (OUTPATIENT)
Dept: CT IMAGING | Facility: CLINIC | Age: 38
Discharge: HOME | End: 2018-08-14
Attending: INTERNAL MEDICINE | Admitting: INTERNAL MEDICINE
Payer: COMMERCIAL

## 2018-08-14 DIAGNOSIS — R10.32 LLQ ABDOMINAL PAIN: ICD-10-CM

## 2018-08-14 PROCEDURE — 74176 CT ABD & PELVIS W/O CONTRAST: CPT

## 2018-08-16 ENCOUNTER — TELEPHONE (OUTPATIENT)
Dept: INTERNAL MEDICINE | Facility: CLINIC | Age: 38
End: 2018-08-16

## 2018-08-16 NOTE — TELEPHONE ENCOUNTER
Pt states she received a message from  to call back. Cannot find any messages.   Gave her the negative CT scan results. She agrees.

## 2018-11-06 ENCOUNTER — OFFICE VISIT (OUTPATIENT)
Dept: INTERNAL MEDICINE | Facility: CLINIC | Age: 38
End: 2018-11-06
Payer: COMMERCIAL

## 2018-11-06 VITALS
OXYGEN SATURATION: 100 % | TEMPERATURE: 98.3 F | WEIGHT: 194 LBS | SYSTOLIC BLOOD PRESSURE: 110 MMHG | RESPIRATION RATE: 16 BRPM | BODY MASS INDEX: 29.4 KG/M2 | HEART RATE: 84 BPM | HEIGHT: 68 IN | DIASTOLIC BLOOD PRESSURE: 60 MMHG

## 2018-11-06 DIAGNOSIS — R10.31 ABDOMINAL PAIN, RIGHT LOWER QUADRANT: ICD-10-CM

## 2018-11-06 DIAGNOSIS — J02.9 SORE THROAT: Primary | ICD-10-CM

## 2018-11-06 LAB
DEPRECATED S PYO AG THROAT QL EIA: NORMAL
SPECIMEN SOURCE: NORMAL

## 2018-11-06 PROCEDURE — 87081 CULTURE SCREEN ONLY: CPT | Performed by: INTERNAL MEDICINE

## 2018-11-06 PROCEDURE — 87880 STREP A ASSAY W/OPTIC: CPT | Performed by: INTERNAL MEDICINE

## 2018-11-06 PROCEDURE — 99213 OFFICE O/P EST LOW 20 MIN: CPT | Performed by: INTERNAL MEDICINE

## 2018-11-06 NOTE — MR AVS SNAPSHOT
After Visit Summary   11/6/2018    Randee Millard    MRN: [id number]           Patient Information     Date Of Birth          1980        Visit Information        Provider Department      11/6/2018 3:00 PM Abe Hou MD [facility]        Today's Diagnoses     Sore throat    -  1    Abdominal pain, right lower quadrant           Follow-ups after your visit        Future tests that were ordered for you today     Open Future Orders        Priority Expected Expires Ordered    US Pelvic Limited Routine  11/6/2019 11/6/2018            Who to contact     If you have questions or need follow up information about today's clinic visit or your schedule please contact [facility] directly at [phone].  Normal or non-critical lab and imaging results will be communicated to you by MyChart, letter or phone within 4 business days after the clinic has received the results. If you do not hear from us within 7 days, please contact the clinic through [employer] or phone. If you have a critical or abnormal lab result, we will notify you by phone as soon as possible.  Submit refill requests through G[employer]h or call your pharmacy and they will forward the refill request to us. Please allow 3 business days for your refill to be completed.          Additional Information About Your Visit        MyChart Information     [employer] gives you secure access to your electronic health record. If you see a primary care provider, you can also send messages to your care team and make appointments. If you have questions, please call your primary care clinic.  If you do not have a primary care provider, please call [phone] and they will assist you.        Care EveryWhere ID     This is your Care EveryWhere ID. This could be used by other organizations to access your [facility] medical records  [id number]        Your Vitals Were     Pulse Temperature Respirations Height Last Period  "Pulse Oximetry    84 98.3  F (36.8  C) (Oral) 16 5' 8\" (1.727 m) 10/17/2018 (Approximate) 100%    Breastfeeding? BMI (Body Mass Index)                No 29.5 kg/m2           Blood Pressure from Last 3 Encounters:   11/06/18 110/60   08/10/18 108/62   07/08/18 114/65    Weight from Last 3 Encounters:   11/06/18 194 lb (88 kg)   08/10/18 181 lb (82.1 kg)   07/08/18 165 lb (74.8 kg)              We Performed the Following     Beta strep group A culture     Rapid strep screen        Primary Care Provider Office Phone # Fax #    Abe COLE MD Savi [phone]       [address]  [facility] [address]        Equal Access to Services     [facility] CenterFANNY : Hadii khanh bailey hadasho Sokee, waaxda luqadaha, qaybta kaalmada adepegyamono, apple dolan . So [facility] [phone].    ATENCIÓN: Si habla español, tiene a santiago disposición servicios gratuitos de asistencia lingüística. Tim al [phone].    We comply with applicable federal civil rights laws and [place] laws. We do not discriminate on the basis of race, color, national origin, age, disability, sex, sexual orientation, or gender identity.            Thank you!     Thank you for choosing [facility]  for your care. Our goal is always to provide you with excellent care. Hearing back from our patients is one way we can continue to improve our services. Please take a few minutes to complete the written survey that you may receive in the mail after your visit with us. Thank you!             Your Updated Medication List - Protect others around you: Learn how to safely use, store and throw away your medicines at www.disposemymeds.org.          This list is accurate as of 11/6/18 10:20 PM.  Always use your most recent med list.                   Brand Name Dispense Instructions for use Diagnosis    ibuprofen 600 MG tablet    ADVIL/MOTRIN    20 tablet    Take 1 tablet (600 mg) by mouth every 6 hours as needed for " moderate pain        IRON SUPPLEMENT PO           TYLENOL PO      Take 500 mg by mouth every 6 hours as needed for mild pain or fever

## 2018-11-06 NOTE — PROGRESS NOTES
SUBJECTIVE:   Randee Millard is a 38 year old female who presents to clinic today for the following health issues:    Pt is a 38 year old female who is seen here to day with c/o sore throat since 3 days ,no fever but has some chills , no cough. No sob, no wheezing. No sinus pressure.   Pt also c/o Rt ower right abdominal pain on and off since 2 mths. No radiation of pain, not related to food intake, no nausea vomiting, BMs normal, no urinary symptoms.      Patient Active Problem List   Diagnosis     NO ACTIVE PROBLEMS     Anemia due to other cause     Vitamin D deficiency     Chronic fatigue     Thyromegaly     Past Surgical History:   Procedure Laterality Date     US BREAST CYST ASPIRATION INITIAL RT         Social History   Substance Use Topics     Smoking status: Never Smoker     Smokeless tobacco: Never Used     Alcohol use No     Family History   Problem Relation Age of Onset     Hypertension Father      Diabetes Father 63     Family History Negative Mother          Current Outpatient Prescriptions   Medication Sig Dispense Refill     Acetaminophen (TYLENOL PO) Take 500 mg by mouth every 6 hours as needed for mild pain or fever       Ferrous Sulfate (IRON SUPPLEMENT PO)        ibuprofen (ADVIL/MOTRIN) 600 MG tablet Take 1 tablet (600 mg) by mouth every 6 hours as needed for moderate pain (Patient not taking: Reported on 11/6/2018) 20 tablet 1       Reviewed and updated as needed this visit by clinical staff  Tobacco  Allergies  Meds  Med Hx  Surg Hx  Fam Hx  Soc Hx      Reviewed and updated as needed this visit by Provider         ROS:  CONSTITUTIONAL: NEGATIVE for fever, chills, change in weight  EYES: NEGATIVE for vision changes or irritation  ENT/MOUTH:sore throat, NEGATIVE for ear pain  RESP: NEGATIVE for significant cough or SOB  CV: NEGATIVE for chest pain, palpitations or peripheral edema  GI: Rt lower abd pain  : negative for, dysuria and frequency     OBJECTIVE:                                 "                    /60 (BP Location: Left arm, Patient Position: Sitting, Cuff Size: Adult Large)  Pulse 84  Temp 98.3  F (36.8  C) (Oral)  Resp 16  Ht 5' 8\" (1.727 m)  Wt 194 lb (88 kg)  LMP 10/17/2018 (Approximate)  SpO2 100%  Breastfeeding? No  BMI 29.5 kg/m2  Body mass index is 29.5 kg/(m^2).   GENERAL: healthy, alert, well nourished, well hydrated, no distress  EYES: Eyes grossly normal to inspection, extraocular movements - intact, and PERRL  HENT: ear canals- normal; TMs- normal; Nose- normal; Mouth- no ulcers, no lesions  NECK: no tenderness, no adenopathy, no asymmetry, no masses, no stiffness;   RESP: lungs clear to auscultation - no rales, no rhonchi, no wheezes  CV: regular rates and rhythm,    ABDOMEN: soft, no tenderness, no  hepatosplenomegaly, no masses, normal bowel sounds  MS; no edema, no calf tenderness     ASSESSMENT/PLAN:                                                       (J02.9) Sore throat  (primary encounter diagnosis)  Plan: Rapid strep screen- negative , await Beta strep group A culture, probably viral syndrome , advised to take OTC Tylenol prn, warm water gargling. Call or return to clinic prn if these symtoms worsen, fail to improve as anticipated, or if new symptoms develop.        (R10.31) Abdominal pain, right lower quadrant  Plan: US Pelvic Limited.pt was told I will contact her after results and proceed accordingly.                Abe Hou MD  [facility]    "

## 2018-11-07 LAB
BACTERIA SPEC CULT: NORMAL
SPECIMEN SOURCE: NORMAL

## 2018-11-09 ENCOUNTER — HOSPITAL ENCOUNTER (OUTPATIENT)
Dept: ULTRASOUND IMAGING | Facility: CLINIC | Age: 38
Discharge: HOME | End: 2018-11-09
Attending: INTERNAL MEDICINE | Admitting: INTERNAL MEDICINE
Payer: COMMERCIAL

## 2018-11-09 ENCOUNTER — TELEPHONE (OUTPATIENT)
Dept: INTERNAL MEDICINE | Facility: CLINIC | Age: 38
End: 2018-11-09

## 2018-11-09 DIAGNOSIS — R10.31 ABDOMINAL PAIN, RIGHT LOWER QUADRANT: ICD-10-CM

## 2018-11-09 PROCEDURE — 76830 TRANSVAGINAL US NON-OB: CPT

## 2018-11-09 NOTE — TELEPHONE ENCOUNTER
[facility] calling[facility]  Asking for clarification on a orde for an US pelvic limited.  What area to focus on for the US?      Verbal order per Dr. Hou, please focus on uterus and ovaries and would like a transvaginal US.    Jaqui informed and states she will send order to primary care provider to sign.

## 2018-11-27 ENCOUNTER — HOSPITAL ENCOUNTER (EMERGENCY)
Facility: CLINIC | Age: 38
Discharge: HOME | End: 2018-11-27
Attending: NURSE PRACTITIONER | Admitting: NURSE PRACTITIONER
Payer: COMMERCIAL

## 2018-11-27 VITALS
DIASTOLIC BLOOD PRESSURE: 69 MMHG | HEART RATE: 73 BPM | SYSTOLIC BLOOD PRESSURE: 131 MMHG | OXYGEN SATURATION: 99 % | TEMPERATURE: 98.4 F | RESPIRATION RATE: 16 BRPM

## 2018-11-27 DIAGNOSIS — K13.79 MOUTH PAIN: ICD-10-CM

## 2018-11-27 PROCEDURE — 64400 NJX AA&/STRD TRIGEMINAL NRV: CPT

## 2018-11-27 PROCEDURE — 99283 EMERGENCY DEPT VISIT LOW MDM: CPT | Mod: 25

## 2018-11-27 PROCEDURE — 25000132 ZZH RX MED GY IP 250 OP 250 PS 637: Performed by: NURSE PRACTITIONER

## 2018-11-27 RX ORDER — HYDROCODONE BITARTRATE AND ACETAMINOPHEN 5; 325 MG/1; MG/1
1 TABLET ORAL EVERY 6 HOURS PRN
Qty: 10 TABLET | Refills: 0 | Status: SHIPPED | OUTPATIENT
Start: 2018-11-27 | End: 2019-03-11

## 2018-11-27 RX ORDER — OXYCODONE AND ACETAMINOPHEN 5; 325 MG/1; MG/1
1 TABLET ORAL ONCE
Status: COMPLETED | OUTPATIENT
Start: 2018-11-27 | End: 2018-11-27

## 2018-11-27 RX ADMIN — OXYCODONE HYDROCHLORIDE AND ACETAMINOPHEN 1 TABLET: 5; 325 TABLET ORAL at 20:04

## 2018-11-27 ASSESSMENT — ENCOUNTER SYMPTOMS
TROUBLE SWALLOWING: 0
SHORTNESS OF BREATH: 0
FEVER: 0
VOMITING: 0
NAUSEA: 0

## 2018-11-27 NOTE — ED AVS SNAPSHOT
[facility] Emergency Department    [address]    [facility] [address]    Phone:  [phone]    Fax:  [phone]                                       Randee Millard   MRN: [id number]    Department:  [facility] Emergency Department   Date of Visit:  11/27/2018           Patient Information     Date Of Birth          1980        Your diagnoses for this visit were:     Mouth pain        You were seen by Rupal Ahumada APRN CNP.      Follow-up Information     Schedule an appointment as soon as possible for a visit with Dentist.        Follow up with Abe Hou MD.    Specialty:  Internal Medicine    Why:  As needed    Contact information:    [address] [facility] [address]  [phone]          Follow up with [facility] Emergency Department.    Specialty:  EMERGENCY MEDICINE    Why:  As needed, If symptoms worsen    Contact information:    [address] [facility] [id number] [phone]        Discharge Instructions       Take ibuprofen 600 mg every 6 hours for the next 3-5 days or until followup with dentist. Use Norco as needed for severe pain.  This does have tylenol in it so do not take tylenol if you take norco.  No driving, operating machinery or alcohol while taking Norco.  Pain medications are not refilled from emergency department so you will need to follow-up with dentist or primary doctor. Take all antibiotics as prescribed. Ultimately you need to follow up with your dentist on Thursday as scheduled. If you cannot get into your dentist in the next couple of days I have given you a list of other options for dental care. You will need to get further pain management from your dentist. Return to emergency room if you develop fevers, difficulty breathing, shortness of breath, the inability to swallow your own saliva, or for other concerns.     Emergency Dental Care  [email or web address] HospitalStevie   [address]  [place] [address], [place]   Directions   [phone]     Emergency Dental Services    [email or web address]  Emergency Dental Clinic You Can Rely On. Open 24 Hours. Call Now.  [address], [place]   Directions   [phone]     [facility]    Address: [address], [address], [place] [address]     Phone: [phone]     Dental Clinics Accepting [facility]     Child and Teen Checkups  [facility]  [phone]     [facility]  [address]  [place], [place]  [phone]     The [address]  [phone]     [facility]  [address]  [phone]     [facility]  [address]  [phone]     [facility]  [address]  [phone]     [facility]  [address]  [phone]     [facility]  [address], [address] [facility] [place]  [phone]     [facility]  [place]  [phone]     [facility]  [address]  [phone]     [facility]  [address]  [phone]     [facility]  [address] [place] [address]  [phone]     [facility] [place]  [address] [facility] [address] [facility]  [phone]     [facility]  [address]  [phone]     ADDITIONAL RESOURCES     [facility]  [phone]     [facility]  [phone]     [facility]  [phone]     This web site contains many locations and information about what services they provide:     [email or web address]-low-cost-uhpskl-dnob-[email or web address].friendshelpingfrienjakob.[email or web address]/      24 Hour Appointment Hotline       To make an appointment at any [facility], call [phone] ([phone]). If you don't have a family doctor or clinic, we will help you find one.  [facility] are conveniently located to serve the needs of you and your family.             Review of your medicines      START taking        Dose / Directions Last dose taken    HYDROcodone-acetaminophen 5-325 MG tablet   Commonly known as:  NORCO   Dose:  1 tablet   Quantity:  10 tablet        Take 1 tablet by mouth every 6 hours as needed for severe pain   Refills:  0          Our records show that you are taking the medicines listed below. If these are incorrect, please call your family doctor or clinic.        Dose / Directions Last dose taken    ibuprofen 600 MG tablet   Commonly known as:  ADVIL/MOTRIN   Dose:  600 mg   Quantity:  20 tablet        Take 1 tablet (600 mg) by mouth every 6 hours as needed for moderate pain   Refills:  1        IRON SUPPLEMENT PO        Refills:  0        TYLENOL PO   Dose:  500 mg        Take 500 mg by mouth every 6 hours as needed for mild pain or fever   Refills:  0                Information about OPIOIDS     PRESCRIPTION OPIOIDS: WHAT YOU NEED TO KNOW   We gave you an opioid (narcotic) pain medicine. It is important to manage your pain, but opioids are not always the best choice. You should first try all the other options your care team gave you. Take this medicine for as short a time (and as few doses) as possible.    Some activities can increase your pain, such as bandage changes or therapy sessions. It may help to take your pain medicine 30 to 60 minutes before these activities. Reduce your stress by getting enough sleep, working on hobbies you enjoy and practicing relaxation or meditation. Talk to your care team about ways to manage your pain beyond prescription opioids.    These medicines have risks:    DO NOT drive when on new or higher doses of pain medicine. These medicines can affect your alertness and reaction times, and you could be arrested for driving under the influence (DUI). If you need to use opioids long-term, talk to your care team about  driving.    DO NOT operate heavy machinery    DO NOT do any other dangerous activities while taking these medicines.    DO NOT drink any alcohol while taking these medicines.     If the opioid prescribed includes acetaminophen, DO NOT take with any other medicines that contain acetaminophen. Read all labels carefully. Look for the word  acetaminophen  or  Tylenol.  Ask your pharmacist if you have questions or are unsure.    You can get addicted to pain medicines, especially if you have a history of addiction (chemical, alcohol or substance dependence). Talk to your care team about ways to reduce this risk.    All opioids tend to cause constipation. Drink plenty of water and eat foods that have a lot of fiber, such as fruits, vegetables, prune juice, apple juice and high-fiber cereal. Take a laxative (Miralax, milk of magnesia, Colace, Senna) if you don t move your bowels at least every other day. Other side effects include upset stomach, sleepiness, dizziness, throwing up, tolerance (needing more of the medicine to have the same effect), physical dependence and slowed breathing.    Store your pills in a secure place, locked if possible. We will not replace any lost or stolen medicine. If you don t finish your medicine, please throw away (dispose) as directed by your pharmacist. The [place] Pollution Control Agency has more information about safe disposal: https://www.pca.state.[place]/living-green/managing-unwanted-medications        Prescriptions were sent or printed at these locations (1 Prescription)                   Other Prescriptions                Printed at Department/Unit printer (1 of 1)         HYDROcodone-acetaminophen (NORCO) 5-325 MG tablet                Orders Needing Specimen Collection     None      Pending Results     No orders found from 11/25/2018 to 11/28/2018.            Pending Culture Results     No orders found from 11/25/2018 to 11/28/2018.            Pending Results Instructions     If  you had any lab results that were not finalized at the time of your Discharge, you can call the ED Lab Result RN at [phone]. You will be contacted by this team for any positive Lab results or changes in treatment. The nurses are available 7 days a week from 10A to 6:30P.  You can leave a message 24 hours per day and they will return your call.        Test Results From Your Hospital Stay               Clinical Quality Measure: Blood Pressure Screening     Your blood pressure was checked while you were in the emergency department today. The last reading we obtained was  BP: 131/69 . Please read the guidelines below about what these numbers mean and what you should do about them.  If your systolic blood pressure (the top number) is less than 120 and your diastolic blood pressure (the bottom number) is less than 80, then your blood pressure is normal. There is nothing more that you need to do about it.  If your systolic blood pressure (the top number) is 120-139 or your diastolic blood pressure (the bottom number) is 80-89, your blood pressure may be higher than it should be. You should have your blood pressure rechecked within a year by a primary care provider.  If your systolic blood pressure (the top number) is 140 or greater or your diastolic blood pressure (the bottom number) is 90 or greater, you may have high blood pressure. High blood pressure is treatable, but if left untreated over time it can put you at risk for heart attack, stroke, or kidney failure. You should have your blood pressure rechecked by a primary care provider within the next 4 weeks.  If your provider in the emergency department today gave you specific instructions to follow-up with your doctor or provider even sooner than that, you should follow that instruction and not wait for up to 4 weeks for your follow-up visit.        Thank you for choosing [facility]       Thank you for choosing [facility] for your care. Our goal is always to provide  you with excellent care. Hearing back from our patients is one way we can continue to improve our services. Please take a few minutes to complete the written survey that you may receive in the mail after you visit with us. Thank you!        [employer] Information     [employer] gives you secure access to your electronic health record. If you see a primary care provider, you can also send messages to your care team and make appointments. If you have questions, please call your primary care clinic.  If you do not have a primary care provider, please call [phone] and they will assist you.        Care EveryWhere ID     This is your Care EveryWhere ID. This could be used by other organizations to access your [place] medical records  [id number]        Equal Access to Services     VALERIE MCKEON : Francesco Addison, cameron garcia, elizabeth alarcon, apple kaplan. So [facility] [phone].    ATENCIÓN: Si habla español, tiene a santiago disposición servicios gratuitos de asistencia lingüística. Llame al [phone].    We comply with applicable federal civil rights laws and [place] laws. We do not discriminate on the basis of race, color, national origin, age, disability, sex, sexual orientation, or gender identity.            After Visit Summary       This is your record. Keep this with you and show to your community pharmacist(s) and doctor(s) at your next visit.

## 2018-11-27 NOTE — ED AVS SNAPSHOT
[facility] Emergency Department    [address]    [facility] [address]    Phone:  [phone]    Fax:  [phone]                                       Randee Millard   MRN: [id number]    Department:  [facility] Emergency Department   Date of Visit:  11/27/2018           After Visit Summary Signature Page     I have received my discharge instructions, and my questions have been answered. I have discussed any challenges I see with this plan with the nurse or doctor.    ..........................................................................................................................................  Patient/Patient Representative Signature      ..........................................................................................................................................  Patient Representative Print Name and Relationship to Patient    ..................................................               ................................................  Date                                   Time    ..........................................................................................................................................  Reviewed by Signature/Title    ...................................................              ..............................................  Date                                               Time          22EPIC Rev 08/18

## 2018-11-28 NOTE — ED TRIAGE NOTES
Patient states bottom left side wisdom painful for 3 days. Tylenol taken with no relief. Called dentist and was unable to schedule appointment for today. Dentist prescribed antibiotic, took first dose today. Azithromycin 250mg started today.

## 2018-11-28 NOTE — ED PROVIDER NOTES
History     Chief Complaint:  Dental Pain     HPI   Randee Millard is a 38 year old female who presents for evaluation of dental pain. Approximately three days ago, one of the patient's left lower teeth started to become painful. She has been taking Tylenol at home without improvement of her pain. She did contact her dentist regarding this today and they wrote her a prescription for Azithromycin and scheduled her a dental appointment for 11/29. However, due to her persistent pain this evening, the patient came into the ED for evaluation. Currently in the ED, the patient rates her pain at a severity of 7/10. She has not had any recent fever, nausea, vomiting, difficulty swallowing, or difficulty breathing.     Allergies:  Amoxicillin  Clindamycin      Medications:    Acetaminophen (TYLENOL PO)  Ferrous Sulfate (IRON SUPPLEMENT PO)  ibuprofen (ADVIL/MOTRIN) 600 MG tablet    Past Medical History:    Anemia  Chronic fatigue   Reflux esophagitis     Past Surgical History:    US breast cyst aspiration     Family History:    Hypertension - Father   Diabetes - Father     Social History:  Tobacco use:    Never smoker   Alcohol use:    Negative   Marital status:       Accompanied to ED by:        Review of Systems   Constitutional: Negative for fever.   HENT: Positive for dental problem (dental pain, left lower ). Negative for trouble swallowing.    Respiratory: Negative for shortness of breath.    Gastrointestinal: Negative for nausea and vomiting.   All other systems reviewed and are negative.    Physical Exam   First Vitals:  BP: 131/69  Pulse: 73  Temp: 98.4  F (36.9  C)  Resp: 16  SpO2: 99 %      Physical Exam  General: Well-nourished, appears to be in pain, GCS 15  Eyes: conjunctivae pink no scleral icterus or conjunctival injection  ENT:  Moist mucus membranes.  Tooth # 32 with pain with palpation; filling in place.  No drainage.  No abscess along gumline.  No cheek or submandibular edema.  No trismus.   Normal voice.  Respiratory:  No respiratory distress  CV: Normal rate   Skin: Warm, dry.  No rashes or petechiae  Musculoskeletal: No peripheral edema or calf tenderness  Neuro: Alert and oriented to person/place/time  Psychiatric: Normal affect      Emergency Department Course     Procedures:    Dental Block     INDICATIONS: Dental pain    ANESTHESIA: Alveolar nerve block using bupivacaine with epinephrine.     PATIENT STATUS: The patient tolerated the procedure well and there were no immediate complications.      Interventions:  2004 Norco 1 tablet PO     Emergency Department Course:  Nursing notes and vitals reviewed.  1942: I performed an exam of the patient as documented above.     1953:  A dental block was performed as outlined in the procedure note above.  The patient tolerated well and there were no complications.     Findings and plan explained to the Patient and spouse. Patient discharged home with instructions regarding supportive care, medications, and reasons to return. The importance of close follow-up was reviewed. The patient was prescribed Norco.      Impression & Plan      Medical Decision Making:  Randee Millard is a 38 year old female who presents for evaluation of jaw pain.  This appears to be secondary to a dental issue. There is no abscess detected around the tooth amenable to incision and drainage. The differential diagnosis includes: cracked tooth syndrome, pulpitis, sub-apical abscess, facial cellulitis, alveolitis amongst others. There is no evidence of deep space infections, significant facial swelling, Lemierre's Syndrome or Taqueria's angina. There are no posterior pharyngeal space (RPA, PTA) infections detected.  Alveolar block preformed with good improvement in pain.  Follow up with a dentist/endodontist in the coming days is indicated for further work up and treatment.  She was given list of free and emergency dental clinics.   Will start pain medication and antibiotics. Discussed no  alcohol, driving or operating machinery while taking pain medications and that they will not be refilled from the ED.  Reasons to return to ED discussed including fevers, difficulty swallowing, increased swelling or redness to face or neck or any further concerns.      Diagnosis:    ICD-10-CM   1. Mouth pain K13.79       Disposition:  Discharged to home with [place].     Discharge Medications:  New Prescriptions    HYDROCODONE-ACETAMINOPHEN (NORCO) 5-325 MG TABLET    Take 1 tablet by mouth every 6 hours as needed for severe pain       Wild CARRINGTON, am serving as a scribe at 7:42 PM on 11/27/2018 to document services personally performed by Rupal Ahumada CNP, based on my observations and the provider's statements to me.    [facility] EMERGENCY DEPARTMENT       Rupal Ahumada APRN CNP  11/27/18 [id number]

## 2018-11-28 NOTE — DISCHARGE INSTRUCTIONS
Take ibuprofen 600 mg every 6 hours for the next 3-5 days or until followup with dentist. Use Norco as needed for severe pain.  This does have tylenol in it so do not take tylenol if you take norco.  No driving, operating machinery or alcohol while taking Norco.  Pain medications are not refilled from emergency department so you will need to follow-up with dentist or primary doctor. Take all antibiotics as prescribed. Ultimately you need to follow up with your dentist on Thursday as scheduled. If you cannot get into your dentist in the next couple of days I have given you a list of other options for dental care. You will need to get further pain management from your dentist. Return to emergency room if you develop fevers, difficulty breathing, shortness of breath, the inability to swallow your own saliva, or for other concerns.     Emergency Dental Care  [email or web address]   [address]   Directions   [phone]     Emergency Dental Services    [email or web address]  Emergency Dental Clinic You Can Rely On. Open 24 Hours. Call Now.  [address] [address] [address]   Directions   [phone]     [facility]    Address: [facility] [facility], [address], [place] [address]     Phone: [phone]     Dental Clinics Accepting [facility]     Child and Teen Checkups  [facility]  [phone]     [facility]  [address]  [place], [place]  [phone]     [address]  [phone]     [facility]  [address] [facility]  [phone]     [facility]  [address] [facility]  [phone]     [facility]  [address] [facility]  [phone]     [facility]  [address] [facility]  [phone]     [facility]  [address], [address] [facility]  [phone]     [facility]  [place]  [phone]     [facility]  [place] [address] [facility]  [phone]     [facility]  [address] [facility]  [phone]     [facility]  [address] [place] [address]  [phone]     [facility]  [address] [facility]  [phone]     [facility]  [address] [facility]  [phone]     ADDITIONAL RESOURCES     [facility]  [phone]     [facility]  [phone]     [facility]  [phone]     This web site contains many locations and information about what services they provide:     [email or web address]

## 2019-03-11 ENCOUNTER — OFFICE VISIT (OUTPATIENT)
Dept: INTERNAL MEDICINE | Facility: CLINIC | Age: 39
End: 2019-03-11
Payer: COMMERCIAL

## 2019-03-11 VITALS
TEMPERATURE: 98 F | OXYGEN SATURATION: 100 % | BODY MASS INDEX: 30.25 KG/M2 | RESPIRATION RATE: 15 BRPM | DIASTOLIC BLOOD PRESSURE: 82 MMHG | WEIGHT: 199.6 LBS | HEIGHT: 68 IN | HEART RATE: 88 BPM | SYSTOLIC BLOOD PRESSURE: 124 MMHG

## 2019-03-11 DIAGNOSIS — R30.0 DYSURIA: Primary | ICD-10-CM

## 2019-03-11 DIAGNOSIS — D50.9 IRON DEFICIENCY ANEMIA, UNSPECIFIED IRON DEFICIENCY ANEMIA TYPE: ICD-10-CM

## 2019-03-11 DIAGNOSIS — Z00.00 ROUTINE HISTORY AND PHYSICAL EXAMINATION OF ADULT: ICD-10-CM

## 2019-03-11 LAB
ALBUMIN UR-MCNC: ABNORMAL MG/DL
APPEARANCE UR: CLEAR
BACTERIA #/AREA URNS HPF: ABNORMAL /HPF
BILIRUB UR QL STRIP: NEGATIVE
COLOR UR AUTO: YELLOW
ERYTHROCYTE [DISTWIDTH] IN BLOOD BY AUTOMATED COUNT: 16.1 % (ref 10–15)
GLUCOSE UR STRIP-MCNC: NEGATIVE MG/DL
HCT VFR BLD AUTO: 32.8 % (ref 35–47)
HGB BLD-MCNC: 10.5 G/DL (ref 11.7–15.7)
HGB UR QL STRIP: ABNORMAL
KETONES UR STRIP-MCNC: NEGATIVE MG/DL
LEUKOCYTE ESTERASE UR QL STRIP: NEGATIVE
MCH RBC QN AUTO: 24.7 PG (ref 26.5–33)
MCHC RBC AUTO-ENTMCNC: 32 G/DL (ref 31.5–36.5)
MCV RBC AUTO: 77 FL (ref 78–100)
MUCOUS THREADS #/AREA URNS LPF: PRESENT /LPF
NITRATE UR QL: NEGATIVE
NON-SQ EPI CELLS #/AREA URNS LPF: ABNORMAL /LPF
PH UR STRIP: 6 PH (ref 5–7)
PLATELET # BLD AUTO: 322 10E9/L (ref 150–450)
RBC # BLD AUTO: 4.25 10E12/L (ref 3.8–5.2)
RBC #/AREA URNS AUTO: ABNORMAL /HPF
SOURCE: ABNORMAL
SP GR UR STRIP: >1.03 (ref 1–1.03)
UROBILINOGEN UR STRIP-ACNC: 0.2 EU/DL (ref 0.2–1)
WBC # BLD AUTO: 9.5 10E9/L (ref 4–11)
WBC #/AREA URNS AUTO: ABNORMAL /HPF

## 2019-03-11 PROCEDURE — 81001 URINALYSIS AUTO W/SCOPE: CPT | Performed by: INTERNAL MEDICINE

## 2019-03-11 PROCEDURE — 80061 LIPID PANEL: CPT | Performed by: INTERNAL MEDICINE

## 2019-03-11 PROCEDURE — 85027 COMPLETE CBC AUTOMATED: CPT | Performed by: INTERNAL MEDICINE

## 2019-03-11 PROCEDURE — G0145 SCR C/V CYTO,THINLAYER,RESCR: HCPCS | Performed by: INTERNAL MEDICINE

## 2019-03-11 PROCEDURE — 99213 OFFICE O/P EST LOW 20 MIN: CPT | Mod: 25 | Performed by: INTERNAL MEDICINE

## 2019-03-11 PROCEDURE — 36415 COLL VENOUS BLD VENIPUNCTURE: CPT | Performed by: INTERNAL MEDICINE

## 2019-03-11 PROCEDURE — 87624 HPV HI-RISK TYP POOLED RSLT: CPT | Performed by: INTERNAL MEDICINE

## 2019-03-11 PROCEDURE — 99395 PREV VISIT EST AGE 18-39: CPT | Performed by: INTERNAL MEDICINE

## 2019-03-11 PROCEDURE — 83550 IRON BINDING TEST: CPT | Performed by: INTERNAL MEDICINE

## 2019-03-11 PROCEDURE — 83540 ASSAY OF IRON: CPT | Performed by: INTERNAL MEDICINE

## 2019-03-11 PROCEDURE — 84443 ASSAY THYROID STIM HORMONE: CPT | Performed by: INTERNAL MEDICINE

## 2019-03-11 PROCEDURE — 80053 COMPREHEN METABOLIC PANEL: CPT | Performed by: INTERNAL MEDICINE

## 2019-03-11 ASSESSMENT — ENCOUNTER SYMPTOMS
NAUSEA: 0
WEAKNESS: 0
ABDOMINAL PAIN: 0
CONSTIPATION: 1
SHORTNESS OF BREATH: 0
SORE THROAT: 0
PARESTHESIAS: 0
MYALGIAS: 0
HEARTBURN: 1
COUGH: 0
ARTHRALGIAS: 0
HEARTBURN: 0
CHILLS: 1
DYSURIA: 1
CONSTIPATION: 0
FREQUENCY: 1
HEADACHES: 0
HEMATURIA: 0
NERVOUS/ANXIOUS: 0
SORE THROAT: 1
PALPITATIONS: 0
DIARRHEA: 0
MYALGIAS: 1
EYE PAIN: 0
FEVER: 0
DIZZINESS: 0
BREAST MASS: 0
ABDOMINAL PAIN: 1
HEMATOCHEZIA: 0

## 2019-03-11 ASSESSMENT — MIFFLIN-ST. JEOR: SCORE: 1628.88

## 2019-03-11 NOTE — PROGRESS NOTES
SUBJECTIVE:   CC: Randee Millard is an 38 year old woman who presents for preventive health visit.     Physical   Annual:     Getting at least 3 servings of Calcium per day:  Yes    Bi-annual eye exam:  Yes    Dental care twice a year:  Yes    Sleep apnea or symptoms of sleep apnea:  None    Diet:  Other    Frequency of exercise:  2-3 days/week    Duration of exercise:  15-30 minutes    Taking medications regularly:  Yes    Medication side effects:  None    Additional concerns today:  No    PHQ-2 Total Score: 0      Pt c/o urinary frequency since 1 wk, also c/o burning with urination , no urgency ,has chills, no fever. Has noted blood on wipes this morning but thinks she is getting her menstrual period, LMP 02/15/19    Patient also complains of feeling cold most of the time, also has noted some weight gain.  No history of thyroid disease    Pt also has h/o anemia and here to recheck, pt says at times that she has heavy menstrual bleeding.       Today's PHQ-2 Score:   PHQ-2 ( 1999 [employer]) 3/11/2019   Q1: Little interest or pleasure in doing things 0   Q2: Feeling down, depressed or hopeless 0   PHQ-2 Score 0   Q1: Little interest or pleasure in doing things Not at all   Q2: Feeling down, depressed or hopeless Not at all   PHQ-2 Score 0       Abuse: Current or Past(Physical, Sexual or Emotional)- No  Do you feel safe in your environment? Yes      Past Medical History:   Diagnosis Date     Anemia      Reflux esophagitis        Past Surgical History:   Procedure Laterality Date     US BREAST CYST ASPIRATION INITIAL RT         Current Outpatient Medications   Medication Sig Dispense Refill     Acetaminophen (TYLENOL PO) Take 500 mg by mouth every 6 hours as needed for mild pain or fever         Family History   Problem Relation Age of Onset     Hypertension Father      Diabetes Father 63     Family History Negative Mother        Social History     Tobacco Use     Smoking status: Never Smoker     Smokeless tobacco:  "Never Used   Substance Use Topics     Alcohol use: No     Alcohol/week: 0.0 oz     Alcohol Use 3/11/2019   If you drink alcohol do you typically have greater than 3 drinks per day OR greater than 7 drinks per week? Not Applicable   No flowsheet data found.    Reviewed orders with patient.  Reviewed health maintenance and updated orders accordingly - Yes       Mammogram not appropriate for this patient based on age.    Pertinent mammograms are reviewed under the imaging tab.  History of abnormal Pap smear: NO - age 30- 65 PAP every 3 years recommended  PAP / HPV Latest Ref Rng & Units 2/5/2016   PAP - NIL   HPV 16 DNA NEG Negative   HPV 18 DNA NEG Negative   OTHER HR HPV NEG Negative     Reviewed and updated as needed this visit by clinical staff  Tobacco  Allergies  Meds  Med Hx  Surg Hx  Fam Hx  Soc Hx        Reviewed and updated as needed this visit by Provider          Review of Systems   Constitutional: Positive for chills. Negative for fever.   HENT: Negative for congestion, ear pain, hearing loss and sore throat.    Eyes: Negative for pain and visual disturbance.   Respiratory: Negative for cough and shortness of breath.    Cardiovascular: Negative for chest pain, palpitations and peripheral edema.   Gastrointestinal: Negative for abdominal pain, constipation, diarrhea, heartburn, hematochezia and nausea.   Breasts:  Negative for tenderness, breast mass and discharge.   Genitourinary: Positive for dysuria, frequency and pelvic pain. Negative for genital sores, hematuria, urgency, vaginal bleeding and vaginal discharge.   Musculoskeletal: Negative for arthralgias and myalgias.   Skin: Negative for rash.   Neurological: Negative for dizziness, weakness, headaches and paresthesias.   Psychiatric/Behavioral: Negative for mood changes. The patient is not nervous/anxious.          OBJECTIVE:   /82   Pulse 88   Temp 98  F (36.7  C) (Oral)   Resp 15   Ht 1.727 m (5' 8\")   Wt 90.5 kg (199 lb 9.6 oz)   " LMP 02/11/2019   SpO2 100%   BMI 30.35 kg/m    Physical Exam  GENERAL: healthy, alert and no distress  EYES: Eyes grossly normal to inspection, PERRL and conjunctivae and sclerae normal  HENT: ear canals and TM's normal, nose and mouth without ulcers or lesions  NECK: no adenopathy, no asymmetry, masses, or scars and thyroid normal to palpation  RESP: lungs clear to auscultation - no rales, rhonchi or wheezes  BREAST: normal without masses, tenderness or nipple discharge and no palpable axillary masses or adenopathy  CV: regular rate and rhythm, normal S1 S2, no S3 or S4, no murmur, click or rub, no peripheral edema and peripheral pulses strong  ABDOMEN: soft, nontender, no hepatosplenomegaly, no masses and bowel sounds normal  :Vagina and vulva are normal;  Pinkish menstrual  discharge is noted.  Cervix normal  without tenderness.  Adnexa normal in size without masses or tenderness. Pap obtained   MS: no gross musculoskeletal defects noted, no edema  NEURO: Normal strength and tone, mentation intact and speech normal  PSYCH: mentation appears normal, affect normal/bright      ASSESSMENT/PLAN:       (Z00.00) Routine history and physical examination of adult  Plan: CBC with platelets, Comprehensive metabolic         panel, Lipid panel reflex to direct LDL         Fasting, TSH with free T4 reflex, HPV High Risk        Types DNA Cervical, Pap imaged thin layer         screen with HPV - recommended age 30 - 65 years        (select HPV order below)            (R30.0) Dysuria    Plan: UA reflex to Microscopic and Culture            Cold intolrence and wt gain ; check TSH .        (D50.9) Iron deficiency anemia, unspecified iron deficiency anemia type  Plan: HGB - 10.5, recommended over-the-counter ferrous sulfate 325 mg 1 tablet once daily,   iron and iron binding capacity, patient states she has excessive menstrual bleeding at times, advised to follow-up with OB/GYN            COUNSELING:  Reviewed preventive health  "counseling, as reflected in patient instructions       Regular exercise       Healthy diet/nutrition    BP Readings from Last 1 Encounters:   11/27/18 131/69     Estimated body mass index is 30.35 kg/m  as calculated from the following:    Height as of this encounter: 1.727 m (5' 8\").    Weight as of this encounter: 90.5 kg (199 lb 9.6 oz).    Weight management plan: Discussed healthy diet and exercise guidelines     reports that  has never smoked. she has never used smokeless tobacco.      Counseling Resources:  ATP IV Guidelines  Pooled Cohorts Equation Calculator  Breast Cancer Risk Calculator  FRAX Risk Assessment  ICSI Preventive Guidelines  Dietary Guidelines for Americans, 2010  USDA's MyPlate  ASA Prophylaxis  Lung CA Screening    Abe Hou MD  [facility]  "

## 2019-03-11 NOTE — NURSING NOTE
"/82   Pulse 112   Temp 98  F (36.7  C) (Oral)   Resp 15   Ht 1.727 m (5' 8\")   Wt 90.5 kg (199 lb 9.6 oz)   LMP 02/11/2019   SpO2 100%   BMI 30.35 kg/m    Patient in for annual female physical.  More Mendieta CMA    "

## 2019-03-12 LAB
ALBUMIN SERPL-MCNC: 3.7 G/DL (ref 3.4–5)
ALP SERPL-CCNC: 70 U/L (ref 40–150)
ALT SERPL W P-5'-P-CCNC: 15 U/L (ref 0–50)
ANION GAP SERPL CALCULATED.3IONS-SCNC: 8 MMOL/L (ref 3–14)
AST SERPL W P-5'-P-CCNC: 14 U/L (ref 0–45)
BILIRUB SERPL-MCNC: 0.2 MG/DL (ref 0.2–1.3)
BUN SERPL-MCNC: 6 MG/DL (ref 7–30)
CALCIUM SERPL-MCNC: 8.7 MG/DL (ref 8.5–10.1)
CHLORIDE SERPL-SCNC: 110 MMOL/L (ref 94–109)
CHOLEST SERPL-MCNC: 152 MG/DL
CO2 SERPL-SCNC: 23 MMOL/L (ref 20–32)
CREAT SERPL-MCNC: 0.68 MG/DL (ref 0.52–1.04)
GFR SERPL CREATININE-BSD FRML MDRD: >90 ML/MIN/{1.73_M2}
GLUCOSE SERPL-MCNC: 80 MG/DL (ref 70–99)
HDLC SERPL-MCNC: 43 MG/DL
IRON SATN MFR SERPL: 8 % (ref 15–46)
IRON SERPL-MCNC: 32 UG/DL (ref 35–180)
LDLC SERPL CALC-MCNC: 98 MG/DL
NONHDLC SERPL-MCNC: 109 MG/DL
POTASSIUM SERPL-SCNC: 3.6 MMOL/L (ref 3.4–5.3)
PROT SERPL-MCNC: 7.4 G/DL (ref 6.8–8.8)
SODIUM SERPL-SCNC: 141 MMOL/L (ref 133–144)
TIBC SERPL-MCNC: 405 UG/DL (ref 240–430)
TRIGL SERPL-MCNC: 55 MG/DL
TSH SERPL DL<=0.005 MIU/L-ACNC: 2.39 MU/L (ref 0.4–4)

## 2019-03-14 LAB
COPATH REPORT: NORMAL
PAP: NORMAL

## 2019-03-15 LAB
FINAL DIAGNOSIS: NORMAL
HPV HR 12 DNA CVX QL NAA+PROBE: NEGATIVE
HPV16 DNA SPEC QL NAA+PROBE: NEGATIVE
HPV18 DNA SPEC QL NAA+PROBE: NEGATIVE
SPECIMEN DESCRIPTION: NORMAL
SPECIMEN SOURCE CVX/VAG CYTO: NORMAL

## 2019-03-25 ENCOUNTER — TELEPHONE (OUTPATIENT)
Dept: INTERNAL MEDICINE | Facility: CLINIC | Age: 39
End: 2019-03-25

## 2019-03-25 NOTE — TELEPHONE ENCOUNTER
Reason for Call:  Request for results:    Name of test or procedure: labs     Date of test of procedure: 3/11    Location of the test or procedure: [facility] to leave the result message on voice mail or with a family member? YES    Phone number Patient can be reached at:  Other phone number:  [phone]    Additional comments: asap    Call taken on 3/25/2019 at 2:56 PM by Randi Jeong

## 2019-04-06 ENCOUNTER — APPOINTMENT (OUTPATIENT)
Dept: GENERAL RADIOLOGY | Facility: CLINIC | Age: 39
End: 2019-04-06
Attending: EMERGENCY MEDICINE
Payer: COMMERCIAL

## 2019-04-06 ENCOUNTER — HOSPITAL ENCOUNTER (EMERGENCY)
Facility: CLINIC | Age: 39
Discharge: HOME | End: 2019-04-06
Attending: EMERGENCY MEDICINE | Admitting: EMERGENCY MEDICINE
Payer: COMMERCIAL

## 2019-04-06 VITALS
RESPIRATION RATE: 20 BRPM | OXYGEN SATURATION: 100 % | SYSTOLIC BLOOD PRESSURE: 99 MMHG | HEIGHT: 68 IN | BODY MASS INDEX: 29.7 KG/M2 | TEMPERATURE: 98 F | DIASTOLIC BLOOD PRESSURE: 56 MMHG | WEIGHT: 196 LBS | HEART RATE: 71 BPM

## 2019-04-06 DIAGNOSIS — M94.0 COSTOCHONDRITIS: ICD-10-CM

## 2019-04-06 LAB
ANION GAP SERPL CALCULATED.3IONS-SCNC: 3 MMOL/L (ref 3–14)
BASOPHILS # BLD AUTO: 0.1 10E9/L (ref 0–0.2)
BASOPHILS NFR BLD AUTO: 0.4 %
BUN SERPL-MCNC: 8 MG/DL (ref 7–30)
CALCIUM SERPL-MCNC: 8.5 MG/DL (ref 8.5–10.1)
CHLORIDE SERPL-SCNC: 107 MMOL/L (ref 94–109)
CO2 SERPL-SCNC: 29 MMOL/L (ref 20–32)
CREAT SERPL-MCNC: 0.64 MG/DL (ref 0.52–1.04)
D DIMER PPP FEU-MCNC: 0.5 UG/ML FEU (ref 0–0.5)
DEPRECATED S PYO AG THROAT QL EIA: NORMAL
DIFFERENTIAL METHOD BLD: ABNORMAL
EOSINOPHIL # BLD AUTO: 0.1 10E9/L (ref 0–0.7)
EOSINOPHIL NFR BLD AUTO: 1.1 %
ERYTHROCYTE [DISTWIDTH] IN BLOOD BY AUTOMATED COUNT: 15.9 % (ref 10–15)
GFR SERPL CREATININE-BSD FRML MDRD: >90 ML/MIN/{1.73_M2}
GLUCOSE SERPL-MCNC: 78 MG/DL (ref 70–99)
HCG SERPL QL: NEGATIVE
HCT VFR BLD AUTO: 33.5 % (ref 35–47)
HGB BLD-MCNC: 10.5 G/DL (ref 11.7–15.7)
IMM GRANULOCYTES # BLD: 0 10E9/L (ref 0–0.4)
IMM GRANULOCYTES NFR BLD: 0.3 %
LYMPHOCYTES # BLD AUTO: 4.3 10E9/L (ref 0.8–5.3)
LYMPHOCYTES NFR BLD AUTO: 36.8 %
MCH RBC QN AUTO: 24.6 PG (ref 26.5–33)
MCHC RBC AUTO-ENTMCNC: 31.3 G/DL (ref 31.5–36.5)
MCV RBC AUTO: 79 FL (ref 78–100)
MONOCYTES # BLD AUTO: 0.6 10E9/L (ref 0–1.3)
MONOCYTES NFR BLD AUTO: 5.4 %
NEUTROPHILS # BLD AUTO: 6.5 10E9/L (ref 1.6–8.3)
NEUTROPHILS NFR BLD AUTO: 56 %
NRBC # BLD AUTO: 0 10*3/UL
NRBC BLD AUTO-RTO: 0 /100
PLATELET # BLD AUTO: 358 10E9/L (ref 150–450)
POTASSIUM SERPL-SCNC: 3.3 MMOL/L (ref 3.4–5.3)
RBC # BLD AUTO: 4.27 10E12/L (ref 3.8–5.2)
SODIUM SERPL-SCNC: 139 MMOL/L (ref 133–144)
SPECIMEN SOURCE: NORMAL
TROPONIN I SERPL-MCNC: <0.015 UG/L (ref 0–0.04)
WBC # BLD AUTO: 11.7 10E9/L (ref 4–11)

## 2019-04-06 PROCEDURE — 84703 CHORIONIC GONADOTROPIN ASSAY: CPT | Performed by: EMERGENCY MEDICINE

## 2019-04-06 PROCEDURE — 71046 X-RAY EXAM CHEST 2 VIEWS: CPT

## 2019-04-06 PROCEDURE — 96374 THER/PROPH/DIAG INJ IV PUSH: CPT

## 2019-04-06 PROCEDURE — 84484 ASSAY OF TROPONIN QUANT: CPT | Performed by: EMERGENCY MEDICINE

## 2019-04-06 PROCEDURE — 93005 ELECTROCARDIOGRAM TRACING: CPT

## 2019-04-06 PROCEDURE — 87880 STREP A ASSAY W/OPTIC: CPT | Performed by: EMERGENCY MEDICINE

## 2019-04-06 PROCEDURE — 99285 EMERGENCY DEPT VISIT HI MDM: CPT | Mod: 25

## 2019-04-06 PROCEDURE — 85025 COMPLETE CBC W/AUTO DIFF WBC: CPT | Performed by: EMERGENCY MEDICINE

## 2019-04-06 PROCEDURE — 80048 BASIC METABOLIC PNL TOTAL CA: CPT | Performed by: EMERGENCY MEDICINE

## 2019-04-06 PROCEDURE — 87081 CULTURE SCREEN ONLY: CPT | Performed by: EMERGENCY MEDICINE

## 2019-04-06 PROCEDURE — 85379 FIBRIN DEGRADATION QUANT: CPT | Performed by: EMERGENCY MEDICINE

## 2019-04-06 PROCEDURE — 25000128 H RX IP 250 OP 636: Performed by: EMERGENCY MEDICINE

## 2019-04-06 RX ORDER — KETOROLAC TROMETHAMINE 15 MG/ML
15 INJECTION, SOLUTION INTRAMUSCULAR; INTRAVENOUS ONCE
Status: COMPLETED | OUTPATIENT
Start: 2019-04-06 | End: 2019-04-06

## 2019-04-06 RX ORDER — IBUPROFEN 200 MG
600 TABLET ORAL EVERY 8 HOURS PRN
Qty: 30 TABLET | Refills: 0 | Status: SHIPPED | OUTPATIENT
Start: 2019-04-06 | End: 2020-01-29

## 2019-04-06 RX ORDER — HYDROCODONE BITARTRATE AND ACETAMINOPHEN 5; 325 MG/1; MG/1
1 TABLET ORAL EVERY 6 HOURS PRN
Qty: 10 TABLET | Refills: 0 | Status: SHIPPED | OUTPATIENT
Start: 2019-04-06 | End: 2019-09-23

## 2019-04-06 RX ADMIN — KETOROLAC TROMETHAMINE 15 MG: 15 INJECTION, SOLUTION INTRAMUSCULAR; INTRAVENOUS at 20:44

## 2019-04-06 ASSESSMENT — ENCOUNTER SYMPTOMS
ABDOMINAL PAIN: 1
BACK PAIN: 1
FEVER: 0
CHILLS: 1
COUGH: 1
SORE THROAT: 1

## 2019-04-06 ASSESSMENT — MIFFLIN-ST. JEOR: SCORE: 1612.55

## 2019-04-06 NOTE — ED AVS SNAPSHOT
[facility] Emergency Department  [address]  [facility] [address]  Phone:  [phone]  Fax:  [phone]                                    Randee Millard   MRN: [id number]    Department:  [facility] Emergency Department   Date of Visit:  4/6/2019           After Visit Summary Signature Page    I have received my discharge instructions, and my questions have been answered. I have discussed any challenges I see with this plan with the nurse or doctor.    ..........................................................................................................................................  Patient/Patient Representative Signature      ..........................................................................................................................................  Patient Representative Print Name and Relationship to Patient    ..................................................               ................................................  Date                                   Time    ..........................................................................................................................................  Reviewed by Signature/Title    ...................................................              ..............................................  Date                                               Time          22EPIC Rev 08/18

## 2019-04-06 NOTE — ED TRIAGE NOTES
Pt c/o pain in bilateral chest under ribs that wraps around sides.  Pt also reports cough, throat pain, and back pain.  Worse with taking deep breath.

## 2019-04-07 LAB — INTERPRETATION ECG - MUSE: NORMAL

## 2019-04-07 NOTE — ED PROVIDER NOTES
History     Chief Complaint:  Chest Pain      HPI   Randee Millard is a 38 year old female who presents to the emergency department with her  for evaluation of chest and back pain. The patient reports that this morning she had the onset of intermittent, pleuritic, sharp mid chest pain that radiates into her back that lasts for five minutes at a time, and has worsened throughout the day. She also has a sore throat with an occasional cough, ear pain, chills, and mild abdominal pain. She has never had pain like this before and she has not taken anything for the pain. She is due for her next menstrual cycle right now. The patient denies fever, leg swelling, recent heavy lifting, recent long travel, use of birth control, history of blood clots, or recent surgery.     Cardiac Risk Factors   Sex: Female   Tobacco: Negative   Hypertension: Negative  Diabetes: Negative  Hyperlipidemia: Negative  Family History: Negative    PE/DVT Risk Factors   Personal History: Negative  Recent Travel: Negative   Recent Surgery/Hospitalization: Negative  Tobacco: Negative  Family History: Negative  Hormone Use: Negative   Cancer: Negative  Trauma: Negative      Allergies:  Amoxicillin  Clindamycin      Medications:    Tylenol      Past Medical History:    Anemia  Reflux esophagitis   Chronic fatigue  Thyromegaly   Vitamin D deficiency   No diabetes, hypertension, hyperlipidemia     Past Surgical History:    Breast cyst aspiration right     Family History:    Hypertension  Diabetes   No blood clots  No MI before age of 60    Social History:  Tobacco Use: Never  Alcohol Use: None  PCP: Abe Hou  Marital Status:        Review of Systems   Constitutional: Positive for chills. Negative for fever.   HENT: Positive for ear pain and sore throat.    Respiratory: Positive for cough.    Cardiovascular: Positive for chest pain. Negative for leg swelling.   Gastrointestinal: Positive for abdominal pain.  "  Musculoskeletal: Positive for back pain.   All other systems reviewed and are negative.        Physical Exam     Patient Vitals for the past 24 hrs:   BP Temp Pulse Heart Rate Resp SpO2 Height Weight   04/06/19 2230 99/56 -- 71 73 20 100 % -- --   04/06/19 2200 116/66 -- 71 72 12 97 % -- --   04/06/19 2130 127/65 -- 68 76 12 100 % -- --   04/06/19 2100 115/69 -- 72 75 13 100 % -- --   04/06/19 1930 116/63 -- 75 77 8 100 % -- --   04/06/19 1915 130/69 -- 83 85 24 100 % -- --   04/06/19 1857 118/65 98  F (36.7  C) 84 -- 16 100 % 1.727 m (5' 8\") 88.9 kg (196 lb)       Physical Exam  General: Alert  HEENT:   The scalp and head appear normal    The pupils are equal, round, and reactive to light    Extraocular muscles are intact.    The nose is normal.    The oropharynx is normal.      Uvula is in the midline.    Neck:  Normal range of motion.    Lungs:  Clear.      No rales, no wheezing.  Congested cough    There is no tachypnea.  Non-labored.  Cardiac: Regular rate.      Normal S1 and S2.      No pathological murmur/rub    Abdomen: Soft. No distension, no localized tenderness or rebound.  MS:  Normal tone. Normal movement of all extremities. Tenderness to right 4th and 5th costochondral junctions to palpation, exactly reproduces her pain.   Neurologic:     Normal mentation.  No cranial nerve deficits.  No focal motor or sensory                            changes.      Speech normal.  Psych:  Awake.     Alert.      Normal affect.      Appropriate interactions.  Skin:  No rash.      No lesions.      Emergency Department Course   ECG:  @ 1903  Indication: Chest pain  Vent. Rate 77 bpm. VT interval 168 ms. QRS duration 82 ms. QT/QTc 396/448 ms. P-R-T axis 16 26 41.   Normal sinus rhythm. Normal ECG.    Read @ 1907 by Dr. Guerrero.    Imaging:  Chest X-Ray. 2 Views:   FINDINGS: The lungs are clear. No confluent airspace opacity,  pneumothorax, or pleural effusion. Normal heart size and pulmonary  vascularity. No " acute-appearing osseous abnormality.  Reading per radiology.     Radiographic findings were communicated with the patient who voiced understanding of the findings.    Laboratory:  CBC: WBC: 11.7 (H), HGB: 10.5 (L), PLT: 358  BMP: Potassium: 3.3 (L), o/w WNL (Creatinine: 0.64)    D dimer quantitative: 0.5    1918 Troponin I: <0.015    HCG qualitative: Negative    Rapid strep screen: Negative  Beta strep group A culture: In process    Interventions:  2044 Toradol 15 mg IV    Emergency Department Course:  1912 Nursing notes and vitals reviewed. I performed an exam of the patient as documented above.     IV inserted. Medicine administered as documented above. Blood drawn. Throat swabbed. This was sent to the lab for further testing, results above.    The patient was sent for a chest XR while in the emergency department, findings above.     2045 I rechecked the patient and discussed the results of her workup thus far.     Findings and plan explained to the Patient. Patient discharged home with instructions regarding supportive care, medications, and reasons to return. The importance of close follow-up was reviewed. The patient was prescribed Norco and Advil.     I personally reviewed the laboratory results with the Patient and answered all related questions prior to discharge.     Impression & Plan      HEART Score  Background  Calculates the overall risk of adverse event in patient's presenting with chest pain.  Based on 5 criteria (each assigned 0-2 points) including suspiciousness of history, EKG, age, risk factors and troponin.    Data  38 year old female  has NO ACTIVE PROBLEMS; Anemia due to other cause; Vitamin D deficiency; Chronic fatigue; and Thyromegaly on their problem list.   reports that  has never smoked. she has never used smokeless tobacco.  family history includes Diabetes (age of onset: 63) in her father; Family History Negative in her mother; Hypertension in her father.  Lab Results   Component Value  Date    TROPI  02/07/2017     <0.015  The 99th percentile for upper reference range is 0.045 ug/L.  Troponin values in   the range of 0.045 - 0.120 ug/L may be associated with risks of adverse   clinical events.       Criteria   0-2 points for each of 5 items (maximum of 10 points):  Score 0- History slightly suspicious for coronary syndrome  Score 0- EKG Normal  Score 0- Age <45 years old  Score 0- No risk factors for atherosclerotic disease  Score 0- Within normal limits for troponin levels  Interpretation  Risk of adverse outcome  Heart Score: 0  Total Score 0-3- Adverse Outcome Risk 2.5% - Supports early discharge with appropriate follow-up        PERC Criteria (PERC Negative) are met:    Is the patient older than 49: No  Is the HR >99: No  Room air sats <95%: No  Prior DVT or PE: No  Recent trauma or surgery (last 4 weeks): No  Hemoptysis: No  Exogenous estrogen: No  Clinical signs of DVT (unilateral leg swelling): No    Low risk patients who are PERC negative have a probability of PE of less than 2%.            Medical Decision Making:  Randee Millard is a 38 year old female who comes in with chest pain. She says that it is worse with deep breathing, movement, and pushing on her chest. She has exactly reproducible chest pain of the costochondral junction as described in the physical exam. She has no risk factors for acute coronary syndrome and her heart score is 0. She has a trop six hours out from the time of onset of her symptoms that is normal. She has a normal EKG. Her chest XR is unremarkable. There is no pneumothorax. No occult pneumonia. No trauma noted to the ribs. There is a negative pregnancy test. She improves some with Toradol. I did discuss with her that it wouldn't make her pain go away completely but that it should help.    We've discussed conservative measures including anti-inflammatories and if she needs to she can add the Norco, but gave her opioid precautions. She will follow up with her  primary medical doctor on Monday if this isn't significantly improving. She will return here in the interim if the nature or character of her symptoms changes or she develops worsening symptoms despite the treatment.     Diagnosis:    ICD-10-CM    1. Costochondritis M94.0        Disposition:  Discharged to home    Discharge Medications:     Medication List      Started    HYDROcodone-acetaminophen 5-325 MG tablet  Commonly known as:  NORCO  1 tablet, Oral, EVERY 6 HOURS PRN     ibuprofen 200 MG tablet  Commonly known as:  ADVIL/MOTRIN  600 mg, Oral, EVERY 8 HOURS PRN          Scribe Disclosure:  Jeff CARRINGTON, am serving as a scribe on 4/6/2019 at 7:12 PM to personally document services performed by Dr. Cesar MD based on my observations and the provider's statements to me.     Jeff Hook  4/6/2019   [facility] EMERGENCY DEPARTMENT       Reuben Guerrero MD  04/07/19 0015

## 2019-04-08 NOTE — RESULT ENCOUNTER NOTE
Preliminary Beta strep group A r/o culture is PENDING and/or NEGATIVE at this time.   No changes in treatment per [facility] Strep protocol.

## 2019-04-09 LAB
BACTERIA SPEC CULT: NORMAL
SPECIMEN SOURCE: NORMAL

## 2019-04-09 NOTE — RESULT ENCOUNTER NOTE
Final Beta strep group A r/o culture is NEGATIVE for Group A streptococcus.    No treatment or change in treatment per [facility] Strep protocol.

## 2019-08-13 ENCOUNTER — HOSPITAL ENCOUNTER (EMERGENCY)
Facility: CLINIC | Age: 39
Discharge: HOME | End: 2019-08-13
Attending: PHYSICIAN ASSISTANT | Admitting: PHYSICIAN ASSISTANT
Payer: COMMERCIAL

## 2019-08-13 VITALS
TEMPERATURE: 98.2 F | RESPIRATION RATE: 18 BRPM | WEIGHT: 196 LBS | OXYGEN SATURATION: 99 % | SYSTOLIC BLOOD PRESSURE: 108 MMHG | DIASTOLIC BLOOD PRESSURE: 72 MMHG | HEART RATE: 82 BPM | BODY MASS INDEX: 29.8 KG/M2

## 2019-08-13 DIAGNOSIS — M54.5 LOW BACK PAIN, UNSPECIFIED BACK PAIN LATERALITY, UNSPECIFIED CHRONICITY, WITH SCIATICA PRESENCE UNSPECIFIED: ICD-10-CM

## 2019-08-13 LAB
ALBUMIN UR-MCNC: NEGATIVE MG/DL
APPEARANCE UR: CLEAR
BILIRUB UR QL STRIP: NEGATIVE
COLOR UR AUTO: ABNORMAL
GLUCOSE UR STRIP-MCNC: NEGATIVE MG/DL
HCG UR QL: NEGATIVE
HGB UR QL STRIP: ABNORMAL
KETONES UR STRIP-MCNC: NEGATIVE MG/DL
LEUKOCYTE ESTERASE UR QL STRIP: NEGATIVE
MUCOUS THREADS #/AREA URNS LPF: PRESENT /LPF
NITRATE UR QL: NEGATIVE
PH UR STRIP: 6 PH (ref 5–7)
RBC #/AREA URNS AUTO: 86 /HPF (ref 0–2)
SOURCE: ABNORMAL
SP GR UR STRIP: 1 (ref 1–1.03)
SQUAMOUS #/AREA URNS AUTO: 1 /HPF (ref 0–1)
UROBILINOGEN UR STRIP-MCNC: NORMAL MG/DL (ref 0–2)
WBC #/AREA URNS AUTO: 2 /HPF (ref 0–5)

## 2019-08-13 PROCEDURE — 99284 EMERGENCY DEPT VISIT MOD MDM: CPT

## 2019-08-13 PROCEDURE — 25000132 ZZH RX MED GY IP 250 OP 250 PS 637: Performed by: PHYSICIAN ASSISTANT

## 2019-08-13 PROCEDURE — 96372 THER/PROPH/DIAG INJ SC/IM: CPT

## 2019-08-13 PROCEDURE — 81025 URINE PREGNANCY TEST: CPT | Performed by: PHYSICIAN ASSISTANT

## 2019-08-13 PROCEDURE — 25000128 H RX IP 250 OP 636: Performed by: PHYSICIAN ASSISTANT

## 2019-08-13 PROCEDURE — 81001 URINALYSIS AUTO W/SCOPE: CPT | Performed by: PHYSICIAN ASSISTANT

## 2019-08-13 RX ORDER — KETOROLAC TROMETHAMINE 15 MG/ML
15 INJECTION, SOLUTION INTRAMUSCULAR; INTRAVENOUS ONCE
Status: COMPLETED | OUTPATIENT
Start: 2019-08-13 | End: 2019-08-13

## 2019-08-13 RX ORDER — METHOCARBAMOL 750 MG/1
750-1500 TABLET, FILM COATED ORAL 3 TIMES DAILY PRN
Qty: 20 TABLET | Refills: 0 | Status: SHIPPED | OUTPATIENT
Start: 2019-08-13 | End: 2019-09-23

## 2019-08-13 RX ORDER — LIDOCAINE 4 G/G
1 PATCH TOPICAL ONCE
Status: DISCONTINUED | OUTPATIENT
Start: 2019-08-13 | End: 2019-08-13 | Stop reason: HOSPADM

## 2019-08-13 RX ORDER — METHOCARBAMOL 750 MG/1
750 TABLET, FILM COATED ORAL ONCE
Status: COMPLETED | OUTPATIENT
Start: 2019-08-13 | End: 2019-08-13

## 2019-08-13 RX ADMIN — KETOROLAC TROMETHAMINE 15 MG: 15 INJECTION, SOLUTION INTRAMUSCULAR; INTRAVENOUS at 20:45

## 2019-08-13 RX ADMIN — LIDOCAINE 1 PATCH: 560 PATCH PERCUTANEOUS; TOPICAL; TRANSDERMAL at 20:16

## 2019-08-13 RX ADMIN — METHOCARBAMOL TABLETS 750 MG: 750 TABLET, COATED ORAL at 20:45

## 2019-08-13 ASSESSMENT — ENCOUNTER SYMPTOMS
DYSURIA: 0
CHILLS: 1
FEVER: 0
FREQUENCY: 1
BACK PAIN: 1
VOMITING: 0
ABDOMINAL PAIN: 0
HEMATURIA: 0
NUMBNESS: 0

## 2019-08-13 NOTE — ED AVS SNAPSHOT
[facility] Emergency Department  [address]  [facility] [address]  Phone:  [phone]  Fax:  [phone]                                    Randee Millard   MRN: [id number]    Department:  [facility] Emergency Department   Date of Visit:  8/13/2019           After Visit Summary Signature Page    I have received my discharge instructions, and my questions have been answered. I have discussed any challenges I see with this plan with the nurse or doctor.    ..........................................................................................................................................  Patient/Patient Representative Signature      ..........................................................................................................................................  Patient Representative Print Name and Relationship to Patient    ..................................................               ................................................  Date                                   Time    ..........................................................................................................................................  Reviewed by Signature/Title    ...................................................              ..............................................  Date                                               Time          22EPIC Rev 08/18

## 2019-08-14 NOTE — ED PROVIDER NOTES
History     Chief Complaint:  Back Pain    HPI   Randee Millard is a 39 year old female who presents to the emergency department today with back pain. The patient woke up 1 week ago with back pain.  Patient denies any provoking incidents prior to onset of back pain.  She states over the last 4 days this is been getting worse and is now intermittently radiating down her bilateral legs when walking. She reports some urinary frequency and chills, but no fever. She denies dysuria, hematuria, numbness, tingling, rash, abdominal pain, loss of bowel or bladder function, saddle paresthesia concern for pregnancy, or vomiting.  She denies any recent falls or trauma.  The patient is currently on her period. She denies history of back problems. Patient has been taking Tylenol (last dose 4 hours ago), which has not helped.     Allergies:  Amoxicillin  Clindamycin     Medications:    The patient is not currently taking any prescribed medications.    Past Medical History:    Anemia  Reflux esophagitis   Chronic fatigue  Thyromegaly  Vitamin D deficiency     Past Surgical History:    Breast cyst aspiration      Family History:    Hypertension  Diabetes    Social History:  The patient was accompanied to the ED by .  Smoking Status: Never  Smokeless Tobacco: Never  Alcohol Use: No    Marital Status:       Review of Systems   Constitutional: Positive for chills. Negative for fever.   Gastrointestinal: Negative for abdominal pain and vomiting.   Genitourinary: Positive for frequency. Negative for dysuria and hematuria.   Musculoskeletal: Positive for back pain (radiates down legs).   Skin: Negative for rash.   Neurological: Negative for numbness.   All other systems reviewed and are negative.    Physical Exam     Patient Vitals for the past 24 hrs:   BP Temp Temp src Pulse Heart Rate Resp SpO2 Weight   08/13/19 1933 108/72 98.2  F (36.8  C) Temporal 82 82 18 99 % 88.9 kg (196 lb)      Physical Exam  General: Resting  comfortably.  Alert and oriented.   Head:  The scalp, face, and head appear normal   Eyes:  Conjunctivae and sclerae are normal    Neck:  There is no midline cervical spine pain/tenderness   CV:  Regular rate and rhythm     Normal S1/S2    DP and PT pulses intact bilaterally    Radial pulses intact bilaterally  Resp:  Lungs are clear to auscultation    Non-labored    No rales or wheezing   GI:  Abdomen is soft, non-distended    No abdominal tenderness   MS:  Tenderness to bilateral paralumbar areas. No midline lumbar tenderness. Good strength with hip flexion/extension, knee flexion/extension, dorsi/plantar flexion, and big toe extension bilaterally.   Skin:  No rash or acute skin lesions noted   Neuro: Speech is normal and fluent. Sensation intact throughout bilateral lower extremities. Patellar reflexes intact bilaterally. No ankle clonus bilaterally.      Emergency Department Course   Laboratory:  Laboratory findings were communicated with the patient and family who voiced understanding of the findings.  HCG Qualitative: negative   UA: clear, straw urine with large blood, 86 RBC, and mucous present o/w WNL     Interventions:  2016: Lidocare 2 patch Transdermal   2045: Toradol 15 mg IM   2045: Robaxin 750 mg PO     Emergency Department Course:  Nursing notes and vitals reviewed.  1953: I performed an exam of the patient as documented above.   The patient provided a urine sample here in the emergency department. This was sent for laboratory testing, findings above.  2004: Findings and plan explained to the Patient and spouse. Patient discharged home with instructions regarding supportive care, medications, and reasons to return. The importance of close follow-up was reviewed. The patient was prescribed Robaxin.    I personally reviewed the laboratory results with the Patient and spouse and answered all related questions prior to discharge.      Impression & Plan    Medical Decision Making:  Randee Millard is a 39  year old female who presents for evaluation of back pain and radicular symptoms as detailed above in the HPI. She doesn't have history of back pain in the past.  Her pain has improved with interventions in the emergency department. She did not sustain any trauma, therefore x-rays are not necessary due to the low likelihood of fracture or subluxation.  The patient has a normal neurologic exam.  There is normal circulation, sensation, and motor function to bilateral lower extremities.  There is no clinical evidence of cauda equina syndrome, discitis, spinal/epidural space hematoma or epidural abscess or other emergently worrisome etiology.  Advanced imaging with CT/MRI is not indicated at this time, but may be indicated in the future if symptoms fail to resolve.  UPT is negative.  UA is normal without evidence of infection.  Patient is menstruating currently.  Given improvement, I believe the patient is safe to discharge home.  Symptomatic cares were discussed with the patient.  She will be discharged with pain medications to use as directed.  No heavy lifting, bending or twisting. Return if increasing pain, fever, muscular weakness, saddle paresthesias, or bowel or bladder dysfunction or any other concern.  All questions were answered prior to discharge.  The patient understands and agrees to this plan.      Diagnosis:    ICD-10-CM    1. Low back pain, unspecified back pain laterality, unspecified chronicity, with sciatica presence unspecified M54.5 HCG qualitative urine     UA with Microscopic       Disposition:  discharged to home    Discharge Medications:  New Prescriptions    METHOCARBAMOL (ROBAXIN) 750 MG TABLET    Take 1-2 tablets (750-1,500 mg) by mouth 3 times daily as needed for muscle spasms       Scribe Disclosure:  ZEB, Gale Tyler, am serving as a scribe at 7:56 PM on 8/13/2019 to document services personally performed by Silvia Longo PA based on my observations and the provider's statements  to me.    8/13/2019   [facility] EMERGENCY DEPARTMENT       Silvia Longo PA-C  08/14/19 7133

## 2019-08-26 ENCOUNTER — NURSE TRIAGE (OUTPATIENT)
Dept: INTERNAL MEDICINE | Facility: CLINIC | Age: 39
End: 2019-08-26

## 2019-08-26 NOTE — TELEPHONE ENCOUNTER
Patient states she is having abdominal pain for the last week that comes and goes in her lower abdomin all the way across. Patient rates the pain 10/10 when it comes- not currently in pain at time of call.  Denies vomiting, diarrhea, or abdominal swelling. Endorses nausea, and chills. Patient unable to check temperature while on the phone. Had a normal bowel movement last night. Denies menstrual cramps or possibility of pregnancy.     Advised patient to go to urgent care today to be seen. Patient declines, states she would like to be seen by primary care provider, writer advised it is not advised to wait that long based on her symptoms. Patient requested next appointment with primary care provider, states she will go to ER if things get worse. Writer advised to monitor for temperature, or new symptoms. Advised to go to ER if worsening, or if 10/10 pain comes back. Patient agreed to plan.    Additional Information    Negative: Passed out (i.e., fainted, collapsed and was not responding)    Negative: Shock suspected (e.g., cold/pale/clammy skin, too weak to stand, low BP, rapid pulse)    Negative: Sounds like a life-threatening emergency to the triager    Negative: Chest pain    Negative: Pain is mainly in upper abdomen (if needed ask: 'is it mainly above the belly button?')    Negative: Abdominal pain and pregnant > 20 weeks    Negative: Abdominal pain and pregnant < 20 weeks    Negative: SEVERE abdominal pain (e.g., excruciating)    Negative: Vomiting red blood or black (coffee ground) material    Negative: Bloody, black, or tarry bowel movements    Negative: Constant abdominal pain lasting > 2 hours    Negative: Vomiting bile (green color)    Negative: Patient sounds very sick or weak to the triager    Negative: Vomiting and abdomen looks much more swollen than usual    Negative: White of the eyes have turned yellow (i.e., jaundice)    Negative: Blood in urine (red, pink, or tea-colored)    Negative: Fever > 103 F  (39.4 C)    Negative: Fever > 101 F (38.3 C) and over 60 years of age    Negative: Fever > 100.0 F (37.8 C) and has diabetes mellitus or a weak immune system (e.g., HIV positive, cancer chemotherapy, organ transplant, splenectomy, chronic steroids)    Negative: Fever > 100.0 F (37.8 C) and bedridden (e.g., nursing home patient, stroke, chronic illness, recovering from surgery)    Negative: Pregnant or could be pregnant (i.e., missed last menstrual period)    MODERATE OR MILD pain that comes and goes (cramps) lasts > 24 hours    Protocols used: ABDOMINAL PAIN - FEMALE-A-OH

## 2019-09-23 ENCOUNTER — OFFICE VISIT (OUTPATIENT)
Dept: INTERNAL MEDICINE | Facility: CLINIC | Age: 39
End: 2019-09-23
Payer: COMMERCIAL

## 2019-09-23 VITALS
BODY MASS INDEX: 29.95 KG/M2 | SYSTOLIC BLOOD PRESSURE: 112 MMHG | WEIGHT: 197.6 LBS | DIASTOLIC BLOOD PRESSURE: 70 MMHG | TEMPERATURE: 98.6 F | HEIGHT: 68 IN | OXYGEN SATURATION: 99 % | RESPIRATION RATE: 20 BRPM | HEART RATE: 95 BPM

## 2019-09-23 DIAGNOSIS — D64.9 ANEMIA, UNSPECIFIED TYPE: ICD-10-CM

## 2019-09-23 DIAGNOSIS — J02.9 SORE THROAT: ICD-10-CM

## 2019-09-23 DIAGNOSIS — N89.8 VAGINAL DISCHARGE: ICD-10-CM

## 2019-09-23 DIAGNOSIS — E55.9 VITAMIN D DEFICIENCY: Primary | ICD-10-CM

## 2019-09-23 DIAGNOSIS — R30.9 PAINFUL URINATION: ICD-10-CM

## 2019-09-23 DIAGNOSIS — R10.2 PELVIC PAIN IN FEMALE: ICD-10-CM

## 2019-09-23 LAB
ALBUMIN UR-MCNC: NEGATIVE MG/DL
APPEARANCE UR: CLEAR
BILIRUB UR QL STRIP: NEGATIVE
COLOR UR AUTO: YELLOW
ERYTHROCYTE [DISTWIDTH] IN BLOOD BY AUTOMATED COUNT: 16.4 % (ref 10–15)
GLUCOSE UR STRIP-MCNC: NEGATIVE MG/DL
HCT VFR BLD AUTO: 33.5 % (ref 35–47)
HGB BLD-MCNC: 10.6 G/DL (ref 11.7–15.7)
HGB UR QL STRIP: NEGATIVE
KETONES UR STRIP-MCNC: NEGATIVE MG/DL
LEUKOCYTE ESTERASE UR QL STRIP: NEGATIVE
MCH RBC QN AUTO: 24.3 PG (ref 26.5–33)
MCHC RBC AUTO-ENTMCNC: 31.6 G/DL (ref 31.5–36.5)
MCV RBC AUTO: 77 FL (ref 78–100)
NITRATE UR QL: NEGATIVE
NON-SQ EPI CELLS #/AREA URNS LPF: NORMAL /LPF
PH UR STRIP: 5.5 PH (ref 5–7)
PLATELET # BLD AUTO: 352 10E9/L (ref 150–450)
RBC # BLD AUTO: 4.37 10E12/L (ref 3.8–5.2)
RBC #/AREA URNS AUTO: NORMAL /HPF
SOURCE: NORMAL
SP GR UR STRIP: <=1.005 (ref 1–1.03)
UROBILINOGEN UR STRIP-ACNC: 0.2 EU/DL (ref 0.2–1)
WBC # BLD AUTO: 11.8 10E9/L (ref 4–11)
WBC #/AREA URNS AUTO: NORMAL /HPF

## 2019-09-23 PROCEDURE — 36415 COLL VENOUS BLD VENIPUNCTURE: CPT | Performed by: INTERNAL MEDICINE

## 2019-09-23 PROCEDURE — 82306 VITAMIN D 25 HYDROXY: CPT | Performed by: INTERNAL MEDICINE

## 2019-09-23 PROCEDURE — 99214 OFFICE O/P EST MOD 30 MIN: CPT | Performed by: INTERNAL MEDICINE

## 2019-09-23 PROCEDURE — 81001 URINALYSIS AUTO W/SCOPE: CPT | Performed by: INTERNAL MEDICINE

## 2019-09-23 PROCEDURE — 85027 COMPLETE CBC AUTOMATED: CPT | Performed by: INTERNAL MEDICINE

## 2019-09-23 ASSESSMENT — MIFFLIN-ST. JEOR: SCORE: 1614.81

## 2019-09-23 NOTE — PROGRESS NOTES
Subjective     Randee Millard is a 39 year old female who presents to clinic today for the following health issues:    HPI     Pt is a 39 year old female who is seen here to day with c/o lower abd pain since 3 wks, across the lower abd, also c/o pain with urination since 3 wks, no urgency,no frequency, also c/o pain in vaginal area with intercourse.   No fever/chills. No blood in urine.  BMs normal . LMP 2 wks ago  Pt also c/o vaginal discharge since 3 wks, no itching, no odor.  Pt also c/o sore throat since 4 days.   Pt is also requesting vit D check   Pt is also here to check on anemia,not taking any iron supplements at this time .       Patient Active Problem List   Diagnosis     NO ACTIVE PROBLEMS     Anemia due to other cause     Vitamin D deficiency     Chronic fatigue     Thyromegaly     Past Surgical History:   Procedure Laterality Date     US BREAST CYST ASPIRATION INITIAL RT         Social History     Tobacco Use     Smoking status: Never Smoker     Smokeless tobacco: Never Used   Substance Use Topics     Alcohol use: No     Alcohol/week: 0.0 standard drinks     Family History   Problem Relation Age of Onset     Hypertension Father      Diabetes Father 63     Family History Negative Mother          Current Outpatient Medications   Medication Sig Dispense Refill     Acetaminophen (TYLENOL PO) Take 500 mg by mouth every 6 hours as needed for mild pain or fever       ibuprofen (ADVIL/MOTRIN) 200 MG tablet Take 3 tablets (600 mg) by mouth every 8 hours as needed for mild pain 30 tablet 0         Reviewed and updated as needed this visit by Provider         Review of Systems   ROS COMP: CONSTITUTIONAL: NEGATIVE for fever, chills, change in weight  EYES: NEGATIVE for vision changes or irritation  ENT/MOUTH: sore throat  RESP: NEGATIVE for significant cough or SOB  CV: NEGATIVE for chest pain, palpitations or peripheral edema  : pain with urination and pain with sex.  Vaginal discharge   Heme; h/o anemia       "  Objective    /70 (BP Location: Left arm, Patient Position: Sitting, Cuff Size: Adult Large)   Pulse 95   Temp 98.6  F (37  C) (Oral)   Resp 20   Ht 1.727 m (5' 8\")   Wt 89.6 kg (197 lb 9.6 oz)   SpO2 99%   BMI 30.04 kg/m    Body mass index is 30.04 kg/m .  Physical Exam   GENERAL: healthy, alert and no distress  EYES: Eyes grossly normal to inspection, PERRL and conjunctivae and sclerae normal  HENT: ear canals and TM's normal, nose and mouth without ulcers or lesions  NECK: no adenopathy, no asymmetry, masses, or scars and thyroid normal to palpation  RESP: lungs clear to auscultation - no rales, rhonchi or wheezes  CV: regular rate and rhythm,    ABDOMEN: soft, nontender,  and bowel sounds normal  MS: no gross musculoskeletal defects noted, no edema  NEURO: Normal strength and tone, mentation intact and speech normal           Assessment & Plan     (D64.9) Anemia, unspecified type  Plan: CBC with platelets-hemoglobin 10.6, and indices consistent with iron deficiency anemia.  Started on ferrous sulfate 325 mg 1 tablet once daily and advised to repeat CBC in 3 months.            (N89.8) Vaginal discharge  Plan: Wet prep            (E55.9) Vitamin D deficiency    Plan: Vitamin D Deficiency      (R30.9) Painful urination  Plan: UA with Microscopic reflex to Culture- negative             (R10.2) Pelvic pain in female  Plan: US Pelvic Complete w Transvaginal            (J02.9) Sore throat  Plan: strep ID.pt was told I will contact her after results and proceed accordingly.        BMI:   Estimated body mass index is 30.04 kg/m  as calculated from the following:    Height as of this encounter: 1.727 m (5' 8\").    Weight as of this encounter: 89.6 kg (197 lb 9.6 oz).   Weight management plan: Discussed healthy diet and exercise guidelines         Abe Hou MD  [facility]        "

## 2019-09-24 ENCOUNTER — OFFICE VISIT (OUTPATIENT)
Dept: INTERNAL MEDICINE | Facility: CLINIC | Age: 39
End: 2019-09-24
Payer: COMMERCIAL

## 2019-09-24 ENCOUNTER — ANCILLARY PROCEDURE (OUTPATIENT)
Dept: ULTRASOUND IMAGING | Facility: CLINIC | Age: 39
End: 2019-09-24
Attending: INTERNAL MEDICINE
Payer: COMMERCIAL

## 2019-09-24 DIAGNOSIS — R10.2 PELVIC PAIN IN FEMALE: ICD-10-CM

## 2019-09-24 DIAGNOSIS — J02.9 SORE THROAT: ICD-10-CM

## 2019-09-24 DIAGNOSIS — N89.8 VAGINAL ITCHING: Primary | ICD-10-CM

## 2019-09-24 LAB
DEPRECATED CALCIDIOL+CALCIFEROL SERPL-MC: 20 UG/L (ref 20–75)
DEPRECATED S PYO AG THROAT QL EIA: NORMAL
SPECIMEN SOURCE: NORMAL
SPECIMEN SOURCE: NORMAL
WET PREP SPEC: NORMAL

## 2019-09-24 PROCEDURE — 76856 US EXAM PELVIC COMPLETE: CPT | Performed by: FAMILY MEDICINE

## 2019-09-24 PROCEDURE — 87210 SMEAR WET MOUNT SALINE/INK: CPT | Performed by: INTERNAL MEDICINE

## 2019-09-24 PROCEDURE — 87880 STREP A ASSAY W/OPTIC: CPT | Performed by: INTERNAL MEDICINE

## 2019-09-24 PROCEDURE — 76830 TRANSVAGINAL US NON-OB: CPT | Performed by: FAMILY MEDICINE

## 2019-09-24 PROCEDURE — 87081 CULTURE SCREEN ONLY: CPT | Performed by: INTERNAL MEDICINE

## 2019-09-24 NOTE — PROGRESS NOTES
Subjective     Randee JUSTIN Millard is a 39 year old female who presents to clinic today for rpt wet prep  and strep test. Pts samples were mislabeled yesterday.        Reviewed and updated as needed this visit by Provider         Review of Systems   ROS COMP: CONSTITUTIONAL: NEGATIVE for fever, chills, change in weight  : vag discharge             Physical Exam   GENERAL: healthy, alert and no distress   (Female):Vagina and vulva are normal;  scant discharge is noted.  Cervix normal. without tenderness.  Adnexa normal in size without masses or tenderness.    MS: no gross musculoskeletal defects noted, no edema           Assessment & Plan     (N89.8) Vaginal itching  (primary encounter diagnosis)   Plan: Wet prep            (J02.9) Sore throat  Plan: Strep, Rapid Screen, Beta strep group A culture             Abe Hou MD  [facility]

## 2019-09-25 LAB
BACTERIA SPEC CULT: NORMAL
SPECIMEN SOURCE: NORMAL

## 2019-09-26 RX ORDER — FERROUS SULFATE 325(65) MG
325 TABLET, DELAYED RELEASE (ENTERIC COATED) ORAL DAILY
Qty: 90 TABLET | Refills: 1 | Status: SHIPPED | OUTPATIENT
Start: 2019-09-26 | End: 2021-06-14

## 2019-11-07 ENCOUNTER — HEALTH MAINTENANCE LETTER (OUTPATIENT)
Age: 39
End: 2019-11-07

## 2019-12-23 ENCOUNTER — OFFICE VISIT (OUTPATIENT)
Dept: OBGYN | Facility: CLINIC | Age: 39
End: 2019-12-23
Payer: COMMERCIAL

## 2019-12-23 VITALS
WEIGHT: 197 LBS | DIASTOLIC BLOOD PRESSURE: 70 MMHG | BODY MASS INDEX: 29.86 KG/M2 | HEIGHT: 68 IN | SYSTOLIC BLOOD PRESSURE: 108 MMHG

## 2019-12-23 DIAGNOSIS — D25.9 UTERINE LEIOMYOMA, UNSPECIFIED LOCATION: Primary | ICD-10-CM

## 2019-12-23 PROCEDURE — 99202 OFFICE O/P NEW SF 15 MIN: CPT | Performed by: OBSTETRICS & GYNECOLOGY

## 2019-12-23 ASSESSMENT — MIFFLIN-ST. JEOR: SCORE: 1612.09

## 2019-12-23 NOTE — PROGRESS NOTES
"OB/Gyn Consultation:    Randee Millard was sent to me for consultation by Dr Hou for evaluation of fibroids.      SUBJECTIVE:                                                   CC:  Patient presents with:  Follow Up: Fibroids      HPI:  Randee Millard is a 39 year old  who presents to discuss fibroid uterus.    She was having pelvic pain and had an ultrasound with her primary Dr Hou, which showed fibroid uterus, one posterior fibroid measuring about 4cm.      Bleeding is fairly light, bleeds for 2-3 days.  Cramping is at times severe, requiring her to stay in bed for 2 days when having a cycle.  She uses ibuprofen and tylenol, both of which help and she can get out of bed and take care of her kids while on these meds.  Para 5, undecided if she would like to have more children or not, but definitely not ready to be done having children for sure.    Has also some low pelvic pain, feels like a sharp, inside vaginal pain, like something is falling down.  No urinary or rectal bulk symptoms.  No ongoing issues with urinary leakage.  No signs of menopause yet.     ROS: 10 point ROS negative other than as listed above in HPI.    Gyn History:  Patient's last menstrual period was 2019 (exact date).     Patient is sexually active.  Using none for contraception.   Recent pap smears:    Lab Results   Component Value Date    PAP NIL 2019    PAP NIL 2016       PMH, PSH, Soc Hx, Fam Hx, Meds, and allergies reviewed in Epic.  Denies family history of gyn cancer    OBJECTIVE:     /70 (BP Location: Left arm, Patient Position: Chair, Cuff Size: Adult Large)   Ht 1.727 m (5' 8\")   Wt 89.4 kg (197 lb)   LMP 2019 (Exact Date)   Breastfeeding No   BMI 29.95 kg/m      Gen: Healthy appearing female, no acute distress, comfortable  HENT: No scleral injection or icterus  CV: Regular rate  Resp: Normal work of breathing, no cough  GI: Abdomen soft, non-tender. No masses, " organomegaly  Skin: No suspicious lesions or rashes  Psychiatric: mentation appears normal and affect bright  :declined     Test Results:  [facility]  Obstetrics & Gynecology  [address]  [place] [address]  Tel: [phone]     ULTRASOUND - PELVIC GYN     Referring MD: Yonathan Hou  Primary Clinic: [facility]     ===================================     CLINICAL INFORMATION     Indications for ultrasound: Pelvic pain     LMP: 10 Sep 2019    Hormones: none     Measurements:  Uterus: 9.4 x 6.0 x 6.2 cm.     Position is anteverted.  Contour is irreg w myomata:   1) posterior right 4.0 x 3.6 cm.     Endo cav: 11.0 mm         Smooth/regular/wnl  Cervix: Wnl     Right ovary: 3.4 x 2.0 x 2.0 cm.   Wnl  Left ovary: 3.0 x 2.0 x 2.5 cm.  Wnl     Cul de sac: no free fluid     ===================================  Complete pelvic ultrasound using realtime   transabdominal and transvaginal scanning  Bladder appears normal      Myomatous uterus: Endometrial cavity not affected.     Normal bilateral ovaries      Endometrium noted to be normal     ASSESSMENT/PLAN:                                                      1. Uterine leiomyoma  Discussed fibroids and their pathophysiology as well as expectant, medical, and surgical mgmt.  We discussed that fibroids are by definition benign and typically only cause issues when they contribute to cramping/bleeding or bulk symptoms.  We discussed that the vast majority of the mgmt modalities would interfere with conception or childbearing, and as her symptoms are currently managed with ibuprofen/APAP, she may continue to do this until she is completed childbearing at which time we could offer LARC contraception, or surgical or less invasive mgmt of the fibroids.  We also discussed that they tend to shrink in menopause and the average age is 51.    She declined a physical exam today but we did discuss that there are other etiologies of pelvic pain  which can include dysmenorrhea, uterine prolapse, etc.  She will consider these options and follow up as needed    Carmen Mckeon MD, MPH  Obstetrics and Gynecology        Total time spent was 15 minutes; greater than 50% of time was spent in counseling and/or coordination of care for the above listed diagnoses, not including time spent on the procedure.

## 2019-12-23 NOTE — NURSING NOTE
"Chief Complaint   Patient presents with     Follow Up     Fibroids       Initial /70 (BP Location: Left arm, Patient Position: Chair, Cuff Size: Adult Large)   Ht 1.727 m (5' 8\")   Wt 89.4 kg (197 lb)   LMP 2019 (Exact Date)   Breastfeeding No   BMI 29.95 kg/m   Estimated body mass index is 29.95 kg/m  as calculated from the following:    Height as of this encounter: 1.727 m (5' 8\").    Weight as of this encounter: 89.4 kg (197 lb).  BP completed using cuff size: regular    Questioned patient about current smoking habits.  Pt. has never smoked.          The following HM Due: NONE  Ekaterina Thao CMA    "

## 2020-02-20 ENCOUNTER — HOSPITAL ENCOUNTER (EMERGENCY)
Facility: CLINIC | Age: 40
Discharge: HOME | End: 2020-02-20
Attending: PHYSICIAN ASSISTANT | Admitting: PHYSICIAN ASSISTANT
Payer: COMMERCIAL

## 2020-02-20 ENCOUNTER — APPOINTMENT (OUTPATIENT)
Dept: GENERAL RADIOLOGY | Facility: CLINIC | Age: 40
End: 2020-02-20
Attending: PHYSICIAN ASSISTANT
Payer: COMMERCIAL

## 2020-02-20 VITALS
SYSTOLIC BLOOD PRESSURE: 135 MMHG | HEART RATE: 92 BPM | OXYGEN SATURATION: 100 % | BODY MASS INDEX: 29.95 KG/M2 | TEMPERATURE: 98.4 F | WEIGHT: 197 LBS | DIASTOLIC BLOOD PRESSURE: 59 MMHG | RESPIRATION RATE: 16 BRPM

## 2020-02-20 DIAGNOSIS — J11.1 INFLUENZA-LIKE ILLNESS: ICD-10-CM

## 2020-02-20 LAB
ALBUMIN UR-MCNC: NEGATIVE MG/DL
APPEARANCE UR: ABNORMAL
BACTERIA #/AREA URNS HPF: ABNORMAL /HPF
BILIRUB UR QL STRIP: NEGATIVE
COLOR UR AUTO: ABNORMAL
FLUAV+FLUBV AG SPEC QL: NEGATIVE
FLUAV+FLUBV AG SPEC QL: NEGATIVE
GLUCOSE UR STRIP-MCNC: NEGATIVE MG/DL
HGB UR QL STRIP: NEGATIVE
KETONES UR STRIP-MCNC: NEGATIVE MG/DL
LEUKOCYTE ESTERASE UR QL STRIP: ABNORMAL
NITRATE UR QL: NEGATIVE
PH UR STRIP: 6 PH (ref 5–7)
RBC #/AREA URNS AUTO: 3 /HPF (ref 0–2)
SOURCE: ABNORMAL
SP GR UR STRIP: 1 (ref 1–1.03)
SPECIMEN SOURCE: NORMAL
SQUAMOUS #/AREA URNS AUTO: 9 /HPF (ref 0–1)
UROBILINOGEN UR STRIP-MCNC: NORMAL MG/DL (ref 0–2)
WBC #/AREA URNS AUTO: 2 /HPF (ref 0–5)

## 2020-02-20 PROCEDURE — 87804 INFLUENZA ASSAY W/OPTIC: CPT | Mod: 59 | Performed by: PHYSICIAN ASSISTANT

## 2020-02-20 PROCEDURE — 99285 EMERGENCY DEPT VISIT HI MDM: CPT

## 2020-02-20 PROCEDURE — 71046 X-RAY EXAM CHEST 2 VIEWS: CPT

## 2020-02-20 PROCEDURE — 25000132 ZZH RX MED GY IP 250 OP 250 PS 637: Performed by: PHYSICIAN ASSISTANT

## 2020-02-20 PROCEDURE — 81001 URINALYSIS AUTO W/SCOPE: CPT | Performed by: PHYSICIAN ASSISTANT

## 2020-02-20 RX ORDER — IBUPROFEN 600 MG/1
600 TABLET, FILM COATED ORAL ONCE
Status: COMPLETED | OUTPATIENT
Start: 2020-02-20 | End: 2020-02-20

## 2020-02-20 RX ADMIN — IBUPROFEN 600 MG: 600 TABLET, FILM COATED ORAL at 13:51

## 2020-02-20 ASSESSMENT — ENCOUNTER SYMPTOMS
VOMITING: 0
NAUSEA: 0
ABDOMINAL PAIN: 1
DIARRHEA: 0
DYSURIA: 0
SHORTNESS OF BREATH: 0
SORE THROAT: 1
FLANK PAIN: 1
COUGH: 1
FEVER: 1
MYALGIAS: 1

## 2020-02-20 NOTE — ED AVS SNAPSHOT
[facility] Emergency Department  [address]  [facility] [address]  Phone:  [phone]  Fax:  [phone]                                    Randee Millard   MRN: [id number]    Department:  [facility] Emergency Department   Date of Visit:  2/20/2020           After Visit Summary Signature Page    I have received my discharge instructions, and my questions have been answered. I have discussed any challenges I see with this plan with the nurse or doctor.    ..........................................................................................................................................  Patient/Patient Representative Signature      ..........................................................................................................................................  Patient Representative Print Name and Relationship to Patient    ..................................................               ................................................  Date                                   Time    ..........................................................................................................................................  Reviewed by Signature/Title    ...................................................              ..............................................  Date                                               Time          22EPIC Rev 08/18

## 2020-02-20 NOTE — ED TRIAGE NOTES
Pt reports she had a sore throat last week and was seen at urgent care. Pt started on Cephalexin for her throat, strep test negative. For the past 2 days pt now also has fever and cough as well as abd pain and back pain. Last Tylenol taken at 4 am

## 2020-03-20 ENCOUNTER — HOSPITAL ENCOUNTER (EMERGENCY)
Facility: CLINIC | Age: 40
Discharge: HOME | End: 2020-03-20
Attending: PHYSICIAN ASSISTANT | Admitting: PHYSICIAN ASSISTANT
Payer: COMMERCIAL

## 2020-03-20 VITALS
TEMPERATURE: 98.7 F | DIASTOLIC BLOOD PRESSURE: 85 MMHG | SYSTOLIC BLOOD PRESSURE: 129 MMHG | RESPIRATION RATE: 20 BRPM | OXYGEN SATURATION: 100 % | HEART RATE: 106 BPM

## 2020-03-20 DIAGNOSIS — N30.01 ACUTE CYSTITIS WITH HEMATURIA: ICD-10-CM

## 2020-03-20 DIAGNOSIS — J02.0 ACUTE STREPTOCOCCAL PHARYNGITIS: ICD-10-CM

## 2020-03-20 LAB
ALBUMIN UR-MCNC: NEGATIVE MG/DL
APPEARANCE UR: ABNORMAL
BACTERIA #/AREA URNS HPF: ABNORMAL /HPF
BILIRUB UR QL STRIP: NEGATIVE
COLOR UR AUTO: ABNORMAL
DEPRECATED S PYO AG THROAT QL EIA: POSITIVE
GLUCOSE UR STRIP-MCNC: NEGATIVE MG/DL
HCG UR QL: NEGATIVE
HGB UR QL STRIP: NEGATIVE
KETONES UR STRIP-MCNC: ABNORMAL MG/DL
LEUKOCYTE ESTERASE UR QL STRIP: ABNORMAL
NITRATE UR QL: NEGATIVE
PH UR STRIP: 6 PH (ref 5–7)
RBC #/AREA URNS AUTO: 5 /HPF (ref 0–2)
SOURCE: ABNORMAL
SP GR UR STRIP: 1.01 (ref 1–1.03)
SPECIMEN SOURCE: ABNORMAL
SQUAMOUS #/AREA URNS AUTO: 14 /HPF (ref 0–1)
UROBILINOGEN UR STRIP-MCNC: NORMAL MG/DL (ref 0–2)
WBC #/AREA URNS AUTO: 7 /HPF (ref 0–5)

## 2020-03-20 PROCEDURE — 25000132 ZZH RX MED GY IP 250 OP 250 PS 637: Performed by: PHYSICIAN ASSISTANT

## 2020-03-20 PROCEDURE — 81001 URINALYSIS AUTO W/SCOPE: CPT | Performed by: PHYSICIAN ASSISTANT

## 2020-03-20 PROCEDURE — 87880 STREP A ASSAY W/OPTIC: CPT | Performed by: PHYSICIAN ASSISTANT

## 2020-03-20 PROCEDURE — 96374 THER/PROPH/DIAG INJ IV PUSH: CPT

## 2020-03-20 PROCEDURE — 25000128 H RX IP 250 OP 636: Performed by: PHYSICIAN ASSISTANT

## 2020-03-20 PROCEDURE — 99284 EMERGENCY DEPT VISIT MOD MDM: CPT | Mod: 25

## 2020-03-20 PROCEDURE — 81025 URINE PREGNANCY TEST: CPT | Performed by: PHYSICIAN ASSISTANT

## 2020-03-20 RX ORDER — CEPHALEXIN 500 MG/1
500 CAPSULE ORAL 2 TIMES DAILY
Qty: 20 CAPSULE | Refills: 0 | Status: SHIPPED | OUTPATIENT
Start: 2020-03-20 | End: 2020-05-12

## 2020-03-20 RX ORDER — DEXAMETHASONE SODIUM PHOSPHATE 10 MG/ML
10 INJECTION, SOLUTION INTRAMUSCULAR; INTRAVENOUS ONCE
Status: COMPLETED | OUTPATIENT
Start: 2020-03-20 | End: 2020-03-20

## 2020-03-20 RX ORDER — IBUPROFEN 600 MG/1
600 TABLET, FILM COATED ORAL ONCE
Status: COMPLETED | OUTPATIENT
Start: 2020-03-20 | End: 2020-03-20

## 2020-03-20 RX ADMIN — IBUPROFEN 600 MG: 600 TABLET, FILM COATED ORAL at 12:11

## 2020-03-20 RX ADMIN — DEXAMETHASONE SODIUM PHOSPHATE 10 MG: 10 INJECTION, SOLUTION INTRAMUSCULAR; INTRAVENOUS at 12:11

## 2020-03-20 ASSESSMENT — ENCOUNTER SYMPTOMS
DYSURIA: 1
VOMITING: 0
COUGH: 0
SORE THROAT: 1
NAUSEA: 1

## 2020-03-20 NOTE — DISCHARGE INSTRUCTIONS
CALL PRIMARY CARE IF YOU GET RASH AT ALL WITH KEFLEX.       Discharge Instructions  Urinary Tract Infection  You or your child have been diagnosed with a urinary tract infection, or UTI. The urinary tract includes the kidneys (which make urine/pee), ureters (the tubes that carry urine/pee from the kidneys to the bladder), the bladder (which stores urine/pee), and urethra (the tube that carries urine/pee out of the bladder). Urinary tract infections occur when bacteria travel up the urethra into the bladder (bladder infection) and, in some cases, from there into the kidneys (kidney infection).  Generally, every Emergency Department visit should have a follow-up clinic visit with either a primary or a specialty clinic/provider. Please follow-up as instructed by your emergency provider today.  Return to the Emergency Department if:  You or your child have severe back pain.  You or your child are vomiting (throwing up) so that you cannot take your medicine.  You or your child have a new fever (had not previously had a fever) over 101 F.  You or your child have confusion or are very weak, or feel very ill.  Your child seems much more ill, will not wake up, will not respond right, or is crying for a long time and will not calm down.  You or your child are showing signs of dehydration. These signs may include decreased urination (pee), dry mouth/gums/tongue, or decreased activity.    Follow-up with your provider:   Children under 24 months need to be seen by their regular provider within one week after a diagnosis of a UTI. It may be necessary to do some more tests to look at the child s kidney or bladder.  You should begin to feel better within 24 - 48 hours of starting your antibiotic; follow-up with your regular clinic/doctor/provider if this is not the case.    Treatment:   You will be treated with an antibiotic to kill the bacteria. We have to make an educated guess, based on what we know about common bacteria and  "antibiotics, as to which antibiotic will work for your infection. We will be correct most times but there will be some cases where the antibiotic chosen is not correct (see urine cultures below).  Take a pain medication such as acetaminophen (Tylenol ) or ibuprofen (Advil , Motrin , Nuprin ).  Phenazopyridine (Pyridium , Uristat ) is a prescription medication that numbs the bladder to reduce the burning pain of some UTIs.  The same medication is available in a non-prescription version (Azo-Standard , Urodol ). This medication will change the color of the urine and tears (usually blue or orange). If you wear contacts, do not wear them while taking this medication as they may be stained by the medication.    Urine Cultures:  If indicated, a urine culture may have been performed today. This test generally takes 24-48 hours to complete so the results are not known at this time. The results can confirm that an infection is present but also determine which antibiotic is effective for the specific bacteria that is causing the infection. If your urine culture shows that the antibiotic you were given today will not work to treat your infection, we will attempt to contact you to make arrangements to change the antibiotic. If the culture confirms that the antibiotic is effective for your infection, you will not be contacted. We often recommend follow-up with your regular physician/provider on the culture results regardless of this process.    Antibiotic Warning:   If you have been placed on antibiotics - watch for signs of allergic reaction.  These include rash, lip swelling, difficulty breathing, wheezing, and dizziness.  If you develop any of these symptoms, stop the antibiotic immediately and go to an emergency room or urgent care for evaluation.    Probiotics: If you have been given an antibiotic, you may want to also take a probiotic pill or eat yogurt with live cultures. Probiotics have \"good bacteria\" to help your " intestines stay healthy. Studies have shown that probiotics help prevent diarrhea and other intestine problems (including C. diff infection) when you take antibiotics. You can buy these without a prescription in the pharmacy section of the store.   If you were given a prescription for medicine here today, be sure to read all of the information (including the package insert) that comes with your prescription.  This will include important information about the medicine, its side effects, and any warnings that you need to know about.  The pharmacist who fills the prescription can provide more information and answer questions you may have about the medicine.  If you have questions or concerns that the pharmacist cannot address, please call or return to the Emergency Department.   Remember that you can always come back to the Emergency Department if you are not able to see your regular provider in the amount of time listed above, if you get any new symptoms, or if there is anything that worries you.  Discharge Instructions  Sore Throat  You were seen today for a sore throat.  Most (>80%) sore throats are caused by a virus. Antibiotics do not help with viral infections, but you can fight off the virus on your own.  In this case, your sore throat would be treated with medications for your pain and fever.    Strep throat is a kind of sore throat caused by Group A streptococcus bacteria.  This type of sore throat is treated with antibiotics.  If you had a rapid test done today for strep throat and it did not show infection, a culture is done in some cases. The culture can take several days to complete. If the culture shows you have strep throat, we will call you and get you a prescription for antibiotics. We will not contact you with a negative culture result.  Generally, every Emergency Department visit should have a follow-up clinic visit with either a primary or a specialty clinic/provider. Please follow-up as instructed  by your emergency provider today.  Return to the Emergency Department if:  If you have difficulty breathing.  If you are drooling because you are unable to swallow.  You become dehydrated due to difficulty drinking. Signs of dehydration include weakness, dry mouth, and urinating less than 3 times per day.  If you develop swelling of the neck or tongue.  If you develop a high fever with either severe or unusual headache or stiff neck.    Treatment:    Pain relief -- Non-prescription pain medications, such as Tylenol  (acetaminophen) or Motrin , Advil  (ibuprofen) are usually recommended for pain.  Do not use a medicine that you are allergic to, or if your provider has told you not to use it.  Soft or liquid diet. Concentrate on liquids to keep yourself hydrated. Cold liquids (popsicles, ice cream, etc.) may feel good on your throat.  If you were given a prescription for medicine here today, be sure to read all of the information (including the package insert) that comes with your prescription.  This will include important information about the medicine, its side effects, and any warnings that you need to know about.  The pharmacist who fills the prescription can provide more information and answer questions you may have about the medicine.  If you have questions or concerns that the pharmacist cannot address, please call or return to the Emergency Department.   Remember that you can always come back to the Emergency Department if you are not able to see your regular provider in the amount of time listed above, if you get any new symptoms, or if there is anything that worries you.

## 2020-03-20 NOTE — ED PROVIDER NOTES
History     Chief Complaint:  Pharyngitis and Dysuria    HPI   Randee Millard is a 39 year old female who presents with pharyngitis and dysuria. Patient states that throat soreness began yesterday, and she had a low grade fever of 99.6F. She also notes she had some dysuria yesterday with associated lower abdominal and lower back discomfort. Furthermore, she has had nausea but no vomiting. She states she has had normal bowel movements. She states she took tylenol last night and this morning. She denies experiencing cough, chest pain, or shortness of breath. She notes three months ago she was diagnosed with UTI.     Allergies:  Amoxicillin  clindamycin     Medications:    The patient is currently on no regular medications.    Past Medical History:    Anemia  Uterine leiomyoma  Chronic fatigue  Reflux esophagitis    Past Surgical History:    Breast cyst aspiration    Family History:    Father: hypertension, diabetes    Social History:  The patient denies tobacco or alcohol use.    Marital Status:   [2]    Review of Systems   HENT: Positive for sore throat.    Respiratory: Negative for cough.    Gastrointestinal: Positive for nausea. Negative for vomiting.   Genitourinary: Positive for dysuria.   All other systems reviewed and are negative.    Physical Exam   Vitals:  BP: 129/85  Pulse: 106  Temp: 98.7  F (37.1  C)  Resp: 20  SpO2: 100 %    Physical Exam  General: Alert and interactive. Appears well. Cooperative and pleasant.   Eyes: The pupils are equal and round. EOMs intact. No scleral icterus.  ENT: No abnormalities to the external nose or ears.Swelling to the posterior oral pharynx with exudate bilaterally and erythema bilaterally. No uvular deviation.    Neck: Trachea is in the midline. No nuchal rigidity.     CV: Regular rate and rhythm. S1 and S2 normal without murmur, click, gallop or rub.   Resp: Breath sounds are clear bilaterally, without rhonchi, wheezes, rales. Non-labored, no retractions or  accessory muscle use.     GI: Abdomen is soft without distension. No tenderness to palpation. No peritoneal signs.    MS: Moving all extremities well. Good muscle tone.   Skin: Warm and dry. No rash or lesions noted.  Neuro: Alert and oriented x 3. No focal neurologic deficits. Good strength and sensation in upper and lower extremities. Psych: Awake. Alert.  Normal affect. Appropriate interactions.  Lymph: Tender anterior cervical lymphadenopathy.    Emergency Department Course     Laboratory:  UA with Microscopic: Ketone: Trace, Leukocyte Esterase: Moderate, WBC/HPF: 7(H), RBC: 5 (H), Bacteria: Few, Squamous Epithelial: 14(H), o/w WNL    HCG Qual: Negative    Streptococcus Group A Rapid: Positive    Interventions:  1211 Ibuprofen 600 mg Oral  1211 Decadron 10 mg IV    Emergency Department Course:  Nursing notes and vitals reviewed. (6526) I performed an exam of the patient as documented above.     IV inserted. Medicine administered as documented above. Blood drawn. Swabs collected. This was sent to the lab for further testing, results above.     1248 I rechecked the patient and discussed the results of her workup thus far.     Findings and plan explained to the Patient. Patient discharged home with instructions regarding supportive care, medications, and reasons to return. The importance of close follow-up was reviewed. The patient was prescribed Keflex.    I personally reviewed the laboratory results with the Patient and answered all related questions prior to discharge.     Impression & Plan    Medical Decision Making:  Randee Millard is a 39 year old female presented for evaluation of sore throat and dysuria. Rapid strep was positive. No clinical evidence of peritonsillar abscess or deep space infection. No meningismus, trismus, difficulty breathing. Airway is patent. Also presents with dysuria, and UA is positive for possible early infection versus contaminated sample. Fortunately, Keflex should cover both  Streptococcal pharyngitis and UTI. She has allergy to Amoxicillin (rash), and suggested that she call primary if she gets a rash with Keflex, although the cross-reactivity is low. She may need to switch to a different antibiotic if this is the case. Will use tylenol or Ibuprofen for fevers and discomfort, and I suggested salt water gargles for symptomatic treatment. Follow up with PCP in three days if no improvement or immediately if worsening. Advised for immediate return to ED if they develop high fevers not controlled with medications, flank pain, persistent vomiting, difficulty swallowing own saliva, inability to open their jaw, or any other worrisome concerns.     Diagnosis:    ICD-10-CM    1. Acute streptococcal pharyngitis  J02.0    2. Acute cystitis with hematuria  N30.01        Disposition:  discharged to home with Keflex    Discharge Medications:  New Prescriptions    CEPHALEXIN (KEFLEX) 500 MG CAPSULE    Take 1 capsule (500 mg) by mouth 2 times daily for 10 days     Scribe Disclosure:  I, Gilbert Cage, am serving as a scribe on 3/20/2020 at 11:54 AM to personally document services performed by Rufina Loredo, * based on my observations and the provider's statements to me.     Gilbert Cage  3/20/2020   [facility] EMERGENCY DEPARTMENT       Rufina Loredo PA-C  03/20/20 2011

## 2020-05-12 ENCOUNTER — VIRTUAL VISIT (OUTPATIENT)
Dept: INTERNAL MEDICINE | Facility: CLINIC | Age: 40
End: 2020-05-12
Payer: COMMERCIAL

## 2020-05-12 DIAGNOSIS — L29.9 ITCHING: Primary | ICD-10-CM

## 2020-05-12 PROCEDURE — 99214 OFFICE O/P EST MOD 30 MIN: CPT | Mod: 95 | Performed by: INTERNAL MEDICINE

## 2020-05-12 RX ORDER — HYDROXYZINE HYDROCHLORIDE 25 MG/1
25 TABLET, FILM COATED ORAL
Qty: 30 TABLET | Refills: 0 | Status: SHIPPED | OUTPATIENT
Start: 2020-05-12 | End: 2020-07-20

## 2020-05-12 NOTE — PROGRESS NOTES
"Randee Millard is a 39 year old female who is being evaluated via a billable video visit.      The patient has been notified of following:     \"This video visit will be conducted via a call between you and your physician/provider. We have found that certain health care needs can be provided without the need for an in-person physical exam.  This service lets us provide the care you need with a video conversation.  If a prescription is necessary we can send it directly to your pharmacy.  If lab work is needed we can place an order for that and you can then stop by our lab to have the test done at a later time.    Video visits are billed at different rates depending on your insurance coverage.  Please reach out to your insurance provider with any questions.    If during the course of the call the physician/provider feels a video visit is not appropriate, you will not be charged for this service.\"    Patient has given verbal consent for Video visit? Yes    How would you like to obtain your AVS? Khloe    Patient would like the video invitation sent by: Text to cell phone: [phone]    Will anyone else be joining your video visit? No    Video visit - 1:15 pm     Subjective     Randee Millard is a 39 year old female who presents to video visit today for the following health issues:    HPI      Pt c/o itchy skin -all over the body since 2 wks  Has on and off rash on rt upper arm and neck area,, patient has not used any new medications, no new soaps, detergents, lotions.  Patient states itching is warm at night and sleep is disturbed secondary to this, patient has been taking over-the-counter Benadryl.        Patient Active Problem List   Diagnosis     NO ACTIVE PROBLEMS     Anemia due to other cause     Vitamin D deficiency     Chronic fatigue     Thyromegaly     Uterine leiomyoma, unspecified location     Past Surgical History:   Procedure Laterality Date     US BREAST CYST ASPIRATION INITIAL RT         Social " History     Tobacco Use     Smoking status: Never Smoker     Smokeless tobacco: Never Used   Substance Use Topics     Alcohol use: No     Alcohol/week: 0.0 standard drinks     Family History   Problem Relation Age of Onset     Hypertension Father      Diabetes Father 63     Family History Negative Mother          Current Outpatient Medications   Medication Sig Dispense Refill     ferrous sulfate (FE TABS) 325 (65 Fe) MG EC tablet Take 1 tablet (325 mg) by mouth daily 90 tablet 1       Reviewed and updated as needed this visit by Provider         Review of Systems   CONSTITUTIONAL: NEGATIVE for fever, chills, change in weight  INTEGUMENTARY/SKIN:  Itching   EYES: NEGATIVE for vision changes or irritation  ENT/MOUTH: NEGATIVE for ear, mouth and throat problems  RESP: NEGATIVE for significant cough or SOB  CV: NEGATIVE for chest pain, palpitations or peripheral edema       Objective   Reported vitals:  There were no vitals taken for this visit.      Physical Exam     GENERAL: Healthy, alert and no distress  EYES: Eyes grossly normal to inspection.  No discharge or erythema, or obvious scleral/conjunctival abnormalities.  RESP: No audible wheeze, cough, or visible cyanosis.  No visible retractions or increased work of breathing.    SKIN: faint pabular rash noted on rt fore arm  area   NEURO:  .  Mentation and speech appropriate for age.  PSYCH: Mentation appears normal, affect normal/bright, judgement and insight intact, normal speech and appearance well-groomed.       Assessment/Plan:    1. Itching  -  Started on hydrOXYzine (ATARAX) 25 MG tablet; Take 1 tablet (25 mg) by mouth nightly as needed for itching  explained clearly about the medication,insructions and side effects. Advised not to drive or operate any machinery while on this med  Also check Hepatic panel; Future       Video-Visit Details    Type of service:  Video Visit    Video End Time: 1:26 pm     Originating Location (pt. Location): home     Distant  Location (provider location):  [facility]     Platform used for Video Visit: Johnnie Hou MD

## 2020-07-08 ENCOUNTER — OFFICE VISIT (OUTPATIENT)
Dept: INTERNAL MEDICINE | Facility: CLINIC | Age: 40
End: 2020-07-08
Payer: COMMERCIAL

## 2020-07-08 VITALS
WEIGHT: 198 LBS | DIASTOLIC BLOOD PRESSURE: 72 MMHG | OXYGEN SATURATION: 99 % | BODY MASS INDEX: 30.11 KG/M2 | RESPIRATION RATE: 16 BRPM | HEART RATE: 71 BPM | SYSTOLIC BLOOD PRESSURE: 128 MMHG

## 2020-07-08 DIAGNOSIS — L50.9 URTICARIA: Primary | ICD-10-CM

## 2020-07-08 PROCEDURE — 99213 OFFICE O/P EST LOW 20 MIN: CPT | Performed by: INTERNAL MEDICINE

## 2020-07-08 NOTE — PROGRESS NOTES
Subjective     Randee Millard is a 40 year old female who presents to clinic today for the following health issues:    HPI     Pt is a 40 year old female who is seen here to day to f/u on  itching all over her body. Pt had virtual visit and was prescribed Hydroxyzine with good relief but symptoms back when the med effect wears off. Pt also c/o having hives when she scratches.  Has noticed hives on simeon UE, LE, and trunk.  Currently no hives. Has not used any new products or medications. No h/o food allergies.       Patient Active Problem List   Diagnosis     NO ACTIVE PROBLEMS     Anemia due to other cause     Vitamin D deficiency     Chronic fatigue     Thyromegaly     Uterine leiomyoma, unspecified location     Past Surgical History:   Procedure Laterality Date     US BREAST CYST ASPIRATION INITIAL RT         Social History     Tobacco Use     Smoking status: Never Smoker     Smokeless tobacco: Never Used   Substance Use Topics     Alcohol use: No     Alcohol/week: 0.0 standard drinks     Family History   Problem Relation Age of Onset     Hypertension Father      Diabetes Father 63     Family History Negative Mother          Current Outpatient Medications   Medication Sig Dispense Refill     ferrous sulfate (FE TABS) 325 (65 Fe) MG EC tablet Take 1 tablet (325 mg) by mouth daily 90 tablet 1     hydrOXYzine (ATARAX) 25 MG tablet Take 1 tablet (25 mg) by mouth nightly as needed for itching 30 tablet 0      Reviewed and updated as needed this visit by Provider         Review of Systems   CONSTITUTIONAL: NEGATIVE for fever, chills, change in weight  INTEGUMENTARY/SKIN: itching   RESP: NEGATIVE for significant cough or SOB  CV: NEGATIVE for chest pain, palpitations or peripheral edema      Objective    /72   Pulse 71   Resp 16   Wt 89.8 kg (198 lb)   SpO2 99%   Breastfeeding No   BMI 30.11 kg/m    Body mass index is 30.11 kg/m .  Physical Exam   GENERAL: healthy, alert and no distress  Skin: no rash or hives  noted any where on the body at this time.            Assessment & Plan     (L50.9) Urticaria  (primary encounter diagnosis)  Plan: referred to ALLERGY/ASTHMA ADULT REFERRAL for further evaluation and management .  Continue Hydroxyzine as directed at thi time.Advised not to drive or operate any machinery while on this med.pt understands.          Abe Hou MD  [facility]

## 2020-07-20 ENCOUNTER — OFFICE VISIT (OUTPATIENT)
Dept: INTERNAL MEDICINE | Facility: CLINIC | Age: 40
End: 2020-07-20
Payer: COMMERCIAL

## 2020-07-20 VITALS
DIASTOLIC BLOOD PRESSURE: 71 MMHG | RESPIRATION RATE: 16 BRPM | WEIGHT: 195.3 LBS | OXYGEN SATURATION: 99 % | TEMPERATURE: 98.3 F | HEIGHT: 68 IN | HEART RATE: 107 BPM | BODY MASS INDEX: 29.6 KG/M2 | SYSTOLIC BLOOD PRESSURE: 115 MMHG

## 2020-07-20 DIAGNOSIS — Z00.00 ROUTINE HISTORY AND PHYSICAL EXAMINATION OF ADULT: Primary | ICD-10-CM

## 2020-07-20 DIAGNOSIS — R35.0 URINARY FREQUENCY: ICD-10-CM

## 2020-07-20 LAB
ALBUMIN UR-MCNC: ABNORMAL MG/DL
APPEARANCE UR: CLEAR
BACTERIA #/AREA URNS HPF: ABNORMAL /HPF
BILIRUB UR QL STRIP: ABNORMAL
COLOR UR AUTO: YELLOW
GLUCOSE UR STRIP-MCNC: NEGATIVE MG/DL
HGB BLD-MCNC: 10.5 G/DL (ref 11.7–15.7)
HGB UR QL STRIP: NEGATIVE
KETONES UR STRIP-MCNC: ABNORMAL MG/DL
LEUKOCYTE ESTERASE UR QL STRIP: NEGATIVE
NITRATE UR QL: NEGATIVE
NON-SQ EPI CELLS #/AREA URNS LPF: ABNORMAL /LPF
PH UR STRIP: 5.5 PH (ref 5–7)
RBC #/AREA URNS AUTO: ABNORMAL /HPF
SOURCE: ABNORMAL
SP GR UR STRIP: 1.02 (ref 1–1.03)
UROBILINOGEN UR STRIP-ACNC: 0.2 EU/DL (ref 0.2–1)
WBC #/AREA URNS AUTO: ABNORMAL /HPF

## 2020-07-20 PROCEDURE — 84443 ASSAY THYROID STIM HORMONE: CPT | Performed by: INTERNAL MEDICINE

## 2020-07-20 PROCEDURE — 80053 COMPREHEN METABOLIC PANEL: CPT | Performed by: INTERNAL MEDICINE

## 2020-07-20 PROCEDURE — 99396 PREV VISIT EST AGE 40-64: CPT | Performed by: INTERNAL MEDICINE

## 2020-07-20 PROCEDURE — 36415 COLL VENOUS BLD VENIPUNCTURE: CPT | Performed by: INTERNAL MEDICINE

## 2020-07-20 PROCEDURE — 85018 HEMOGLOBIN: CPT | Performed by: INTERNAL MEDICINE

## 2020-07-20 PROCEDURE — 81001 URINALYSIS AUTO W/SCOPE: CPT | Performed by: INTERNAL MEDICINE

## 2020-07-20 PROCEDURE — 82306 VITAMIN D 25 HYDROXY: CPT | Performed by: INTERNAL MEDICINE

## 2020-07-20 PROCEDURE — 80061 LIPID PANEL: CPT | Performed by: INTERNAL MEDICINE

## 2020-07-20 ASSESSMENT — ENCOUNTER SYMPTOMS
EYES NEGATIVE: 1
RESPIRATORY NEGATIVE: 1
CONSTIPATION: 1
ENDOCRINE NEGATIVE: 1
GASTROINTESTINAL NEGATIVE: 1
PSYCHIATRIC NEGATIVE: 1
NEUROLOGICAL NEGATIVE: 1
CARDIOVASCULAR NEGATIVE: 1
FREQUENCY: 1
CONSTITUTIONAL NEGATIVE: 1
ABDOMINAL PAIN: 1
MUSCULOSKELETAL NEGATIVE: 1

## 2020-07-20 ASSESSMENT — MIFFLIN-ST. JEOR: SCORE: 1599.37

## 2020-07-20 NOTE — PROGRESS NOTES
SUBJECTIVE:   CC: Randee Millard is an 40 year old woman who presents for preventive health visit.     Healthy Habits:     Getting at least 3 servings of Calcium per day:  Yes    Bi-annual eye exam:  Yes    Dental care twice a year:  Yes    Sleep apnea or symptoms of sleep apnea:  None    Diet:  Regular (no restrictions)    Frequency of exercise:  2-3 days/week    Duration of exercise:  Less than 15 minutes    Taking medications regularly:  Yes    Medication side effects:  None    PHQ-2 Total Score: 0    Additional concerns today:  No       Pt C/o frequency of urination since 2 days, no hematuria, no fever/chills.       Today's PHQ-2 Score:   PHQ-2 ( 1999 Pfizer) 7/20/2020   Q1: Little interest or pleasure in doing things 0   Q2: Feeling down, depressed or hopeless 0   PHQ-2 Score 0   Q1: Little interest or pleasure in doing things Not at all   Q2: Feeling down, depressed or hopeless Not at all   PHQ-2 Score 0       Abuse: Current or Past(Physical, Sexual or Emotional)- No  Do you feel safe in your environment? Yes    Past Medical History:   Diagnosis Date     Anemia      Reflux esophagitis        Past Surgical History:   Procedure Laterality Date     US BREAST CYST ASPIRATION INITIAL RT         Current Outpatient Medications   Medication Sig Dispense Refill     ferrous sulfate (FE TABS) 325 (65 Fe) MG EC tablet Take 1 tablet (325 mg) by mouth daily 90 tablet 1       Family History   Problem Relation Age of Onset     Hypertension Father      Diabetes Father 63     Family History Negative Mother        Social History     Tobacco Use     Smoking status: Never Smoker     Smokeless tobacco: Never Used   Substance Use Topics     Alcohol use: No     Alcohol/week: 0.0 standard drinks     If you drink alcohol do you typically have >3 drinks per day or >7 drinks per week? No    Alcohol Use 7/20/2020   Prescreen: >3 drinks/day or >7 drinks/week? Not Applicable   Prescreen: >3 drinks/day or >7 drinks/week? -   No flowsheet  "data found.    Reviewed orders with patient.  Reviewed health maintenance and updated orders accordingly - Yes        History of abnormal Pap smear: NO - age 30- 65 PAP every 3 years recommended  PAP / HPV Latest Ref Rng & Units 3/11/2019 2/5/2016   PAP - NIL NIL   HPV 16 DNA NEG:Negative Negative Negative   HPV 18 DNA NEG:Negative Negative Negative   OTHER HR HPV NEG:Negative Negative Negative     Reviewed and updated as needed this visit by clinical staff  Tobacco  Allergies  Meds  Med Hx  Surg Hx  Fam Hx  Soc Hx        Reviewed and updated as needed this visit by Provider            Review of Systems   Constitutional: Negative.    HENT: Negative.    Eyes: Negative.    Respiratory: Negative.    Cardiovascular: Negative.    Gastrointestinal: Negative.    Endocrine: Negative.    Genitourinary: Positive for frequency.   Musculoskeletal: Negative.    Neurological: Negative.    Psychiatric/Behavioral: Negative.        OBJECTIVE:   /71   Pulse 107   Temp 98.3  F (36.8  C) (Oral)   Resp 16   Ht 1.727 m (5' 8\")   Wt 88.6 kg (195 lb 4.8 oz)   LMP 06/29/2020   SpO2 99%   BMI 29.70 kg/m    Physical Exam  GENERAL: healthy, alert and no distress  EYES: Eyes grossly normal to inspection, PERRL and conjunctivae and sclerae normal  HENT: ear canals and TM's normal, nose and mouth without ulcers or lesions  NECK: no adenopathy, no asymmetry, masses, or scars and thyroid normal to palpation  RESP: lungs clear to auscultation - no rales, rhonchi or wheezes  BREAST: normal without masses, tenderness or nipple discharge and no palpable axillary masses or adenopathy  CV: regular rate and rhythm,   ABDOMEN: soft, nontender,  and bowel sounds normal  MS: no gross musculoskeletal defects noted, no edema  SKIN: no suspicious lesions or rashes  NEURO: Normal strength and tone, mentation intact and speech normal  PSYCH: mentation appears normal, affect normal/bright         ASSESSMENT/PLAN:     (Z00.00) Routine history " "and physical examination of adult  (primary encounter diagnosis)  Plan: Hemoglobin, Comprehensive metabolic panel,         Lipid panel reflex to direct LDL Fasting, TSH         with free T4 reflex, Vitamin D Deficiency            (R35.0) Urinary frequency  Plan: UA with Microscopic reflex to Culture.pt was told I will contact her after results and proceed accordingly.     COUNSELING:  Reviewed preventive health counseling, as reflected in patient instructions       Regular exercise       Healthy diet/nutrition    Estimated body mass index is 30.11 kg/m  as calculated from the following:    Height as of 1/29/20: 1.727 m (5' 8\").    Weight as of 7/8/20: 89.8 kg (198 lb).    Weight management plan: Discussed healthy diet and exercise guidelines     reports that she has never smoked. She has never used smokeless tobacco.      Counseling Resources:  ATP IV Guidelines  Pooled Cohorts Equation Calculator  Breast Cancer Risk Calculator  FRAX Risk Assessment  ICSI Preventive Guidelines  Dietary Guidelines for Americans, 2010  USDA's MyPlate  ASA Prophylaxis  Lung CA Screening    bAe Hou MD  [facility]  "

## 2020-07-20 NOTE — NURSING NOTE
"/71   Pulse 107   Temp 98.3  F (36.8  C) (Oral)   Resp 16   Ht 1.727 m (5' 8\")   Wt 88.6 kg (195 lb 4.8 oz)   LMP 06/29/2020   SpO2 99%   BMI 29.70 kg/m    Patient in for Female Px.  More Mendieta, FRANCISCO    "

## 2020-07-21 DIAGNOSIS — R80.9 PROTEINURIA: Primary | ICD-10-CM

## 2020-07-21 LAB
ALBUMIN SERPL-MCNC: 3.5 G/DL (ref 3.4–5)
ALP SERPL-CCNC: 69 U/L (ref 40–150)
ALT SERPL W P-5'-P-CCNC: 14 U/L (ref 0–50)
ANION GAP SERPL CALCULATED.3IONS-SCNC: 9 MMOL/L (ref 3–14)
AST SERPL W P-5'-P-CCNC: 10 U/L (ref 0–45)
BILIRUB SERPL-MCNC: 0.3 MG/DL (ref 0.2–1.3)
BUN SERPL-MCNC: 8 MG/DL (ref 7–30)
CALCIUM SERPL-MCNC: 8.3 MG/DL (ref 8.5–10.1)
CHLORIDE SERPL-SCNC: 108 MMOL/L (ref 94–109)
CHOLEST SERPL-MCNC: 162 MG/DL
CO2 SERPL-SCNC: 22 MMOL/L (ref 20–32)
CREAT SERPL-MCNC: 0.67 MG/DL (ref 0.52–1.04)
DEPRECATED CALCIDIOL+CALCIFEROL SERPL-MC: 20 UG/L (ref 20–75)
GFR SERPL CREATININE-BSD FRML MDRD: >90 ML/MIN/{1.73_M2}
GLUCOSE SERPL-MCNC: 80 MG/DL (ref 70–99)
HDLC SERPL-MCNC: 39 MG/DL
LDLC SERPL CALC-MCNC: 103 MG/DL
NONHDLC SERPL-MCNC: 123 MG/DL
POTASSIUM SERPL-SCNC: 3.7 MMOL/L (ref 3.4–5.3)
PROT SERPL-MCNC: 7.6 G/DL (ref 6.8–8.8)
SODIUM SERPL-SCNC: 139 MMOL/L (ref 133–144)
TRIGL SERPL-MCNC: 101 MG/DL
TSH SERPL DL<=0.005 MIU/L-ACNC: 2.14 MU/L (ref 0.4–4)

## 2020-08-19 ENCOUNTER — OFFICE VISIT (OUTPATIENT)
Dept: OBGYN | Facility: CLINIC | Age: 40
End: 2020-08-19
Payer: COMMERCIAL

## 2020-08-19 VITALS
SYSTOLIC BLOOD PRESSURE: 100 MMHG | BODY MASS INDEX: 29.86 KG/M2 | WEIGHT: 197 LBS | HEIGHT: 68 IN | DIASTOLIC BLOOD PRESSURE: 70 MMHG

## 2020-08-19 DIAGNOSIS — D25.9 UTERINE LEIOMYOMA, UNSPECIFIED LOCATION: ICD-10-CM

## 2020-08-19 DIAGNOSIS — R10.2 PELVIC PAIN IN FEMALE: Primary | ICD-10-CM

## 2020-08-19 DIAGNOSIS — N92.6 IRREGULAR PERIODS: ICD-10-CM

## 2020-08-19 LAB
ERYTHROCYTE [DISTWIDTH] IN BLOOD BY AUTOMATED COUNT: 18.1 % (ref 10–15)
HCT VFR BLD AUTO: 34.2 % (ref 35–47)
HGB BLD-MCNC: 10.8 G/DL (ref 11.7–15.7)
MCH RBC QN AUTO: 23.8 PG (ref 26.5–33)
MCHC RBC AUTO-ENTMCNC: 31.6 G/DL (ref 31.5–36.5)
MCV RBC AUTO: 75 FL (ref 78–100)
PLATELET # BLD AUTO: 366 10E9/L (ref 150–450)
RBC # BLD AUTO: 4.54 10E12/L (ref 3.8–5.2)
WBC # BLD AUTO: 10.3 10E9/L (ref 4–11)

## 2020-08-19 PROCEDURE — 85027 COMPLETE CBC AUTOMATED: CPT | Performed by: ADVANCED PRACTICE MIDWIFE

## 2020-08-19 PROCEDURE — 36415 COLL VENOUS BLD VENIPUNCTURE: CPT | Performed by: ADVANCED PRACTICE MIDWIFE

## 2020-08-19 PROCEDURE — 84443 ASSAY THYROID STIM HORMONE: CPT | Performed by: ADVANCED PRACTICE MIDWIFE

## 2020-08-19 PROCEDURE — 99214 OFFICE O/P EST MOD 30 MIN: CPT | Performed by: ADVANCED PRACTICE MIDWIFE

## 2020-08-19 ASSESSMENT — MIFFLIN-ST. JEOR: SCORE: 1607.09

## 2020-08-19 NOTE — PATIENT INSTRUCTIONS
Dysmenorrhea or painful menstruation     If you have painful periods consider trying the following:      Note on your calendar the beginning and end of each of your periods.       Try to anticipate your period by a couple days and take ibuprofen(Advil or Motrin) 800 mg three times a day for a day or two before your period starts or you can use naproxen (Aleve) 500 mg twice a day.       When your period starts continue with the ibuprofen for as long as you have cramping.       If the ibuprofen doesn't work completely you could add acetaminophen (Tylenol) 650 mg every four hours or 1000 mg every 6 hours      You can also use heat to your abdomen or back either in a tub or shower or with a heating pad.  You may also purchase Thermacare which stick to your abdomen or back and supply heat for up to 8 hours.       During your period try to get a good amount of sleep, eat a healthy well balanced diet, and make sure you drink plenty of water    If these methods do not improve you symptoms make an appointment to see your midwife to discuss hormonal options.    Please call with questions/concerns:    [facility] Certified Nurse Midwives  [phone]

## 2020-08-19 NOTE — PROGRESS NOTES
SUBJECTIVE:   Randee is a 40 year old here for pelvic pain  Pt states her pelvic pain started ab out a year ago. She has a history of ovarian cysts. She was seen by Dr Mckeon about 6 mo ago d/t pelvic pain and it was noted that she had uterine fibroids on a recent ultrasound.     Pt desired to proceed with expectant management at that point.    Pt also states that for the past year, she has been having two periods a month. Sometimes they are light, other times they are heavy. She states that her pelvic pain is across the low pelvis and is crampy. Generally the pain starts prior to her periods and is severe for the first few days of her cycle, and she states she typically cannot get out of bed d/t the pain.     Pelvic US 9/2020 showed       Measurements:  Uterus: 9.4 x 6.0 x 6.2 cm.     Position is anteverted.  Contour is irreg w myomata:   1) posterior right 4.0 x 3.6 cm.     Endo cav: 11.0 mm         Smooth/regular/wnl  Cervix: Wnl     Right ovary: 3.4 x 2.0 x 2.0 cm.   Wnl  Left ovary: 3.0 x 2.0 x 2.5 cm.  Wnl     Cul de sac: no free fluid     Myomatous uterus: Endometrial cavity not affected.     Normal bilateral ovaries      Endometrium noted to be normal            Vaginal Symptoms     Onset: last week, pain usually starts at beginning of period and is so severe she doesn't leave the bed for first few days of cycle    Description:  Vaginal Discharge: wn;  Itching (Pruritis): No  Burning sensation:  No  Odor:  No  Irritation:  No    Accompanying Signs & Symptoms:  Pain with Urination: No  Abdominal Pain:  No  Fever: No   History:   Sexually active:  Yes  New Partner:  No  Possibility of Pregnancy:  no  Contraceptive type: condoms    Precipitating factors:   Recent Antibiotic Use: No    Alleviating factors:  Tylenol, heat pack         Other associated symptoms: none.  History of STI's:  No  STI Testing offered: YES , declined    LMP: Patient's last menstrual period was 07/31/2020 (exact date).      Patient Active  "Problem List   Diagnosis     NO ACTIVE PROBLEMS     Anemia due to other cause     Vitamin D deficiency     Chronic fatigue     Thyromegaly     Uterine leiomyoma, unspecified location     Past Medical History:   Diagnosis Date     Anemia      Reflux esophagitis      Past Surgical History:   Procedure Laterality Date     US BREAST CYST ASPIRATION INITIAL RT       Current Outpatient Medications   Medication Sig Dispense Refill     ferrous sulfate (FE TABS) 325 (65 Fe) MG EC tablet Take 1 tablet (325 mg) by mouth daily 90 tablet 1     Allergies   Allergen Reactions     Amoxicillin Rash     Clindamycin Rash         PHYSICAL EXAM:    /70 (BP Location: Left arm, Patient Position: Chair, Cuff Size: Adult Regular)   Ht 1.727 m (5' 8\")   Wt 89.4 kg (197 lb)   LMP 07/31/2020 (Exact Date)   Breastfeeding No   BMI 29.95 kg/m  , Body mass index is 29.95 kg/m .  Exam:  Constitutional: healthy, alert and no distress  Respiratory: negative, Percussion normal. Good diaphragmatic excursion. Lungs clear  Psychiatric: mentation appears normal and affect normal/bright    Pelvic Exam:    Pelvic Exam:  Vulva: No external lesions, normal hair distribution, no adenopathy  Vagina: Moist, pink, no abnormal discharge, well rugated, no lesions  Cervix:  Smooth, pink, no visible lesions  Uterus: Normal size, anteverted, non-tender, mobile, diffusely tender to deep palpation  Ovaries: No mass, non-tender, mobile  Rectal exam: Deferred    ASSESSMENT/PLAN:   1. (R10.2) Pelvic pain in female  (primary encounter diagnosis)  Plan: US Pelvic Complete w Transvaginal      2. (D25.9) Uterine leiomyoma, unspecified location  Plan: US Pelvic Complete w Transvaginal  - Pelvic US to assess if fibroids stable, larger, etc.   - Discussed fibroids likely the cause of her pelvic pain. Reviewed expectant management, medication and surgical management. Pt desires to decide after getting US, but would be open to trying birth control to help her symptoms. " She does plan to have more children and is not interested in surgical management until after she is done having children.     3. (N92.6) Irregular periods  Plan: CBC with platelets, TSH with free T4 reflex  - Discussed option to try birth control to regulate periods, she would like to wait and see what her labs show        No results found for any visits on 08/19/20.      Luisana Marion CNM

## 2020-08-20 LAB — TSH SERPL DL<=0.005 MIU/L-ACNC: 2.75 MU/L (ref 0.4–4)

## 2020-09-08 ENCOUNTER — ANCILLARY PROCEDURE (OUTPATIENT)
Dept: ULTRASOUND IMAGING | Facility: CLINIC | Age: 40
End: 2020-09-08
Attending: ADVANCED PRACTICE MIDWIFE
Payer: COMMERCIAL

## 2020-09-08 DIAGNOSIS — D25.9 UTERINE LEIOMYOMA, UNSPECIFIED LOCATION: ICD-10-CM

## 2020-09-08 DIAGNOSIS — R10.2 PELVIC PAIN IN FEMALE: ICD-10-CM

## 2020-09-08 PROCEDURE — 76830 TRANSVAGINAL US NON-OB: CPT | Performed by: OBSTETRICS & GYNECOLOGY

## 2020-09-08 PROCEDURE — 76856 US EXAM PELVIC COMPLETE: CPT | Performed by: OBSTETRICS & GYNECOLOGY

## 2020-09-09 ENCOUNTER — TRANSFERRED RECORDS (OUTPATIENT)
Dept: HEALTH INFORMATION MANAGEMENT | Facility: CLINIC | Age: 40
End: 2020-09-09

## 2020-09-10 ENCOUNTER — HOSPITAL ENCOUNTER (OUTPATIENT)
Dept: LAB | Facility: CLINIC | Age: 40
Discharge: HOME | End: 2020-09-10
Attending: ALLERGY & IMMUNOLOGY | Admitting: ALLERGY & IMMUNOLOGY
Payer: COMMERCIAL

## 2020-09-10 DIAGNOSIS — L50.1 IDIOPATHIC URTICARIA: Primary | ICD-10-CM

## 2020-09-10 LAB
ALBUMIN SERPL-MCNC: 3.4 G/DL (ref 3.4–5)
ALP SERPL-CCNC: 75 U/L (ref 40–150)
ALT SERPL W P-5'-P-CCNC: 15 U/L (ref 0–50)
ANION GAP SERPL CALCULATED.3IONS-SCNC: 5 MMOL/L (ref 3–14)
AST SERPL W P-5'-P-CCNC: 11 U/L (ref 0–45)
BILIRUB SERPL-MCNC: 0.2 MG/DL (ref 0.2–1.3)
BUN SERPL-MCNC: 8 MG/DL (ref 7–30)
CALCIUM SERPL-MCNC: 8.4 MG/DL (ref 8.5–10.1)
CHLORIDE SERPL-SCNC: 110 MMOL/L (ref 94–109)
CO2 SERPL-SCNC: 23 MMOL/L (ref 20–32)
CREAT SERPL-MCNC: 0.71 MG/DL (ref 0.52–1.04)
CRP SERPL-MCNC: 11 MG/L (ref 0–8)
ERYTHROCYTE [SEDIMENTATION RATE] IN BLOOD BY WESTERGREN METHOD: 41 MM/H (ref 0–20)
GFR SERPL CREATININE-BSD FRML MDRD: >90 ML/MIN/{1.73_M2}
GLUCOSE SERPL-MCNC: 80 MG/DL (ref 70–99)
POTASSIUM SERPL-SCNC: 3.7 MMOL/L (ref 3.4–5.3)
PROT SERPL-MCNC: 7.6 G/DL (ref 6.8–8.8)
SODIUM SERPL-SCNC: 138 MMOL/L (ref 133–144)

## 2020-09-10 PROCEDURE — 86160 COMPLEMENT ANTIGEN: CPT | Performed by: ALLERGY & IMMUNOLOGY

## 2020-09-10 PROCEDURE — 84999 UNLISTED CHEMISTRY PROCEDURE: CPT | Performed by: ALLERGY & IMMUNOLOGY

## 2020-09-10 PROCEDURE — 86038 ANTINUCLEAR ANTIBODIES: CPT | Performed by: ALLERGY & IMMUNOLOGY

## 2020-09-10 PROCEDURE — 88184 FLOWCYTOMETRY/ TC 1 MARKER: CPT | Performed by: ALLERGY & IMMUNOLOGY

## 2020-09-10 PROCEDURE — 86376 MICROSOMAL ANTIBODY EACH: CPT | Performed by: ALLERGY & IMMUNOLOGY

## 2020-09-10 PROCEDURE — 80053 COMPREHEN METABOLIC PANEL: CPT | Performed by: ALLERGY & IMMUNOLOGY

## 2020-09-10 PROCEDURE — 36415 COLL VENOUS BLD VENIPUNCTURE: CPT | Performed by: ALLERGY & IMMUNOLOGY

## 2020-09-10 PROCEDURE — 88185 FLOWCYTOMETRY/TC ADD-ON: CPT | Performed by: ALLERGY & IMMUNOLOGY

## 2020-09-10 PROCEDURE — 86140 C-REACTIVE PROTEIN: CPT | Performed by: ALLERGY & IMMUNOLOGY

## 2020-09-10 PROCEDURE — 86039 ANTINUCLEAR ANTIBODIES (ANA): CPT | Performed by: ALLERGY & IMMUNOLOGY

## 2020-09-10 PROCEDURE — 85652 RBC SED RATE AUTOMATED: CPT | Performed by: ALLERGY & IMMUNOLOGY

## 2020-09-11 LAB
ANA PAT SER IF-IMP: ABNORMAL
ANA SER QL IF: ABNORMAL
ANA TITR SER IF: ABNORMAL {TITER}
C4 SERPL-MCNC: 41 MG/DL (ref 13–39)
THYROPEROXIDASE AB SERPL-ACNC: 85 IU/ML

## 2020-09-15 ENCOUNTER — TELEPHONE (OUTPATIENT)
Dept: OBGYN | Facility: CLINIC | Age: 40
End: 2020-09-15

## 2020-09-15 DIAGNOSIS — Z30.011 ENCOUNTER FOR INITIAL PRESCRIPTION OF CONTRACEPTIVE PILLS: ICD-10-CM

## 2020-09-15 DIAGNOSIS — D25.9 UTERINE LEIOMYOMA, UNSPECIFIED LOCATION: Primary | ICD-10-CM

## 2020-09-15 LAB — MISCELLANEOUS TEST: NORMAL

## 2020-09-15 NOTE — TELEPHONE ENCOUNTER
She saw you on 8/19 and had labs, do you still want her to come back for consult?  Just wanted to make sure.  Thanks      Guadalupe DAVID R.N.

## 2020-09-16 RX ORDER — NORGESTIMATE AND ETHINYL ESTRADIOL 0.25-0.035
1 KIT ORAL DAILY
Qty: 28 TABLET | Refills: 11 | Status: SHIPPED | OUTPATIENT
Start: 2020-09-16 | End: 2021-04-07

## 2020-09-16 NOTE — TELEPHONE ENCOUNTER
Called pt to discuss plan of care.     Per our discussion at her last visit, she desires to start OCPs. Rx sent in. Reviewed how to take it, side effects, efficacy, and ACHES warning signs. Use backup method x1 wk.     Recommended she follow up in 3mo for repeat TVUS to assess fibroid and ensure it remains stable.     She states understanding and will call the clinic to schedule.     BLANCA Mensah, KRISTINEM

## 2020-09-18 LAB
RESULT: NORMAL
SEND OUTS MISC TEST CODE: NORMAL
SEND OUTS MISC TEST SPECIMEN: NORMAL
TEST NAME: NORMAL

## 2020-10-06 DIAGNOSIS — D25.9 UTERINE LEIOMYOMA, UNSPECIFIED LOCATION: Primary | ICD-10-CM

## 2020-11-22 ENCOUNTER — APPOINTMENT (OUTPATIENT)
Dept: CT IMAGING | Facility: CLINIC | Age: 40
End: 2020-11-22
Attending: PHYSICIAN ASSISTANT
Payer: COMMERCIAL

## 2020-11-22 ENCOUNTER — HOSPITAL ENCOUNTER (EMERGENCY)
Facility: CLINIC | Age: 40
Discharge: HOME | End: 2020-11-22
Attending: PHYSICIAN ASSISTANT | Admitting: PHYSICIAN ASSISTANT
Payer: COMMERCIAL

## 2020-11-22 VITALS
SYSTOLIC BLOOD PRESSURE: 128 MMHG | HEART RATE: 88 BPM | TEMPERATURE: 98.2 F | DIASTOLIC BLOOD PRESSURE: 78 MMHG | BODY MASS INDEX: 29.65 KG/M2 | RESPIRATION RATE: 20 BRPM | OXYGEN SATURATION: 98 % | WEIGHT: 195 LBS

## 2020-11-22 DIAGNOSIS — R10.32 LLQ ABDOMINAL PAIN: ICD-10-CM

## 2020-11-22 DIAGNOSIS — Z86.16 HISTORY OF 2019 NOVEL CORONAVIRUS DISEASE (COVID-19): ICD-10-CM

## 2020-11-22 LAB
ALBUMIN SERPL-MCNC: 3.3 G/DL (ref 3.4–5)
ALBUMIN UR-MCNC: NEGATIVE MG/DL
ALP SERPL-CCNC: 81 U/L (ref 40–150)
ALT SERPL W P-5'-P-CCNC: 12 U/L (ref 0–50)
ANION GAP SERPL CALCULATED.3IONS-SCNC: 5 MMOL/L (ref 3–14)
APPEARANCE UR: CLEAR
AST SERPL W P-5'-P-CCNC: 19 U/L (ref 0–45)
BASOPHILS # BLD AUTO: 0 10E9/L (ref 0–0.2)
BASOPHILS NFR BLD AUTO: 0.2 %
BILIRUB SERPL-MCNC: 0.2 MG/DL (ref 0.2–1.3)
BILIRUB UR QL STRIP: NEGATIVE
BUN SERPL-MCNC: 6 MG/DL (ref 7–30)
CALCIUM SERPL-MCNC: 8.6 MG/DL (ref 8.5–10.1)
CHLORIDE SERPL-SCNC: 108 MMOL/L (ref 94–109)
CO2 SERPL-SCNC: 27 MMOL/L (ref 20–32)
COLOR UR AUTO: ABNORMAL
CREAT SERPL-MCNC: 0.59 MG/DL (ref 0.52–1.04)
D DIMER PPP FEU-MCNC: <0.3 UG/ML FEU (ref 0–0.5)
DIFFERENTIAL METHOD BLD: ABNORMAL
EOSINOPHIL # BLD AUTO: 0.1 10E9/L (ref 0–0.7)
EOSINOPHIL NFR BLD AUTO: 0.5 %
ERYTHROCYTE [DISTWIDTH] IN BLOOD BY AUTOMATED COUNT: 16.5 % (ref 10–15)
GFR SERPL CREATININE-BSD FRML MDRD: >90 ML/MIN/{1.73_M2}
GLUCOSE SERPL-MCNC: 96 MG/DL (ref 70–99)
GLUCOSE UR STRIP-MCNC: NEGATIVE MG/DL
HCG UR QL: NEGATIVE
HCT VFR BLD AUTO: 34.5 % (ref 35–47)
HGB BLD-MCNC: 10.5 G/DL (ref 11.7–15.7)
HGB UR QL STRIP: NEGATIVE
IMM GRANULOCYTES # BLD: 0.1 10E9/L (ref 0–0.4)
IMM GRANULOCYTES NFR BLD: 0.4 %
KETONES UR STRIP-MCNC: NEGATIVE MG/DL
LACTATE BLD-SCNC: 1.2 MMOL/L (ref 0.7–2)
LEUKOCYTE ESTERASE UR QL STRIP: NEGATIVE
LIPASE SERPL-CCNC: 60 U/L (ref 73–393)
LYMPHOCYTES # BLD AUTO: 1.9 10E9/L (ref 0.8–5.3)
LYMPHOCYTES NFR BLD AUTO: 10.1 %
MCH RBC QN AUTO: 24 PG (ref 26.5–33)
MCHC RBC AUTO-ENTMCNC: 30.4 G/DL (ref 31.5–36.5)
MCV RBC AUTO: 79 FL (ref 78–100)
MONOCYTES # BLD AUTO: 0.9 10E9/L (ref 0–1.3)
MONOCYTES NFR BLD AUTO: 5.1 %
MUCOUS THREADS #/AREA URNS LPF: PRESENT /LPF
NEUTROPHILS # BLD AUTO: 15.4 10E9/L (ref 1.6–8.3)
NEUTROPHILS NFR BLD AUTO: 83.7 %
NITRATE UR QL: NEGATIVE
NRBC # BLD AUTO: 0 10*3/UL
NRBC BLD AUTO-RTO: 0 /100
PH UR STRIP: 6 PH (ref 5–7)
PLATELET # BLD AUTO: 338 10E9/L (ref 150–450)
POTASSIUM SERPL-SCNC: 3.5 MMOL/L (ref 3.4–5.3)
PROT SERPL-MCNC: 7.5 G/DL (ref 6.8–8.8)
RBC # BLD AUTO: 4.38 10E12/L (ref 3.8–5.2)
RBC #/AREA URNS AUTO: 0 /HPF (ref 0–2)
SODIUM SERPL-SCNC: 140 MMOL/L (ref 133–144)
SOURCE: ABNORMAL
SP GR UR STRIP: 1 (ref 1–1.03)
SQUAMOUS #/AREA URNS AUTO: 1 /HPF (ref 0–1)
TROPONIN I SERPL-MCNC: <0.015 UG/L (ref 0–0.04)
UROBILINOGEN UR STRIP-MCNC: NORMAL MG/DL (ref 0–2)
WBC # BLD AUTO: 18.4 10E9/L (ref 4–11)
WBC #/AREA URNS AUTO: <1 /HPF (ref 0–5)

## 2020-11-22 PROCEDURE — 99285 EMERGENCY DEPT VISIT HI MDM: CPT | Mod: 25

## 2020-11-22 PROCEDURE — 36415 COLL VENOUS BLD VENIPUNCTURE: CPT | Performed by: PHYSICIAN ASSISTANT

## 2020-11-22 PROCEDURE — 250N000009 HC RX 250: Performed by: PHYSICIAN ASSISTANT

## 2020-11-22 PROCEDURE — 85379 FIBRIN DEGRADATION QUANT: CPT | Performed by: PHYSICIAN ASSISTANT

## 2020-11-22 PROCEDURE — 93005 ELECTROCARDIOGRAM TRACING: CPT

## 2020-11-22 PROCEDURE — 85025 COMPLETE CBC W/AUTO DIFF WBC: CPT | Performed by: PHYSICIAN ASSISTANT

## 2020-11-22 PROCEDURE — 96374 THER/PROPH/DIAG INJ IV PUSH: CPT | Mod: 59

## 2020-11-22 PROCEDURE — 258N000003 HC RX IP 258 OP 636: Performed by: PHYSICIAN ASSISTANT

## 2020-11-22 PROCEDURE — 74177 CT ABD & PELVIS W/CONTRAST: CPT

## 2020-11-22 PROCEDURE — 96361 HYDRATE IV INFUSION ADD-ON: CPT

## 2020-11-22 PROCEDURE — 83605 ASSAY OF LACTIC ACID: CPT | Performed by: PHYSICIAN ASSISTANT

## 2020-11-22 PROCEDURE — 83690 ASSAY OF LIPASE: CPT | Performed by: PHYSICIAN ASSISTANT

## 2020-11-22 PROCEDURE — 81025 URINE PREGNANCY TEST: CPT | Performed by: PHYSICIAN ASSISTANT

## 2020-11-22 PROCEDURE — 250N000011 HC RX IP 250 OP 636: Performed by: PHYSICIAN ASSISTANT

## 2020-11-22 PROCEDURE — 80053 COMPREHEN METABOLIC PANEL: CPT | Performed by: PHYSICIAN ASSISTANT

## 2020-11-22 PROCEDURE — 81001 URINALYSIS AUTO W/SCOPE: CPT | Performed by: PHYSICIAN ASSISTANT

## 2020-11-22 PROCEDURE — 84484 ASSAY OF TROPONIN QUANT: CPT | Performed by: PHYSICIAN ASSISTANT

## 2020-11-22 RX ORDER — KETOROLAC TROMETHAMINE 15 MG/ML
15 INJECTION, SOLUTION INTRAMUSCULAR; INTRAVENOUS ONCE
Status: COMPLETED | OUTPATIENT
Start: 2020-11-22 | End: 2020-11-22

## 2020-11-22 RX ORDER — IOPAMIDOL 755 MG/ML
500 INJECTION, SOLUTION INTRAVASCULAR ONCE
Status: COMPLETED | OUTPATIENT
Start: 2020-11-22 | End: 2020-11-22

## 2020-11-22 RX ADMIN — IOPAMIDOL 98 ML: 755 INJECTION, SOLUTION INTRAVENOUS at 19:18

## 2020-11-22 RX ADMIN — SODIUM CHLORIDE 64 ML: 9 INJECTION, SOLUTION INTRAVENOUS at 19:18

## 2020-11-22 RX ADMIN — SODIUM CHLORIDE 1000 ML: 9 INJECTION, SOLUTION INTRAVENOUS at 17:23

## 2020-11-22 RX ADMIN — KETOROLAC TROMETHAMINE 15 MG: 15 INJECTION, SOLUTION INTRAMUSCULAR; INTRAVENOUS at 17:23

## 2020-11-22 ASSESSMENT — ENCOUNTER SYMPTOMS
VOMITING: 1
DIARRHEA: 1
FEVER: 0
NAUSEA: 1
CHILLS: 0

## 2020-11-22 NOTE — ED TRIAGE NOTES
A&O x4.  ABC's intact.      Pt arrives with c/o abdominal pain w/ diarrhea, and became SOB last night. Did have COVID19 2 weeks ago.

## 2020-11-22 NOTE — ED PROVIDER NOTES
History     Chief Complaint:  Abdominal Pain        HPI   Randee Millard is a 40 year old female who presents with abdominal pain. The patient developed suprapubic, left lower quadrant abdominal pain, headache, and neck pain yesterday. She went to urgent care yesterday and was found to have a UTI. She was started on Macrobid and took one dose last night. She states that she woke up in the middle of the night with a couple episodes of vomiting and diarrhea. Today, she complains of continued suprapubic and left lower quadrant abdominal pain, but is now also having upper abdominal pain with radiation to the back. She also complains of having aching pain in her legs. She denies any fevers or chills. Of note, she was tested positive for covid on 11/3 and her symptoms were only loss of her taste and smell.     Allergies:  Amoxicillin  Clindamycin     Medications:    Ortho-cyclen   Macrobid     Past Medical History:    Anemia   Reflux esophagitis     Past Surgical History:    Surgical history reviewed. No pertinent surgical history.     Family History:    Hypertension   Diabetes     Social History:  Smoking Status: Never Smoker  Smokeless Tobacco: Never Used  Alcohol Use: No  Drug Use: No  PCP: Abe Hou     Review of Systems   Constitutional: Negative for chills and fever.   Gastrointestinal: Positive for diarrhea, nausea and vomiting.   Musculoskeletal:        Aching pain in legs   All other systems reviewed and are negative.    Physical Exam     Patient Vitals for the past 24 hrs:   BP Temp Temp src Pulse Resp SpO2 Weight   11/22/20 1801 -- -- -- -- -- -- 88.5 kg (195 lb)   11/22/20 1800 134/88 -- -- 94 20 99 % --   11/22/20 1625 (!) 169/94 98.2  F (36.8  C) Oral 121 20 99 % --        Physical Exam  General: Alert and interactive. Appears well. Cooperative and pleasant.   Eyes: The pupils are equal and round. EOMs intact. No scleral icterus.  ENT: No abnormalities to the external nose or ears. Mucous  membranes moist. Posterior oropharynx is non-erythematous.  Neck: Trachea is in the midline. No nuchal rigidity. No meningeal signs.    CV: Tachycardia. S1 and S2 normal without murmur, click, gallop or rub.   Resp: Breath sounds are clear bilaterally, without rhonchi, wheezes, rales. Non-labored, no retractions or accessory muscle use.     GI: Abdomen is soft without distension. Tenderness to palpation the LLQ of the abdomen without guarding. No CVA tenderness.   MS: Moving all extremities well. Good muscle tone.   Skin: Warm and dry. No rash or lesions noted.  Neuro: Alert and oriented x 3. No focal neurologic deficits. Good strength and sensation in upper and lower extremities. Psych: Awake. Alert.  Normal affect. Appropriate interactions.  Lymph: No anterior or posterior cervical lymphadenopathy noted.    Emergency Department Course   ECG:  ECG taken at 1710  Normal sinus rhythm  Normal ECG  Rate 91 bpm. FL interval 180 ms. QRS duration 76 ms. QT/QTc 354/435 ms. P-R-T axes 35 10 28.     Imaging:  Radiology findings were communicated with the patient who voiced understanding of the findings.    CT Abdomen Pelvis w Contrast    (Results Pending)     Laboratory:  Laboratory findings were communicated with the patient who voiced understanding of the findings.    D dimer quantitative: 0.3    CBC:  WBC 18.4 (H), HGB 10.5 (L), , o/w WNL     CMP: BUN 6 (L), albumin 3.3 (L), o/w WNL (Creatinine: 0.59)     Lipase: 60 (L)      Troponin: <0.015    Lactic Acid whole blood (1719): 1.2      UA with microscopic: mucous urine present (A), o/w WNL   UPT: negative    Interventions:  1723 0.9% sodium chloride BOLUS 1000 mL IV   1723 Toradol 15 mg IV     Emergency Department Course:  Past medical records, nursing notes, and vitals reviewed.    1638 I performed an exam of the patient as documented above.    IV was inserted and blood was drawn for laboratory testing, results above.     The patient provided a urine sample here in  the emergency department. This was sent for laboratory testing, findings above.      1840 Patient rechecked and updated.       The patient was signed out to Issa Salcedo, pending CT results.       Impression & Plan   Covid-19  Randee Millard was evaluated during a global COVID-19 pandemic, which necessitated consideration that the patient might be at risk for infection with the SARS-CoV-2 virus that causes COVID-19.   Applicable protocols for evaluation were followed during the patient's care.   COVID-19 was considered as part of the patient's evaluation. The plan for testing is:  a test was obtained during this visit.    Medical Decision Making:  Radnee Millard is a 40 year old female who presents to the emergency department today with left lower quadrant abdominal pain, epigastric abdominal pain, and back pain. Patient was seen at an urgent care last night for dysuria, and she was started on Macrobid for urinary tract infection. Since taking her first dose, she developing vomiting and diarrhea. Her urine sample collected today is totally normal without any pyuria, leukocyte esterase, or nitrites; I think she could hold off on further doses of Macrobid. Patient denies vaginal discharge or concerns for STI. Low suspicion for PID or pelvic abscess. Patient's pregnancy test yesterday was negative.     The patient also complained of a headache and neck discomfort yesterday, which the urgent care provider thought was musculoskeletal in etiology. She states that her symptoms today are completely improved. She has no meningeal signs or nuchal rigidity on examination, and I think the likelihood of meningitis is very low. The patient has tenderness in the left lower quadrant with guarding, and I am suspicious for a bowel pathology such as diverticulitis. She does have a significant leukocytosis of 18k. Her lactic acid is within the normal range at 1.2, and there is no signs of severe sepsis. Her vitals here are  reassuring without a fever. Patient was given fluid and Toradol and felt much improved.     D-dimer was negative, essentially ruling out pulmonary embolism in low risk patient. EKG shows NSR without ischemia. Her troponin is negative. She denies chest pain or shortness of breath; low likelihood of cardiopulmonary etiology. With leukocytosis of 18k, need CT to rule out intraabdominal catastrophe or infection. Signing out to partner, AIDA Velazquez, while awaiting results of CT. Vitally stable at this time.     Discharge Diagnosis:    ICD-10-CM    1. LLQ abdominal pain  R10.32    2. History of 2019 novel coronavirus disease (COVID-19)  Z86.19        Disposition:  Signed out to AIDA Salcedo pending CT    Scribe Disclosure:  I, Kristofer Hall, am serving as a scribe at 4:38 PM on 11/22/2020 to document services personally performed by Rufina Loredo PA based on my observations and the provider's statements to me.      11/22/2020   Rufina Loredo PA Sowles, Greta Lisabeth, PA-C  11/22/20 1921

## 2020-11-22 NOTE — ED AVS SNAPSHOT
[facility]  [address]  [facility] [address]  Phone: [phone]  Fax: [phone]                                    Randee Millard   MRN: [id number]    Department: [facility]   Date of Visit: 11/22/2020           After Visit Summary Signature Page    I have received my discharge instructions, and my questions have been answered. I have discussed any challenges I see with this plan with the nurse or doctor.    ..........................................................................................................................................  Patient/Patient Representative Signature      ..........................................................................................................................................  Patient Representative Print Name and Relationship to Patient    ..................................................               ................................................  Date                                   Time    ..........................................................................................................................................  Reviewed by Signature/Title    ...................................................              ..............................................  Date                                               Time          22EPIC Rev 08/18

## 2020-11-23 LAB — INTERPRETATION ECG - MUSE: NORMAL

## 2020-11-23 NOTE — ED PROVIDER NOTES
CT resulted without actionable findings at this time. Discharged to outpatient care as per previous careplan.      Issa Salcedo PA-C  11/22/20 1959

## 2020-11-24 ENCOUNTER — PATIENT OUTREACH (OUTPATIENT)
Dept: CARE COORDINATION | Facility: CLINIC | Age: 40
End: 2020-11-24

## 2020-11-24 DIAGNOSIS — U07.1 2019 NOVEL CORONAVIRUS DISEASE (COVID-19): Primary | ICD-10-CM

## 2020-11-24 NOTE — PROGRESS NOTES
Clinic Care Coordination Contact  [facility]/Voicemail       Clinical Data: Care Coordinator Outreach  Outreach attempted x 1.  Left message on patient's voicemail with call back information and requested return call.  Plan:  Care Coordinator will try to reach patient again in 1-2 business days.

## 2020-11-25 NOTE — PROGRESS NOTES
Clinic Care Coordination Contact  Community Health Worker Initial Outreach       Patient accepts CC: No, I have everything I need. Thank you so much.

## 2020-11-29 ENCOUNTER — HEALTH MAINTENANCE LETTER (OUTPATIENT)
Age: 40
End: 2020-11-29

## 2020-12-02 ENCOUNTER — TRANSFERRED RECORDS (OUTPATIENT)
Dept: HEALTH INFORMATION MANAGEMENT | Facility: CLINIC | Age: 40
End: 2020-12-02

## 2020-12-30 ENCOUNTER — ANCILLARY PROCEDURE (OUTPATIENT)
Dept: ULTRASOUND IMAGING | Facility: CLINIC | Age: 40
End: 2020-12-30
Attending: ADVANCED PRACTICE MIDWIFE
Payer: COMMERCIAL

## 2020-12-30 DIAGNOSIS — D25.9 UTERINE LEIOMYOMA, UNSPECIFIED LOCATION: ICD-10-CM

## 2020-12-30 PROCEDURE — 76856 US EXAM PELVIC COMPLETE: CPT | Performed by: OBSTETRICS & GYNECOLOGY

## 2020-12-30 PROCEDURE — 76830 TRANSVAGINAL US NON-OB: CPT | Performed by: OBSTETRICS & GYNECOLOGY

## 2021-01-08 ENCOUNTER — OFFICE VISIT (OUTPATIENT)
Dept: OBGYN | Facility: CLINIC | Age: 41
End: 2021-01-08
Payer: COMMERCIAL

## 2021-01-08 VITALS
DIASTOLIC BLOOD PRESSURE: 70 MMHG | BODY MASS INDEX: 30.92 KG/M2 | HEIGHT: 68 IN | SYSTOLIC BLOOD PRESSURE: 100 MMHG | WEIGHT: 204 LBS

## 2021-01-08 DIAGNOSIS — D25.9 UTERINE LEIOMYOMA, UNSPECIFIED LOCATION: Primary | ICD-10-CM

## 2021-01-08 PROCEDURE — 99213 OFFICE O/P EST LOW 20 MIN: CPT | Performed by: OBSTETRICS & GYNECOLOGY

## 2021-01-08 ASSESSMENT — MIFFLIN-ST. JEOR: SCORE: 1638.84

## 2021-01-08 NOTE — NURSING NOTE
"Chief Complaint   Patient presents with     Follow Up     fibroid uterus       Initial /70 (BP Location: Left arm, Patient Position: Chair, Cuff Size: Adult Large)   Ht 1.727 m (5' 8\")   Wt 92.5 kg (204 lb)   LMP 2021 (Exact Date)   Breastfeeding No   BMI 31.02 kg/m   Estimated body mass index is 31.02 kg/m  as calculated from the following:    Height as of this encounter: 1.727 m (5' 8\").    Weight as of this encounter: 92.5 kg (204 lb).  BP completed using cuff size: large    Questioned patient about current smoking habits.  Pt. has never smoked.          The following HM Due: NONE  Ekaterina Thao CMA    "

## 2021-01-08 NOTE — PROGRESS NOTES
"  SUBJECTIVE:                                                   CC:  Patient presents with:  Follow Up: fibroid uterus      HPI:  Randee Millard is a 40 year old  who presents to discuss fibroid uterus.  She has AUB with cramping and anemia, as well as pelvic pain and some mild bulk symptoms.  Desires to help with the ovulation but not interested in hysterectomy.     ROS: 10 point ROS negative other than as listed above in HPI.    Gyn History:  Patient's last menstrual period was 2021 (exact date).        Last 3 Pap and HPV Results:   PAP / HPV Latest Ref Rng & Units 3/11/2019 2016   PAP - NIL NIL   HPV 16 DNA NEG:Negative Negative Negative   HPV 18 DNA NEG:Negative Negative Negative   OTHER HR HPV NEG:Negative Negative Negative       PMH, PSH, Soc Hx, Fam Hx, Meds, and allergies reviewed in Epic.  Denies family history of gyn cancer    OBJECTIVE:     /70 (BP Location: Left arm, Patient Position: Chair, Cuff Size: Adult Large)   Ht 1.727 m (5' 8\")   Wt 92.5 kg (204 lb)   LMP 2021 (Exact Date)   Breastfeeding No   BMI 31.02 kg/m      Gen: Healthy appearing female, no acute distress, comfortable    Test Results:  CLINICAL INFORMATION     Indications for ultrasound:   Fibroid     LMP: 1 Dec 2020    Hormones: none     Measurements:  Uterus:  10.2 x 6.7 x 7.4  cm     Position is anteverted.  Contour is irregular w/ myomata: 1 Posterior 5.5 x 5.4 x 6.7 cm.     Endo cav: 12.5 mm       Smooth/regular/wnl     Right ovary: 3.9 x 3.1 x 2.6 cm  Wnl  Left ovary:   2.4 x 1.5 x 2.0 cm Wnl     Cul de sac: no free fluid     ===================================  Impression:     Comparison study 2020.     Complete pelvic ultrasound using realtime transabdominal and transvaginal scanning     Bladder appears normal      Normal uterus.  Uterus is anteverted.  Myomatous uterus: Endometrial cavity not affected.  There is a large posterior subserosal/intramural myoma measuring 5.5 x 5.4 x 6.7 " cm.     Endometrium noted to be normal for phase of menstrual cycle.     Normal bilateral ovaries      No cul-de-sac fluid.     Large posterior myoma that is essentially unchanged from prior ultrasound.       ASSESSMENT/PLAN:                                                      1. Uterine leiomyoma, unspecified location.  Anemia.   Reviewed ultrasound personally with the patient and again reviewed treatments for AUB-L.   Discussed methods of treatment of heavy uterine bleeding including birth control pills, Mirena IUD, nexplanon, UFE, myomectomy, or hysterectomy. My recommendation would be Mirena IUD or hysterectomy, the IUD would be most likely minimally invasive and reversible way to potentially help her AUB and anemia - hysterectomy would be curative and definitive for fibroids.  We discussed with her  on the phone as well, and they desire Mirena IUD insertion, she will return in 2 weeks for placement.       Carmen Mckeon MD, MPH  Obstetrics and Gynecology      Total time spent was 25 minutes; this includes pre-work time, intra-service time, and post-work time including time spent on documentation.

## 2021-04-07 ENCOUNTER — OFFICE VISIT (OUTPATIENT)
Dept: INTERNAL MEDICINE | Facility: CLINIC | Age: 41
End: 2021-04-07
Payer: COMMERCIAL

## 2021-04-07 VITALS
OXYGEN SATURATION: 99 % | SYSTOLIC BLOOD PRESSURE: 110 MMHG | HEART RATE: 109 BPM | BODY MASS INDEX: 31.24 KG/M2 | WEIGHT: 206.1 LBS | HEIGHT: 68 IN | TEMPERATURE: 98.4 F | DIASTOLIC BLOOD PRESSURE: 74 MMHG

## 2021-04-07 DIAGNOSIS — R30.0 DYSURIA: Primary | ICD-10-CM

## 2021-04-07 DIAGNOSIS — Z11.1 SCREENING EXAMINATION FOR PULMONARY TUBERCULOSIS: ICD-10-CM

## 2021-04-07 PROCEDURE — 99214 OFFICE O/P EST MOD 30 MIN: CPT | Performed by: NURSE PRACTITIONER

## 2021-04-07 PROCEDURE — 36415 COLL VENOUS BLD VENIPUNCTURE: CPT | Performed by: NURSE PRACTITIONER

## 2021-04-07 PROCEDURE — 86481 TB AG RESPONSE T-CELL SUSP: CPT | Performed by: NURSE PRACTITIONER

## 2021-04-07 PROCEDURE — 81003 URINALYSIS AUTO W/O SCOPE: CPT | Performed by: NURSE PRACTITIONER

## 2021-04-07 PROCEDURE — 87086 URINE CULTURE/COLONY COUNT: CPT | Performed by: NURSE PRACTITIONER

## 2021-04-07 RX ORDER — PHENAZOPYRIDINE HYDROCHLORIDE 100 MG/1
100 TABLET, FILM COATED ORAL
Qty: 6 TABLET | Refills: 0 | Status: SHIPPED | OUTPATIENT
Start: 2021-04-07 | End: 2021-04-09

## 2021-04-07 ASSESSMENT — MIFFLIN-ST. JEOR: SCORE: 1648.36

## 2021-04-07 NOTE — PROGRESS NOTES
"    Assessment & Plan     Dysuria  - urine labs pending but will treat the burning pain with medication:  - *UA reflex to Microscopic and Culture ([place] and [facility] (except [place] and [place])  - Urine Culture Aerobic Bacterial  - phenazopyridine (PYRIDIUM) 100 MG tablet; Take 1 tablet (100 mg) by mouth 3 times daily (with meals) for 2 days; turns urine orange    Screening examination for pulmonary tuberculosis  - Quantiferon TB Gold Plus  - once results are known, patient wants results faxed to her new work office which she will provide  [phone]     BMI:   Estimated body mass index is 31.34 kg/m  as calculated from the following:    Height as of this encounter: 1.727 m (5' 8\").    Weight as of this encounter: 93.5 kg (206 lb 1.6 oz).       Patient Instructions   Go to [address] for TB blood test; and once results are known let us know where to fax the results for work    Urine specimen and culture was collected and pending; will treat the burning pain with Pyridium 100 mg 3 times/day with meals x 2 days - will turn urine orange    Fluids especially water and diluted cranberry juice; also over the counter cranberry tablets may help    Use Tylenol alternating with Ibuprofen for discomfort      Return if symptoms worsen or fail to improve.    Sary Pires CNP  [facility] is a 40 year old who presents for the following health issues     HPI     She wants her blood draw to check for TB. She is complaining of pain when she urinates. She has pain in her back and abdomen.    See above.  Reports urinary pain x 1 week with low back pain.  No fever or chills.  No history of previous positive urine culture.  Also needs TB test for new job; wants it faxed when completed.    Review of Systems   Constitutional, HEENT, cardiovascular, pulmonary, gi and gu systems are negative, except as otherwise noted.      Objective    /74 (BP Location: Right arm, Patient Position: " "Sitting, Cuff Size: Adult Large)   Pulse 109   Temp 98.4  F (36.9  C) (Oral)   Ht 1.727 m (5' 8\")   Wt 93.5 kg (206 lb 1.6 oz)   LMP 03/27/2021 (Exact Date)   SpO2 99%   BMI 31.34 kg/m    Body mass index is 31.34 kg/m .     Physical Exam   GENERAL: alert and no distress  RESP: lungs clear to auscultation - no rales, rhonchi or wheezes  CV: regular rate and rhythm, normal S1 S2, no S3 or S4, no murmur, click or rub, no peripheral edema and peripheral pulses strong  ABDOMEN: soft, nontender, no hepatosplenomegaly, no masses and bowel sounds normal  SKIN: no suspicious lesions or rashes              "

## 2021-04-07 NOTE — PATIENT INSTRUCTIONS
Go to [address] for TB blood test; and once results are known let us know where to fax the results for work    Urine specimen and culture was collected and pending; will treat the burning pain with Pyridium 100 mg 3 times/day with meals x 2 days - will turn urine orange    Fluids especially water and diluted cranberry juice; also over the counter cranberry tablets may help    Use Tylenol alternating with Ibuprofen for discomfort

## 2021-04-09 ENCOUNTER — TELEPHONE (OUTPATIENT)
Dept: INTERNAL MEDICINE | Facility: CLINIC | Age: 41
End: 2021-04-09

## 2021-04-09 LAB
BACTERIA SPEC CULT: NORMAL
GAMMA INTERFERON BACKGROUND BLD IA-ACNC: 0.01 IU/ML
Lab: NORMAL
M TB IFN-G CD4+ BCKGRND COR BLD-ACNC: 9.99 IU/ML
M TB TUBERC IFN-G BLD QL: NEGATIVE
MITOGEN IGNF BCKGRD COR BLD-ACNC: 0.03 IU/ML
MITOGEN IGNF BCKGRD COR BLD-ACNC: 0.05 IU/ML
SPECIMEN SOURCE: NORMAL

## 2021-04-09 NOTE — TELEPHONE ENCOUNTER
Pt asking about her lab results.     Advised that all are negative but will send message to Yuly Pires CNP to review.   She specifically was asking about the UC.     please advise. She was OK waiting until Monday.

## 2021-04-12 NOTE — TELEPHONE ENCOUNTER
Urine results including the culture are all negative.  No treatment with an antibiotic is needed.  Fluids and cranberry tablets may help. Hopefully the Pyridium helped with the burning pain.

## 2021-04-12 NOTE — TELEPHONE ENCOUNTER
Attempted to contact patient. Left voice message to call back.     Please advise patient of Yuly's recommendations.

## 2021-06-10 ENCOUNTER — HOSPITAL ENCOUNTER (OUTPATIENT)
Dept: CT IMAGING | Facility: CLINIC | Age: 41
Discharge: HOME | End: 2021-06-10
Attending: NURSE PRACTITIONER | Admitting: NURSE PRACTITIONER
Payer: COMMERCIAL

## 2021-06-10 ENCOUNTER — OFFICE VISIT (OUTPATIENT)
Dept: INTERNAL MEDICINE | Facility: CLINIC | Age: 41
End: 2021-06-10
Payer: COMMERCIAL

## 2021-06-10 VITALS
BODY MASS INDEX: 30.69 KG/M2 | OXYGEN SATURATION: 100 % | DIASTOLIC BLOOD PRESSURE: 70 MMHG | SYSTOLIC BLOOD PRESSURE: 106 MMHG | HEART RATE: 88 BPM | WEIGHT: 202.5 LBS | RESPIRATION RATE: 16 BRPM | HEIGHT: 68 IN | TEMPERATURE: 98.5 F

## 2021-06-10 DIAGNOSIS — D64.9 ANEMIA, UNSPECIFIED TYPE: ICD-10-CM

## 2021-06-10 DIAGNOSIS — R11.0 NAUSEA: ICD-10-CM

## 2021-06-10 DIAGNOSIS — R10.32 LLQ ABDOMINAL PAIN: Primary | ICD-10-CM

## 2021-06-10 DIAGNOSIS — R10.32 LLQ ABDOMINAL PAIN: ICD-10-CM

## 2021-06-10 LAB
BASOPHILS # BLD AUTO: 0 10E9/L (ref 0–0.2)
BASOPHILS NFR BLD AUTO: 0.2 %
DIFFERENTIAL METHOD BLD: ABNORMAL
EOSINOPHIL # BLD AUTO: 0.1 10E9/L (ref 0–0.7)
EOSINOPHIL NFR BLD AUTO: 0.6 %
ERYTHROCYTE [DISTWIDTH] IN BLOOD BY AUTOMATED COUNT: 17.2 % (ref 10–15)
HCT VFR BLD AUTO: 32.3 % (ref 35–47)
HGB BLD-MCNC: 10.3 G/DL (ref 11.7–15.7)
LYMPHOCYTES # BLD AUTO: 4.7 10E9/L (ref 0.8–5.3)
LYMPHOCYTES NFR BLD AUTO: 37.8 %
MCH RBC QN AUTO: 23.8 PG (ref 26.5–33)
MCHC RBC AUTO-ENTMCNC: 31.9 G/DL (ref 31.5–36.5)
MCV RBC AUTO: 75 FL (ref 78–100)
MONOCYTES # BLD AUTO: 0.9 10E9/L (ref 0–1.3)
MONOCYTES NFR BLD AUTO: 6.9 %
NEUTROPHILS # BLD AUTO: 6.8 10E9/L (ref 1.6–8.3)
NEUTROPHILS NFR BLD AUTO: 54.5 %
PLATELET # BLD AUTO: 367 10E9/L (ref 150–450)
RBC # BLD AUTO: 4.32 10E12/L (ref 3.8–5.2)
WBC # BLD AUTO: 12.5 10E9/L (ref 4–11)

## 2021-06-10 PROCEDURE — 250N000011 HC RX IP 250 OP 636: Performed by: NURSE PRACTITIONER

## 2021-06-10 PROCEDURE — 99214 OFFICE O/P EST MOD 30 MIN: CPT | Performed by: NURSE PRACTITIONER

## 2021-06-10 PROCEDURE — 85025 COMPLETE CBC W/AUTO DIFF WBC: CPT | Performed by: NURSE PRACTITIONER

## 2021-06-10 PROCEDURE — 36415 COLL VENOUS BLD VENIPUNCTURE: CPT | Performed by: NURSE PRACTITIONER

## 2021-06-10 PROCEDURE — 74177 CT ABD & PELVIS W/CONTRAST: CPT

## 2021-06-10 PROCEDURE — 83540 ASSAY OF IRON: CPT | Performed by: NURSE PRACTITIONER

## 2021-06-10 PROCEDURE — 250N000009 HC RX 250: Performed by: NURSE PRACTITIONER

## 2021-06-10 PROCEDURE — 83550 IRON BINDING TEST: CPT | Performed by: NURSE PRACTITIONER

## 2021-06-10 PROCEDURE — 82728 ASSAY OF FERRITIN: CPT | Performed by: NURSE PRACTITIONER

## 2021-06-10 PROCEDURE — 80053 COMPREHEN METABOLIC PANEL: CPT | Performed by: NURSE PRACTITIONER

## 2021-06-10 RX ORDER — IOPAMIDOL 755 MG/ML
500 INJECTION, SOLUTION INTRAVASCULAR ONCE
Status: COMPLETED | OUTPATIENT
Start: 2021-06-10 | End: 2021-06-10

## 2021-06-10 RX ADMIN — SODIUM CHLORIDE 65 ML: 9 INJECTION, SOLUTION INTRAVENOUS at 17:32

## 2021-06-10 RX ADMIN — IOPAMIDOL 100 ML: 755 INJECTION, SOLUTION INTRAVENOUS at 17:26

## 2021-06-10 ASSESSMENT — MIFFLIN-ST. JEOR: SCORE: 1632.03

## 2021-06-10 NOTE — PROGRESS NOTES
"Iron  Ct abd     Assessment & Plan     LLQ abdominal pain  Neg for diverticulitis   Positive for large stool burden and uterine fibroid   See ob gyn   - CBC with platelets and differential  - Comprehensive metabolic panel (BMP + Alb, Alk Phos, ALT, AST, Total. Bili, TP)  - CT Abdomen Pelvis w Contrast; Future    Nausea    - CT Abdomen Pelvis w Contrast; Future    Anemia, unspecified type    - Iron and iron binding capacity  - Ferritin      28 minutes spent on the date of the encounter doing chart review, history and exam, documentation and further activities per the note       BMI:   Estimated body mass index is 30.79 kg/m  as calculated from the following:    Height as of this encounter: 1.727 m (5' 8\").    Weight as of this encounter: 91.9 kg (202 lb 8 oz).       Patient Instructions   Lab in [address]    CT scan     Lots of stool present   miralax daily or twice daily until stooling well      Has some fibroid in uterus and also has a resolving ovarian cyst - both may cause pain   See OB GYN      No other concerning finding      No follow-ups on file.    BLANCA Valladares CNP  [facility]    Subjective   Randee is a 40 year old who presents for the following health issues     HPI     ED/ Followup:    Facility:  [facility]  Date of visit: 5/30/21  Reason for visit: kidney infection  Current Status: still has constant pain 6/10 pain level (finished antibiotic 6/5)    Pain was better on antibiotic- and improved   But now pain is lower abdomen and to back     Has some nausea            Review of Systems   Constitutional, HEENT, cardiovascular, pulmonary, GI, , musculoskeletal, neuro, skin, endocrine and psych systems are negative, except as otherwise noted.      Objective    /70 (BP Location: Left arm, Patient Position: Sitting, Cuff Size: Adult Large)   Pulse 88   Temp 98.5  F (36.9  C) (Oral)   Resp 16   Ht 1.727 m (5' 8\")   Wt 91.9 kg (202 lb 8 oz)   LMP " 05/26/2021 (Approximate)   SpO2 100%   Breastfeeding No   BMI 30.79 kg/m    Body mass index is 30.79 kg/m .  Physical Exam   GENERAL:alert and mild distress  RESP: lungs clear to auscultation - no rales, rhonchi or wheezes  CV: regular rate and rhythm  ABDOMEN: soft, tender lower abdomen - most tender LLQ - no rebound no hepatosplenomegaly, no masses and bowel sounds normal  MS: no gross musculoskeletal defects noted, no edema  NEURO: Normal strength and tone, mentation intact and speech normal  PSYCH: mentation appears normal, affect normal/bright    CT scan negative for diverticulitis   Positive for uterine fibroids and large amount of stool

## 2021-06-10 NOTE — PATIENT INSTRUCTIONS
Lab in [address]    CT scan     Lots of stool present   miralax daily or twice daily until stooling well      Has some fibroid in uterus and also has a resolving ovarian cyst - both may cause pain   See OB GYN      No other concerning finding

## 2021-06-10 NOTE — LETTER
June 14, 2021      Randee Millard  [address] [facility] [address]        Dear ,    We are writing to inform you of your test results.    hgb low   Iron low   Iron daily     Resulted Orders   CBC with platelets and differential   Result Value Ref Range    WBC 12.5 (H) 4.0 - 11.0 10e9/L    RBC Count 4.32 3.8 - 5.2 10e12/L    Hemoglobin 10.3 (L) 11.7 - 15.7 g/dL    Hematocrit 32.3 (L) 35.0 - 47.0 %    MCV 75 (L) 78 - 100 fl    MCH 23.8 (L) 26.5 - 33.0 pg    MCHC 31.9 31.5 - 36.5 g/dL    RDW 17.2 (H) 10.0 - 15.0 %    Platelet Count 367 150 - 450 10e9/L    % Neutrophils 54.5 %    % Lymphocytes 37.8 %    % Monocytes 6.9 %    % Eosinophils 0.6 %    % Basophils 0.2 %    Absolute Neutrophil 6.8 1.6 - 8.3 10e9/L    Absolute Lymphocytes 4.7 0.8 - 5.3 10e9/L    Absolute Monocytes 0.9 0.0 - 1.3 10e9/L    Absolute Eosinophils 0.1 0.0 - 0.7 10e9/L    Absolute Basophils 0.0 0.0 - 0.2 10e9/L    Diff Method Automated Method    Comprehensive metabolic panel (BMP + Alb, Alk Phos, ALT, AST, Total. Bili, TP)   Result Value Ref Range    Sodium 138 133 - 144 mmol/L    Potassium 4.1 3.4 - 5.3 mmol/L    Chloride 108 94 - 109 mmol/L    Carbon Dioxide 24 20 - 32 mmol/L    Anion Gap 6 3 - 14 mmol/L    Glucose 77 70 - 99 mg/dL    Urea Nitrogen 6 (L) 7 - 30 mg/dL    Creatinine 0.69 0.52 - 1.04 mg/dL    GFR Estimate >90 >60 mL/min/[1.73_m2]      Comment:      Non  GFR Calc  Starting 12/18/2018, serum creatinine based estimated GFR (eGFR) will be   calculated using the Chronic Kidney Disease Epidemiology Collaboration   (CKD-EPI) equation.      GFR Estimate If Black >90 >60 mL/min/[1.73_m2]      Comment:       GFR Calc  Starting 12/18/2018, serum creatinine based estimated GFR (eGFR) will be   calculated using the Chronic Kidney Disease Epidemiology Collaboration   (CKD-EPI) equation.      Calcium 9.2 8.5 - 10.1 mg/dL    Bilirubin Total 0.1 (L) 0.2 - 1.3 mg/dL    Albumin 3.6 3.4 - 5.0 g/dL    Protein  Total 7.6 6.8 - 8.8 g/dL    Alkaline Phosphatase 61 40 - 150 U/L    ALT 13 0 - 50 U/L    AST 13 0 - 45 U/L   Iron and iron binding capacity   Result Value Ref Range    Iron 263 (H) 35 - 180 ug/dL    Iron Binding Cap 448 (H) 240 - 430 ug/dL    Iron Saturation Index 59 (H) 15 - 46 %   Ferritin   Result Value Ref Range    Ferritin 6 (L) 12 - 150 ng/mL       If you have any questions or concerns, please call the clinic at the number listed above.       Sincerely,      BLANCA Valladares CNP

## 2021-06-11 LAB
ALBUMIN SERPL-MCNC: 3.6 G/DL (ref 3.4–5)
ALP SERPL-CCNC: 61 U/L (ref 40–150)
ALT SERPL W P-5'-P-CCNC: 13 U/L (ref 0–50)
ANION GAP SERPL CALCULATED.3IONS-SCNC: 6 MMOL/L (ref 3–14)
AST SERPL W P-5'-P-CCNC: 13 U/L (ref 0–45)
BILIRUB SERPL-MCNC: 0.1 MG/DL (ref 0.2–1.3)
BUN SERPL-MCNC: 6 MG/DL (ref 7–30)
CALCIUM SERPL-MCNC: 9.2 MG/DL (ref 8.5–10.1)
CHLORIDE SERPL-SCNC: 108 MMOL/L (ref 94–109)
CO2 SERPL-SCNC: 24 MMOL/L (ref 20–32)
CREAT SERPL-MCNC: 0.69 MG/DL (ref 0.52–1.04)
FERRITIN SERPL-MCNC: 6 NG/ML (ref 12–150)
GFR SERPL CREATININE-BSD FRML MDRD: >90 ML/MIN/{1.73_M2}
GLUCOSE SERPL-MCNC: 77 MG/DL (ref 70–99)
IRON SATN MFR SERPL: 59 % (ref 15–46)
IRON SERPL-MCNC: 263 UG/DL (ref 35–180)
POTASSIUM SERPL-SCNC: 4.1 MMOL/L (ref 3.4–5.3)
PROT SERPL-MCNC: 7.6 G/DL (ref 6.8–8.8)
SODIUM SERPL-SCNC: 138 MMOL/L (ref 133–144)
TIBC SERPL-MCNC: 448 UG/DL (ref 240–430)

## 2021-06-14 DIAGNOSIS — D64.9 ANEMIA, UNSPECIFIED TYPE: ICD-10-CM

## 2021-06-14 RX ORDER — FERROUS SULFATE 325(65) MG
325 TABLET, DELAYED RELEASE (ENTERIC COATED) ORAL DAILY
Qty: 90 TABLET | Refills: 3 | Status: SHIPPED | OUTPATIENT
Start: 2021-06-14 | End: 2021-08-12

## 2021-07-26 ENCOUNTER — OFFICE VISIT (OUTPATIENT)
Dept: OBGYN | Facility: CLINIC | Age: 41
End: 2021-07-26
Payer: COMMERCIAL

## 2021-07-26 VITALS
SYSTOLIC BLOOD PRESSURE: 108 MMHG | BODY MASS INDEX: 30.31 KG/M2 | DIASTOLIC BLOOD PRESSURE: 68 MMHG | HEIGHT: 68 IN | WEIGHT: 200 LBS

## 2021-07-26 DIAGNOSIS — D25.9 UTERINE LEIOMYOMA, UNSPECIFIED LOCATION: Primary | ICD-10-CM

## 2021-07-26 PROCEDURE — 99214 OFFICE O/P EST MOD 30 MIN: CPT | Performed by: OBSTETRICS & GYNECOLOGY

## 2021-07-26 ASSESSMENT — MIFFLIN-ST. JEOR: SCORE: 1615.69

## 2021-07-26 NOTE — NURSING NOTE
"Chief Complaint   Patient presents with     Follow Up     Bilateral pelvic pain        Initial /68 (BP Location: Right arm, Patient Position: Sitting, Cuff Size: Adult Regular)   Ht 1.727 m (5' 8\")   Wt 90.7 kg (200 lb)   LMP 2021 (Within Days)   BMI 30.41 kg/m   Estimated body mass index is 30.41 kg/m  as calculated from the following:    Height as of this encounter: 1.727 m (5' 8\").    Weight as of this encounter: 90.7 kg (200 lb).  BP completed using cuff size: regular    Questioned patient about current smoking habits.  Pt. has never smoked.          The following HM Due: NONE    Venkat Chand CMA                "

## 2021-07-26 NOTE — PROGRESS NOTES
"  SUBJECTIVE:                                                   CC:  Patient presents with:  Follow Up: Bilateral pelvic pain       HPI:  Randee Millard is a 41 year old  with pelvic pain, bulk symptoms from fibroid uterus who presents to again discuss mgmt of her symptoms.      She was seen previously and we discussed options for mgmt of fibroids including IUD, myomectomy, UAE, RFA, and hysterectomy.  At the time she was considering IUD but wanted to discuss with her .  Today she describes her main symptom as pain and pressure, with occasional heavy cycles, but not all of the cycles are heavy.  She is still interested in being able to have more kids, not interested in hysterectomy.      OBJECTIVE:     /68 (BP Location: Right arm, Patient Position: Sitting, Cuff Size: Adult Regular)   Ht 1.727 m (5' 8\")   Wt 90.7 kg (200 lb)   LMP 2021 (Within Days)   BMI 30.41 kg/m      Gen: Healthy appearing female, no acute distress, comfortable  Psych: mentation appears normal and affect bright    Test Results:  CT abd pelvis 2021:    PELVIC ORGANS: 2.6 x 1.2 cm right adnexal recently involuted follicle or cyst with trace free fluid. Heterogeneous uterus with presumed fibroids, largest 7.0 x 5.7 cm posteriorly.     MUSCULOSKELETAL: Degenerative disease. Sclerosis at the right SI joint.                                                                      IMPRESSION:   1.  Recently involuted right adnexal follicle or cyst with trace free fluid.  2.  Presumed uterine fibroid.  3.  Evidence for constipation without bowel obstruction.    [facility] Obstetrics and Gynecology     ULTRASOUND - PELVIC GYN- Transabdominal and Transvaginal     Normal uterus.  Uterus is anteverted.  Myomatous uterus: Endometrial cavity not affected.  There is a large posterior subserosal/intramural myoma measuring 5.5 x 5.4 x 6.7 cm.     Endometrium noted to be normal for phase of menstrual cycle.     Normal " bilateral ovaries      No cul-de-sac fluid.     Large posterior myoma that is essentially unchanged from prior ultrasound.          ASSESSMENT/PLAN:                                                      1. Uterine leiomyoma, unspecified location  Reviewed the images of the CT and pelvic US with the patient.  Reviewed that although the size numbers are slightly different, they are different imaging modalities and is unlikely to represent that the fibroid is actually significantly growing.  Again reviewed options, explained them in detail, and she would like to proceed with DV Assisted Myomectomy, possible open.   The main fibroid is on the posterior aspect and we discussed that this may make it difficult to remove without a larger incision.  We also discussed that she would likely need  delivery if she becomes pregnant again, due to the large nature of the fibroid.  Discussed risks of surgery including bleeding to the point of requiring blood transfusion, infection, injury to surrounding organs, possibility of needing a larger incision, blood clot, and pneumonia.  Discussed recovery expectations and that this is typically a same-day surgery as long as it is completed through the smaller incisions.  Pt has had all questions answered and has agreed to proceed.  Will meet with PCP for pre-op physical with PCP.  Will sign consent form day of the procedure.  Pre-op orders entered.   - [facility]-Operative Worksheet    Carmen Mckeon MD, MPH  Obstetrics and Gynecology

## 2021-07-27 ENCOUNTER — TELEPHONE (OUTPATIENT)
Dept: OBGYN | Facility: CLINIC | Age: 41
End: 2021-07-27

## 2021-07-27 ENCOUNTER — PREP FOR PROCEDURE (OUTPATIENT)
Dept: OBGYN | Facility: CLINIC | Age: 41
End: 2021-07-27

## 2021-07-27 DIAGNOSIS — D25.9 FIBROID UTERUS: Primary | ICD-10-CM

## 2021-07-27 NOTE — TELEPHONE ENCOUNTER
Procedure name(s) - multi select robot assisted myomectomy    Reason for procedure fibroid uterus with bulk symptoms    Is this a multi surgeon case? No    Laterality N/A    Request for additional equipment Other (see comments) None   Anesthesia General    Initiate Pre-op orders for above procedure: Yes, as ordered in Epic Additional orders noted there also   Location of Case: [facility]    Surgeon Procedure Time (incision to closure) in minutes (per procedure as applicable) 180    Note:  Surgical Case Time Needed (in minutes)   Patient Class (for admit prior to surgery, specify number of days in comments): Same day (hospital outpatient)    Why can t this outpatient surgery be done at the [facility] or  ASC? robot    Vendor Needed? No    Other/Additional Comments, as needed: [place]         No

## 2021-08-12 ENCOUNTER — OFFICE VISIT (OUTPATIENT)
Dept: URGENT CARE | Facility: URGENT CARE | Age: 41
End: 2021-08-12
Payer: COMMERCIAL

## 2021-08-12 ENCOUNTER — ANCILLARY PROCEDURE (OUTPATIENT)
Dept: GENERAL RADIOLOGY | Facility: CLINIC | Age: 41
End: 2021-08-12
Attending: NURSE PRACTITIONER
Payer: COMMERCIAL

## 2021-08-12 VITALS
BODY MASS INDEX: 30.41 KG/M2 | TEMPERATURE: 98.3 F | DIASTOLIC BLOOD PRESSURE: 67 MMHG | WEIGHT: 200 LBS | OXYGEN SATURATION: 100 % | RESPIRATION RATE: 18 BRPM | SYSTOLIC BLOOD PRESSURE: 115 MMHG | HEART RATE: 77 BPM

## 2021-08-12 DIAGNOSIS — R07.9 CHEST PAIN, UNSPECIFIED TYPE: ICD-10-CM

## 2021-08-12 DIAGNOSIS — D64.9 ANEMIA, UNSPECIFIED TYPE: ICD-10-CM

## 2021-08-12 DIAGNOSIS — R42 DIZZINESS: ICD-10-CM

## 2021-08-12 DIAGNOSIS — R07.9 CHEST PAIN, UNSPECIFIED TYPE: Primary | ICD-10-CM

## 2021-08-12 DIAGNOSIS — Z87.19 HISTORY OF CONSTIPATION: ICD-10-CM

## 2021-08-12 DIAGNOSIS — D50.8 IRON DEFICIENCY ANEMIA SECONDARY TO INADEQUATE DIETARY IRON INTAKE: ICD-10-CM

## 2021-08-12 LAB
ALBUMIN SERPL-MCNC: 3.7 G/DL (ref 3.4–5)
ALBUMIN UR-MCNC: NEGATIVE MG/DL
ALP SERPL-CCNC: 64 U/L (ref 40–150)
ALT SERPL W P-5'-P-CCNC: 15 U/L (ref 0–50)
ANION GAP SERPL CALCULATED.3IONS-SCNC: 2 MMOL/L (ref 3–14)
APPEARANCE UR: CLEAR
AST SERPL W P-5'-P-CCNC: 9 U/L (ref 0–45)
BACTERIA #/AREA URNS HPF: ABNORMAL /HPF
BASOPHILS # BLD AUTO: 0 10E3/UL (ref 0–0.2)
BASOPHILS NFR BLD AUTO: 0 %
BILIRUB SERPL-MCNC: 0.1 MG/DL (ref 0.2–1.3)
BILIRUB UR QL STRIP: NEGATIVE
BUN SERPL-MCNC: 7 MG/DL (ref 7–30)
CALCIUM SERPL-MCNC: 8.7 MG/DL (ref 8.5–10.1)
CHLORIDE BLD-SCNC: 110 MMOL/L (ref 94–109)
CO2 SERPL-SCNC: 27 MMOL/L (ref 20–32)
COLOR UR AUTO: YELLOW
CREAT SERPL-MCNC: 0.65 MG/DL (ref 0.52–1.04)
D DIMER PPP FEU-MCNC: 0.27 UG/ML FEU (ref 0–0.5)
EOSINOPHIL # BLD AUTO: 0.1 10E3/UL (ref 0–0.7)
EOSINOPHIL NFR BLD AUTO: 1 %
ERYTHROCYTE [DISTWIDTH] IN BLOOD BY AUTOMATED COUNT: 18.4 % (ref 10–15)
GFR SERPL CREATININE-BSD FRML MDRD: >90 ML/MIN/1.73M2
GLUCOSE BLD-MCNC: 91 MG/DL (ref 70–99)
GLUCOSE UR STRIP-MCNC: NEGATIVE MG/DL
HCG UR QL: NEGATIVE
HCT VFR BLD AUTO: 33.7 % (ref 35–47)
HGB BLD-MCNC: 10.7 G/DL (ref 11.7–15.7)
HGB UR QL STRIP: NEGATIVE
KETONES UR STRIP-MCNC: NEGATIVE MG/DL
LEUKOCYTE ESTERASE UR QL STRIP: NEGATIVE
LYMPHOCYTES # BLD AUTO: 3.8 10E3/UL (ref 0.8–5.3)
LYMPHOCYTES NFR BLD AUTO: 35 %
MCH RBC QN AUTO: 23.9 PG (ref 26.5–33)
MCHC RBC AUTO-ENTMCNC: 31.8 G/DL (ref 31.5–36.5)
MCV RBC AUTO: 75 FL (ref 78–100)
MONOCYTES # BLD AUTO: 0.7 10E3/UL (ref 0–1.3)
MONOCYTES NFR BLD AUTO: 7 %
NEUTROPHILS # BLD AUTO: 6.2 10E3/UL (ref 1.6–8.3)
NEUTROPHILS NFR BLD AUTO: 57 %
NITRATE UR QL: NEGATIVE
PH UR STRIP: 6 [PH] (ref 5–7)
PLATELET # BLD AUTO: 367 10E3/UL (ref 150–450)
POTASSIUM BLD-SCNC: 3.6 MMOL/L (ref 3.4–5.3)
PROT SERPL-MCNC: 7.8 G/DL (ref 6.8–8.8)
RBC # BLD AUTO: 4.48 10E6/UL (ref 3.8–5.2)
RBC #/AREA URNS AUTO: ABNORMAL /HPF
SODIUM SERPL-SCNC: 139 MMOL/L (ref 133–144)
SP GR UR STRIP: 1.01 (ref 1–1.03)
SQUAMOUS #/AREA URNS AUTO: ABNORMAL /LPF
TROPONIN I SERPL-MCNC: <0.015 UG/L (ref 0–0.04)
UROBILINOGEN UR STRIP-ACNC: 0.2 E.U./DL
WBC # BLD AUTO: 10.8 10E3/UL (ref 4–11)
WBC #/AREA URNS AUTO: ABNORMAL /HPF

## 2021-08-12 PROCEDURE — 71046 X-RAY EXAM CHEST 2 VIEWS: CPT | Performed by: RADIOLOGY

## 2021-08-12 PROCEDURE — 85379 FIBRIN DEGRADATION QUANT: CPT | Performed by: NURSE PRACTITIONER

## 2021-08-12 PROCEDURE — 81001 URINALYSIS AUTO W/SCOPE: CPT | Performed by: NURSE PRACTITIONER

## 2021-08-12 PROCEDURE — 80053 COMPREHEN METABOLIC PANEL: CPT | Performed by: NURSE PRACTITIONER

## 2021-08-12 PROCEDURE — 81025 URINE PREGNANCY TEST: CPT | Performed by: NURSE PRACTITIONER

## 2021-08-12 PROCEDURE — 85025 COMPLETE CBC W/AUTO DIFF WBC: CPT | Performed by: NURSE PRACTITIONER

## 2021-08-12 PROCEDURE — 36415 COLL VENOUS BLD VENIPUNCTURE: CPT | Performed by: NURSE PRACTITIONER

## 2021-08-12 PROCEDURE — 99214 OFFICE O/P EST MOD 30 MIN: CPT | Performed by: NURSE PRACTITIONER

## 2021-08-12 PROCEDURE — 84484 ASSAY OF TROPONIN QUANT: CPT | Performed by: NURSE PRACTITIONER

## 2021-08-12 PROCEDURE — 93000 ELECTROCARDIOGRAM COMPLETE: CPT | Performed by: NURSE PRACTITIONER

## 2021-08-12 RX ORDER — FERROUS SULFATE 325(65) MG
325 TABLET, DELAYED RELEASE (ENTERIC COATED) ORAL DAILY
Qty: 90 TABLET | Refills: 3 | Status: SHIPPED | OUTPATIENT
Start: 2021-08-12 | End: 2022-11-17

## 2021-08-12 RX ORDER — POLYETHYLENE GLYCOL 3350 17 G/17G
17 POWDER, FOR SOLUTION ORAL DAILY
Qty: 510 G | Refills: 0 | Status: SHIPPED | OUTPATIENT
Start: 2021-08-12 | End: 2021-09-11

## 2021-08-12 NOTE — PATIENT INSTRUCTIONS
Start taking your iron.    Miralax 1 cap daily mixed in water.    Patient Education     Anemia  Anemia is a condition that occurs when your body does not have enough healthy red blood cells (RBCs). RBCs are the parts of your blood that carry oxygen all over your body. A protein called hemoglobin allows your RBCs to absorb and release oxygen. Without enough RBCs or hemoglobin, your body doesn't get enough oxygen. Symptoms of anemia may then occur.    What are the symptoms of anemia?  Some people with anemia have no symptoms. But most people have symptoms that range from mild to severe. These can include:    Tiredness (fatigue)    Weakness    Pale skin    Shortness of breath    Dizziness or fainting    Rapid heartbeat    Trouble doing normal amounts of activity    Yellowing of your eyes, skin, or mouth and dark urine (jaundice)  What causes anemia?  Anemia can occur when your body:    Loses too much blood    Does not make enough RBCs    Destroys your RBCs at a faster rate than it can replace them    Does not make a normal amount of hemoglobin in your RBCs  These problems can occur for many reasons, including:    A condition that you are born with (congenital or inherited), such as sickle cell disease or thalassemia    Heavy bleeding for any reason, including injury, surgery, childbirth, or even heavy menstrual periods    Being low in certain nutrients, such as iron, folate, or vitamin B-12    Certain long-term (chronic) conditions such as diabetes, arthritis, or kidney disease    Certain chronic infections such as tuberculosis or HIV    Exposure to certain medicines, such as those used for chemotherapy  There are different types of anemia. Your healthcare provider can tell you more about the type of anemia you have and what may have caused it.  How is anemia diagnosed?  To diagnose anemia, your healthcare provider orders blood tests. These can include:    Complete blood cell count (CBC). This test measures the amounts  of the different types of blood cells.    Blood smear. This test checks the size and shape of your blood cells. To do the test, a drop of your blood is looked at under a microscope. A stain is used to make the blood cells easier to see.    Iron studies. These tests measure the amount of iron in your blood. Your body needs iron to make hemoglobin in your RBCs.    Vitamin B-12 and folate studies. These tests check for some of the components that help give RBCs a normal size and shape.    Reticulocyte count. This test measures the amount of new RBCs that your bone marrow makes.    Hemoglobin electrophoresis. This test checks for problems with your hemoglobin in RBCs.    Bone marrow biopsy. This test evaluates the bone marrow where RBCs are made.  How is anemia treated?  Treatment for anemia is based on the type of anemia, its cause, and the severity of your symptoms. Treatments may include:    Diet changes. This includes increasing the amount of certain nutrients in your diet, such as iron, vitamin B-12, or folate. Your healthcare provider may also prescribe nutrient supplements.    Medicines. Certain medicines treat the cause of your anemia. Others help build new RBCs or ease symptoms. If a medicine is the cause of your anemia, you may need to stop or change it.    Blood transfusions. Replacing some of your blood can increase the number of healthy RBCs in your body.    Surgery. In some cases, your healthcare provider may do surgery to treat the underlying cause of anemia. If you need surgery, your healthcare provider will explain the procedure and outline the risks and benefits for you.  What are the long-term concerns?  If you have a certain type of anemia, you can expect a full recovery after treatment. If you have other types of anemia (especially a type you're born with), you will need to manage it for life. Your healthcare provider can tell you more.  Wang last reviewed this educational content on 4/1/2019     1432-7396 [employer]. All rights reserved. This information is not intended as a substitute for professional medical care. Always follow your healthcare professional's instructions.           Patient Education     Noncardiac Chest Pain    Based on your visit today, the healthcare provider doesn t know what is causing your chest pain. In most cases, people who come to the emergency room with chest pain don t have a problem with their heart. Instead, the pain is caused by other conditions. It's important for the healthcare team to be sure you are not having a life-threatening cause for chest pain such as:     Heart attack    Blood clot in the lungs    Collapsed lung    Ruptured esophagus    Tearing of the aorta  Once these major causes have been ruled out, you may have further evaluation for nonheart causes of chest pain. These may be problems with the lungs, muscles, bones, digestive tract, nerves, or mental health. They include:     Inflammation around the lungs (pleurisy)    Collapsed lung (pneumothorax)    Fluid around the lungs (pleural effusion)    Lung cancer (a rare cause of chest pain)    Inflamed cartilage between the ribs (costochondritis)    Fibromyalgia    Rheumatoid arthritis    Chest wall strain    Reflux    Stomach ulcer    Spasms of the esophagus    Gall stones    Gallbladder inflammation    Panic or anxiety attacks    Emotional distress  Your condition doesn t seem serious. And your pain doesn t seem to be coming from your heart. But sometimes the signs of a serious problem take more time to appear. Watch for the warning signs listed below.   Home care  Follow these guidelines when caring for yourself at home:     Rest today and don't do any strenuous activity.    Take any prescribed medicine as directed.  Follow-up care  Follow up with your healthcare provider, or as advised, if you don t start to feel better in 24 hours.   Call 911  Call 911 if any of these occur:     A change in the  type of pain: if it feels different, becomes more severe, lasts longer, or begins to spread into your shoulder, arm, neck, jaw or back    Shortness of breath or increased pain with breathing    Weakness, dizziness, or fainting    Rapid heart beat    Crushing sensation in your chest  When to seek medical advice  Call your healthcare provider right away if any of these occur:     Cough with dark colored sputum (phlegm) or blood    Fever of 100.4 F (38 C) or higher, or as directed by your healthcare provider    Swelling, pain or redness in one leg  Wang last reviewed this educational content on 6/1/2019 2000-2021 [employer]. All rights reserved. This information is not intended as a substitute for professional medical care. Always follow your healthcare professional's instructions.

## 2021-08-12 NOTE — PROGRESS NOTES
Chief Complaint   Patient presents with     Dizziness     Pt states she started having dizziness and fast heart rate X 2 hours          ICD-10-CM    1. Chest pain, unspecified type  R07.9 EKG 12-lead complete w/read - Clinics     CBC with platelets and differential     Troponin I     D dimer, quantitative     Comprehensive metabolic panel (BMP + Alb, Alk Phos, ALT, AST, Total. Bili, TP)     XR Chest 2 Views     CBC with platelets and differential     Troponin I     D dimer, quantitative     Comprehensive metabolic panel (BMP + Alb, Alk Phos, ALT, AST, Total. Bili, TP)   2. Dizziness  R42 CBC with platelets and differential     Troponin I     D dimer, quantitative     Comprehensive metabolic panel (BMP + Alb, Alk Phos, ALT, AST, Total. Bili, TP)     UA with Microscopic reflex to Culture - lab collect     HCG Qual, Urine ([id number])     UA with Microscopic reflex to Culture - lab collect     HCG Qual, Urine ([id number])     CBC with platelets and differential     Troponin I     D dimer, quantitative     Comprehensive metabolic panel (BMP + Alb, Alk Phos, ALT, AST, Total. Bili, TP)     Urine Microscopic   3. Anemia, unspecified type  D64.9 ferrous sulfate (FE TABS) 325 (65 Fe) MG EC tablet     polyethylene glycol (MIRALAX) 17 GM/Dose powder   4. Iron deficiency anemia secondary to inadequate dietary iron intake  D50.8 polyethylene glycol (MIRALAX) 17 GM/Dose powder   5. History of constipation  Z87.19 polyethylene glycol (MIRALAX) 17 GM/Dose powder   Patient back on iron to treat anemia.  She reports she stopped taking it because she got so constipated.  Sent Rx for MiraLAX to pharmacy along with iron and instructed her how to take this.    She will follow up with primary care provider regarding anemia    Suspect chest pain is musculoskeletal in nature.  If she has further episodes of racing heart encouraged her to follow-up with her primary care provider to discuss the possibility of a cardiology referral or Holter  monitoring.  She also seems to have an anxiety component that could be contributing to her earlier symptoms.    Medical Decision Making  Differential diagnosis for patient's chest pain include but is not limited to: Acute myocardial infarction, pneumothorax, pleurisy, pericarditis, myocarditis, asthma exacerbation, pneumonia, lung abscess, aortic dissection, thoracic aortic aneurysm, costochondritis, pulmonary embolus, rib fractures, muscular pain, GERD, cholecystitis, pancreatitis, diaphragmatic irritation, anxiety.      CXR - Reviewed and interpreted by me Normal- no infiltrates, effusions, pneumothoraces, cardiomegaly or masses  EKG - Reviewed and interpreted by me appears normal, NSR, no acute changes    Results for orders placed or performed in visit on 08/12/21 (from the past 24 hour(s))   UA with Microscopic reflex to Culture - lab collect    Specimen: Urine, Midstream   Result Value Ref Range    Color Urine Yellow Colorless, Straw, Light Yellow, Yellow    Appearance Urine Clear Clear    Glucose Urine Negative Negative mg/dL    Bilirubin Urine Negative Negative    Ketones Urine Negative Negative mg/dL    Specific Gravity Urine 1.010 1.003 - 1.035    Blood Urine Negative Negative    pH Urine 6.0 5.0 - 7.0    Protein Albumin Urine Negative Negative mg/dL    Urobilinogen Urine 0.2 0.2, 1.0 E.U./dL    Nitrite Urine Negative Negative    Leukocyte Esterase Urine Negative Negative   HCG Qual, Urine ([id number])   Result Value Ref Range    hCG Urine Qualitative Negative Negative   Urine Microscopic   Result Value Ref Range    Bacteria Urine Few (A) None Seen /HPF    RBC Urine 0-2 0-2 /HPF /HPF    WBC Urine 0-5 0-5 /HPF /HPF    Squamous Epithelials Urine Moderate (A) None Seen /LPF    Narrative    Urine Culture not indicated   CBC with platelets and differential    Narrative    The following orders were created for panel order CBC with platelets and differential.  Procedure                               Abnormality          Status                     ---------                               -----------         ------                     CBC with platelets and d...[[id number]]  Abnormal            Final result                 Please view results for these tests on the individual orders.   Troponin I   Result Value Ref Range    Troponin I <0.015 0.000 - 0.045 ug/L   D dimer, quantitative   Result Value Ref Range    D-Dimer Quantitative 0.27 0.00 - 0.50 ug/mL FEU    Narrative    This D-dimer assay is intended for use in conjunction with a clinical pretest probability assessment model to exclude pulmonary embolism (PE) and deep venous thrombosis (DVT) in outpatients suspected of PE or DVT. The cut-off value is 0.50 ug/mL FEU.   Comprehensive metabolic panel (BMP + Alb, Alk Phos, ALT, AST, Total. Bili, TP)   Result Value Ref Range    Sodium 139 133 - 144 mmol/L    Potassium 3.6 3.4 - 5.3 mmol/L    Chloride 110 (H) 94 - 109 mmol/L    Carbon Dioxide (CO2) 27 20 - 32 mmol/L    Anion Gap 2 (L) 3 - 14 mmol/L    Urea Nitrogen 7 7 - 30 mg/dL    Creatinine 0.65 0.52 - 1.04 mg/dL    Calcium 8.7 8.5 - 10.1 mg/dL    Glucose 91 70 - 99 mg/dL    Alkaline Phosphatase 64 40 - 150 U/L    AST 9 0 - 45 U/L    ALT 15 0 - 50 U/L    Protein Total 7.8 6.8 - 8.8 g/dL    Albumin 3.7 3.4 - 5.0 g/dL    Bilirubin Total 0.1 (L) 0.2 - 1.3 mg/dL    GFR Estimate >90 >60 mL/min/1.73m2   CBC with platelets and differential   Result Value Ref Range    WBC Count 10.8 4.0 - 11.0 10e3/uL    RBC Count 4.48 3.80 - 5.20 10e6/uL    Hemoglobin 10.7 (L) 11.7 - 15.7 g/dL    Hematocrit 33.7 (L) 35.0 - 47.0 %    MCV 75 (L) 78 - 100 fL    MCH 23.9 (L) 26.5 - 33.0 pg    MCHC 31.8 31.5 - 36.5 g/dL    RDW 18.4 (H) 10.0 - 15.0 %    Platelet Count 367 150 - 450 10e3/uL    % Neutrophils 57 %    % Lymphocytes 35 %    % Monocytes 7 %    % Eosinophils 1 %    % Basophils 0 %    Absolute Neutrophils 6.2 1.6 - 8.3 10e3/uL    Absolute Lymphocytes 3.8 0.8 - 5.3 10e3/uL    Absolute Monocytes 0.7 0.0 -  1.3 10e3/uL    Absolute Eosinophils 0.1 0.0 - 0.7 10e3/uL    Absolute Basophils 0.0 0.0 - 0.2 10e3/uL       Subjective     Randee Millard is an 41 year old female who presents to clinic today for dizziness and fast heart rate, pounding in her chest for 2 hours prior to arrival. She also felt the room spinning and everything started to go dark. Chest pain on the left side radiating to the back that is still present.  She reports previous episodes of dizziness but nothing like this.    ROS: 10 point ROS neg other than the symptoms noted above in the HPI.       Objective    /67   Pulse 77   Temp 98.3  F (36.8  C) (Tympanic)   Resp 18   Wt 90.7 kg (200 lb)   SpO2 100%   Breastfeeding No   BMI 30.41 kg/m      Physical Exam       GENERAL APPEARANCE: healthy appearing, alert     EYES: PERRL, EOMI, sclera non-icteric     HENT: oral exam benign, mucus membranes intact, without ulcers or lesions     NECK: no adenopathy or asymmetry, thyroid normal to palpation     RESP: lungs clear to auscultation - no rales, rhonchi or wheezes     CV: regular rates and rhythm, no murmurs, rubs, or gallop     ABDOMEN:  soft, nontender, no HSM or masses and bowel sounds normal     MS: extremities normal- no gross deformities noted; normal muscle tone.     SKIN: no suspicious lesions or rashes     NEURO: Normal strength and tone, mentation intact and speech normal     PSYCH: normal thought process; no significant mood disturbance    Patient Instructions   Start taking your iron.    Miralax 1 cap daily mixed in water.    Patient Education     Anemia  Anemia is a condition that occurs when your body does not have enough healthy red blood cells (RBCs). RBCs are the parts of your blood that carry oxygen all over your body. A protein called hemoglobin allows your RBCs to absorb and release oxygen. Without enough RBCs or hemoglobin, your body doesn't get enough oxygen. Symptoms of anemia may then occur.    What are the symptoms of  anemia?  Some people with anemia have no symptoms. But most people have symptoms that range from mild to severe. These can include:    Tiredness (fatigue)    Weakness    Pale skin    Shortness of breath    Dizziness or fainting    Rapid heartbeat    Trouble doing normal amounts of activity    Yellowing of your eyes, skin, or mouth and dark urine (jaundice)  What causes anemia?  Anemia can occur when your body:    Loses too much blood    Does not make enough RBCs    Destroys your RBCs at a faster rate than it can replace them    Does not make a normal amount of hemoglobin in your RBCs  These problems can occur for many reasons, including:    A condition that you are born with (congenital or inherited), such as sickle cell disease or thalassemia    Heavy bleeding for any reason, including injury, surgery, childbirth, or even heavy menstrual periods    Being low in certain nutrients, such as iron, folate, or vitamin B-12    Certain long-term (chronic) conditions such as diabetes, arthritis, or kidney disease    Certain chronic infections such as tuberculosis or HIV    Exposure to certain medicines, such as those used for chemotherapy  There are different types of anemia. Your healthcare provider can tell you more about the type of anemia you have and what may have caused it.  How is anemia diagnosed?  To diagnose anemia, your healthcare provider orders blood tests. These can include:    Complete blood cell count (CBC). This test measures the amounts of the different types of blood cells.    Blood smear. This test checks the size and shape of your blood cells. To do the test, a drop of your blood is looked at under a microscope. A stain is used to make the blood cells easier to see.    Iron studies. These tests measure the amount of iron in your blood. Your body needs iron to make hemoglobin in your RBCs.    Vitamin B-12 and folate studies. These tests check for some of the components that help give RBCs a normal size  and shape.    Reticulocyte count. This test measures the amount of new RBCs that your bone marrow makes.    Hemoglobin electrophoresis. This test checks for problems with your hemoglobin in RBCs.    Bone marrow biopsy. This test evaluates the bone marrow where RBCs are made.  How is anemia treated?  Treatment for anemia is based on the type of anemia, its cause, and the severity of your symptoms. Treatments may include:    Diet changes. This includes increasing the amount of certain nutrients in your diet, such as iron, vitamin B-12, or folate. Your healthcare provider may also prescribe nutrient supplements.    Medicines. Certain medicines treat the cause of your anemia. Others help build new RBCs or ease symptoms. If a medicine is the cause of your anemia, you may need to stop or change it.    Blood transfusions. Replacing some of your blood can increase the number of healthy RBCs in your body.    Surgery. In some cases, your healthcare provider may do surgery to treat the underlying cause of anemia. If you need surgery, your healthcare provider will explain the procedure and outline the risks and benefits for you.  What are the long-term concerns?  If you have a certain type of anemia, you can expect a full recovery after treatment. If you have other types of anemia (especially a type you're born with), you will need to manage it for life. Your healthcare provider can tell you more.  [employer] last reviewed this educational content on 4/1/2019 2000-2021 [employer]. All rights reserved. This information is not intended as a substitute for professional medical care. Always follow your healthcare professional's instructions.           Patient Education     Noncardiac Chest Pain    Based on your visit today, the healthcare provider doesn t know what is causing your chest pain. In most cases, people who come to the emergency room with chest pain don t have a problem with their heart. Instead, the pain  is caused by other conditions. It's important for the healthcare team to be sure you are not having a life-threatening cause for chest pain such as:     Heart attack    Blood clot in the lungs    Collapsed lung    Ruptured esophagus    Tearing of the aorta  Once these major causes have been ruled out, you may have further evaluation for nonheart causes of chest pain. These may be problems with the lungs, muscles, bones, digestive tract, nerves, or mental health. They include:     Inflammation around the lungs (pleurisy)    Collapsed lung (pneumothorax)    Fluid around the lungs (pleural effusion)    Lung cancer (a rare cause of chest pain)    Inflamed cartilage between the ribs (costochondritis)    Fibromyalgia    Rheumatoid arthritis    Chest wall strain    Reflux    Stomach ulcer    Spasms of the esophagus    Gall stones    Gallbladder inflammation    Panic or anxiety attacks    Emotional distress  Your condition doesn t seem serious. And your pain doesn t seem to be coming from your heart. But sometimes the signs of a serious problem take more time to appear. Watch for the warning signs listed below.   Home care  Follow these guidelines when caring for yourself at home:     Rest today and don't do any strenuous activity.    Take any prescribed medicine as directed.  Follow-up care  Follow up with your healthcare provider, or as advised, if you don t start to feel better in 24 hours.   Call 911  Call 911 if any of these occur:     A change in the type of pain: if it feels different, becomes more severe, lasts longer, or begins to spread into your shoulder, arm, neck, jaw or back    Shortness of breath or increased pain with breathing    Weakness, dizziness, or fainting    Rapid heart beat    Crushing sensation in your chest  When to seek medical advice  Call your healthcare provider right away if any of these occur:     Cough with dark colored sputum (phlegm) or blood    Fever of 100.4 F (38 C) or higher, or as  directed by your healthcare provider    Swelling, pain or redness in one leg  [employer] last reviewed this educational content on 6/1/2019 2000-2021 [employer]. All rights reserved. This information is not intended as a substitute for professional medical care. Always follow your healthcare professional's instructions.               BLANCA Curran, CNP  [facility] Provider

## 2021-08-30 ENCOUNTER — OFFICE VISIT (OUTPATIENT)
Dept: URGENT CARE | Facility: URGENT CARE | Age: 41
End: 2021-08-30
Payer: COMMERCIAL

## 2021-08-30 VITALS
DIASTOLIC BLOOD PRESSURE: 60 MMHG | WEIGHT: 200 LBS | RESPIRATION RATE: 16 BRPM | OXYGEN SATURATION: 100 % | HEART RATE: 91 BPM | TEMPERATURE: 98.2 F | BODY MASS INDEX: 30.41 KG/M2 | SYSTOLIC BLOOD PRESSURE: 108 MMHG

## 2021-08-30 DIAGNOSIS — S29.012A MUSCLE STRAIN OF LEFT UPPER BACK, INITIAL ENCOUNTER: Primary | ICD-10-CM

## 2021-08-30 PROCEDURE — 99213 OFFICE O/P EST LOW 20 MIN: CPT | Performed by: FAMILY MEDICINE

## 2021-08-30 RX ORDER — METHOCARBAMOL 750 MG/1
750 TABLET, FILM COATED ORAL 4 TIMES DAILY
Qty: 28 TABLET | Refills: 0 | Status: SHIPPED | OUTPATIENT
Start: 2021-08-30 | End: 2021-09-06

## 2021-08-30 RX ORDER — NAPROXEN 500 MG/1
500 TABLET ORAL 2 TIMES DAILY WITH MEALS
Qty: 14 TABLET | Refills: 0 | Status: SHIPPED | OUTPATIENT
Start: 2021-08-30 | End: 2021-09-06

## 2021-08-30 NOTE — PROGRESS NOTES
SUBJECTIVEHPI: Randee Millard is a 41 year old female who presents for evaluation of back pain.  Symptoms began hour(s) ago, have been onset acute and are stable.  Pain is located in the upper back left region, with radiation to does not radiate.  Recent injury:turning/bending   Pain is exacerbated by: bending and changing position.  Associated sx include: none.  Red flag symptoms: negative.    Past Medical History:   Diagnosis Date     Anemia      Kidney infection 05/30/2021     Reflux esophagitis      Allergies   Allergen Reactions     Amoxicillin Rash     Clindamycin Rash     Social History     Tobacco Use     Smoking status: Never Smoker     Smokeless tobacco: Never Used   Substance Use Topics     Alcohol use: No     Alcohol/week: 0.0 standard drinks       ROS:CONSTITUTIONAL:NEGATIVE for fever, chills, change in weight    OBJECTIVE:  /60   Pulse 91   Temp 98.2  F (36.8  C) (Tympanic)   Resp 16   Wt 90.7 kg (200 lb)   SpO2 100%   Breastfeeding No   BMI 30.41 kg/m    Back examination: Back symmetric, no curvature. ROM normal. No CVA tenderness., positive findings: limitation of motion - flexion: Moderate and rotation: Moderate, paraspinal muscle spasm, tenderness to palpation.  Straight leg raise test: not done.  GENERAL APPEARANCE: healthy, alert and no distress  SKIN: no suspicious lesions or rashes      ICD-10-CM    1. Muscle strain of left upper back, initial encounter  S29.012A methocarbamol (ROBAXIN) 750 MG tablet     naproxen (NAPROSYN) 500 MG tablet     Continue prn heat or ice application.    F/U PCP/IM/FP if persists, ED if worse

## 2021-09-25 ENCOUNTER — HEALTH MAINTENANCE LETTER (OUTPATIENT)
Age: 41
End: 2021-09-25

## 2021-10-02 ENCOUNTER — OFFICE VISIT (OUTPATIENT)
Dept: URGENT CARE | Facility: URGENT CARE | Age: 41
End: 2021-10-02
Payer: COMMERCIAL

## 2021-10-02 VITALS
SYSTOLIC BLOOD PRESSURE: 118 MMHG | WEIGHT: 190 LBS | HEART RATE: 76 BPM | BODY MASS INDEX: 28.89 KG/M2 | OXYGEN SATURATION: 100 % | TEMPERATURE: 98.3 F | DIASTOLIC BLOOD PRESSURE: 70 MMHG | RESPIRATION RATE: 20 BRPM

## 2021-10-02 DIAGNOSIS — R10.9 FLANK PAIN: ICD-10-CM

## 2021-10-02 DIAGNOSIS — R10.824 LEFT LOWER QUADRANT ABDOMINAL TENDERNESS WITH REBOUND TENDERNESS: Primary | ICD-10-CM

## 2021-10-02 LAB
ALBUMIN UR-MCNC: NEGATIVE MG/DL
APPEARANCE UR: CLEAR
BILIRUB UR QL STRIP: NEGATIVE
COLOR UR AUTO: YELLOW
GLUCOSE UR STRIP-MCNC: NEGATIVE MG/DL
HGB UR QL STRIP: NEGATIVE
KETONES UR STRIP-MCNC: NEGATIVE MG/DL
LEUKOCYTE ESTERASE UR QL STRIP: NEGATIVE
NITRATE UR QL: NEGATIVE
PH UR STRIP: 7 [PH] (ref 5–7)
SP GR UR STRIP: 1.01 (ref 1–1.03)
UROBILINOGEN UR STRIP-ACNC: 0.2 E.U./DL

## 2021-10-02 PROCEDURE — 81003 URINALYSIS AUTO W/O SCOPE: CPT

## 2021-10-02 PROCEDURE — 99215 OFFICE O/P EST HI 40 MIN: CPT | Performed by: PHYSICIAN ASSISTANT

## 2021-10-02 NOTE — PROGRESS NOTES
URGENT CARE VISIT:    SUBJECTIVE:   Randee Millard is a 41 year old female who presents with abdominal pain for 1 week. Abdominal pain is located over LLQ and is described as sharp. Pain timing/severity is described as worsening and constant and moderate.  Pain is improved by nothing and worsened by nothing. Associated symptoms include frequency. She denies anorexia, nausea, vomiting, diarrhea, constipation, dysuria, belching, fever and chills. She has tried Tylenol with no relief of symptoms.  Appetite is decreased. Risk factors include none. Abdominal surgical history includes none. LMP was 2 weeks ago.    PMH:   Past Medical History:   Diagnosis Date     Anemia      Kidney infection 05/30/2021     Reflux esophagitis      Allergies: Amoxicillin and Clindamycin  Medications:   Current Outpatient Medications   Medication Sig Dispense Refill     ferrous sulfate (FE TABS) 325 (65 Fe) MG EC tablet Take 1 tablet (325 mg) by mouth daily 90 tablet 3     Social History:   Social History     Socioeconomic History     Marital status:      Spouse name: Not on file     Number of children: Not on file     Years of education: Not on file     Highest education level: Not on file   Occupational History     Not on file   Tobacco Use     Smoking status: Never Smoker     Smokeless tobacco: Never Used   Vaping Use     Vaping Use: Never used   Substance and Sexual Activity     Alcohol use: No     Alcohol/week: 0.0 standard drinks     Drug use: No     Sexual activity: Yes     Partners: Male   Other Topics Concern     Parent/sibling w/ CABG, MI or angioplasty before 65F 55M? Not Asked   Social History Narrative     Not on file     Social Determinants of Health     Financial Resource Strain:      Difficulty of Paying Living Expenses:    Food Insecurity:      Worried About Running Out of Food in the Last Year:      Ran Out of Food in the Last Year:    Transportation Needs:      Lack of Transportation (Medical):      Lack of  Transportation (Non-Medical):    Physical Activity:      Days of Exercise per Week:      Minutes of Exercise per Session:    Stress:      Feeling of Stress :    Social Connections:      Frequency of Communication with Friends and Family:      Frequency of Social Gatherings with Friends and Family:      Attends [employer] Services:      Active Member of Clubs or Organizations:      Attends Club or Organization Meetings:      Marital Status:    Intimate Partner Violence:      Fear of Current or Ex-Partner:      Emotionally Abused:      Physically Abused:      Sexually Abused:      Family History:  I have reviewed this patient's family history and updated it with pertinent information if needed.  Family History   Problem Relation Age of Onset     Hypertension Father      Diabetes Father 63     Family History Negative Mother      No Known Problems Brother      No Known Problems Sister      No Known Problems Son      No Known Problems Daughter      ROS:   General: negative  Skin: negative  Eyes: negative  Ears/Nose/Throat: negative  Respiratory: No shortness of breath, dyspnea on exertion, cough, or hemoptysis  Cardiovascular: negative  Gastrointestinal: abdominal pain  Genitourinary: frequency  Musculoskeletal: left flank pain  Neurologic: negative  Psychiatric: negative  Hematologic/Lymphatic/Immunologic: negative  Endocrine: negative     OBJECTIVE:  /70   Pulse 76   Temp 98.3  F (36.8  C)   Resp 20   Wt 86.2 kg (190 lb)   LMP  (LMP Unknown)   SpO2 100%   BMI 28.89 kg/m    GENERAL APPEARANCE: healthy, alert and no distress  EYES: EOMI,  PERRL, conjunctiva clear  RESP: lungs clear to auscultation - no rales, rhonchi or wheezes  CV: regular rates and rhythm, normal S1 S2, no murmur noted  ABDOMEN: soft, normal bowel sounds, tenderness moderate LLQ  SKIN: no suspicious lesions or rashes    LABS:  Results for orders placed or performed in visit on 10/02/21   UA Macro with Reflex to Micro and Culture - lab  collect     Status: Normal    Specimen: Urine, Midstream   Result Value Ref Range    Color Urine Yellow Colorless, Straw, Light Yellow, Yellow    Appearance Urine Clear Clear    Glucose Urine Negative Negative mg/dL    Bilirubin Urine Negative Negative    Ketones Urine Negative Negative mg/dL    Specific Gravity Urine 1.010 1.003 - 1.035    Blood Urine Negative Negative    pH Urine 7.0 5.0 - 7.0    Protein Albumin Urine Negative Negative mg/dL    Urobilinogen Urine 0.2 0.2, 1.0 E.U./dL    Nitrite Urine Negative Negative    Leukocyte Esterase Urine Negative Negative    Narrative    Microscopic not indicated        ASSESSMENT:     ICD-10-CM    1. Left lower quadrant abdominal tenderness with rebound tenderness  R10.824 UA Macro with Reflex to Micro and Culture - lab collect   2. Flank pain  R10.9         PLAN:  Patient Instructions   Ddx discussed including ectopic pregnancy, pyelonephritis, UTI, nephrolithiasis, ovarian torsion, diverticulitis, among others. UA was normal. Workup will likely require a minimum imaging study of abdominopelvic ultrasound which we do not have available in the clinic. No appointments available either. Discussed watchful waiting until Monday vs ER visit and she decided to go to [facility] for work up today. Patient verbalized understanding and is agreeable to plan. The patient was discharged ambulatory and in stable condition.    Susana Up PA-C ....................  10/2/2021   2:40 PM

## 2021-10-02 NOTE — PATIENT INSTRUCTIONS
Ddx discussed including ectopic pregnancy, pyelonephritis, UTI, nephrolithiasis, ovarian torsion, diverticulitis, among others. UA was normal. Workup will likely require a minimum imaging study of abdominopelvic ultrasound which we do not have available in the clinic. No appointments available either. Discussed watchful waiting until Monday vs ER visit and she decided to go to [facility] for work up today.

## 2021-10-05 ENCOUNTER — OFFICE VISIT (OUTPATIENT)
Dept: OBGYN | Facility: CLINIC | Age: 41
End: 2021-10-05
Payer: COMMERCIAL

## 2021-10-05 VITALS
WEIGHT: 202.4 LBS | DIASTOLIC BLOOD PRESSURE: 70 MMHG | HEIGHT: 68 IN | SYSTOLIC BLOOD PRESSURE: 116 MMHG | BODY MASS INDEX: 30.68 KG/M2

## 2021-10-05 DIAGNOSIS — N89.8 VAGINAL DISCHARGE: ICD-10-CM

## 2021-10-05 DIAGNOSIS — R10.32 LLQ ABDOMINAL PAIN: ICD-10-CM

## 2021-10-05 DIAGNOSIS — R10.2 PELVIC PAIN IN FEMALE: Primary | ICD-10-CM

## 2021-10-05 LAB
CLUE CELLS: ABNORMAL
TRICHOMONAS, WET PREP: ABNORMAL
WBC'S/HIGH POWER FIELD, WET PREP: ABNORMAL
YEAST, WET PREP: ABNORMAL

## 2021-10-05 PROCEDURE — 87210 SMEAR WET MOUNT SALINE/INK: CPT | Performed by: OBSTETRICS & GYNECOLOGY

## 2021-10-05 PROCEDURE — 99214 OFFICE O/P EST MOD 30 MIN: CPT | Performed by: OBSTETRICS & GYNECOLOGY

## 2021-10-05 RX ORDER — CIPROFLOXACIN 250 MG/1
250 TABLET, FILM COATED ORAL 2 TIMES DAILY
Qty: 14 TABLET | Refills: 0 | Status: SHIPPED | OUTPATIENT
Start: 2021-10-05 | End: 2021-10-12

## 2021-10-05 ASSESSMENT — MIFFLIN-ST. JEOR: SCORE: 1626.58

## 2021-10-05 NOTE — NURSING NOTE
"Chief Complaint   Patient presents with     Pelvic Pain     pain going on for a week--went to [facility]--not UTI       Initial /70   Ht 1.727 m (5' 8\")   Wt 91.8 kg (202 lb 6.4 oz)   LMP 09/15/2021 (Approximate)   BMI 30.77 kg/m   Estimated body mass index is 30.77 kg/m  as calculated from the following:    Height as of this encounter: 1.727 m (5' 8\").    Weight as of this encounter: 91.8 kg (202 lb 6.4 oz).  BP completed using cuff size: regular    Questioned patient about current smoking habits.  Pt. has never smoked.          The following HM Due: NONE             "

## 2021-10-05 NOTE — PROGRESS NOTES
HPI:  Randee Millard is a 41 year old female [place]ian female  Patient's last menstrual period was 09/15/2021 (approximate). Not using anything for contraception, who presents for evaluation of an approximate 1 week history of left lower quadrant abdominal pelvic pain. Most recently she describes the pain as being sharp and can radiate into the vagina. Patient was seen at the [facility] urgent care 10/2/2021 I reviewed those notes. She has not had recent intercourse. Her menses have been on time. She is used Tylenol without complete relief. Nothing seems to make the pain worse. She has similar symptoms approximately 5 years ago when she was pregnant. I reviewed the results of pelvic ultrasound the patient had on 2020 with the following findings    Impression:     Comparison study 2020.     Complete pelvic ultrasound using realtime transabdominal and transvaginal scanning     Bladder appears normal      Normal uterus.  Uterus is anteverted.  Myomatous uterus: Endometrial cavity not affected.  There is a large posterior subserosal/intramural myoma measuring 5.5 x 5.4 x 6.7 cm.     Endometrium noted to be normal for phase of menstrual cycle.     Normal bilateral ovaries      No cul-de-sac fluid.     Large posterior myoma that is essentially unchanged from prior ultrasound.    I also reviewed the results of an abdominal pelvic CT the patient had on 6/10/2021 the following findings    IMPRESSION:   1.  Recently involuted right adnexal follicle or cyst with trace free fluid.  2.  Presumed uterine fibroid.  3.  Evidence for constipation without bowel obstruction.    Further questioning the patient states that she has 2 and sometimes 3 bowel movements a week without black and tarry stools or bloody stools. She states that her stools can range from hard to soft any unusual vaginal discharge. Her urinalysis from 10/2/2021 was normal  .    Past Medical History:   Diagnosis Date     Anemia      Fibroid uterus       Kidney infection 05/30/2021     Reflux esophagitis      Past Surgical History:   Procedure Laterality Date     US BREAST CYST ASPIRATION INITIAL RT       Family History   Problem Relation Age of Onset     Hypertension Father      Diabetes Father 63     Family History Negative Mother      No Known Problems Brother      No Known Problems Sister      No Known Problems Son      No Known Problems Daughter      Social History     Socioeconomic History     Marital status:      Spouse name: Not on file     Number of children: Not on file     Years of education: Not on file     Highest education level: Not on file   Occupational History     Occupation:  full time   Tobacco Use     Smoking status: Never Smoker     Smokeless tobacco: Never Used   Vaping Use     Vaping Use: Never used   Substance and Sexual Activity     Alcohol use: No     Alcohol/week: 0.0 standard drinks     Drug use: No     Sexual activity: Yes     Partners: Male   Other Topics Concern     Parent/sibling w/ CABG, MI or angioplasty before 65F 55M? Not Asked   Social History Narrative     Not on file     Social Determinants of Health     Financial Resource Strain:      Difficulty of Paying Living Expenses:    Food Insecurity:      Worried About Running Out of Food in the Last Year:      Ran Out of Food in the Last Year:    Transportation Needs:      Lack of Transportation (Medical):      Lack of Transportation (Non-Medical):    Physical Activity:      Days of Exercise per Week:      Minutes of Exercise per Session:    Stress:      Feeling of Stress :    Social Connections:      Frequency of Communication with Friends and Family:      Frequency of Social Gatherings with Friends and Family:      Attends Spiritism Services:      Active Member of Clubs or Organizations:      Attends Club or Organization Meetings:      Marital Status:    Intimate Partner Violence:      Fear of Current or Ex-Partner:      Emotionally Abused:      Physically  "Abused:      Sexually Abused:        Allergies:  Amoxicillin and Clindamycin    Current Outpatient Medications   Medication Sig Dispense Refill     ciprofloxacin (CIPRO) 250 MG tablet Take 1 tablet (250 mg) by mouth 2 times daily for 7 days 14 tablet 0     ferrous sulfate (FE TABS) 325 (65 Fe) MG EC tablet Take 1 tablet (325 mg) by mouth daily 90 tablet 3       Review Of Systems   ROS: 10 point ROS neg other than the symptoms noted above in the HPI.    Exam:  /70   Ht 1.727 m (5' 8\")   Wt 91.8 kg (202 lb 6.4 oz)   LMP 09/15/2021 (Approximate)   BMI 30.77 kg/m    {Constitutional: healthy, alert and mild distress  Cardiovascular: negative, PMI normal. No lifts, heaves, or thrills. RRR. No murmurs, clicks gallops or rub  Respiratory: negative, Percussion normal. Good diaphragmatic excursion. Lungs clear  Gastrointestinal: Abdomen soft, mildly-tender in the left lower quadrant without rebound or guarding. BS normal. No masses, organomegaly  Genitourinary: Normal external genitalia without lesions and speculum exam multiparous cervix lesions seen. A wet prep was performed for completeness and was normal manual exam there is a suggestion of an approximate 6 cm fibroid in the posterior fundal region of the uterus. It is mildly tender to palpation adnexa without masses or enlargement rectovaginal exam reveals stool in the colon other masses    Assessment/Plan:  (R10.2) Pelvic pain in female  (primary encounter diagnosis)  Comment: I suspect that her pain is most probably bowel related with a past history of constipation there may be a component of diverticulitis. I reviewed the pathophysiology of this with the patient. I recommended she begin MiraLAX one capful daily and Cipro 250 mg p.o. twice daily for 7 days. We discussed the use of Tylenol and ibuprofen for analgesia. We discussed the symptoms of concern and she will call should these occur otherwise I like to see her in the office in a week for " follow-up  Plan: Wet prep - Clinic Collect, ciprofloxacin         (CIPRO) 250 MG tablet        Results reviewed    (N89.8) Vaginal discharge  Comment: Patient notified  Plan: Wet prep - Clinic Collect        Given to the patient. A written outline of our discussion was given    (R10.32) LLQ abdominal pain  Comment: As above  Plan: ciprofloxacin (CIPRO) 250 MG tablet        As above      Reuben Díaz M.D.

## 2021-10-19 ENCOUNTER — OFFICE VISIT (OUTPATIENT)
Dept: OBGYN | Facility: CLINIC | Age: 41
End: 2021-10-19
Payer: COMMERCIAL

## 2021-10-19 VITALS — SYSTOLIC BLOOD PRESSURE: 102 MMHG | DIASTOLIC BLOOD PRESSURE: 62 MMHG

## 2021-10-19 DIAGNOSIS — R30.0 BURNING WITH URINATION: Primary | ICD-10-CM

## 2021-10-19 DIAGNOSIS — N89.8 VAGINAL ITCHING: ICD-10-CM

## 2021-10-19 DIAGNOSIS — D25.9 UTERINE LEIOMYOMA, UNSPECIFIED LOCATION: ICD-10-CM

## 2021-10-19 LAB
ALBUMIN UR-MCNC: NEGATIVE MG/DL
APPEARANCE UR: CLEAR
BACTERIA #/AREA URNS HPF: ABNORMAL /HPF
BILIRUB UR QL STRIP: NEGATIVE
CLUE CELLS: ABNORMAL
COLOR UR AUTO: YELLOW
GLUCOSE UR STRIP-MCNC: NEGATIVE MG/DL
HGB UR QL STRIP: NEGATIVE
KETONES UR STRIP-MCNC: ABNORMAL MG/DL
LEUKOCYTE ESTERASE UR QL STRIP: ABNORMAL
NITRATE UR QL: NEGATIVE
PH UR STRIP: 7 [PH] (ref 5–7)
RBC #/AREA URNS AUTO: ABNORMAL /HPF
SP GR UR STRIP: 1.02 (ref 1–1.03)
SQUAMOUS #/AREA URNS AUTO: ABNORMAL /LPF
TRICHOMONAS, WET PREP: ABNORMAL
UROBILINOGEN UR STRIP-ACNC: 0.2 E.U./DL
WBC #/AREA URNS AUTO: ABNORMAL /HPF
WBC'S/HIGH POWER FIELD, WET PREP: ABNORMAL
YEAST, WET PREP: ABNORMAL

## 2021-10-19 PROCEDURE — 87210 SMEAR WET MOUNT SALINE/INK: CPT | Performed by: OBSTETRICS & GYNECOLOGY

## 2021-10-19 PROCEDURE — 81001 URINALYSIS AUTO W/SCOPE: CPT | Performed by: OBSTETRICS & GYNECOLOGY

## 2021-10-19 PROCEDURE — 87086 URINE CULTURE/COLONY COUNT: CPT | Performed by: OBSTETRICS & GYNECOLOGY

## 2021-10-19 PROCEDURE — 99214 OFFICE O/P EST MOD 30 MIN: CPT | Performed by: OBSTETRICS & GYNECOLOGY

## 2021-10-19 RX ORDER — FLUCONAZOLE 150 MG/1
150 TABLET ORAL DAILY
Qty: 3 TABLET | Refills: 0 | Status: SHIPPED | OUTPATIENT
Start: 2021-10-19 | End: 2021-10-22

## 2021-10-19 NOTE — PROGRESS NOTES
Randee Millard is a 41 year old female  last menstrual period approximately 9/15/2021 not using anything for contraception presents today for a follow-up at the office visit of 10/5/2021 when she was seen for evaluation of a 1 week history of left lower quadrant abdominal pelvic pain.  I reviewed the results of her ultrasound and CT scan demonstrating a uterine fibroid unchanged in size and copious stool in the bowel with a concern of constipation.  The patient was treated with Cipro 250 mg p.o. twice daily for 7 days in addition to MiraLAX 1 capful daily.  The patient states that her lower abdominal pelvic discomfort is much better.  In the interval she did develop vaginal itching and irritation without a malodorous discharge.  No fevers or chills.  She does notice when urine hits the external tissues that there is burning present    Past Medical History:   Diagnosis Date     Anemia      Fibroid uterus      Kidney infection 2021     Reflux esophagitis      Current Outpatient Medications   Medication     ferrous sulfate (FE TABS) 325 (65 Fe) MG EC tablet     fluconazole (DIFLUCAN) 150 MG tablet     No current facility-administered medications for this visit.     /62   Constitutional: healthy, alert and no distress  Cardiovascular: negative, PMI normal. No lifts, heaves, or thrills. RRR. No murmurs, clicks gallops or rub  Respiratory: negative, Percussion normal. Good diaphragmatic excursion. Lungs clear  Gastrointestinal: Abdomen soft, non-tender. BS normal. No masses, organomegaly  Genitourinary: Normal external genitalia without lesions and speculum exam multiparous appearing cervix a wet prep was done today as well as a urine analysis and culture.  Bimanual exam the uterus feels parous there is a suggestion of an approximate 6 cm fibroid in the posterior aspect of the uterus unchanged from earlier exams    (R30.0) Burning with urination  (primary encounter diagnosis)  Comment: After completing  the course of Cipro the patient may have developed a vaginal yeast infection.  This could cause irritation of the external tissues and perhaps some of the burning that she is experiencing  Plan: UA reflex to Microscopic - lab collect, Urine         Culture Aerobic Bacterial - lab collect, Urine         Microscopic Exam        Urinalysis culture pending with appropriate therapy based on the result    (N89.8) Vaginal itching  Comment: I would empirically treat her with Diflucan 150 mg orally daily for 3 days  Plan: Wet prep - Clinic Collect, fluconazole         (DIFLUCAN) 150 MG tablet        Patient will call if no improvement    (D25.9) Uterine leiomyoma, unspecified location  Comment: A lengthy discussion regarding uterine fibroma and the risk benefits and alternative forms of therapy.  I gave her a detailed written outline of our discussion.  I also gave her patient education information on uterine fibroids.  We discussed treatment alternatives both in couples who desire to conceive again in people who are done with childbearing.  The patient will let us know of her wishes  Plan: Plans reviewed questions answered

## 2021-10-19 NOTE — NURSING NOTE
"Chief Complaint   Patient presents with     RECHECK     pelvic pain       Initial /62  Estimated body mass index is 30.77 kg/m  as calculated from the following:    Height as of 10/5/21: 1.727 m (5' 8\").    Weight as of 10/5/21: 91.8 kg (202 lb 6.4 oz).  BP completed using cuff size: regular    Questioned patient about current smoking habits.  Pt. has never smoked.          The following HM Due: NONE      The following patient reported/Care Every where data was sent to:  P ABSTRACT QUALITY INITIATIVES [[id number]]        Ofe Espinoza, FRANCISCO                "

## 2021-10-21 LAB — BACTERIA UR CULT: NORMAL

## 2022-01-14 NOTE — ED PROVIDER NOTES
History     Chief Complaint:  Fever    The history is provided by the patient.      Randee Millard is a 39 year old female who presents with fever. Notably, patient is currently on a course of Keflex for strep despite negative rapid strep last week in urgent care. She notes she was febrile at 103.2F last night and highlights associated dry cough, abdominal pain radiating to her bilateral flanks, and generalized myalgia over the last couple of days. Additionally, patient endorsed persistent pharyngitis. She denies any nausea/vomiting, diarrhea, dysuria, or shortness of breath. Last dose of Tylenol was at 0400.     Allergies:  Amoxicillin  Clindamycin     Medications:    Ferrous sulfate     Past Medical History:    Anemia  Uterine leiomyoma  Chronic fatigue  Reflux esophagitis    Past Surgical History:    Breast cyst aspiration    Family History:    HTN  DM    Social History:  Accompanied by .  Never Smoker  Alcohol Use: No  Drug Use: No  Marital Status:       Review of Systems   Constitutional: Positive for fever.   HENT: Positive for sore throat.    Respiratory: Positive for cough. Negative for shortness of breath.    Gastrointestinal: Positive for abdominal pain. Negative for diarrhea, nausea and vomiting.   Genitourinary: Positive for flank pain. Negative for dysuria.   Musculoskeletal: Positive for myalgias.   All other systems reviewed and are negative.    Physical Exam   Patient Vitals for the past 24 hrs:   BP Temp Temp src Pulse Resp SpO2 Weight   02/20/20 1317 135/59 98.4  F (36.9  C) Oral 92 16 100 % 89.4 kg (197 lb)     Physical Exam  Constitutional: well appearing female in no acute distress.   Head: No external signs of trauma noted to head or face.   Eyes: Pupils are equal, round, and reactive to light. Conjunctiva normal. EOMI.  ENT: external ears, canals, and TMs normal bilaterally. MMM. Oropharynx clear and moist without erythema, tonsillar enlargement, or exudate. Normal voice.  Less hypoglycemia now - usually fasting though.  Decrease basal insulin.     Neck: normal ROM. No lymphadenopathy. No swelling. Normal ROM.  Cardiovascular: Normal rate, regular rhythm, and intact distal pulses.    Respiratory: Effort normal. No respiratory distress. Lungs clear to auscultation bilaterally. No wheezing, rales, or rhonchi.    GI: Soft. Non-tender. No rebound or guarding.   Musculoskeletal: No deformities appreciated. Normal ROM. No edema noted.  Neurological: Alert and Oriented x 3. Speech normal. Moves all extremities equally.  Psychiatric: Appropriate mood, affect, and behavior.   Skin: Skin is warm and dry.         Emergency Department Course   Imaging:  XR Chest 2 Views  PA and lateral views of the chest. Lungs are clear. Heart is normal in size. No effusions are evident. No pneumothorax.  NAMRATA BEDOYA MD  Reading per radiology    Laboratory:  Influenza A/B antigen: A negative, B negative    UA: LE moderate (A) WBC 2 RBC 3 (H) bacteria few (A) HPF (H) o/w WNL    Interventions:  1351: ibuprofen 600 mg PO      Emergency Department Course:  Nursing notes and vitals reviewed.  1324 I performed an exam of the patient as documented above.   1356 The patient was sent for a XR while in the emergency department, results above.   1407 The patient provided a urine sample here in the emergency department. This was sent for laboratory testing, findings above.  1447 Findings and plan explained to the patient. Patient discharged home with instructions regarding supportive care, medications, and reasons to return. The importance of close follow-up was reviewed.     Impression & Plan      Medical Decision Making:  Randee Millard is a 39 year old female who presents for evaluation of cough, fever and myalgias.  This is consistent with an influenza like illness. Rapid flu was negative.  Patient understands the sensitivity of influenza rapid test. There is no evidence of pneumonia on CXR. She is already on antibiotics for strep throat and there is no evidence of any complication such as  peritonsillar abscess or retropharyngeal abscess at this time. She is afebrile here and well appearing and is tolerating PO. She is appropriate for discharge home with close followup of primary care physician and return to the ED for high fevers > 103 for more than 48 hours more, increasing productive cough, shortness of breath, or confusion.     Diagnosis:    ICD-10-CM   1. Influenza-like illness R69     Disposition: discharged to home    Scribe Disclosure:   I, Prasad Gupta, am serving as a scribe at 1:24 PM on 2/20/2020 to document services personally performed by Johanna Nixon PA-C based on my observations and the provider's statements to me.     [facility] EMERGENCY DEPARTMENT       Johanna Nixon PA-C  02/20/20 [id number]

## 2022-01-17 ENCOUNTER — OFFICE VISIT (OUTPATIENT)
Dept: URGENT CARE | Facility: URGENT CARE | Age: 42
End: 2022-01-17
Payer: COMMERCIAL

## 2022-01-17 VITALS
RESPIRATION RATE: 12 BRPM | HEART RATE: 80 BPM | SYSTOLIC BLOOD PRESSURE: 126 MMHG | TEMPERATURE: 97.5 F | DIASTOLIC BLOOD PRESSURE: 80 MMHG | OXYGEN SATURATION: 100 %

## 2022-01-17 DIAGNOSIS — R10.31 ACUTE BILATERAL LOWER ABDOMINAL PAIN: ICD-10-CM

## 2022-01-17 DIAGNOSIS — R39.9 UTI SYMPTOMS: Primary | ICD-10-CM

## 2022-01-17 DIAGNOSIS — R30.0 DYSURIA: ICD-10-CM

## 2022-01-17 DIAGNOSIS — R10.32 ACUTE BILATERAL LOWER ABDOMINAL PAIN: ICD-10-CM

## 2022-01-17 LAB
ALBUMIN UR-MCNC: NEGATIVE MG/DL
APPEARANCE UR: CLEAR
BILIRUB UR QL STRIP: NEGATIVE
COLOR UR AUTO: YELLOW
GLUCOSE UR STRIP-MCNC: NEGATIVE MG/DL
HCG UR QL: NEGATIVE
HGB UR QL STRIP: ABNORMAL
KETONES UR STRIP-MCNC: NEGATIVE MG/DL
LEUKOCYTE ESTERASE UR QL STRIP: ABNORMAL
NITRATE UR QL: NEGATIVE
PH UR STRIP: 6 [PH] (ref 5–7)
RBC #/AREA URNS AUTO: ABNORMAL /HPF
SP GR UR STRIP: <=1.005 (ref 1–1.03)
SQUAMOUS #/AREA URNS AUTO: ABNORMAL /LPF
UROBILINOGEN UR STRIP-ACNC: 0.2 E.U./DL
WBC #/AREA URNS AUTO: ABNORMAL /HPF

## 2022-01-17 PROCEDURE — 99214 OFFICE O/P EST MOD 30 MIN: CPT | Performed by: PHYSICIAN ASSISTANT

## 2022-01-17 PROCEDURE — 87088 URINE BACTERIA CULTURE: CPT | Performed by: PHYSICIAN ASSISTANT

## 2022-01-17 PROCEDURE — 81001 URINALYSIS AUTO W/SCOPE: CPT | Performed by: PHYSICIAN ASSISTANT

## 2022-01-17 PROCEDURE — 87086 URINE CULTURE/COLONY COUNT: CPT | Performed by: PHYSICIAN ASSISTANT

## 2022-01-17 PROCEDURE — 87186 SC STD MICRODIL/AGAR DIL: CPT | Performed by: PHYSICIAN ASSISTANT

## 2022-01-17 PROCEDURE — 81025 URINE PREGNANCY TEST: CPT | Performed by: PHYSICIAN ASSISTANT

## 2022-01-17 RX ORDER — PHENAZOPYRIDINE HYDROCHLORIDE 200 MG/1
200 TABLET, FILM COATED ORAL 3 TIMES DAILY PRN
Qty: 9 TABLET | Refills: 0 | Status: SHIPPED | OUTPATIENT
Start: 2022-01-17 | End: 2022-01-20

## 2022-01-17 RX ORDER — NITROFURANTOIN 25; 75 MG/1; MG/1
100 CAPSULE ORAL 2 TIMES DAILY
Qty: 14 CAPSULE | Refills: 0 | Status: SHIPPED | OUTPATIENT
Start: 2022-01-17 | End: 2022-04-21

## 2022-01-18 NOTE — PATIENT INSTRUCTIONS

## 2022-01-18 NOTE — PROGRESS NOTES
"  Assessment & Plan     UTI symptoms  UA positive   Urine culture pending  Start macrobid  - UA Macro with Reflex to Micro and Culture - lab collect; Future  - UA Macro with Reflex to Micro and Culture - lab collect  - Urine Microscopic  - phenazopyridine (PYRIDIUM) 200 MG tablet; Take 1 tablet (200 mg) by mouth 3 times daily as needed for irritation  - nitroFURantoin macrocrystal-monohydrate (MACROBID) 100 MG capsule; Take 1 capsule (100 mg) by mouth 2 times daily    Acute bilateral lower abdominal pain  Urine HCG negative for ectopic pregnancy  - HCG Qual, Urine ([id number])    Dysuria  Macrobid for dysuria  Urine culture peding  - phenazopyridine (PYRIDIUM) 200 MG tablet; Take 1 tablet (200 mg) by mouth 3 times daily as needed for irritation  - nitroFURantoin macrocrystal-monohydrate (MACROBID) 100 MG capsule; Take 1 capsule (100 mg) by mouth 2 times daily  - Urine Culture Aerobic Bacterial - lab collect; Future  - Urine Culture Aerobic Bacterial - lab collect    Review of external notes as documented elsewhere in note  30 minutes spent on the date of the encounter doing chart review, review of outside records, review of test results, interpretation of tests, patient visit and documentation        BMI:   Estimated body mass index is 30.77 kg/m  as calculated from the following:    Height as of 10/5/21: 1.727 m (5' 8\").    Weight as of 10/5/21: 91.8 kg (202 lb 6.4 oz).       No follow-ups on file.    Peyman Whitlock PA-C  [facility] is a 41 year old who presents for the following health issues     HPI     Dysuria  Lower abdominal pain    Review of Systems   Constitutional, HEENT, cardiovascular, pulmonary, gi and gu systems are negative, except as otherwise noted.      Objective    /80   Pulse 80   Temp 97.5  F (36.4  C) (Oral)   Resp 12   LMP 12/30/2021   SpO2 100%   Breastfeeding No   There is no height or weight on file to calculate BMI.  Physical Exam "   GENERAL: healthy, alert and no distress  RESP: lungs clear to auscultation - no rales, rhonchi or wheezes  CV: regular rate and rhythm, normal S1 S2, no S3 or S4, no murmur, click or rub, no peripheral edema and peripheral pulses strong  ABDOMEN:   MS: no gross musculoskeletal defects noted, no edema    Results for orders placed or performed in visit on 01/17/22   UA Macro with Reflex to Micro and Culture - lab collect     Status: Abnormal    Specimen: Urine, Clean Catch   Result Value Ref Range    Color Urine Yellow Colorless, Straw, Light Yellow, Yellow    Appearance Urine Clear Clear    Glucose Urine Negative Negative mg/dL    Bilirubin Urine Negative Negative    Ketones Urine Negative Negative mg/dL    Specific Gravity Urine <=1.005 1.003 - 1.035    Blood Urine Moderate (A) Negative    pH Urine 6.0 5.0 - 7.0    Protein Albumin Urine Negative Negative mg/dL    Urobilinogen Urine 0.2 0.2, 1.0 E.U./dL    Nitrite Urine Negative Negative    Leukocyte Esterase Urine Small (A) Negative   HCG Qual, Urine ([id number])     Status: Normal   Result Value Ref Range    hCG Urine Qualitative Negative Negative   Urine Microscopic     Status: Abnormal   Result Value Ref Range    RBC Urine 2-5 (A) 0-2 /HPF /HPF    WBC Urine 0-5 0-5 /HPF /HPF    Squamous Epithelials Urine Few (A) None Seen /LPF    Narrative    Urine Culture not indicated

## 2022-01-20 ENCOUNTER — OFFICE VISIT (OUTPATIENT)
Dept: OBGYN | Facility: CLINIC | Age: 42
End: 2022-01-20
Payer: COMMERCIAL

## 2022-01-20 ENCOUNTER — TELEPHONE (OUTPATIENT)
Dept: URGENT CARE | Facility: URGENT CARE | Age: 42
End: 2022-01-20

## 2022-01-20 VITALS — SYSTOLIC BLOOD PRESSURE: 106 MMHG | WEIGHT: 202 LBS | DIASTOLIC BLOOD PRESSURE: 72 MMHG | BODY MASS INDEX: 30.71 KG/M2

## 2022-01-20 DIAGNOSIS — N39.0 URINARY TRACT INFECTION WITHOUT HEMATURIA, SITE UNSPECIFIED: ICD-10-CM

## 2022-01-20 DIAGNOSIS — Z20.822 SUSPECTED 2019 NOVEL CORONAVIRUS INFECTION: Primary | ICD-10-CM

## 2022-01-20 LAB
DEPRECATED S PYO AG THROAT QL EIA: NEGATIVE
FLUAV AG SPEC QL IA: NEGATIVE
FLUBV AG SPEC QL IA: NEGATIVE

## 2022-01-20 PROCEDURE — U0005 INFEC AGEN DETEC AMPLI PROBE: HCPCS | Performed by: ADVANCED PRACTICE MIDWIFE

## 2022-01-20 PROCEDURE — 87651 STREP A DNA AMP PROBE: CPT | Performed by: ADVANCED PRACTICE MIDWIFE

## 2022-01-20 PROCEDURE — 99213 OFFICE O/P EST LOW 20 MIN: CPT | Performed by: ADVANCED PRACTICE MIDWIFE

## 2022-01-20 PROCEDURE — U0003 INFECTIOUS AGENT DETECTION BY NUCLEIC ACID (DNA OR RNA); SEVERE ACUTE RESPIRATORY SYNDROME CORONAVIRUS 2 (SARS-COV-2) (CORONAVIRUS DISEASE [COVID-19]), AMPLIFIED PROBE TECHNIQUE, MAKING USE OF HIGH THROUGHPUT TECHNOLOGIES AS DESCRIBED BY CMS-2020-01-R: HCPCS | Performed by: ADVANCED PRACTICE MIDWIFE

## 2022-01-20 PROCEDURE — 87804 INFLUENZA ASSAY W/OPTIC: CPT | Performed by: ADVANCED PRACTICE MIDWIFE

## 2022-01-20 NOTE — PROGRESS NOTES
SUBJECTIVE:                                                   Randee Millard is a 41 year old who presents to clinic today for the following health issue(s):  Patient presents with:  Follow Up: Here for f/u from Urgent Care on 1/17/2022 for a UTI and treated with Macrobid and Pyridium. Urine Culture positive for GBS      HPI:  Ranede presents today for follow up of urgent care visit on 1/17/22. Was prescribed Macrobid. UC indicated GBS infection. (Patient has allergy to PCN and clindamycin).  Patient reports that yesterday, she started feeling ill, nauseated, vomiting, chills, back pain, stomach pain, and leg pain. Denies cough or shortness of breath.    Patient's last menstrual period was 01/19/2022.  Menstrual History: frequency: every 28-30 days    Last PHQ-9 score on record =   PHQ-9 SCORE 9/2/2016   PHQ-9 Total Score 6     Last GAD7 score on record = No flowsheet data found.      Problem list and histories reviewed & adjusted, as indicated.  Additional history: as documented.    Patient Active Problem List   Diagnosis     NO ACTIVE PROBLEMS     Iron deficiency anemia secondary to inadequate dietary iron intake     Vitamin D deficiency     Chronic fatigue     Thyromegaly     Uterine leiomyoma, unspecified location     Past Surgical History:   Procedure Laterality Date     US BREAST CYST ASPIRATION INITIAL RT        Social History     Tobacco Use     Smoking status: Never Smoker     Smokeless tobacco: Never Used   Substance Use Topics     Alcohol use: No     Alcohol/week: 0.0 standard drinks      Problem (# of Occurrences) Relation (Name,Age of Onset)    Diabetes (1) Father (63)    Family History Negative (1) Mother    Hypertension (1) Father    No Known Problems (4) Brother (6 brothers), Sister (2 sisters), Son (3 sons), Daughter (1 daughter)            Current Outpatient Medications   Medication Sig     ferrous sulfate (FE TABS) 325 (65 Fe) MG EC tablet Take 1 tablet (325 mg) by mouth daily     nitroFURantoin  macrocrystal-monohydrate (MACROBID) 100 MG capsule Take 1 capsule (100 mg) by mouth 2 times daily (Patient not taking: Reported on 1/20/2022)     phenazopyridine (PYRIDIUM) 200 MG tablet Take 1 tablet (200 mg) by mouth 3 times daily as needed for irritation (Patient not taking: Reported on 1/20/2022)     No current facility-administered medications for this visit.     Allergies   Allergen Reactions     Amoxicillin Rash     Clindamycin Rash       ROS:  CONSTITUTIONAL: NEGATIVE for change in weight  POSITIVE for chils  ENT: NEGATIVE for ear, mouth and throat problems  RESP: NEGATIVE for significant cough or SOB  CV: NEGATIVE for chest pain, palpitations or peripheral edema  GI: NEGATIVE for nausea, heartburn, or change in bowel habits  POSITIVE for back and leg pain  : NEGATIVE for unusual  vaginal symptoms. Periods are regular.  MUSCULOSKELETAL: POSITIVE for back and leg pain  NEURO: NEGATIVE for weakness, dizziness or paresthesias  ENDOCRINE: NEGATIVE for temperature intolerance, skin/hair changes  HEME/ALLERGY/IMMUNE: NEGATIVE for bleeding problems  PSYCHIATRIC: NEGATIVE for changes in mood or affect    OBJECTIVE:     /72 (BP Location: Left arm, Cuff Size: Adult Regular)   Wt 91.6 kg (202 lb)   LMP 01/19/2022   Breastfeeding No   BMI 30.71 kg/m    Body mass index is 30.71 kg/m .    PHYSICAL EXAM:  Constitutional:  Appearance: Well nourished, well developed alert, in mild distress  Chest:  Respiratory Effort:  Breathing unlabored. Clear to auscultation bilaterally.   Cardiovascular: Heart: Auscultation:  Regular rate, normal rhythm, no murmurs present  Back: negative for CVA tenderness  Neurologic:  Mental Status:  Oriented X3.  Normal strength and tone, sensory exam grossly normal, mentation intact and speech normal.        In-Clinic Test Results:  Results for orders placed or performed in visit on 01/20/22   Streptococcus A Rapid Screen w/Reflex to PCR - Clinic Collect     Status: Normal    Specimen:  Throat; Swab   Result Value Ref Range    Group A Strep antigen Negative Negative   Influenza A & B Antigen - Clinic Collect     Status: Normal    Specimen: Nose; Swab   Result Value Ref Range    Influenza A antigen Negative Negative    Influenza B antigen Negative Negative    Narrative    Test results must be correlated with clinical data. If necessary, results should be confirmed by a molecular assay or viral culture.       ASSESSMENT/PLAN:                                                          ICD-10-CM    1. Suspected 2019 novel coronavirus infection  Z20.822 Streptococcus A Rapid Screen w/Reflex to PCR - Clinic Collect     Beta strep group A culture     Influenza A & B Antigen - Clinic Collect     Symptomatic; Yes; 1/19/2022 COVID-19 Virus (Coronavirus) by PCR Nose     Group A Streptococcus PCR Throat Swab   2. Urinary tract infection without hematuria, site unspecified  N39.0      PLAN:    1. Rapid tests for strep and influenza negative. Covid and strep culture pending. Advised patient quarantine until results available. Increase fluids and rest.     2. Called microbiology lab for susceptibility testing. Will advise patient of results and order antibiotics when results available.        BLANCA Wyatt, CNM

## 2022-01-21 ENCOUNTER — TELEPHONE (OUTPATIENT)
Dept: OBGYN | Facility: CLINIC | Age: 42
End: 2022-01-21
Payer: COMMERCIAL

## 2022-01-21 DIAGNOSIS — R30.0 BURNING WITH URINATION: Primary | ICD-10-CM

## 2022-01-21 LAB
GROUP A STREP BY PCR: NOT DETECTED
SARS-COV-2 RNA RESP QL NAA+PROBE: NEGATIVE

## 2022-01-21 NOTE — TELEPHONE ENCOUNTER
Called patient and advised of negative Covid PCR. Also advised of negative rapid strep and influenza swabs. Still waiting on strep culture and UTI susceptibility tests      BLANCA Wyatt, KRISTINEM

## 2022-01-25 NOTE — TELEPHONE ENCOUNTER
Urine culture results back.    Pt states she has dysuria and frequency. She said she wants something prescribed.    Guadalupe DAVID RN BSN

## 2022-01-26 ENCOUNTER — LAB (OUTPATIENT)
Dept: LAB | Facility: CLINIC | Age: 42
End: 2022-01-26
Payer: COMMERCIAL

## 2022-01-26 DIAGNOSIS — R30.0 BURNING WITH URINATION: ICD-10-CM

## 2022-01-26 LAB
ALBUMIN UR-MCNC: NEGATIVE MG/DL
APPEARANCE UR: CLEAR
BACTERIA #/AREA URNS HPF: ABNORMAL /HPF
BILIRUB UR QL STRIP: NEGATIVE
COLOR UR AUTO: YELLOW
GLUCOSE UR STRIP-MCNC: NEGATIVE MG/DL
HGB UR QL STRIP: ABNORMAL
KETONES UR STRIP-MCNC: ABNORMAL MG/DL
LEUKOCYTE ESTERASE UR QL STRIP: ABNORMAL
NITRATE UR QL: NEGATIVE
PH UR STRIP: 6 [PH] (ref 5–7)
RBC #/AREA URNS AUTO: ABNORMAL /HPF
SP GR UR STRIP: 1.02 (ref 1–1.03)
SQUAMOUS #/AREA URNS AUTO: ABNORMAL /LPF
UROBILINOGEN UR STRIP-ACNC: 0.2 E.U./DL
WBC #/AREA URNS AUTO: ABNORMAL /HPF

## 2022-01-26 PROCEDURE — 81001 URINALYSIS AUTO W/SCOPE: CPT

## 2022-01-26 PROCEDURE — 87086 URINE CULTURE/COLONY COUNT: CPT

## 2022-01-26 PROCEDURE — 87088 URINE BACTERIA CULTURE: CPT

## 2022-01-26 NOTE — TELEPHONE ENCOUNTER
I recommend repeating another UA/UC as her last one was almost 10 days ago.   She can take pyridium OTC for discomfort.     BLANCA Mensah, CNM

## 2022-01-27 DIAGNOSIS — N30.00 ACUTE CYSTITIS WITHOUT HEMATURIA: ICD-10-CM

## 2022-01-27 DIAGNOSIS — N30.01 ACUTE CYSTITIS WITH HEMATURIA: Primary | ICD-10-CM

## 2022-01-27 LAB
BACTERIA UR CULT: ABNORMAL
BACTERIA UR CULT: ABNORMAL

## 2022-01-27 RX ORDER — LEVOFLOXACIN 250 MG/1
250 TABLET, FILM COATED ORAL DAILY
Qty: 3 TABLET | Refills: 0 | Status: SHIPPED | OUTPATIENT
Start: 2022-01-27 | End: 2022-01-30

## 2022-01-28 LAB
BACTERIA UR CULT: ABNORMAL
BACTERIA UR CULT: ABNORMAL

## 2022-02-02 ENCOUNTER — APPOINTMENT (OUTPATIENT)
Dept: CT IMAGING | Facility: CLINIC | Age: 42
End: 2022-02-02
Attending: EMERGENCY MEDICINE
Payer: COMMERCIAL

## 2022-02-02 ENCOUNTER — HOSPITAL ENCOUNTER (EMERGENCY)
Facility: CLINIC | Age: 42
Discharge: HOME | End: 2022-02-02
Attending: EMERGENCY MEDICINE | Admitting: EMERGENCY MEDICINE
Payer: COMMERCIAL

## 2022-02-02 VITALS
TEMPERATURE: 98.2 F | OXYGEN SATURATION: 100 % | SYSTOLIC BLOOD PRESSURE: 113 MMHG | HEIGHT: 68 IN | RESPIRATION RATE: 18 BRPM | HEART RATE: 67 BPM | DIASTOLIC BLOOD PRESSURE: 71 MMHG | BODY MASS INDEX: 30.01 KG/M2 | WEIGHT: 198 LBS

## 2022-02-02 DIAGNOSIS — R10.2 PELVIC PAIN IN FEMALE: ICD-10-CM

## 2022-02-02 LAB
ALBUMIN UR-MCNC: NEGATIVE MG/DL
ANION GAP SERPL CALCULATED.3IONS-SCNC: 5 MMOL/L (ref 3–14)
APPEARANCE UR: CLEAR
B-HCG SERPL-ACNC: <1 IU/L (ref 0–5)
BASOPHILS # BLD AUTO: 0 10E3/UL (ref 0–0.2)
BASOPHILS NFR BLD AUTO: 0 %
BILIRUB UR QL STRIP: NEGATIVE
BUN SERPL-MCNC: 9 MG/DL (ref 7–30)
CALCIUM SERPL-MCNC: 9 MG/DL (ref 8.5–10.1)
CHLORIDE BLD-SCNC: 109 MMOL/L (ref 94–109)
CO2 SERPL-SCNC: 26 MMOL/L (ref 20–32)
COLOR UR AUTO: ABNORMAL
CREAT SERPL-MCNC: 0.68 MG/DL (ref 0.52–1.04)
EOSINOPHIL # BLD AUTO: 0.1 10E3/UL (ref 0–0.7)
EOSINOPHIL NFR BLD AUTO: 1 %
ERYTHROCYTE [DISTWIDTH] IN BLOOD BY AUTOMATED COUNT: 18.6 % (ref 10–15)
FLUAV RNA SPEC QL NAA+PROBE: NEGATIVE
FLUBV RNA RESP QL NAA+PROBE: NEGATIVE
GFR SERPL CREATININE-BSD FRML MDRD: >90 ML/MIN/1.73M2
GLUCOSE BLD-MCNC: 91 MG/DL (ref 70–99)
GLUCOSE UR STRIP-MCNC: NEGATIVE MG/DL
HCT VFR BLD AUTO: 34.9 % (ref 35–47)
HGB BLD-MCNC: 10.6 G/DL (ref 11.7–15.7)
HGB UR QL STRIP: NEGATIVE
HOLD SPECIMEN: NORMAL
IMM GRANULOCYTES # BLD: 0 10E3/UL
IMM GRANULOCYTES NFR BLD: 0 %
KETONES UR STRIP-MCNC: NEGATIVE MG/DL
LEUKOCYTE ESTERASE UR QL STRIP: ABNORMAL
LYMPHOCYTES # BLD AUTO: 3.9 10E3/UL (ref 0.8–5.3)
LYMPHOCYTES NFR BLD AUTO: 38 %
MCH RBC QN AUTO: 23.5 PG (ref 26.5–33)
MCHC RBC AUTO-ENTMCNC: 30.4 G/DL (ref 31.5–36.5)
MCV RBC AUTO: 77 FL (ref 78–100)
MONOCYTES # BLD AUTO: 0.7 10E3/UL (ref 0–1.3)
MONOCYTES NFR BLD AUTO: 6 %
NEUTROPHILS # BLD AUTO: 5.6 10E3/UL (ref 1.6–8.3)
NEUTROPHILS NFR BLD AUTO: 55 %
NITRATE UR QL: NEGATIVE
NRBC # BLD AUTO: 0 10E3/UL
NRBC BLD AUTO-RTO: 0 /100
PH UR STRIP: 6 [PH] (ref 5–7)
PLATELET # BLD AUTO: 355 10E3/UL (ref 150–450)
POTASSIUM BLD-SCNC: 3.4 MMOL/L (ref 3.4–5.3)
RBC # BLD AUTO: 4.51 10E6/UL (ref 3.8–5.2)
RBC URINE: 1 /HPF
SARS-COV-2 RNA RESP QL NAA+PROBE: NEGATIVE
SODIUM SERPL-SCNC: 140 MMOL/L (ref 133–144)
SP GR UR STRIP: 1.01 (ref 1–1.03)
SQUAMOUS EPITHELIAL: 5 /HPF
UROBILINOGEN UR STRIP-MCNC: NORMAL MG/DL
WBC # BLD AUTO: 10.3 10E3/UL (ref 4–11)
WBC URINE: 2 /HPF

## 2022-02-02 PROCEDURE — 84702 CHORIONIC GONADOTROPIN TEST: CPT | Performed by: EMERGENCY MEDICINE

## 2022-02-02 PROCEDURE — 96374 THER/PROPH/DIAG INJ IV PUSH: CPT | Mod: 59

## 2022-02-02 PROCEDURE — 250N000009 HC RX 250: Performed by: EMERGENCY MEDICINE

## 2022-02-02 PROCEDURE — 81001 URINALYSIS AUTO W/SCOPE: CPT | Performed by: EMERGENCY MEDICINE

## 2022-02-02 PROCEDURE — 99285 EMERGENCY DEPT VISIT HI MDM: CPT | Mod: 25

## 2022-02-02 PROCEDURE — 80048 BASIC METABOLIC PNL TOTAL CA: CPT | Performed by: EMERGENCY MEDICINE

## 2022-02-02 PROCEDURE — 250N000011 HC RX IP 250 OP 636: Performed by: EMERGENCY MEDICINE

## 2022-02-02 PROCEDURE — 87636 SARSCOV2 & INF A&B AMP PRB: CPT | Performed by: EMERGENCY MEDICINE

## 2022-02-02 PROCEDURE — 74177 CT ABD & PELVIS W/CONTRAST: CPT

## 2022-02-02 PROCEDURE — C9803 HOPD COVID-19 SPEC COLLECT: HCPCS

## 2022-02-02 PROCEDURE — 85025 COMPLETE CBC W/AUTO DIFF WBC: CPT | Performed by: EMERGENCY MEDICINE

## 2022-02-02 PROCEDURE — 36415 COLL VENOUS BLD VENIPUNCTURE: CPT | Performed by: EMERGENCY MEDICINE

## 2022-02-02 RX ORDER — CEPHALEXIN 500 MG/1
500 CAPSULE ORAL 2 TIMES DAILY
Qty: 10 CAPSULE | Refills: 0 | Status: SHIPPED | OUTPATIENT
Start: 2022-02-02 | End: 2022-02-07

## 2022-02-02 RX ORDER — IOPAMIDOL 755 MG/ML
500 INJECTION, SOLUTION INTRAVASCULAR ONCE
Status: COMPLETED | OUTPATIENT
Start: 2022-02-02 | End: 2022-02-02

## 2022-02-02 RX ORDER — KETOROLAC TROMETHAMINE 15 MG/ML
15 INJECTION, SOLUTION INTRAMUSCULAR; INTRAVENOUS ONCE
Status: COMPLETED | OUTPATIENT
Start: 2022-02-02 | End: 2022-02-02

## 2022-02-02 RX ORDER — ONDANSETRON 4 MG/1
4 TABLET, ORALLY DISINTEGRATING ORAL EVERY 8 HOURS PRN
Qty: 15 TABLET | Refills: 0 | Status: SHIPPED | OUTPATIENT
Start: 2022-02-02 | End: 2022-04-21

## 2022-02-02 RX ADMIN — IOPAMIDOL 100 ML: 755 INJECTION, SOLUTION INTRAVENOUS at 13:10

## 2022-02-02 RX ADMIN — KETOROLAC TROMETHAMINE 15 MG: 15 INJECTION, SOLUTION INTRAMUSCULAR; INTRAVENOUS at 13:45

## 2022-02-02 RX ADMIN — SODIUM CHLORIDE 65 ML: 9 INJECTION, SOLUTION INTRAVENOUS at 13:10

## 2022-02-02 ASSESSMENT — ENCOUNTER SYMPTOMS
VOMITING: 1
HEADACHES: 1
NAUSEA: 1
FEVER: 1
FLANK PAIN: 1
ABDOMINAL PAIN: 1
DYSURIA: 1

## 2022-02-02 ASSESSMENT — MIFFLIN-ST. JEOR: SCORE: 1606.62

## 2022-02-02 NOTE — ED PROVIDER NOTES
History   Chief Complaint:  Abdominal Pain       HPI   Randee Millard is a 41 year old female who presents with abdominal pain. The patient reports 2 weeks of intermittent left lower quadrant abdominal pain. She was first seen at Urgent Care on 1/17 and was found to have a UTI so she was given Macrobid and Pyridium. She was then seen by OBGYN 5 days ago and prescribed Levofloxacin. She took 1 dose of the antibiotic and began experiencing nausea and vomiting. She reports intermittent fever yesterday. She has experienced nausea and vomiting multiple times since which is exacerbated after meals. She also reports a headache and some epigastric pain. She is still experiencing dysuria and left side flank pain. Her last menstrual period began on 1/20. She denies any current vaginal bleeding. She denies any other symptoms.     UA and Culture (1/17/22):  UA: blood moderate, leukocyte esterase small  Urine culture: group B streptococcus    Review of Systems   Constitutional: Positive for fever.   Gastrointestinal: Positive for abdominal pain, nausea and vomiting.   Genitourinary: Positive for dysuria and flank pain.   Neurological: Positive for headaches.   All other systems reviewed and are negative.      Allergies:  Amoxicillin  Clindamycin    Medications:  Levofloxacin  Macrobid    Past Medical History:     Anemia  Fibroid uterus  Kidney infection  Gastroesophageal reflux disease   Thyromegaly    Family History:    Father- hypertension, diabetes mellitus     Social History:  Presents alone       Physical Exam     Patient Vitals for the past 24 hrs:   BP Temp Temp src Pulse Resp SpO2 Height Weight   02/02/22 1345 113/71 -- -- -- -- 100 % -- --   02/02/22 1300 102/67 -- -- 70 -- 100 % -- --   02/02/22 1230 -- -- -- -- -- 100 % -- --   02/02/22 1215 106/54 -- -- -- -- 100 % -- --   02/02/22 1200 116/70 -- -- 76 -- 99 % -- --   02/02/22 1145 -- -- -- -- -- 99 % -- --   02/02/22 1130 118/80 -- -- 75 -- 99 % -- --  "  02/02/22 1115 119/67 98.2  F (36.8  C) Oral 81 18 100 % 1.727 m (5' 8\") 89.8 kg (198 lb)       Physical Exam  General:          Well appearing, nontoxic. Resting comfortably  Head:              Scalp, face, and head appear normal  Eyes:               Pupils are equal, round                          Conjunctivae non-injected and sclerae white  ENT:                The external nose is normal                          Pinnae are normal  Neck:              Normal range of motion                          There is no rigidity noted                          Trachea is in the midline  CV:                  Regular rate and rhythm                           Normal S1/S2, no S3/S4                          No murmur or rub. Radial pulses 2+ bilaterally.  Resp:              Lungs are clear and equal bilaterally  There is no tachypnea                          No increased work of breathing                          No rales, wheezing, or rhonchi  GI:                   Abdomen is soft, no rigidity or guarding                          No distension, or mass                          Moderate LLQ tenderness. No rebound tenderness. No CVA tenderness  MS:                  Normal muscular tone                          Symmetric motor strength                          No lower extremity edema  Skin:               No rash or acute skin lesions noted  Neuro: Awake and alert  Speech is normal and fluent  Moves all extremities spontaneously  Psych:                        Normal affect. Appropriate interactions.    Emergency Department Course     Imaging:  CT Abdomen Pelvis w Contrast   Final Result   IMPRESSION:    1.  Evaluation slightly limited by motion in the upper abdomen. No   acute findings in the abdomen and pelvis.   2.  Stable uterine fibroid.      SYLVESTER PELLETIER MD            SYSTEM ID:  [id number]        Report per radiology    Laboratory:  Labs Ordered and Resulted from Time of ED Arrival to Time of ED Departure   CBC WITH PLATELETS " AND DIFFERENTIAL - Abnormal       Result Value    WBC Count 10.3      RBC Count 4.51      Hemoglobin 10.6 (*)     Hematocrit 34.9 (*)     MCV 77 (*)     MCH 23.5 (*)     MCHC 30.4 (*)     RDW 18.6 (*)     Platelet Count 355      % Neutrophils 55      % Lymphocytes 38      % Monocytes 6      % Eosinophils 1      % Basophils 0      % Immature Granulocytes 0      NRBCs per 100 WBC 0      Absolute Neutrophils 5.6      Absolute Lymphocytes 3.9      Absolute Monocytes 0.7      Absolute Eosinophils 0.1      Absolute Basophils 0.0      Absolute Immature Granulocytes 0.0      Absolute NRBCs 0.0     ROUTINE UA WITH MICROSCOPIC REFLEX TO CULTURE - Abnormal    Color Urine Straw      Appearance Urine Clear      Glucose Urine Negative      Bilirubin Urine Negative      Ketones Urine Negative      Specific Gravity Urine 1.006      Blood Urine Negative      pH Urine 6.0      Protein Albumin Urine Negative      Urobilinogen Urine Normal      Nitrite Urine Negative      Leukocyte Esterase Urine Small (*)     RBC Urine 1      WBC Urine 2      Squamous Epithelials Urine 5 (*)    BASIC METABOLIC PANEL - Normal    Sodium 140      Potassium 3.4      Chloride 109      Carbon Dioxide (CO2) 26      Anion Gap 5      Urea Nitrogen 9      Creatinine 0.68      Calcium 9.0      Glucose 91      GFR Estimate >90     INFLUENZA A/B & SARS-COV2 PCR MULTIPLEX - Normal    Influenza A PCR Negative      Influenza B PCR Negative      SARS CoV2 PCR Negative     HCG QUANTITATIVE PREGNANCY - Normal    hCG Quantitative <1        Emergency Department Course:    Reviewed:  I reviewed nursing notes, vitals and past medical history    ED Course as of 02/02/22 1356   Wed Feb 02, 2022   1213 I obtained history from the patient and performed a physical exam as recorded above.    1355 I rechecked the patient and explained findings.      Interventions:  1345: Toradol 15 mg IV     Disposition:  The patient was discharged to home.     Impression & Plan     CMS Diagnoses:  None    Medical Decision Making:  Randee Millard is a 41 year old female who presents for evaluation of ongoing dysuria, left lower quadrant pain.  Patient was previously diagnosed with a possible UTI and urine culture growing Streptococcus agalactiae/GBS.  Patient was initially treated with Macrobid however she reports that she did not complete this course due to stomach upset, nausea and vomiting.  She then followed up with her OB/GYN who recommended that she take levofloxacin.  The patient reports nausea and vomiting again therefore she only took 1 dose of this medication and stopped taking it.  On my evaluation she is well-appearing, hemodynamically stable and afebrile.  Abdominal exam is benign without evidence of peritonitis or acute surgical emergency.  In review of her prior urinalyses the diagnosis of UTI is not straightforward.  Both samples had 0-5 WBCs with moderate squamous epithelial cells and negative nitrites.  Urine culture results may certainly be reflective of a contaminated sample especially given the organism isolated.  Given the concern for alternative diagnosis such as diverticulitis, colitis or other intra-abdominal process CT scan of the abdomen and pelvis was obtained.  This was unremarkable.  No evidence of any acute bowel pathology, intra-abdominal infection, kidney stone or any other acute process.  Laboratory studies today are otherwise reassuring.  BMP is normal.  CBC with chronic stable anemia no leukocytosis.  I discussed with the patient that her urinalysis findings may not represent true infection.  I recommended a period of waiting and if her symptoms improve on their own I would not treat with further antibiotic therapy.  However if symptoms persist then I recommended treatment with Keflex.  Close outpatient follow-up was recommended.  The patient was agreeable to plan of care.  Close return precautions were provided and she was discharged in stable condition.    Diagnosis:     ICD-10-CM    1. Pelvic pain in female  R10.2        Discharge Medications:  New Prescriptions    ONDANSETRON (ZOFRAN ODT) 4 MG ODT TAB    Take 1 tablet (4 mg) by mouth every 8 hours as needed for nausea or vomiting       Scribe Disclosure:  ZEB, Ny Goodwin, am serving as a scribe at 11:45 AM on 2/2/2022 to document services personally performed by Yosef Rosario MD based on my observations and the provider's statements to me.            Yosef Rosario MD  02/02/22 [id number]

## 2022-02-02 NOTE — ED TRIAGE NOTES
Pt presents for evaluation of LLQ abdominal pain x 2 weeks. Also notes epigastric abdominal x 1 weeks. Associted nausea and vomiting, worse after eating. Urinary frequency x 2 weeks. Intermittent fevers. Sharp pain in vaginal area. Is not vaccinated against COVID or influenza.

## 2022-02-02 NOTE — DISCHARGE INSTRUCTIONS
Continue the antibiotic as prescribed until it is completed. Use the zofran as needed for any nausea or vomiting. It may help to take the zofran before the antibiotic pills.    Discharge Instructions  Abdominal Pain    Abdominal pain (belly pain) can be caused by many things. Your evaluation today does not show the exact cause for your pain. Your provider today has decided that it is unlikely your pain is due to a life threatening problem, or a problem requiring surgery or hospital admission. Sometimes those problems cannot be found right away, so it is very important that you follow up as directed.  Sometimes only the changes which occur over time allow the cause of your pain to be found.    Generally, every Emergency Department visit should have a follow-up clinic visit with either a primary or a specialty clinic/provider. Please follow-up as instructed by your emergency provider today. With abdominal pain, we often recommend very close follow-up, such as the following day.    ADULTS:  Return to the Emergency Department right away if:    You get an oral temperature above 102oF or as directed by your provider.  You have blood in your stools. This may be bright red or appear as black, tarry stools.    You keep vomiting (throwing up) or cannot drink liquids.  You see blood when you vomit.   You cannot have a bowel movement or you cannot pass gas.  Your stomach gets bloated or bigger.  Your skin or the whites of your eyes look yellow.  You faint.  You have bloody, frequent or painful urination (peeing).  You have new symptoms or anything that worries you.    CHILDREN:  Return to the Emergency Department right away if your child has any of the above-listed symptoms or the following:    Pushes your hand away or screams/cries when his/her belly is touched.  You notice your child is very fussy or weak.  Your child is very tired and is too tired to eat or drink.  Your child is dehydrated.  Signs of dehydration can  be:  Significant change in the amount of wet diapers/urine.  Your infant or child starts to have dry mouth and lips, or no saliva (spit) or tears.    PREGNANT WOMEN:  Return to the Emergency Department right away if you have any of the above-listed symptoms or the following:    You have bleeding, leaking fluid or passing tissue from the vagina.  You have worse pain or cramping, or pain in your shoulder or back.  You have vomiting that will not stop.  You have a temperature of 100oF or more.  Your baby is not moving as much as usual.  You faint.  You get a bad headache with or without eye problems and abdominal pain.  You have a seizure.  You have unusual discharge from your vagina and abdominal pain.    Abdominal pain is pretty common during pregnancy.  Your pain may or may not be related to your pregnancy. You should follow-up closely with your OB provider so they can evaluate you and your baby.  Until you follow-up with your regular provider, do the following:     Avoid sex and do not put anything in your vagina.  Drink clear fluids.  Only take medications approved by your provider.    MORE INFORMATION:    Appendicitis:  A possible cause of abdominal pain in any person who still has their appendix is acute appendicitis. Appendicitis is often hard to diagnose.  Testing does not always rule out early appendicitis or other causes of abdominal pain. Close follow-up with your provider and re-evaluations may be needed to figure out the reason for your abdominal pain.    Follow-up:  It is very important that you make an appointment with your clinic and go to the appointment.  If you do not follow-up with your primary provider, it may result in missing an important development which could result in permanent injury or disability and/or lasting pain.  If there is any problem keeping your appointment, call your provider or return to the Emergency Department.    Medications:  Take your medications as directed by your  "provider today.  Before using over-the-counter medications, ask your provider and make sure to take the medications as directed.  If you have any questions about medications, ask your provider.    Diet:  Resume your normal diet as much as possible, but do not eat fried, fatty or spicy foods while you have pain.  Do not drink alcohol or have caffeine.  Do not smoke tobacco.    Probiotics: If you have been given an antibiotic, you may want to also take a probiotic pill or eat yogurt with live cultures. Probiotics have \"good bacteria\" to help your intestines stay healthy. Studies have shown that probiotics help prevent diarrhea (loose stools) and other intestine problems (including C. diff infection) when you take antibiotics. You can buy these without a prescription in the pharmacy section of the store.     If you were given a prescription for medicine here today, be sure to read all of the information (including the package insert) that comes with your prescription.  This will include important information about the medicine, its side effects, and any warnings that you need to know about.  The pharmacist who fills the prescription can provide more information and answer questions you may have about the medicine.  If you have questions or concerns that the pharmacist cannot address, please call or return to the Emergency Department.       Remember that you can always come back to the Emergency Department if you are not able to see your regular provider in the amount of time listed above, if you get any new symptoms, or if there is anything that worries you.    "

## 2022-04-18 ENCOUNTER — OFFICE VISIT (OUTPATIENT)
Dept: URGENT CARE | Facility: URGENT CARE | Age: 42
End: 2022-04-18
Payer: COMMERCIAL

## 2022-04-18 VITALS
WEIGHT: 198 LBS | OXYGEN SATURATION: 100 % | BODY MASS INDEX: 30.11 KG/M2 | RESPIRATION RATE: 20 BRPM | TEMPERATURE: 98.5 F | DIASTOLIC BLOOD PRESSURE: 62 MMHG | HEART RATE: 85 BPM | SYSTOLIC BLOOD PRESSURE: 112 MMHG

## 2022-04-18 DIAGNOSIS — R07.0 THROAT PAIN: Primary | ICD-10-CM

## 2022-04-18 LAB — DEPRECATED S PYO AG THROAT QL EIA: NEGATIVE

## 2022-04-18 PROCEDURE — 99213 OFFICE O/P EST LOW 20 MIN: CPT | Performed by: FAMILY MEDICINE

## 2022-04-18 PROCEDURE — 87651 STREP A DNA AMP PROBE: CPT | Performed by: FAMILY MEDICINE

## 2022-04-18 NOTE — PROGRESS NOTES
SUBJECTIVE: Randee Millard is a 41 year old female presenting with a chief complaint of cough  and sore throat.  Onset of symptoms was 2 day(s) ago.  Current and Associated symptoms: cough - non-productive  Treatment measures tried include Tylenol/Ibuprofen.  Predisposing factors include None.    Past Medical History:   Diagnosis Date     Anemia      Fibroid uterus      Kidney infection 05/30/2021     Reflux esophagitis      Allergies   Allergen Reactions     Amoxicillin Rash     Clindamycin Rash     Social History     Tobacco Use     Smoking status: Never Smoker     Smokeless tobacco: Never Used   Substance Use Topics     Alcohol use: No     Alcohol/week: 0.0 standard drinks       ROS:  SKIN: no rash  GI: no vomiting    OBJECTIVE:  /62   Pulse 85   Temp 98.5  F (36.9  C)   Resp 20   Wt 89.8 kg (198 lb)   LMP  (LMP Unknown)   SpO2 100%   BMI 30.11 kg/m  GENERAL APPEARANCE: healthy, alert and no distress  EYES: EOMI,  PERRL, conjunctiva clear  HENT: ear canals and TM's normal.  Nose and mouth without ulcers, erythema or lesions  RESP: lungs clear to auscultation - no rales, rhonchi or wheezes  SKIN: no suspicious lesions or rashes      ICD-10-CM    1. Throat pain  R07.0 Streptococcus A Rapid Scr w Reflx to PCR - Lab Collect     Streptococcus A Rapid Scr w Reflx to PCR - Lab Collect     Group A Streptococcus PCR Throat Swab       Fluids/Rest, f/u if worse/not any better

## 2022-04-19 LAB — GROUP A STREP BY PCR: NOT DETECTED

## 2022-04-21 ENCOUNTER — OFFICE VISIT (OUTPATIENT)
Dept: URGENT CARE | Facility: URGENT CARE | Age: 42
End: 2022-04-21
Payer: COMMERCIAL

## 2022-04-21 VITALS
OXYGEN SATURATION: 100 % | DIASTOLIC BLOOD PRESSURE: 87 MMHG | HEART RATE: 86 BPM | SYSTOLIC BLOOD PRESSURE: 135 MMHG | TEMPERATURE: 98.8 F

## 2022-04-21 DIAGNOSIS — R59.9 SWOLLEN LYMPH NODES: ICD-10-CM

## 2022-04-21 DIAGNOSIS — R05.9 COUGH: ICD-10-CM

## 2022-04-21 DIAGNOSIS — J02.9 SORE THROAT: Primary | ICD-10-CM

## 2022-04-21 LAB
BASOPHILS # BLD AUTO: 0 10E3/UL (ref 0–0.2)
BASOPHILS NFR BLD AUTO: 0 %
EOSINOPHIL # BLD AUTO: 0.3 10E3/UL (ref 0–0.7)
EOSINOPHIL NFR BLD AUTO: 3 %
ERYTHROCYTE [DISTWIDTH] IN BLOOD BY AUTOMATED COUNT: 17.1 % (ref 10–15)
HCT VFR BLD AUTO: 31.5 % (ref 35–47)
HGB BLD-MCNC: 9.8 G/DL (ref 11.7–15.7)
LYMPHOCYTES # BLD AUTO: 3.9 10E3/UL (ref 0.8–5.3)
LYMPHOCYTES NFR BLD AUTO: 36 %
MCH RBC QN AUTO: 23.1 PG (ref 26.5–33)
MCHC RBC AUTO-ENTMCNC: 31.1 G/DL (ref 31.5–36.5)
MCV RBC AUTO: 74 FL (ref 78–100)
MONOCYTES # BLD AUTO: 0.7 10E3/UL (ref 0–1.3)
MONOCYTES NFR BLD AUTO: 7 %
MONOCYTES NFR BLD AUTO: NEGATIVE %
NEUTROPHILS # BLD AUTO: 5.9 10E3/UL (ref 1.6–8.3)
NEUTROPHILS NFR BLD AUTO: 55 %
PLATELET # BLD AUTO: 370 10E3/UL (ref 150–450)
RBC # BLD AUTO: 4.25 10E6/UL (ref 3.8–5.2)
WBC # BLD AUTO: 10.8 10E3/UL (ref 4–11)

## 2022-04-21 PROCEDURE — 86308 HETEROPHILE ANTIBODY SCREEN: CPT | Performed by: NURSE PRACTITIONER

## 2022-04-21 PROCEDURE — 99213 OFFICE O/P EST LOW 20 MIN: CPT | Mod: CS | Performed by: NURSE PRACTITIONER

## 2022-04-21 PROCEDURE — U0005 INFEC AGEN DETEC AMPLI PROBE: HCPCS | Performed by: NURSE PRACTITIONER

## 2022-04-21 PROCEDURE — U0003 INFECTIOUS AGENT DETECTION BY NUCLEIC ACID (DNA OR RNA); SEVERE ACUTE RESPIRATORY SYNDROME CORONAVIRUS 2 (SARS-COV-2) (CORONAVIRUS DISEASE [COVID-19]), AMPLIFIED PROBE TECHNIQUE, MAKING USE OF HIGH THROUGHPUT TECHNOLOGIES AS DESCRIBED BY CMS-2020-01-R: HCPCS | Performed by: NURSE PRACTITIONER

## 2022-04-21 PROCEDURE — 85025 COMPLETE CBC W/AUTO DIFF WBC: CPT | Performed by: NURSE PRACTITIONER

## 2022-04-21 PROCEDURE — 36415 COLL VENOUS BLD VENIPUNCTURE: CPT | Performed by: NURSE PRACTITIONER

## 2022-04-21 NOTE — PROGRESS NOTES
Chief Complaint   Patient presents with     Urgent Care     Patient was here on 4/18, took strep test and it was negative. Symptoms are not getting better.          ICD-10-CM    1. Sore throat  J02.9 CBC with platelets and differential     Mononucleosis screen     Symptomatic; Yes; 4/16/2022 COVID-19 Virus (Coronavirus) by PCR     Symptomatic; Yes; 4/16/2022 COVID-19 Virus (Coronavirus) by PCR Nose     CBC with platelets and differential     Mononucleosis screen   2. Swollen lymph nodes  R59.9 CBC with platelets and differential     Mononucleosis screen     Symptomatic; Yes; 4/16/2022 COVID-19 Virus (Coronavirus) by PCR     Symptomatic; Yes; 4/16/2022 COVID-19 Virus (Coronavirus) by PCR Nose     CBC with platelets and differential     Mononucleosis screen   3. Cough  R05.9 Symptomatic; Yes; 4/16/2022 COVID-19 Virus (Coronavirus) by PCR     Symptomatic; Yes; 4/16/2022 COVID-19 Virus (Coronavirus) by PCR Nose     Patient instructed to continue with symptomatic care, salt water gargles, Tylenol or ibuprofen as needed for pain.  If she develops any fever or difficulty swallowing or breathing she will go to the emergency room for follow-up.      Results for orders placed or performed in visit on 04/21/22 (from the past 24 hour(s))   CBC with platelets and differential    Narrative    The following orders were created for panel order CBC with platelets and differential.  Procedure                               Abnormality         Status                     ---------                               -----------         ------                     CBC with platelets and d...[[id number]]  Abnormal            Final result                 Please view results for these tests on the individual orders.   Mononucleosis screen   Result Value Ref Range    Mononucleosis Screen Negative Negative   CBC with platelets and differential   Result Value Ref Range    WBC Count 10.8 4.0 - 11.0 10e3/uL    RBC Count 4.25 3.80 - 5.20 10e6/uL     Hemoglobin 9.8 (L) 11.7 - 15.7 g/dL    Hematocrit 31.5 (L) 35.0 - 47.0 %    MCV 74 (L) 78 - 100 fL    MCH 23.1 (L) 26.5 - 33.0 pg    MCHC 31.1 (L) 31.5 - 36.5 g/dL    RDW 17.1 (H) 10.0 - 15.0 %    Platelet Count 370 150 - 450 10e3/uL    % Neutrophils 55 %    % Lymphocytes 36 %    % Monocytes 7 %    % Eosinophils 3 %    % Basophils 0 %    Absolute Neutrophils 5.9 1.6 - 8.3 10e3/uL    Absolute Lymphocytes 3.9 0.8 - 5.3 10e3/uL    Absolute Monocytes 0.7 0.0 - 1.3 10e3/uL    Absolute Eosinophils 0.3 0.0 - 0.7 10e3/uL    Absolute Basophils 0.0 0.0 - 0.2 10e3/uL       Subjective     Randee Millard is an 41 year old female who presents to clinic today for continued sore throat and worsening swollen glands on the left side.  Symptoms started 5 days ago.  She was seen 3 days ago and tested for strep and this was negative.  She was sent home with symptomatic care at that time.  She does have a cough that started 2 days ago.      ROS: 10 point ROS neg other than the symptoms noted above in the HPI.       Objective    /87 (BP Location: Right arm, Patient Position: Sitting, Cuff Size: Adult Regular)   Pulse 86   Temp 98.8  F (37.1  C) (Oral)   LMP  (LMP Unknown)   SpO2 100%   Nurses notes and VS have been reviewed.    Physical Exam       GENERAL APPEARANCE: alert and mild distress     EYES: PERRL, EOMI, sclera non-icteric     HENT: ear canals and TM's normal, nose and mouth without ulcers or lesions, tonsillar hypertrophy and tonsillar erythema     NECK: cervical adenopathy on the left     RESP: lungs clear to auscultation - no rales, rhonchi or wheezes     CV: regular rates and rhythm, no murmurs, rubs, or gallop     MS: extremities normal- no gross deformities noted; normal muscle tone.     SKIN: no suspicious lesions or rashes     NEURO: Normal strength and tone, mentation intact and speech normal     PSYCH: normal thought process; no significant mood disturbance    Patient Instructions   Salt water gargles as  needed for throat pain as well as Tylenol or ibuprofen.  If you develop new fever or problems swallowing please return for further evaluation.      BLANCA Curran, CNP  [facility] Provider    The use of Dragon/[employer] dictation services may have been used to construct the content in this note; any grammatical or spelling errors are non-intentional. Please contact the author of this note directly if you are in need of any clarification.

## 2022-04-22 LAB — SARS-COV-2 RNA RESP QL NAA+PROBE: NEGATIVE

## 2022-04-22 NOTE — PATIENT INSTRUCTIONS
Salt water gargles as needed for throat pain as well as Tylenol or ibuprofen.  If you develop new fever or problems swallowing please return for further evaluation.

## 2022-05-03 ENCOUNTER — HOSPITAL ENCOUNTER (EMERGENCY)
Facility: CLINIC | Age: 42
Discharge: HOME | End: 2022-05-03
Attending: EMERGENCY MEDICINE | Admitting: EMERGENCY MEDICINE
Payer: COMMERCIAL

## 2022-05-03 ENCOUNTER — APPOINTMENT (OUTPATIENT)
Dept: GENERAL RADIOLOGY | Facility: CLINIC | Age: 42
End: 2022-05-03
Attending: EMERGENCY MEDICINE
Payer: COMMERCIAL

## 2022-05-03 VITALS
TEMPERATURE: 97.6 F | SYSTOLIC BLOOD PRESSURE: 115 MMHG | DIASTOLIC BLOOD PRESSURE: 78 MMHG | RESPIRATION RATE: 20 BRPM | HEART RATE: 77 BPM | OXYGEN SATURATION: 100 %

## 2022-05-03 DIAGNOSIS — R05.9 COUGH: ICD-10-CM

## 2022-05-03 LAB
FLUAV RNA SPEC QL NAA+PROBE: NEGATIVE
FLUBV RNA RESP QL NAA+PROBE: NEGATIVE
RSV RNA SPEC NAA+PROBE: NEGATIVE
SARS-COV-2 RNA RESP QL NAA+PROBE: NEGATIVE

## 2022-05-03 PROCEDURE — 94640 AIRWAY INHALATION TREATMENT: CPT

## 2022-05-03 PROCEDURE — C9803 HOPD COVID-19 SPEC COLLECT: HCPCS

## 2022-05-03 PROCEDURE — 93005 ELECTROCARDIOGRAM TRACING: CPT

## 2022-05-03 PROCEDURE — 99285 EMERGENCY DEPT VISIT HI MDM: CPT | Mod: 25

## 2022-05-03 PROCEDURE — 250N000013 HC RX MED GY IP 250 OP 250 PS 637: Performed by: EMERGENCY MEDICINE

## 2022-05-03 PROCEDURE — 87637 SARSCOV2&INF A&B&RSV AMP PRB: CPT | Performed by: EMERGENCY MEDICINE

## 2022-05-03 PROCEDURE — 71046 X-RAY EXAM CHEST 2 VIEWS: CPT

## 2022-05-03 RX ORDER — ALBUTEROL SULFATE 90 UG/1
2 AEROSOL, METERED RESPIRATORY (INHALATION) ONCE
Status: COMPLETED | OUTPATIENT
Start: 2022-05-03 | End: 2022-05-03

## 2022-05-03 RX ADMIN — ALBUTEROL SULFATE 2 PUFF: 90 AEROSOL, METERED RESPIRATORY (INHALATION) at 12:13

## 2022-05-03 ASSESSMENT — ENCOUNTER SYMPTOMS
BACK PAIN: 1
CHEST TIGHTNESS: 1
SORE THROAT: 1
VOMITING: 1
COUGH: 1

## 2022-05-03 NOTE — ED TRIAGE NOTES
Cough and sore throat today. Has not been around anybody that is sick that she knows of. Respirations even and unlabored.

## 2022-05-03 NOTE — ED PROVIDER NOTES
History   Chief Complaint:  Cough       The history is provided by the patient.      Randee Millard is a 41 year old female, otherwise healthy, who presents with cough. For the past three days, the patient has had an ongoing cough. Last night her cough made it difficult for her to sleep and she was vomiting due to coughing so hard. She also notes that she is having some slight chest tightness as well as upper back pain. While in the ED, she mentions a slight sore throat as well. She denies any recent leg swelling, abdominal pain, or chest pain. She denies any personal history of asthma.     Review of Systems   HENT: Positive for sore throat.    Respiratory: Positive for cough and chest tightness.    Cardiovascular: Negative for chest pain and leg swelling.   Gastrointestinal: Positive for vomiting.   Musculoskeletal: Positive for back pain.   All other systems reviewed and are negative.        Allergies:  Amoxicillin  Clindamycin    Medications:  Ferrous sulfate     Past Medical History:     Anemia  Fibroid uterus   Kidney infection   Reflux esophagitis   Vitamin D deficiency   Chronic fatigue   Thyromegaly   Uterine leiomyoma       Past Surgical History:    Breast cyst aspiration      Family History:    Father: hypertension, diabetes    Social History:  The patient presents to the ED alone.      Physical Exam     Patient Vitals for the past 24 hrs:   BP Temp Pulse Resp SpO2   05/03/22 1114 115/78 97.6  F (36.4  C) 77 20 100 %       Physical Exam    Constitutional: Patient is well appearing. No distress.  Head: Atraumatic.  Mouth/Throat: Oropharynx is clear and moist. No oropharyngeal exudate.  Eyes: Conjunctivae are normal. No scleral icterus.  Neck: Normal range of motion. Neck supple.   Cardiovascular: Normal rate, regular rhythm, normal heart sounds and intact distal perfusion.   Pulmonary/Chest: Breath sounds normal. No respiratory distress.  Abdominal: Soft. Bowel sounds are normal. No distension. No  tenderness. No rebound or guarding.   Musculoskeletal: Normal range of motion. No edema or tenderness.   Neurological: Alert and orientated to person, place, and time. No observable focal neuro deficit  Skin: Warm and dry. No rash noted. Not diaphoretic.       Emergency Department Course   ECG  ECG obtained at 1233, ECG read at 1235  Normal sinus rhythm. Normal ECG.    Rate 67 bpm. TN interval 166 ms. QRS duration 84 ms. QT/QTc 400/422 ms. P-R-T axes 25 16 21.     Imaging:  XR Chest 2 Views   Preliminary Result   IMPRESSION:  There are no acute infiltrates. The cardiac silhouette is   not enlarged. Pulmonary vasculature is unremarkable.         Report per radiology    Laboratory:  Labs Ordered and Resulted from Time of ED Arrival to Time of ED Departure   INFLUENZA A/B & SARS-COV2 PCR MULTIPLEX - Normal       Result Value    Influenza A PCR Negative      Influenza B PCR Negative      RSV PCR Negative      SARS CoV2 PCR Negative            Emergency Department Course:             Reviewed:  I reviewed nursing notes and vitals    Assessments:  1133 I obtained history and examined the patient as noted above.   1250 I rechecked the patient and explained findings.     Interventions:  1213 Albuterol 2 puffs inhalation    Disposition:  The patient was discharged to home.     Impression & Plan     CMS Diagnoses: None    Medical Decision Making:  This patient was seen in the ED during the midst of a complete laboratory failure on 5/3/22 greatly impacting the length of total care time.     Randee Millard is a 41 year old who presents for evaluation of cough.  This is consistent with an upper respiratory tract infection.  There is no signs at this point of serious bacterial infection such as OM, RPA, epiglottitis, PTA, strep pharyngitis, croup, bronchiolitis, pneumonia, sinusitis, meningitis, or bacteremia.  Given clear lungs, no fever, no hypoxia and no respiratory distress, the probability of bacterial pneumonia is very  unlikely. While in the ED, the patient received a dose of albuterol which improved her symptoms. Her chest XR was negative. COVID/Influzena/RSV tests negative.  There are no gastrointestinal symptoms at this point and no signs of dehydration. EKG also normal in l<40YOF with no risk.  We discussed conservative measures. Patient will come back to the the ED for fever > 103, respiratory distress, protracted vomiting, or confusion. All questions and concerns were answered. The patient was discharged home and recommended to follow up with her primary physician and return with any new or worsening symptoms.         Diagnosis:    ICD-10-CM    1. Cough  R05.9          Scribe Disclosure:  I, Elizabeth Griggs, am serving as a scribe at 11:23 AM on 5/3/2022 to document services personally performed by Smith Molina MD based on my observations and the provider's statements to me.              Smith Molina MD  05/03/22 [id number]

## 2022-05-04 ENCOUNTER — OFFICE VISIT (OUTPATIENT)
Dept: URGENT CARE | Facility: URGENT CARE | Age: 42
End: 2022-05-04
Payer: COMMERCIAL

## 2022-05-04 VITALS
SYSTOLIC BLOOD PRESSURE: 116 MMHG | TEMPERATURE: 98 F | DIASTOLIC BLOOD PRESSURE: 73 MMHG | OXYGEN SATURATION: 100 % | HEART RATE: 88 BPM

## 2022-05-04 DIAGNOSIS — R05.9 COUGH: Primary | ICD-10-CM

## 2022-05-04 LAB
ATRIAL RATE - MUSE: 67 BPM
DIASTOLIC BLOOD PRESSURE - MUSE: NORMAL MMHG
INTERPRETATION ECG - MUSE: NORMAL
P AXIS - MUSE: 25 DEGREES
PR INTERVAL - MUSE: 166 MS
QRS DURATION - MUSE: 84 MS
QT - MUSE: 400 MS
QTC - MUSE: 422 MS
R AXIS - MUSE: 16 DEGREES
SYSTOLIC BLOOD PRESSURE - MUSE: NORMAL MMHG
T AXIS - MUSE: 21 DEGREES
VENTRICULAR RATE- MUSE: 67 BPM

## 2022-05-04 PROCEDURE — 99214 OFFICE O/P EST MOD 30 MIN: CPT | Performed by: PHYSICIAN ASSISTANT

## 2022-05-04 RX ORDER — CODEINE PHOSPHATE AND GUAIFENESIN 10; 100 MG/5ML; MG/5ML
1-2 SOLUTION ORAL
Qty: 60 ML | Refills: 0 | Status: SHIPPED | OUTPATIENT
Start: 2022-05-04 | End: 2022-11-17

## 2022-05-04 NOTE — PROGRESS NOTES
SUBJECTIVE:   Randee Millard is a 41 year old female presenting with a chief complaint of cough - non-productive.  Onset of symptoms was 1 week(s) ago.  Course of illness is worsening.    Severity moderate      Past Medical History:   Diagnosis Date     Anemia      Fibroid uterus      Kidney infection 05/30/2021     Reflux esophagitis      Current Outpatient Medications   Medication Sig Dispense Refill     ferrous sulfate (FE TABS) 325 (65 Fe) MG EC tablet Take 1 tablet (325 mg) by mouth daily (Patient not taking: Reported on 5/4/2022) 90 tablet 3     Social History     Tobacco Use     Smoking status: Never Smoker     Smokeless tobacco: Never Used   Substance Use Topics     Alcohol use: No     Alcohol/week: 0.0 standard drinks       ROS:  Review of systems negative except as stated above.    OBJECTIVE:  /73 (BP Location: Left arm, Patient Position: Sitting, Cuff Size: Adult Large)   Pulse 88   Temp 98  F (36.7  C) (Oral)   LMP  (LMP Unknown)   SpO2 100%   GENERAL APPEARANCE: healthy, alert and no distress  EYES: EOMI,  PERRL, conjunctiva clear  HENT: ear canals and TM's normal.  Nose and mouth without ulcers, erythema or lesions  NECK: supple, nontender, no lymphadenopathy  RESP: lungs clear to auscultation - no rales, rhonchi or wheezes  CV: regular rates and rhythm, normal S1 S2, no murmur noted  NEURO: Normal strength and tone, sensory exam grossly normal,  normal speech and mentation  SKIN: no suspicious lesions or rashes      No results found for any visits on 05/04/22.  '  ASSESSMENT:  (R05.9) Cough  (primary encounter diagnosis)  Plan: guaiFENesin-codeine (ROBITUSSIN AC) 100-10         MG/5ML solution      There are no Patient Instructions on file for this visit.

## 2022-05-26 ENCOUNTER — OFFICE VISIT (OUTPATIENT)
Dept: INTERNAL MEDICINE | Facility: CLINIC | Age: 42
End: 2022-05-26
Payer: COMMERCIAL

## 2022-05-26 VITALS
WEIGHT: 204 LBS | OXYGEN SATURATION: 99 % | DIASTOLIC BLOOD PRESSURE: 64 MMHG | TEMPERATURE: 98.2 F | SYSTOLIC BLOOD PRESSURE: 112 MMHG | BODY MASS INDEX: 30.92 KG/M2 | HEIGHT: 68 IN | RESPIRATION RATE: 14 BRPM | HEART RATE: 113 BPM

## 2022-05-26 DIAGNOSIS — E04.9 GOITER: ICD-10-CM

## 2022-05-26 DIAGNOSIS — J02.8 ACUTE PHARYNGITIS DUE TO OTHER SPECIFIED ORGANISMS: Primary | ICD-10-CM

## 2022-05-26 PROCEDURE — 36415 COLL VENOUS BLD VENIPUNCTURE: CPT | Performed by: NURSE PRACTITIONER

## 2022-05-26 PROCEDURE — 84443 ASSAY THYROID STIM HORMONE: CPT | Performed by: NURSE PRACTITIONER

## 2022-05-26 PROCEDURE — 99213 OFFICE O/P EST LOW 20 MIN: CPT | Performed by: NURSE PRACTITIONER

## 2022-05-26 NOTE — PROGRESS NOTES
"  Assessment & Plan     Goiter    - TSH with free T4 reflex; Future    Acute pharyngitis due to other specified organisms        OTC flonase, zyrtec daily  Consider endocrine consult, labs pending         BMI:   Estimated body mass index is 31.02 kg/m  as calculated from the following:    Height as of this encounter: 1.727 m (5' 8\").    Weight as of this encounter: 92.5 kg (204 lb).           Zita Kaplan NP  [facility] SELMA Jeff is a 41 year old who presents for the following health issues : ED follow up.    [facility]     ED/UC Followup:    Facility:  [facility]  Date of visit: 5/09/22  Reason for visit: cough, sinusitis  Current Status: improved, intermittent scratchy throat, denies PND  H/o goiter, no f/u 6 years.           Review of Systems   CONSTITUTIONAL: NEGATIVE for fever, chills, change in weight  ENT/MOUTH: NEGATIVE for ear pain , fever, hoarseness, rhinorrhea-clear and sinus pressure  RESP: NEGATIVE for significant cough or SOB  CV: NEGATIVE for chest pain, palpitations or peripheral edema      Objective    /64   Pulse 113   Temp 98.2  F (36.8  C) (Oral)   Resp 14   Ht 1.727 m (5' 8\")   Wt 92.5 kg (204 lb)   LMP 05/26/2022 (Exact Date)   SpO2 99%   Breastfeeding No   BMI 31.02 kg/m    Body mass index is 31.02 kg/m .  Physical Exam   GENERAL: healthy, alert and no distress  EYES: Eyes grossly normal to inspection, PERRL and conjunctivae and sclerae normal  HENT: ear canals and TM's normal, nasal mucosa edematous , oropharynx clear, oral mucous membranes moist and sinuses: not tender  NECK: no adenopathy and thyromegaly approximately 3 times normal  RESP: lungs clear to auscultation - no rales, rhonchi or wheezes  CV: regular rate and rhythm, normal S1 S2, no S3 or S4, no murmur, click or rub, no peripheral edema and peripheral pulses strong              "

## 2022-05-26 NOTE — LETTER
June 21, 2022      Randee JUSTIN Millard  [address] [place] [address]        Dear ,    We are writing to inform you of your test results.    Your recent lab results were normal.       Resulted Orders   TSH with free T4 reflex   Result Value Ref Range    TSH 1.81 0.40 - 4.00 mU/L       If you have any questions or concerns, please call the clinic at the number listed above.       Sincerely,      Zita Kaplan NP

## 2022-05-27 LAB — TSH SERPL DL<=0.005 MIU/L-ACNC: 1.81 MU/L (ref 0.4–4)

## 2022-06-08 ENCOUNTER — TRANSFERRED RECORDS (OUTPATIENT)
Dept: HEALTH INFORMATION MANAGEMENT | Facility: CLINIC | Age: 42
End: 2022-06-08
Payer: COMMERCIAL

## 2022-08-24 ENCOUNTER — OFFICE VISIT (OUTPATIENT)
Dept: OBGYN | Facility: CLINIC | Age: 42
End: 2022-08-24
Payer: COMMERCIAL

## 2022-08-24 VITALS — BODY MASS INDEX: 30.71 KG/M2 | WEIGHT: 202 LBS | DIASTOLIC BLOOD PRESSURE: 68 MMHG | SYSTOLIC BLOOD PRESSURE: 104 MMHG

## 2022-08-24 DIAGNOSIS — R10.2 PELVIC PAIN IN FEMALE: Primary | ICD-10-CM

## 2022-08-24 DIAGNOSIS — Z12.4 SCREENING FOR MALIGNANT NEOPLASM OF CERVIX: ICD-10-CM

## 2022-08-24 DIAGNOSIS — D25.9 UTERINE LEIOMYOMA, UNSPECIFIED LOCATION: ICD-10-CM

## 2022-08-24 PROCEDURE — 87624 HPV HI-RISK TYP POOLED RSLT: CPT | Performed by: OBSTETRICS & GYNECOLOGY

## 2022-08-24 PROCEDURE — 99213 OFFICE O/P EST LOW 20 MIN: CPT | Performed by: OBSTETRICS & GYNECOLOGY

## 2022-08-24 PROCEDURE — G0145 SCR C/V CYTO,THINLAYER,RESCR: HCPCS | Performed by: OBSTETRICS & GYNECOLOGY

## 2022-08-24 NOTE — NURSING NOTE
"Chief Complaint   Patient presents with     Pelvic Pain     Hx fibroids.  Here with her      initial /68   Wt 91.6 kg (202 lb)   LMP 08/17/2022 (Approximate)   BMI 30.71 kg/m   Estimated body mass index is 30.71 kg/m  as calculated from the following:    Height as of 5/26/22: 1.727 m (5' 8\").    Weight as of this encounter: 91.6 kg (202 lb).  BP completed using cuff size large.  Lillian Fernández CMA    "

## 2022-08-24 NOTE — PROGRESS NOTES
Chief Complaint   Patient presents with     Pelvic Pain     Hx fibroids.  Here with her        Subjective:  43yo P5 here with  to discuss pelvic/abdominal and vaginal pain.  Vaginal pain present with I/C.  Patient with known fibroid uterus with 7+ cm fibroid seen on CT in 2/2022.  Patient has been counseled extensively and was scheduled previously for a myomectomy with Dr. Mckeon but procedure was cancelled by patient.    REVIEW OF SYSTEMS:  General: negative    Health Maintenance   Topic Date Due     ADVANCE CARE PLANNING  Never done     COVID-19 Vaccine (1) Never done     HEPATITIS C SCREENING  Never done     PREVENTIVE CARE VISIT  07/20/2021     INFLUENZA VACCINE (1) 09/01/2022     HPV TEST  03/11/2024     PAP  03/11/2024     DTAP/TDAP/TD IMMUNIZATION (2 - Td or Tdap) 05/23/2026     HIV SCREENING  Completed     PHQ-2 (once per calendar year)  Completed     Pneumococcal Vaccine: Pediatrics (0 to 5 Years) and At-Risk Patients (6 to 64 Years)  Aged Out     IPV IMMUNIZATION  Aged Out     MENINGITIS IMMUNIZATION  Aged Out     HEPATITIS B IMMUNIZATION  Aged Out       Allergies   Allergen Reactions     Amoxicillin Rash     Clindamycin Rash       Objective:  Vitals: /68   Wt 91.6 kg (202 lb)   LMP 08/17/2022 (Approximate)   BMI 30.71 kg/m    BMI= Body mass index is 30.71 kg/m .    Abdomen: Obese, soft, tender suprapubically.  No scars    Pelvic:  EGBUS nl.  Vagina neg. Cervix mulip w/o lesions.  Pap obtained.    Uterus:  Tender, mobile RV, 12 week size.      Assessment/Plan:  1. Screening for malignant neoplasm of cervix  Patient due for rputine screening  - Pap screen with HPV - recommended age 30 - 65 years    2. Pelvic pain in female  Likely related to fibroid uterus.  Wishes to maintain future childbearing potential.  Age 42 accurate and declining fecundity with age discussed    3. Uterine leiomyoma, unspecified location  Likely cause of pain.  Advised she connect again with Dr. Mckeon to  reschedule myomectomy which had been originally scheduled in 8/2021        Eleazar Seymour MD

## 2022-08-28 LAB
BKR LAB AP GYN ADEQUACY: NORMAL
BKR LAB AP GYN INTERPRETATION: NORMAL
BKR LAB AP HPV REFLEX: NORMAL
BKR LAB AP LMP: NORMAL
BKR LAB AP PREVIOUS ABNORMAL: NORMAL
PATH REPORT.COMMENTS IMP SPEC: NORMAL
PATH REPORT.COMMENTS IMP SPEC: NORMAL
PATH REPORT.RELEVANT HX SPEC: NORMAL

## 2022-08-30 LAB
HUMAN PAPILLOMA VIRUS 16 DNA: NEGATIVE
HUMAN PAPILLOMA VIRUS 18 DNA: NEGATIVE
HUMAN PAPILLOMA VIRUS FINAL DIAGNOSIS: NORMAL
HUMAN PAPILLOMA VIRUS OTHER HR: NEGATIVE

## 2022-09-26 ENCOUNTER — OFFICE VISIT (OUTPATIENT)
Dept: URGENT CARE | Facility: URGENT CARE | Age: 42
End: 2022-09-26
Payer: COMMERCIAL

## 2022-09-26 VITALS
HEART RATE: 82 BPM | DIASTOLIC BLOOD PRESSURE: 70 MMHG | TEMPERATURE: 98.6 F | WEIGHT: 202 LBS | OXYGEN SATURATION: 100 % | SYSTOLIC BLOOD PRESSURE: 118 MMHG | RESPIRATION RATE: 16 BRPM | BODY MASS INDEX: 30.71 KG/M2

## 2022-09-26 DIAGNOSIS — R30.0 DYSURIA: Primary | ICD-10-CM

## 2022-09-26 DIAGNOSIS — M54.50 BILATERAL LOW BACK PAIN WITHOUT SCIATICA, UNSPECIFIED CHRONICITY: ICD-10-CM

## 2022-09-26 LAB
ALBUMIN UR-MCNC: NEGATIVE MG/DL
APPEARANCE UR: CLEAR
BACTERIA #/AREA URNS HPF: ABNORMAL /HPF
BILIRUB UR QL STRIP: NEGATIVE
COLOR UR AUTO: YELLOW
GLUCOSE UR STRIP-MCNC: NEGATIVE MG/DL
HGB UR QL STRIP: NEGATIVE
KETONES UR STRIP-MCNC: NEGATIVE MG/DL
LEUKOCYTE ESTERASE UR QL STRIP: ABNORMAL
NITRATE UR QL: NEGATIVE
PH UR STRIP: 6.5 [PH] (ref 5–7)
RBC #/AREA URNS AUTO: ABNORMAL /HPF
SP GR UR STRIP: <=1.005 (ref 1–1.03)
SQUAMOUS #/AREA URNS AUTO: ABNORMAL /LPF
UROBILINOGEN UR STRIP-ACNC: 0.2 E.U./DL
WBC #/AREA URNS AUTO: ABNORMAL /HPF

## 2022-09-26 PROCEDURE — 87086 URINE CULTURE/COLONY COUNT: CPT | Performed by: PHYSICIAN ASSISTANT

## 2022-09-26 PROCEDURE — 99214 OFFICE O/P EST MOD 30 MIN: CPT | Performed by: PHYSICIAN ASSISTANT

## 2022-09-26 PROCEDURE — 81001 URINALYSIS AUTO W/SCOPE: CPT | Performed by: PHYSICIAN ASSISTANT

## 2022-09-26 RX ORDER — PHENAZOPYRIDINE HYDROCHLORIDE 200 MG/1
200 TABLET, FILM COATED ORAL 3 TIMES DAILY PRN
Qty: 6 TABLET | Refills: 0 | Status: SHIPPED | OUTPATIENT
Start: 2022-09-26 | End: 2022-11-17

## 2022-09-26 RX ORDER — IBUPROFEN 800 MG/1
800 TABLET, FILM COATED ORAL EVERY 8 HOURS PRN
Qty: 30 TABLET | Refills: 0 | Status: SHIPPED | OUTPATIENT
Start: 2022-09-26 | End: 2022-11-17

## 2022-09-26 RX ORDER — CIPROFLOXACIN 500 MG/1
500 TABLET, FILM COATED ORAL 2 TIMES DAILY
Qty: 14 TABLET | Refills: 0 | Status: SHIPPED | OUTPATIENT
Start: 2022-09-26 | End: 2022-10-03

## 2022-09-26 NOTE — PROGRESS NOTES
"  Assessment & Plan     UTI/DYSURIA    UA positive, urine culture pending  Cipro for coverage of bladder and pyelonephritis  Start on Cipro for infection, she is having flank and lower back pain, so will treat for UTI  Pyridium for dysuria    - UA macro with reflex to Microscopic and Culture - Clinc Collect  - Urine Microscopic  - Urine Culture  - ciprofloxacin (CIPRO) 500 MG tablet; Take 1 tablet (500 mg) by mouth 2 times daily for 7 days  - phenazopyridine (PYRIDIUM) 200 MG tablet; Take 1 tablet (200 mg) by mouth 3 times daily as needed for irritation    Bilateral low back pain without sciatica, unspecified chronicity    Motrin for lower back pain with tenderness in flank area  Increase oral fluids    - Urine Culture  - ibuprofen (ADVIL/MOTRIN) 800 MG tablet; Take 1 tablet (800 mg) by mouth every 8 hours as needed    Review of external notes as documented elsewhere in note       BMI:   Estimated body mass index is 30.71 kg/m  as calculated from the following:    Height as of 5/26/22: 1.727 m (5' 8\").    Weight as of this encounter: 91.6 kg (202 lb).     At today's visit with Randee Millard , we discussed results, diagnosis, medications and formulated a plan.  We also discussed red flags for immediate return to clinic/ER, as well as indications for follow up with PCP if not improved in 3 days. Patient understood and agreed to plan. Randee Millard was discharged with stable vitals and has no further questions.     No follow-ups on file.    Peyman Whitlock, [facility], PA-C  [facility]    Dez Jeff is a 42 year old, presenting for the following health issues:  UTI (Dysuria, low back pain, low abdominal pain X 3 days )      HPI   Review of Systems   Constitutional, HEENT, cardiovascular, pulmonary, GI, , musculoskeletal, neuro, skin, endocrine and psych systems are negative, except as otherwise noted.      Objective    /70   Pulse 82   Temp 98.6  F (37  C) (Tympanic)   Resp 16   " Wt 91.6 kg (202 lb)   LMP 08/17/2022 (Approximate)   SpO2 100%   BMI 30.71 kg/m    Body mass index is 30.71 kg/m .  Physical Exam   GENERAL: healthy, alert and no distress  NECK: no adenopathy, no asymmetry, masses, or scars and thyroid normal to palpation  RESP: lungs clear to auscultation - no rales, rhonchi or wheezes  CV: regular rate and rhythm, normal S1 S2, no S3 or S4, no murmur, click or rub, no peripheral edema and peripheral pulses strong  ABDOMEN: tenderness suprapubic  MS: Positive for lower back pain and right side flank tenderness  SKIN: no suspicious lesions or rashes  NEURO: Normal strength and tone, mentation intact and speech normal  PSYCH: mentation appears normal, affect normal/bright

## 2022-09-28 LAB — BACTERIA UR CULT: NORMAL

## 2022-10-17 ENCOUNTER — OFFICE VISIT (OUTPATIENT)
Dept: URGENT CARE | Facility: URGENT CARE | Age: 42
End: 2022-10-17
Payer: COMMERCIAL

## 2022-10-17 VITALS
DIASTOLIC BLOOD PRESSURE: 78 MMHG | BODY MASS INDEX: 30.71 KG/M2 | WEIGHT: 202 LBS | OXYGEN SATURATION: 98 % | HEART RATE: 81 BPM | SYSTOLIC BLOOD PRESSURE: 114 MMHG

## 2022-10-17 DIAGNOSIS — M25.531 RIGHT WRIST PAIN: Primary | ICD-10-CM

## 2022-10-17 PROCEDURE — 99213 OFFICE O/P EST LOW 20 MIN: CPT | Performed by: FAMILY MEDICINE

## 2022-10-17 RX ORDER — NAPROXEN 500 MG/1
500 TABLET ORAL 2 TIMES DAILY WITH MEALS
Qty: 14 TABLET | Refills: 0 | Status: SHIPPED | OUTPATIENT
Start: 2022-10-17 | End: 2022-10-24

## 2022-10-18 NOTE — PROGRESS NOTES
SUBJECTIVE:  Chief Complaint   Patient presents with     Wrist Pain     Rt, today, painful and swollen   .ident who presents with a chief complaint of  right wrist pain.  Symptoms began 1 day(s) ago , are moderate andstill present.  Context:Injury: no    Past Medical History:   Diagnosis Date     Anemia      Fibroid uterus      Kidney infection 05/30/2021     Reflux esophagitis      Allergies   Allergen Reactions     Amoxicillin Rash     Clindamycin Rash     .socx    ROS:Review of systems negative except as stated below    EXAM: /78 (BP Location: Right arm, Patient Position: Sitting, Cuff Size: Adult Regular)   Pulse 81   Wt 91.6 kg (202 lb)   SpO2 98%   BMI 30.71 kg/m    Exam:wrist  tenderness to palpation and pain with rom  GENERAL APPEARANCE: healthy, alert and no distress  EXTREMITIES: peripheral pulses normal  SKIN: no suspicious lesions or rashes  NEURO: Normal strength and tone, sensory exam grossly normal, mentation intact and speech normal    X-RAY was not done.    ASSESSMENT:     ICD-10-CM    1. Right wrist pain  M25.531 naproxen (NAPROSYN) 500 MG tablet     Wrist/Arm/Hand Supplies Order for DME - ONLY FOR DME        Rest/ice     Yes

## 2022-11-17 ENCOUNTER — OFFICE VISIT (OUTPATIENT)
Dept: INTERNAL MEDICINE | Facility: CLINIC | Age: 42
End: 2022-11-17
Payer: COMMERCIAL

## 2022-11-17 VITALS
BODY MASS INDEX: 31.07 KG/M2 | DIASTOLIC BLOOD PRESSURE: 65 MMHG | HEART RATE: 96 BPM | TEMPERATURE: 97.5 F | HEIGHT: 68 IN | SYSTOLIC BLOOD PRESSURE: 95 MMHG | OXYGEN SATURATION: 99 % | WEIGHT: 205 LBS | RESPIRATION RATE: 16 BRPM

## 2022-11-17 DIAGNOSIS — D50.8 IRON DEFICIENCY ANEMIA SECONDARY TO INADEQUATE DIETARY IRON INTAKE: ICD-10-CM

## 2022-11-17 DIAGNOSIS — E55.9 VITAMIN D DEFICIENCY: ICD-10-CM

## 2022-11-17 DIAGNOSIS — Z00.00 ROUTINE HISTORY AND PHYSICAL EXAMINATION OF ADULT: ICD-10-CM

## 2022-11-17 DIAGNOSIS — Z11.59 NEED FOR HEPATITIS C SCREENING TEST: Primary | ICD-10-CM

## 2022-11-17 DIAGNOSIS — E01.0 THYROMEGALY: ICD-10-CM

## 2022-11-17 LAB
ALBUMIN SERPL BCG-MCNC: 4.1 G/DL (ref 3.5–5.2)
ALP SERPL-CCNC: 63 U/L (ref 35–104)
ALT SERPL W P-5'-P-CCNC: 10 U/L (ref 10–35)
ANION GAP SERPL CALCULATED.3IONS-SCNC: 12 MMOL/L (ref 7–15)
AST SERPL W P-5'-P-CCNC: 24 U/L (ref 10–35)
BILIRUB SERPL-MCNC: 0.2 MG/DL
BUN SERPL-MCNC: 10 MG/DL (ref 6–20)
CALCIUM SERPL-MCNC: 9.3 MG/DL (ref 8.6–10)
CHLORIDE SERPL-SCNC: 103 MMOL/L (ref 98–107)
CHOLEST SERPL-MCNC: 160 MG/DL
CREAT SERPL-MCNC: 0.61 MG/DL (ref 0.51–0.95)
DEPRECATED HCO3 PLAS-SCNC: 21 MMOL/L (ref 22–29)
ERYTHROCYTE [DISTWIDTH] IN BLOOD BY AUTOMATED COUNT: 20.9 % (ref 10–15)
GFR SERPL CREATININE-BSD FRML MDRD: >90 ML/MIN/1.73M2
GLUCOSE SERPL-MCNC: 82 MG/DL (ref 70–99)
HBA1C MFR BLD: 5.2 % (ref 0–5.6)
HCT VFR BLD AUTO: 32.8 % (ref 35–47)
HDLC SERPL-MCNC: 40 MG/DL
HGB BLD-MCNC: 10.4 G/DL (ref 11.7–15.7)
LDLC SERPL CALC-MCNC: 98 MG/DL
MCH RBC QN AUTO: 23.2 PG (ref 26.5–33)
MCHC RBC AUTO-ENTMCNC: 31.7 G/DL (ref 31.5–36.5)
MCV RBC AUTO: 73 FL (ref 78–100)
NONHDLC SERPL-MCNC: 120 MG/DL
PLATELET # BLD AUTO: 357 10E3/UL (ref 150–450)
POTASSIUM SERPL-SCNC: 3.6 MMOL/L (ref 3.4–5.3)
PROT SERPL-MCNC: 7.2 G/DL (ref 6.4–8.3)
RBC # BLD AUTO: 4.49 10E6/UL (ref 3.8–5.2)
SODIUM SERPL-SCNC: 136 MMOL/L (ref 136–145)
TRIGL SERPL-MCNC: 109 MG/DL
TSH SERPL DL<=0.005 MIU/L-ACNC: 2.19 UIU/ML (ref 0.3–4.2)
WBC # BLD AUTO: 9.1 10E3/UL (ref 4–11)

## 2022-11-17 PROCEDURE — 80061 LIPID PANEL: CPT | Performed by: INTERNAL MEDICINE

## 2022-11-17 PROCEDURE — 80053 COMPREHEN METABOLIC PANEL: CPT | Performed by: INTERNAL MEDICINE

## 2022-11-17 PROCEDURE — 86803 HEPATITIS C AB TEST: CPT | Performed by: INTERNAL MEDICINE

## 2022-11-17 PROCEDURE — 83550 IRON BINDING TEST: CPT | Performed by: INTERNAL MEDICINE

## 2022-11-17 PROCEDURE — 83540 ASSAY OF IRON: CPT | Performed by: INTERNAL MEDICINE

## 2022-11-17 PROCEDURE — 84443 ASSAY THYROID STIM HORMONE: CPT | Performed by: INTERNAL MEDICINE

## 2022-11-17 PROCEDURE — 36415 COLL VENOUS BLD VENIPUNCTURE: CPT | Performed by: INTERNAL MEDICINE

## 2022-11-17 PROCEDURE — 85027 COMPLETE CBC AUTOMATED: CPT | Performed by: INTERNAL MEDICINE

## 2022-11-17 PROCEDURE — 82306 VITAMIN D 25 HYDROXY: CPT | Performed by: INTERNAL MEDICINE

## 2022-11-17 PROCEDURE — 83036 HEMOGLOBIN GLYCOSYLATED A1C: CPT | Performed by: INTERNAL MEDICINE

## 2022-11-17 PROCEDURE — 99396 PREV VISIT EST AGE 40-64: CPT | Performed by: INTERNAL MEDICINE

## 2022-11-17 PROCEDURE — 99213 OFFICE O/P EST LOW 20 MIN: CPT | Mod: 25 | Performed by: INTERNAL MEDICINE

## 2022-11-17 ASSESSMENT — ENCOUNTER SYMPTOMS
HEADACHES: 1
HEARTBURN: 1
DIZZINESS: 1
WEAKNESS: 1
ABDOMINAL PAIN: 1
MYALGIAS: 1
CONSTIPATION: 1

## 2022-11-17 NOTE — NURSING NOTE
"BP 95/65 (BP Location: Right arm, Patient Position: Sitting, Cuff Size: Adult Large)   Pulse 96   Temp 97.5  F (36.4  C) (Tympanic)   Resp 16   Ht 1.727 m (5' 8\")   Wt 93 kg (205 lb)   LMP 10/26/2022   SpO2 99%   BMI 31.17 kg/m    Patient in for Female Px.  "

## 2022-11-17 NOTE — PROGRESS NOTES
SUBJECTIVE:   CC: Randee is an 42 year old who presents for preventive health visit.     Patient has been advised of split billing requirements and indicates understanding: Yes  Healthy Habits:     Getting at least 3 servings of Calcium per day:  Yes    Bi-annual eye exam:  Yes    Dental care twice a year:  Yes    Sleep apnea or symptoms of sleep apnea:  None    Diet:  Breakfast skipped    Frequency of exercise:  1 day/week    Duration of exercise:  15-30 minutes    Taking medications regularly:  Yes    Medication side effects:  Other    PHQ-2 Total Score: 0    Additional concerns today:  No      Pt c/o heavy menstrual periods, since several months, has seen OBGYN and has been found to have fibroids and advised surgery but pt does not want any procedures at this time.    H/o Iron deficiency anemia , not taking any iron supplements- at this time, has on and off dizziness and tiredness.    H/o vit D deficiency ; not on any supplements .     History of thyromegaly, has had ultrasound in the past and also has seen endocrinologist, last US 2018      Today's PHQ-2 Score:   PHQ-2 ( 1999 Pf[employer]) 11/17/2022   Q1: Little interest or pleasure in doing things 0   Q2: Feeling down, depressed or hopeless 0   PHQ-2 Score 0   PHQ-2 Total Score (12-17 Years)- Positive if 3 or more points; Administer PHQ-A if positive -   Q1: Little interest or pleasure in doing things Not at all   Q2: Feeling down, depressed or hopeless Not at all   PHQ-2 Score 0       Have you ever done Advance Care Planning? (For example, a Health Directive, POLST, or a discussion with a medical provider or your loved ones about your wishes): No, advance care planning information given to patient to review.  Advanced care planning was discussed at today's visit.      Past Medical History:   Diagnosis Date     Anemia      Fibroid uterus      Kidney infection 05/30/2021     Reflux esophagitis        Past Surgical History:   Procedure Laterality Date     US BREAST  CYST ASPIRATION INITIAL RT         No current outpatient medications on file.       Family History   Problem Relation Age of Onset     Hypertension Father      Diabetes Father 63     Family History Negative Mother      No Known Problems Brother      No Known Problems Sister      No Known Problems Son      No Known Problems Daughter          Social History     Tobacco Use     Smoking status: Never     Smokeless tobacco: Never   Substance Use Topics     Alcohol use: No     Alcohol/week: 0.0 standard drinks     If you drink alcohol do you typically have >3 drinks per day or >7 drinks per week? No    Alcohol Use 11/17/2022   Prescreen: >3 drinks/day or >7 drinks/week? No   Prescreen: >3 drinks/day or >7 drinks/week? -   No flowsheet data found.    Reviewed orders with patient.  Reviewed health maintenance and updated orders accordingly - Yes       Breast Cancer Screening:    Breast CA Risk Assessment (FHS-7) 11/17/2022   Do you have a family history of breast, colon, or ovarian cancer? No / Unknown          History of abnormal Pap smear: NO - age 30- 65 PAP every 3 years recommended  PAP / HPV Latest Ref Rng & Units 8/24/2022 3/11/2019 2/5/2016   PAP   Negative for Intraepithelial Lesion or Malignancy (NILM) - -   PAP (Historical) - - NIL NIL   HPV16 Negative Negative Negative Negative   HPV18 Negative Negative Negative Negative   HRHPV Negative Negative Negative Negative     Reviewed and updated as needed this visit by clinical staff                  Reviewed and updated as needed this visit by Provider                     Review of Systems   Constitutional: Negative.    HENT: Negative.    Eyes: Negative.    Respiratory: Negative.    Cardiovascular: Negative.    Gastrointestinal: Positive for heartburn.   Breasts:  negative.    Genitourinary: Positive for vaginal bleeding.   Musculoskeletal: Negative.    Neurological: Positive for weakness.       OBJECTIVE:   BP 95/65 (BP Location: Right arm, Patient Position: Sitting,  "Cuff Size: Adult Large)   Pulse 96   Temp 97.5  F (36.4  C) (Tympanic)   Resp 16   Ht 1.727 m (5' 8\")   Wt 93 kg (205 lb)   LMP 10/26/2022   SpO2 99%   BMI 31.17 kg/m    Physical Exam  GENERAL:  alert and no distress  EYES: Eyes grossly normal to inspection, PERRL and conjunctivae and sclerae normal  NECK: no adenopathy, no asymmetry, masses, or scars , mild thyroid fullness noted  RESP: lungs clear to auscultation - no rales, rhonchi or wheezes  BREAST: normal without masses, tenderness or nipple discharge and no palpable axillary masses or adenopathy  CV: regular rate and rhythm, normal S1 S2,    ABDOMEN: soft, nontender,   and bowel sounds normal  MS: no gross musculoskeletal defects noted, no edema  NEURO: Normal strength and tone, mentation intact and speech normal  PSYCH: mentation appears normal, affect normal/bright    ASSESSMENT/PLAN:        (Z00.00) Routine history and physical examination of adult  Plan: CBC with platelets, Comprehensive metabolic         panel, Lipid panel reflex to direct LDL         Fasting, TSH with free T4 reflex, Vitamin D         Deficiency, Hemoglobin A1c            (E55.9) Vitamin D deficiency  Plan: Vitamin D Deficiency           (D50.8) Iron deficiency anemia secondary to inadequate dietary iron intake  Plan: CBC with platelets, Iron & Iron Binding Capacity- HGB 10.4- started on  ferrous sulfate (FE TABS) 325 (65 Fe)        MG EC tablet 1 tab daily as directed.explained clearly about the medication,insructions and side effects.  Advised to consume iron rich foods, also advised to follow-up with OB/GYN regarding treatment options for fibroid uterus           (E01.0) Thyromegaly  Plan: check TSH, pt was told I will contact her after results and proceed accordingly.       (Z11.59) Need for hepatitis C screening test    Plan: Hepatitis C Screen Reflex to HCV RNA Quant and         Genotype              Patient has been advised of split billing requirements and indicates " "understanding: Yes      COUNSELING:  Reviewed preventive health counseling, as reflected in patient instructions       Regular exercise       Healthy diet/nutrition       Immunizations  Declined: Covid-19, Influenza        BMI:   Estimated body mass index is 31.17 kg/m  as calculated from the following:    Height as of this encounter: 1.727 m (5' 8\").    Weight as of this encounter: 93 kg (205 lb).   Weight management plan: Discussed healthy diet and exercise guidelines      She reports that she has never smoked. She has never used smokeless tobacco.      Abe Hou MD  [facility]  "

## 2022-11-18 LAB
DEPRECATED CALCIDIOL+CALCIFEROL SERPL-MC: 26 UG/L (ref 20–75)
HCV AB SERPL QL IA: NONREACTIVE
IRON BINDING CAPACITY (ROCHE): 399 UG/DL (ref 240–430)
IRON SATN MFR SERPL: 6 % (ref 15–46)
IRON SERPL-MCNC: 23 UG/DL (ref 37–145)

## 2022-11-18 RX ORDER — FERROUS SULFATE 325(65) MG
325 TABLET, DELAYED RELEASE (ENTERIC COATED) ORAL DAILY
Qty: 90 TABLET | Refills: 1 | Status: SHIPPED | OUTPATIENT
Start: 2022-11-18 | End: 2024-02-22

## 2022-11-18 ASSESSMENT — ENCOUNTER SYMPTOMS
MUSCULOSKELETAL NEGATIVE: 1
CARDIOVASCULAR NEGATIVE: 1
RESPIRATORY NEGATIVE: 1
EYES NEGATIVE: 1
CONSTITUTIONAL NEGATIVE: 1

## 2022-12-03 ENCOUNTER — OFFICE VISIT (OUTPATIENT)
Dept: URGENT CARE | Facility: URGENT CARE | Age: 42
End: 2022-12-03
Payer: COMMERCIAL

## 2022-12-03 VITALS
HEART RATE: 87 BPM | BODY MASS INDEX: 32.01 KG/M2 | HEIGHT: 68 IN | RESPIRATION RATE: 18 BRPM | OXYGEN SATURATION: 99 % | WEIGHT: 211.2 LBS | TEMPERATURE: 98.1 F | DIASTOLIC BLOOD PRESSURE: 75 MMHG | SYSTOLIC BLOOD PRESSURE: 111 MMHG

## 2022-12-03 DIAGNOSIS — R30.0 DYSURIA: Primary | ICD-10-CM

## 2022-12-03 DIAGNOSIS — N30.00 ACUTE CYSTITIS WITHOUT HEMATURIA: ICD-10-CM

## 2022-12-03 DIAGNOSIS — N89.8 VAGINAL DISCHARGE: ICD-10-CM

## 2022-12-03 DIAGNOSIS — N90.89 VULVAR LUMP: ICD-10-CM

## 2022-12-03 LAB
ALBUMIN UR-MCNC: NEGATIVE MG/DL
APPEARANCE UR: CLEAR
BACTERIA #/AREA URNS HPF: ABNORMAL /HPF
BILIRUB UR QL STRIP: NEGATIVE
CLUE CELLS: ABNORMAL
COLOR UR AUTO: YELLOW
GLUCOSE UR STRIP-MCNC: NEGATIVE MG/DL
HGB UR QL STRIP: NEGATIVE
KETONES UR STRIP-MCNC: NEGATIVE MG/DL
LEUKOCYTE ESTERASE UR QL STRIP: ABNORMAL
NITRATE UR QL: NEGATIVE
PH UR STRIP: 6.5 [PH] (ref 5–7)
RBC #/AREA URNS AUTO: ABNORMAL /HPF
SP GR UR STRIP: 1.01 (ref 1–1.03)
SQUAMOUS #/AREA URNS AUTO: ABNORMAL /LPF
TRICHOMONAS, WET PREP: ABNORMAL
UROBILINOGEN UR STRIP-ACNC: 0.2 E.U./DL
WBC #/AREA URNS AUTO: ABNORMAL /HPF
WBC'S/HIGH POWER FIELD, WET PREP: ABNORMAL
YEAST, WET PREP: ABNORMAL

## 2022-12-03 PROCEDURE — 87210 SMEAR WET MOUNT SALINE/INK: CPT | Performed by: FAMILY MEDICINE

## 2022-12-03 PROCEDURE — 99214 OFFICE O/P EST MOD 30 MIN: CPT | Performed by: FAMILY MEDICINE

## 2022-12-03 PROCEDURE — 87086 URINE CULTURE/COLONY COUNT: CPT | Performed by: FAMILY MEDICINE

## 2022-12-03 PROCEDURE — 81001 URINALYSIS AUTO W/SCOPE: CPT | Performed by: FAMILY MEDICINE

## 2022-12-03 RX ORDER — SULFAMETHOXAZOLE/TRIMETHOPRIM 800-160 MG
1 TABLET ORAL 2 TIMES DAILY
Qty: 14 TABLET | Refills: 0 | Status: SHIPPED | OUTPATIENT
Start: 2022-12-03 | End: 2022-12-10

## 2022-12-04 NOTE — PROGRESS NOTES
Chief Complaint   Patient presents with     Vaginal Problem     Patient present to the U/C with complains of painful lump inside the vaginal area. Was noticed 3x days ago     Randee was seen today for vaginal problem.    Diagnoses and all orders for this visit:    Dysuria  -     UA reflex to Microscopic and Culture  -     Wet preparation    Vaginal discharge    Vulvar lump      D/d- vaginitis , uti, pyelonephritis .    Results for orders placed or performed in visit on 12/03/22   UA reflex to Microscopic and Culture     Status: Abnormal    Specimen: Urine, Clean Catch   Result Value Ref Range    Color Urine Yellow Colorless, Straw, Light Yellow, Yellow    Appearance Urine Clear Clear    Glucose Urine Negative Negative mg/dL    Bilirubin Urine Negative Negative    Ketones Urine Negative Negative mg/dL    Specific Gravity Urine 1.015 1.003 - 1.035    Blood Urine Negative Negative    pH Urine 6.5 5.0 - 7.0    Protein Albumin Urine Negative Negative mg/dL    Urobilinogen Urine 0.2 0.2, 1.0 E.U./dL    Nitrite Urine Negative Negative    Leukocyte Esterase Urine Moderate (A) Negative   Urine Microscopic Exam     Status: Abnormal   Result Value Ref Range    Bacteria Urine Few (A) None Seen /HPF    RBC Urine 0-2 0-2 /HPF /HPF    WBC Urine 5-10 (A) 0-5 /HPF /HPF    Squamous Epithelials Urine Moderate (A) None Seen /LPF   Wet preparation     Status: Abnormal    Specimen: Vagina; Swab   Result Value Ref Range    Trichomonas Absent Absent    Yeast Absent Absent    Clue Cells Absent Absent    WBCs/high power field 2+ (A) None       Time spent for reviewing  chart, medications ,treatment plan  with patient in details   Routine discharge counseling was given to the patient and the patient understands that worsening, changing or persistent symptoms should prompt an immediate call or follow up with their primary physician or the emergency department. The importance of appropriate follow up was also discussed with the patient.      These self-care steps can help prevent future infections:    Drink plenty of fluids. This helps to prevent dehydration and flush out your bladder. Do this unless you must restrict fluids for other health reasons, or your healthcare provider told you not to.    Clean yourself correctly after going to the bathroom. Wipe from front to back after using the toilet. This helps prevent the spread of bacteria.    Urinate more often. Don't try to hold urine in for a long time.    Wear loose-fitting clothes and cotton underwear. Don't wear tight-fitting pants.    Improve your diet and prevent constipation. Eat more fresh fruits and vegetables, and fiber. Eat less junk foods and fatty foods.    Don't have sex until your symptoms are gone.    Don't have caffeine, alcohol, and spicy foods. These can irritate your bladder.    Urinate right after you have sex to flush out your bladder.These self-care steps can help prevent future infections:    Suggested to do Tylenol to help with the pain.  Discussed with patient if the painful palpable lump does not get better should follow-up with GYN      SUBJECTIVE:   Randee Millard is a 42 year old female with h/o female circumcision who  presents today for a possible UTI. Symptoms of dysuria and frequency and vaginal pain have been going on for 3day(s).  Hematuria no.  sudden onsetand moderate.  There is no history of fever, chills, nausea or vomiting.  Has some vaginal discharge. This patient does not  have a history of urinary tract infections. Patient denies rigors, flank pain and temperature > 101 degrees F.     Past Medical History:   Diagnosis Date     Anemia      Fibroid uterus      Kidney infection 05/30/2021     Reflux esophagitis      Current Outpatient Medications   Medication Sig Dispense Refill     ferrous sulfate (FE TABS) 325 (65 Fe) MG EC tablet Take 1 tablet (325 mg) by mouth daily 90 tablet 1     Social History     Tobacco Use     Smoking status: Never     Smokeless  "tobacco: Never   Substance Use Topics     Alcohol use: No     Alcohol/week: 0.0 standard drinks       ROS:   10 point ROS of systems including Constitutional, Eyes, Respiratory, Cardiovascular, Gastroenterology, Integumentary, Muscularskeletal, Psychiatric were all negative except for pertinent positives noted in my HPI           OBJECTIVE:  /75 (BP Location: Right arm, Patient Position: Sitting, Cuff Size: Adult Regular)   Pulse 87   Temp 98.1  F (36.7  C) (Oral)   Resp 18   Ht 1.727 m (5' 8\")   Wt 95.8 kg (211 lb 3.2 oz)   LMP 10/26/2022   SpO2 99%   BMI 32.11 kg/m    GENERAL APPEARANCE: healthy, alert and no distress  RESP: lungs clear to auscultation - no rales, rhonchi or wheezes  CV: regular rates and rhythm, normal S1 S2, no murmur noted  ABDOMEN:  soft, nontender, no HSM or masses and bowel sounds normal  GU_female:  Female circumcision vaginal discharge: copious and white, there is a tender lump noted in the right upper vulvar area , no redness noted   SKIN: no suspicious lesions or rashes  PSYCH: mentation appears normal      Danisha Mendoza MD     "

## 2022-12-06 LAB — BACTERIA UR CULT: NORMAL

## 2023-03-01 ENCOUNTER — TRANSFERRED RECORDS (OUTPATIENT)
Dept: HEALTH INFORMATION MANAGEMENT | Facility: CLINIC | Age: 43
End: 2023-03-01

## 2023-03-11 ENCOUNTER — OFFICE VISIT (OUTPATIENT)
Dept: URGENT CARE | Facility: URGENT CARE | Age: 43
End: 2023-03-11
Payer: COMMERCIAL

## 2023-03-11 VITALS
DIASTOLIC BLOOD PRESSURE: 75 MMHG | SYSTOLIC BLOOD PRESSURE: 112 MMHG | TEMPERATURE: 98.8 F | HEART RATE: 88 BPM | OXYGEN SATURATION: 99 % | RESPIRATION RATE: 30 BRPM

## 2023-03-11 DIAGNOSIS — Z11.3 SCREEN FOR STD (SEXUALLY TRANSMITTED DISEASE): ICD-10-CM

## 2023-03-11 DIAGNOSIS — R30.0 DYSURIA: Primary | ICD-10-CM

## 2023-03-11 LAB
ALBUMIN UR-MCNC: NEGATIVE MG/DL
APPEARANCE UR: CLEAR
BACTERIA #/AREA URNS HPF: ABNORMAL /HPF
BILIRUB UR QL STRIP: NEGATIVE
COLOR UR AUTO: YELLOW
GLUCOSE UR STRIP-MCNC: NEGATIVE MG/DL
HGB UR QL STRIP: NEGATIVE
KETONES UR STRIP-MCNC: NEGATIVE MG/DL
LEUKOCYTE ESTERASE UR QL STRIP: ABNORMAL
NITRATE UR QL: NEGATIVE
PH UR STRIP: 7 [PH] (ref 5–7)
RBC #/AREA URNS AUTO: ABNORMAL /HPF
SP GR UR STRIP: 1.01 (ref 1–1.03)
SQUAMOUS #/AREA URNS AUTO: ABNORMAL /LPF
UROBILINOGEN UR STRIP-ACNC: 0.2 E.U./DL
WBC #/AREA URNS AUTO: ABNORMAL /HPF

## 2023-03-11 PROCEDURE — 81001 URINALYSIS AUTO W/SCOPE: CPT | Performed by: NURSE PRACTITIONER

## 2023-03-11 PROCEDURE — 87491 CHLMYD TRACH DNA AMP PROBE: CPT | Performed by: NURSE PRACTITIONER

## 2023-03-11 PROCEDURE — 87086 URINE CULTURE/COLONY COUNT: CPT | Performed by: NURSE PRACTITIONER

## 2023-03-11 PROCEDURE — 99213 OFFICE O/P EST LOW 20 MIN: CPT | Performed by: NURSE PRACTITIONER

## 2023-03-11 PROCEDURE — 87591 N.GONORRHOEAE DNA AMP PROB: CPT | Performed by: NURSE PRACTITIONER

## 2023-03-11 PROCEDURE — 87088 URINE BACTERIA CULTURE: CPT | Performed by: NURSE PRACTITIONER

## 2023-03-11 RX ORDER — FAMOTIDINE 20 MG/1
TABLET, FILM COATED ORAL
COMMUNITY
Start: 2023-03-01 | End: 2023-11-01

## 2023-03-11 RX ORDER — NITROFURANTOIN 25; 75 MG/1; MG/1
100 CAPSULE ORAL 2 TIMES DAILY
Qty: 6 CAPSULE | Refills: 0 | Status: SHIPPED | OUTPATIENT
Start: 2023-03-11 | End: 2023-03-12

## 2023-03-11 NOTE — PROGRESS NOTES
Assessment & Plan     Dysuria  - UA Macroscopic with reflex to Microscopic and Culture  - UA Microscopic with Reflex to Culture  - nitroFURantoin macrocrystal-monohydrate (MACROBID) 100 MG capsule  Dispense: 6 capsule; Refill: 0    Screen for STD (sexually transmitted disease)  - NEISSERIA GONORRHOEA PCR  - CHLAMYDIA TRACHOMATIS PCR     Patient Instructions     Results for orders placed or performed in visit on 03/11/23   UA Macroscopic with reflex to Microscopic and Culture     Status: Abnormal    Specimen: Urine, Clean Catch   Result Value Ref Range    Color Urine Yellow Colorless, Straw, Light Yellow, Yellow    Appearance Urine Clear Clear    Glucose Urine Negative Negative mg/dL    Bilirubin Urine Negative Negative    Ketones Urine Negative Negative mg/dL    Specific Gravity Urine 1.010 1.003 - 1.035    Blood Urine Negative Negative    pH Urine 7.0 5.0 - 7.0    Protein Albumin Urine Negative Negative mg/dL    Urobilinogen Urine 0.2 0.2, 1.0 E.U./dL    Nitrite Urine Negative Negative    Leukocyte Esterase Urine Trace (A) Negative     UA shows likely contamination.  Pt insistent this is worsening and needs antibiotics.   UC pending    Push fluids  Pyridium PRN - urine will be dark orange   Antibiotics macrobid for 3 days  Will call if bacteria is resistant to the antibiotic prescribed.    F/u in 1 week if persists or 3 days if worsening.           Return in about 1 week (around 3/18/2023) for with regular provider if symptoms persist.    [facility] KATELYN Jeff is a 42 year old female who presents to clinic today for the following health issues:  Chief Complaint   Patient presents with     Urgent Care     Present for pain when urinating, abdominal pain and low back pain for 5 days.      HPI    UTI    Onset of symptoms was 5day(s).  Course of illness is worsening  Severity moderately severe  Current and associated symptoms dysuria, frequency, urgency  and burning  Treatment and measures tried Increase fluid intake  Predisposing factors include history of frequent UTI's and pyelo  Patient denies flank pain, temperature > 101 degrees F., vomiting, vaginal discharge, vaginal odor and vaginal itching        Review of Systems  Constitutional, HEENT, cardiovascular, pulmonary, GI, , musculoskeletal, neuro, skin, endocrine and psych systems are negative, except as otherwise noted.      Objective    /75 (BP Location: Left arm, Patient Position: Sitting, Cuff Size: Adult Large)   Pulse 88   Temp 98.8  F (37.1  C) (Tympanic)   Resp 30   SpO2 99%   Physical Exam   GENERAL: healthy, alert and no distress  ABDOMEN: soft, nontender, no hepatosplenomegaly, no masses and bowel sounds normal  MS: no gross musculoskeletal defects noted, no edema  BACK: no CVA tenderness, no paralumbar tenderness

## 2023-03-11 NOTE — PATIENT INSTRUCTIONS
Results for orders placed or performed in visit on 03/11/23   UA Macroscopic with reflex to Microscopic and Culture     Status: Abnormal    Specimen: Urine, Clean Catch   Result Value Ref Range    Color Urine Yellow Colorless, Straw, Light Yellow, Yellow    Appearance Urine Clear Clear    Glucose Urine Negative Negative mg/dL    Bilirubin Urine Negative Negative    Ketones Urine Negative Negative mg/dL    Specific Gravity Urine 1.010 1.003 - 1.035    Blood Urine Negative Negative    pH Urine 7.0 5.0 - 7.0    Protein Albumin Urine Negative Negative mg/dL    Urobilinogen Urine 0.2 0.2, 1.0 E.U./dL    Nitrite Urine Negative Negative    Leukocyte Esterase Urine Trace (A) Negative     UA shows likely contamination.  Pt insistent this is worsening and needs antibiotics.   UC pending  Will start macrobid, if culture negative to stop antibiotics.    Push fluids  Pyridium PRN - urine will be dark orange   Antibiotics macrobid for 3 days  Will call if bacteria is resistant to the antibiotic prescribed.    F/u in 1 week if persists or 3 days if worsening.

## 2023-03-12 ENCOUNTER — NURSE TRIAGE (OUTPATIENT)
Dept: NURSING | Facility: CLINIC | Age: 43
End: 2023-03-12
Payer: COMMERCIAL

## 2023-03-12 DIAGNOSIS — R30.0 DYSURIA: ICD-10-CM

## 2023-03-12 RX ORDER — NITROFURANTOIN 25; 75 MG/1; MG/1
100 CAPSULE ORAL 2 TIMES DAILY
Qty: 6 CAPSULE | Refills: 0 | Status: SHIPPED | OUTPATIENT
Start: 2023-03-12 | End: 2023-04-30

## 2023-03-12 NOTE — TELEPHONE ENCOUNTER
TELEPHONE CALL - RX    TRANSFERRED RX to ANOTHER PHARMACY  Was seen at [facility] yesterday Caller calling requesting medication to be transfer Pharmacy of choice [facility] in [facility] [address]     RX: nitroFURantoin macrocrystal-monohydrate (MACROBID) 100 MG capsule  Prescribed: Ashley Negron APRN CNP Family Medicine    Medication was sent e-prescribed under prescriber s name  Confirmed transferred was successful via e-prescribed     Sarah Suárez RN [place] Nurse Advisor,  5:47 PM 3/12/2023  Reason for Disposition    [1] Prescription prescribed recently is not at pharmacy AND [2] triager has access to patient's EMR AND [3] prescription is recorded in the EMR    Protocols used: MEDICATION QUESTION CALL-A-AH

## 2023-03-13 ENCOUNTER — NURSE TRIAGE (OUTPATIENT)
Dept: NURSING | Facility: CLINIC | Age: 43
End: 2023-03-13
Payer: COMMERCIAL

## 2023-03-13 LAB
C TRACH DNA SPEC QL NAA+PROBE: NEGATIVE
N GONORRHOEA DNA SPEC QL NAA+PROBE: NEGATIVE

## 2023-03-13 NOTE — TELEPHONE ENCOUNTER
Pt is being treated for a UTI with Macrobid and is experiencing shortness of breath after taking first dose     Pt is also having chest pain     Per disposition:Call  Now    Care advice given per protocol and when to call back. Pt verbalized understanding and agrees to plan of care.    Arcelia Burris RN  [place] Nurse Advisor  1:33 PM 3/13/2023       Reason for Disposition    Wheezing, stridor, hoarseness, or difficulty breathing    Additional Information    Negative: Life-threatening reaction in the past to similar substance (e.g., food, insect bite/sting, medication, etc.) and < 2 hours since exposure    Protocols used: [id number]

## 2023-03-14 ENCOUNTER — TELEPHONE (OUTPATIENT)
Dept: URGENT CARE | Facility: URGENT CARE | Age: 43
End: 2023-03-14
Payer: COMMERCIAL

## 2023-03-14 DIAGNOSIS — N39.0 URINARY TRACT INFECTION WITHOUT HEMATURIA, SITE UNSPECIFIED: Primary | ICD-10-CM

## 2023-03-14 LAB
BACTERIA UR CULT: ABNORMAL
BACTERIA UR CULT: ABNORMAL

## 2023-03-14 RX ORDER — CEFDINIR 300 MG/1
300 CAPSULE ORAL 2 TIMES DAILY
Qty: 10 CAPSULE | Refills: 0 | Status: SHIPPED | OUTPATIENT
Start: 2023-03-14 | End: 2023-03-19

## 2023-03-15 ENCOUNTER — NURSE TRIAGE (OUTPATIENT)
Dept: NURSING | Facility: CLINIC | Age: 43
End: 2023-03-15
Payer: COMMERCIAL

## 2023-03-15 NOTE — TELEPHONE ENCOUNTER
Telephone call:    Daughter calling on patient's behalf, daughter was given verbal CTC.  Daughter reports patient received a call this morning.    Writer read last encounter to daughter regarding new ABX prescribed.    Michelle John RN on 3/15/2023 at 11:32 AM    Reason for Disposition    Information only question and nurse able to answer    Protocols used: INFORMATION ONLY CALL - NO TRIAGE-A-OH

## 2023-03-31 ENCOUNTER — NURSE TRIAGE (OUTPATIENT)
Dept: INTERNAL MEDICINE | Facility: CLINIC | Age: 43
End: 2023-03-31
Payer: COMMERCIAL

## 2023-03-31 NOTE — TELEPHONE ENCOUNTER
S-(situation): Pt calling in regarding body aches and fatigue.     B-(background): Pt sx x1 week, requesting appt.     A-(assessment): Pt reports chills and hot flashes, no fever, body aches and fatigue. No cough, no sore throat, no abdominal pain, no nausea or vomiting, no diarrhea.     R-(recommendations): Reviewed advice under care tab, advised pt to be seen in UC. Pt verbalized understanding and agreeable to plan, no further questions.         Reason for Disposition    MODERATE weakness (i.e., interferes with work, school, normal activities) and cause unknown  (Exceptions: Weakness with acute minor illness, or weakness from poor fluid intake.)    Additional Information    Negative: SEVERE difficulty breathing (e.g., struggling for each breath, speaks in single words)    Negative: Shock suspected (e.g., cold/pale/clammy skin, too weak to stand, low BP, rapid pulse)    Negative: Difficult to awaken or acting confused (e.g., disoriented, slurred speech)    Negative: Fainted > 15 minutes ago and still feels too weak or dizzy to stand    Negative: SEVERE weakness (i.e., unable to walk or barely able to walk, requires support) and new-onset or worsening    Negative: Sounds like a life-threatening emergency to the triager    Negative: Weakness of the face, arm or leg on one side of the body    Negative: Has diabetes mellitus and weakness from low blood sugar (i.e., < 60 mg/dL or 3.5 mmol/L)    Negative: Recent heat exposure, suspected cause of weakness    Negative: Vomiting is main symptom    Negative: Diarrhea is main symptom    Negative: Difficulty breathing    Negative: Heart beating < 50 beats per minute OR > 140 beats per minute    Negative: Extra heartbeats OR irregular heart beating (i.e., 'palpitations')    Negative: Follows bleeding (e.g., from vomiting, rectum, vagina)  (Exception: Small transient weakness from sight of a small amount blood.)    Negative: Bloody, black, or tarry bowel movements  (Exception:  Chronic-unchanged black-grey bowel movements and is taking iron pills or Pepto-Bismol.)    Negative: MODERATE weakness from poor fluid intake with no improvement after 2 hours of rest and fluids    Negative: Drinking very little and dehydration suspected (e.g., no urine > 12 hours, very dry mouth, very lightheaded)    Negative: Patient sounds very sick or weak to the triager    Protocols used: WEAKNESS (GENERALIZED) AND FATIGUE-A-OH    Luther SANTANA RN

## 2023-04-30 ENCOUNTER — OFFICE VISIT (OUTPATIENT)
Dept: URGENT CARE | Facility: URGENT CARE | Age: 43
End: 2023-04-30
Payer: COMMERCIAL

## 2023-04-30 VITALS
SYSTOLIC BLOOD PRESSURE: 102 MMHG | TEMPERATURE: 98.2 F | DIASTOLIC BLOOD PRESSURE: 70 MMHG | RESPIRATION RATE: 16 BRPM | HEART RATE: 80 BPM | OXYGEN SATURATION: 100 %

## 2023-04-30 DIAGNOSIS — R30.0 DYSURIA: Primary | ICD-10-CM

## 2023-04-30 LAB
ALBUMIN UR-MCNC: NEGATIVE MG/DL
APPEARANCE UR: CLEAR
BILIRUB UR QL STRIP: NEGATIVE
COLOR UR AUTO: YELLOW
GLUCOSE UR STRIP-MCNC: NEGATIVE MG/DL
HGB UR QL STRIP: NEGATIVE
KETONES UR STRIP-MCNC: NEGATIVE MG/DL
LEUKOCYTE ESTERASE UR QL STRIP: NEGATIVE
NITRATE UR QL: NEGATIVE
PH UR STRIP: 7 [PH] (ref 5–7)
SP GR UR STRIP: <=1.005 (ref 1–1.03)
UROBILINOGEN UR STRIP-ACNC: 0.2 E.U./DL

## 2023-04-30 PROCEDURE — 81003 URINALYSIS AUTO W/O SCOPE: CPT | Performed by: FAMILY MEDICINE

## 2023-04-30 PROCEDURE — 99213 OFFICE O/P EST LOW 20 MIN: CPT | Performed by: FAMILY MEDICINE

## 2023-04-30 RX ORDER — CEPHALEXIN 500 MG/1
500 CAPSULE ORAL 3 TIMES DAILY
Qty: 21 CAPSULE | Refills: 0 | Status: SHIPPED | OUTPATIENT
Start: 2023-04-30 | End: 2023-05-07

## 2023-04-30 NOTE — PROGRESS NOTES
CC:   Chief Complaint   Patient presents with     Back Pain     Low back pain, low abdominal pain, and urinary frequency X 3 days      Low back and abd pain x 3 days  Was seen for a UTI or similar symptoms a month ago  Took macrobid and vomited was shifted to keflex  Cultures have not been positive    Of note, follows with obgyn for chronic abd pain due to fibroid uterus, pt has declied intervention due to wanting to preserve fertility      Current Outpatient Medications   Medication     famotidine (PEPCID) 20 MG tablet     ferrous sulfate (FE TABS) 325 (65 Fe) MG EC tablet     nitroFURantoin macrocrystal-monohydrate (MACROBID) 100 MG capsule     No current facility-administered medications for this visit.     I have reviewed the patient's medical history in detail; there are no changes to the history as noted in EpicCare.    ROS: As per HPI.  Constitutional: no fevers/sweats/chills    EXAM  /70   Pulse 80   Temp 98.2  F (36.8  C) (Tympanic)   Resp 16   SpO2 100%   Gen: Healthy appearing female in no apparent distress  Abdomen: Soft, non-tender, v    ASSESSMENT/PLAN:    ICD-10-CM    1. Dysuria  R30.0 UA Macroscopic with reflex to Microscopic and Culture     cephALEXin (KEFLEX) 500 MG capsule        Results for orders placed or performed in visit on 04/30/23   UA Macroscopic with reflex to Microscopic and Culture     Status: Normal    Specimen: Urine, Midstream   Result Value Ref Range    Color Urine Yellow Colorless, Straw, Light Yellow, Yellow    Appearance Urine Clear Clear    Glucose Urine Negative Negative mg/dL    Bilirubin Urine Negative Negative    Ketones Urine Negative Negative mg/dL    Specific Gravity Urine <=1.005 1.003 - 1.035    Blood Urine Negative Negative    pH Urine 7.0 5.0 - 7.0    Protein Albumin Urine Negative Negative mg/dL    Urobilinogen Urine 0.2 0.2, 1.0 E.U./dL    Nitrite Urine Negative Negative    Leukocyte Esterase Urine Negative Negative    Narrative    Microscopic not  indicated     Pretty normal urine but has frequency, and pt says abnormal smell, etc  Fairly convincing symptoms  We will do a trial of abx but instructed to.otherwise up with OBGYN given the known chronic pelvic pain issue if this persists    Jose Medina MD MPH          No follow-ups on file.

## 2023-05-05 ENCOUNTER — OFFICE VISIT (OUTPATIENT)
Dept: OBGYN | Facility: CLINIC | Age: 43
End: 2023-05-05
Payer: COMMERCIAL

## 2023-05-05 VITALS
BODY MASS INDEX: 31.07 KG/M2 | DIASTOLIC BLOOD PRESSURE: 70 MMHG | SYSTOLIC BLOOD PRESSURE: 106 MMHG | WEIGHT: 205 LBS | HEIGHT: 68 IN

## 2023-05-05 DIAGNOSIS — R10.2 PELVIC PAIN IN FEMALE: Primary | ICD-10-CM

## 2023-05-05 DIAGNOSIS — D25.9 UTERINE LEIOMYOMA, UNSPECIFIED LOCATION: ICD-10-CM

## 2023-05-05 PROCEDURE — 99214 OFFICE O/P EST MOD 30 MIN: CPT | Performed by: OBSTETRICS & GYNECOLOGY

## 2023-05-05 NOTE — PROGRESS NOTES
"  SUBJECTIVE:                                                   CC:  Patient presents with:  Vaginal Problem      HPI:  Randee Millard is a 41 year old  with pelvic pain, bulk symptoms from fibroid uterus who presents to again discuss mgmt of her symptoms.      She was seen previously x2 and we discussed options for mgmt of fibroids including IUD, myomectomy, UAE, RFA, and hysterectomy.  Initially she had planned on the Mirena IUD to help with bleeding, however after our last meeting she was then planning to schedule a robot myomectomy.  Her main symptoms are pain and pressure with occasional heavy cycles although her bleeding is not always heavy.  She also describes pain and pressure with intercourse.  It is a sharp vaginal pain and she does not feel that it improves much with medications.  Cycles are also becoming more frequent and somewhat shorter.  She has struggled with anemia throughout the years, her most recent hemoglobin was 10.4 in 2022.    OBJECTIVE:     /70   Ht 1.727 m (5' 8\")   Wt 93 kg (205 lb)   BMI 31.17 kg/m      Gen: Healthy appearing female, no acute distress, comfortable  Psych: mentation appears normal and affect bright  Gyn: fullness posterior to the uterus, tender to palpation, unable to appreciate enlarged uterus from the abdominal approach on bimanual     Test Results:  CT abd pelvis 2021:    PELVIC ORGANS: 2.6 x 1.2 cm right adnexal recently involuted follicle or cyst with trace free fluid. Heterogeneous uterus with presumed fibroids, largest 7.0 x 5.7 cm posteriorly.     MUSCULOSKELETAL: Degenerative disease. Sclerosis at the right SI joint.                                                                      IMPRESSION:   1.  Recently involuted right adnexal follicle or cyst with trace free fluid.  2.  Presumed uterine fibroid.  3.  Evidence for constipation without bowel obstruction.    [facility]     ULTRASOUND - PELVIC " GYN- Transabdominal and Transvaginal     Normal uterus.  Uterus is anteverted.  Myomatous uterus: Endometrial cavity not affected.  There is a large posterior subserosal/intramural myoma measuring 5.5 x 5.4 x 6.7 cm.     Endometrium noted to be normal for phase of menstrual cycle.     Normal bilateral ovaries      No cul-de-sac fluid.     Large posterior myoma that is essentially unchanged from prior ultrasound.          ASSESSMENT/PLAN:                                                      1. Uterine leiomyoma, unspecified location  Reviewed the images of the CT and pelvic US with the patient. She has not had imaging in several years and is desirous of repeat testing to see if the fibroid has changed in size. I have again recommended myomectomy as the primary surgical option, as she is not ready for a hysterectomy.  The accessa procedure is not yet in the system and she may wait a long time to have this as an option. UFE is also not a great option if her main symptom is already pain.  Regarding DV myomectomy, the main fibroid is on the posterior aspect and we discussed that this may make it difficult to remove without a larger incision.  We also discussed that she would likely need  delivery if she becomes pregnant again, due to the large nature of the fibroid.  Discussed risks of surgery including bleeding to the point of requiring blood transfusion, infection, injury to surrounding organs, possibility of needing a larger incision, blood clot, and pneumonia.  Discussed recovery expectations and that this is typically a same-day surgery as long as it is completed through the smaller incisions.  She wishes to have imaging and have a phone or in person visit to review options for surgery again.    2. Pelvic pain in female  See #1  - US Pelvic Transabdominal and Transvaginal; Future     Carmen Mckeon MD, MPH  Obstetrics and Gynecology

## 2023-05-12 ENCOUNTER — OFFICE VISIT (OUTPATIENT)
Dept: FAMILY MEDICINE | Facility: CLINIC | Age: 43
End: 2023-05-12
Payer: COMMERCIAL

## 2023-05-12 VITALS
WEIGHT: 204.7 LBS | BODY MASS INDEX: 31.02 KG/M2 | RESPIRATION RATE: 20 BRPM | SYSTOLIC BLOOD PRESSURE: 125 MMHG | HEART RATE: 95 BPM | TEMPERATURE: 98.7 F | DIASTOLIC BLOOD PRESSURE: 76 MMHG | OXYGEN SATURATION: 100 % | HEIGHT: 68 IN

## 2023-05-12 DIAGNOSIS — E55.9 VITAMIN D DEFICIENCY: ICD-10-CM

## 2023-05-12 DIAGNOSIS — E01.0 THYROMEGALY: Primary | ICD-10-CM

## 2023-05-12 DIAGNOSIS — M25.50 MULTIPLE JOINT PAIN: ICD-10-CM

## 2023-05-12 DIAGNOSIS — R53.83 FATIGUE, UNSPECIFIED TYPE: ICD-10-CM

## 2023-05-12 LAB
CRP SERPL-MCNC: 14.5 MG/L
ERYTHROCYTE [DISTWIDTH] IN BLOOD BY AUTOMATED COUNT: 18.7 % (ref 10–15)
ERYTHROCYTE [SEDIMENTATION RATE] IN BLOOD BY WESTERGREN METHOD: 44 MM/HR (ref 0–20)
FERRITIN SERPL-MCNC: 10 NG/ML (ref 6–175)
HCT VFR BLD AUTO: 30.7 % (ref 35–47)
HGB BLD-MCNC: 9.7 G/DL (ref 11.7–15.7)
IRON BINDING CAPACITY (ROCHE): 383 UG/DL (ref 240–430)
IRON SATN MFR SERPL: 3 % (ref 15–46)
IRON SERPL-MCNC: 12 UG/DL (ref 37–145)
MCH RBC QN AUTO: 22 PG (ref 26.5–33)
MCHC RBC AUTO-ENTMCNC: 31.6 G/DL (ref 31.5–36.5)
MCV RBC AUTO: 70 FL (ref 78–100)
PLATELET # BLD AUTO: 442 10E3/UL (ref 150–450)
RBC # BLD AUTO: 4.41 10E6/UL (ref 3.8–5.2)
TSH SERPL DL<=0.005 MIU/L-ACNC: 2.15 UIU/ML (ref 0.3–4.2)
WBC # BLD AUTO: 10.4 10E3/UL (ref 4–11)

## 2023-05-12 PROCEDURE — 86038 ANTINUCLEAR ANTIBODIES: CPT

## 2023-05-12 PROCEDURE — 82728 ASSAY OF FERRITIN: CPT

## 2023-05-12 PROCEDURE — 83550 IRON BINDING TEST: CPT

## 2023-05-12 PROCEDURE — 86431 RHEUMATOID FACTOR QUANT: CPT

## 2023-05-12 PROCEDURE — 82306 VITAMIN D 25 HYDROXY: CPT

## 2023-05-12 PROCEDURE — 36415 COLL VENOUS BLD VENIPUNCTURE: CPT

## 2023-05-12 PROCEDURE — 86140 C-REACTIVE PROTEIN: CPT

## 2023-05-12 PROCEDURE — 86039 ANTINUCLEAR ANTIBODIES (ANA): CPT

## 2023-05-12 PROCEDURE — 85652 RBC SED RATE AUTOMATED: CPT

## 2023-05-12 PROCEDURE — 83540 ASSAY OF IRON: CPT

## 2023-05-12 PROCEDURE — 99213 OFFICE O/P EST LOW 20 MIN: CPT

## 2023-05-12 PROCEDURE — 84443 ASSAY THYROID STIM HORMONE: CPT

## 2023-05-12 PROCEDURE — 85027 COMPLETE CBC AUTOMATED: CPT

## 2023-05-12 ASSESSMENT — ENCOUNTER SYMPTOMS: FATIGUE: 1

## 2023-05-12 ASSESSMENT — PAIN SCALES - GENERAL: PAINLEVEL: MODERATE PAIN (4)

## 2023-05-12 NOTE — PROGRESS NOTES
"  Assessment & Plan     (E01.0) Thyromegaly  (primary encounter diagnosis)  (R53.83) Fatigue, unspecified type  (M25.50) Multiple joint pain  Comment: referred to endocrine given patient thyromegaly and history of endocrinology following. Will get labs today to evaluate fatigue and multiple joint pain to rule in/out anemia, thyroid involvement, or IJD. Will follow up on results and whether further recommendations are necessary. Continue to use OTC NSAID's and Tylenol as needed for joint discomfort. Patient agreeable with plan of care and at this point patient will follow up as needed unless acute concerns arise in the meantime.  Plan: Adult Endocrinology  Referral, TSH with free T4 reflex, CBC           with platelets,         Iron and iron binding capacity, Ferritin, ESR:         Erythrocyte sedimentation rate, CRP,         inflammation, Rheumatoid factor, Anti Nuclear         Sandhya IgG by IFA with Reflex    (E55.9) Vitamin D deficiency  Comment: history of, will evaluate today. Follow up with results and if further recommendations necessary. Patient agreeable with plan of care and at this point patient will follow up as needed unless acute concerns arise in the meantime.  Plan: Vitamin D Deficiency           BMI:   Estimated body mass index is 31.12 kg/m  as calculated from the following:    Height as of this encounter: 1.727 m (5' 8\").    Weight as of this encounter: 92.9 kg (204 lb 11.2 oz).     BLANCA Castaneda [facility]   Randee is a 42 year old, presenting for the following health issues:  Fatigue        5/12/2023     2:58 PM   Additional Questions   Roomed by Mica HERNANDEZ CMA   Accompanied by TEVIN         5/12/2023     2:58 PM   Patient Reported Additional Medications   Patient reports taking the following new medications None     Fatigue  Associated symptoms include fatigue.   History of Present Illness       Reason for visit:  I feeling tire and no energy at " "all    She eats 0-1 servings of fruits and vegetables daily.She consumes 0 sweetened beverage(s) daily.She exercises with enough effort to increase her heart rate 9 or less minutes per day.  She exercises with enough effort to increase her heart rate 3 or less days per week. She is missing 5 dose(s) of medications per week.     Concern - Fatigue and Achyness   Onset: 1 week   Intensity: moderate  Progression of Symptoms:  same  Accompanying Signs & Symptoms:   Previous history of similar problem: No   Precipitating factors:        Worsened by:  Nothing   Alleviating factors:        Improved by: Nothing   Therapies tried and outcome: None, ibuprofen/tylenol helps with joint pain     -Very tired, lack of energy, and feels pain in her bones  -Constipation noted (takes miralax as needed, Patient losing hair, dry skin, feels like she is gaining weight.  -Used to see endocrinology in [place] for enlarged thyroid then moved to [place] 7 years ago, has only seen endocrine once since then. Would like referral back to endocrine.   -Currently taking iron supplement   -takes ibuprofen/tylenol for joint pain with some relief.   -No fever, however occasionally gets chills.     Review of Systems   Constitutional: Positive for fatigue.      Constitutional, HEENT, cardiovascular, pulmonary, GI, , musculoskeletal, neuro, skin, endocrine and psych systems are negative, except as otherwise noted.      Objective    /76 (BP Location: Right arm, Patient Position: Sitting, Cuff Size: Adult Regular)   Pulse 95   Temp 98.7  F (37.1  C) (Oral)   Resp 20   Ht 1.727 m (5' 8\")   Wt 92.9 kg (204 lb 11.2 oz)   LMP 04/19/2023 (Approximate)   SpO2 100%   BMI 31.12 kg/m    Body mass index is 31.12 kg/m .  Physical Exam  Vitals and nursing note reviewed.   Constitutional:       General: She is not in acute distress.     Appearance: Normal appearance. She is obese. She is not ill-appearing.   Neck:      Thyroid: Thyromegaly present. No " thyroid mass or thyroid tenderness.   Cardiovascular:      Rate and Rhythm: Normal rate and regular rhythm.      Heart sounds: No murmur heard.     No friction rub. No gallop.   Pulmonary:      Effort: Pulmonary effort is normal. No respiratory distress.      Breath sounds: No wheezing, rhonchi or rales.   Musculoskeletal:      Cervical back: No pain with movement.   Lymphadenopathy:      Cervical: No cervical adenopathy.      Right cervical: No superficial, deep or posterior cervical adenopathy.     Left cervical: No superficial, deep or posterior cervical adenopathy.   Skin:     General: Skin is warm and dry.   Neurological:      Mental Status: She is alert.   Psychiatric:         Mood and Affect: Mood normal.         Behavior: Behavior normal. Behavior is cooperative.         Thought Content: Thought content normal.         Judgment: Judgment normal.

## 2023-05-13 LAB — DEPRECATED CALCIDIOL+CALCIFEROL SERPL-MC: 25 UG/L (ref 20–75)

## 2023-05-15 ENCOUNTER — TELEPHONE (OUTPATIENT)
Dept: INTERNAL MEDICINE | Facility: CLINIC | Age: 43
End: 2023-05-15
Payer: COMMERCIAL

## 2023-05-15 DIAGNOSIS — D50.8 IRON DEFICIENCY ANEMIA SECONDARY TO INADEQUATE DIETARY IRON INTAKE: Primary | ICD-10-CM

## 2023-05-15 LAB
ANA PAT SER IF-IMP: ABNORMAL
ANA SER QL IF: ABNORMAL
ANA TITR SER IF: ABNORMAL {TITER}
RHEUMATOID FACT SER NEPH-ACNC: <7 IU/ML

## 2023-05-15 NOTE — TELEPHONE ENCOUNTER
Patient returns call.     Reviewed provider's message with patient. Patient asks what borderline positive DELORIS means - advised will send message to provider and return call with response.    Patient admits that she does not always remember to take her ferrous sulfate. Patient thinks that she may average taking medication 2-3 times per week. Reviewed provider's recommendations regarding iron supplement.    Will route to provider to advise on patient's question regarding DELORIS and return call with recommendations. Patient denies additional questions or concerns at this time.    Katie GRANDE RN, BSN, PHN

## 2023-05-15 NOTE — TELEPHONE ENCOUNTER
Left message asking patient to return the call regarding result note from Zoran Otto PA-C:    DELORIS borderline positive, this is unchanged from 2 years ago. Please follow up with any questions/concerns.               Complete blood count and iron labs indicate patient is anemic. Ensure patient is taking iron supplement as directed:      -Iron generally should not be given with food.  -Iron should especially be taken separately from calcium-containing foods and beverages (milk), calcium supplements, cereals, dietary fiber, tea, coffee, and eggs.  -Iron should be given two hours before, or four hours after, ingestion of antacids.     Vitamin D levels are on low end of normal, can take supplement if wanted (1000 U Vit D) otherwise sunshine is the best way to get adequate amounts of Vitamin D. Thyroid levels (TSH) look good. Rheumatoid Factor (inflammatory joint disease) are normal, however inflammatory markers (ESR and CRP) are a bit elevated, monitor these for now as it looks like ESR chronically elevated. Will await DELORIS antibody levels to result and will follow up when obtained. Please let me know if patient has any questions/concerns.     BLANCA Castaneda CNP

## 2023-05-16 NOTE — TELEPHONE ENCOUNTER
Called patient and left voicemail to call back and ask to speak to any triage nurse.      Zoran Otto APRN CNP  Cr Triage 17 hours ago (5:51 PM)     AK  Correction: labs placed for 2 months from now.       Zoran Otto APRN CNP  Cr Triage 17 hours ago (5:50 PM)     AK  With her DELORIS being borderline I would not suggest going to rheumatology at this point given this lab can be falsely elevated at these levels for multiple reasons. I would like to have her fix her anemia first at this time as this is more than likely a major contributor to her fatigue and weakness. I will place some labs to repeat in 3 months. Please let me know if she has any further questions/concerns.     BLANCA Castaneda CNP, RN

## 2023-05-16 NOTE — TELEPHONE ENCOUNTER
Pt calling back - adv of message below.   Offered to schedule lab only - pt will call back.     Shae HOFFMAN RN

## 2023-05-18 ENCOUNTER — ANCILLARY PROCEDURE (OUTPATIENT)
Dept: ULTRASOUND IMAGING | Facility: CLINIC | Age: 43
End: 2023-05-18
Attending: OBSTETRICS & GYNECOLOGY
Payer: COMMERCIAL

## 2023-05-18 DIAGNOSIS — R10.2 PELVIC PAIN IN FEMALE: ICD-10-CM

## 2023-05-18 PROCEDURE — 76856 US EXAM PELVIC COMPLETE: CPT | Performed by: OBSTETRICS & GYNECOLOGY

## 2023-05-18 PROCEDURE — 76830 TRANSVAGINAL US NON-OB: CPT | Performed by: OBSTETRICS & GYNECOLOGY

## 2023-05-26 ENCOUNTER — OFFICE VISIT (OUTPATIENT)
Dept: OBGYN | Facility: CLINIC | Age: 43
End: 2023-05-26
Payer: COMMERCIAL

## 2023-05-26 VITALS
SYSTOLIC BLOOD PRESSURE: 122 MMHG | BODY MASS INDEX: 31.07 KG/M2 | HEIGHT: 68 IN | DIASTOLIC BLOOD PRESSURE: 60 MMHG | WEIGHT: 205 LBS

## 2023-05-26 DIAGNOSIS — R10.2 PELVIC PAIN IN FEMALE: ICD-10-CM

## 2023-05-26 DIAGNOSIS — D25.9 UTERINE LEIOMYOMA, UNSPECIFIED LOCATION: Primary | ICD-10-CM

## 2023-05-26 PROCEDURE — 99214 OFFICE O/P EST MOD 30 MIN: CPT | Performed by: OBSTETRICS & GYNECOLOGY

## 2023-05-26 NOTE — NURSING NOTE
"Chief Complaint   Patient presents with     Follow Up     U/S        Initial /60 (BP Location: Right arm, Patient Position: Chair, Cuff Size: Adult Regular)   Ht 1.727 m (5' 8\")   Wt 93 kg (205 lb)   LMP 2023 (Approximate)   Breastfeeding No   BMI 31.17 kg/m   Estimated body mass index is 31.17 kg/m  as calculated from the following:    Height as of this encounter: 1.727 m (5' 8\").    Weight as of this encounter: 93 kg (205 lb).  BP completed using cuff size: regular    Questioned patient about current smoking habits.  Pt. has never smoked.          The following HM Due: NONE    Ekaterina Thao CMA    " Detail Level: Detailed

## 2023-05-30 NOTE — PROGRESS NOTES
"  SUBJECTIVE:                                                   CC:  Patient presents with:  Follow Up: U/S       HPI:  Randee Millard is a 42 year old  who presents to again here options regarding treatment of a known posterior fibroid which causes bulk symptoms and pelvic pain.  She presents today with her  Joy.    She is certain she is not interested in hysterectomy.  Beyond this she is not sure still what she would like to do.    Gyn History:  Patient's last menstrual period was 2023 (approximate).       Using none for contraception.    Last 3 Pap and HPV Results:     Latest Ref Rng & Units 2022     2:34 PM 3/11/2019     3:35 PM 3/11/2019     3:30 PM   PAP / HPV   PAP  Negative for Intraepithelial Lesion or Malignancy (NILM)       PAP (Historical)   NIL      HPV 16 DNA Negative Negative    Negative     HPV 18 DNA Negative Negative    Negative     Other HR HPV Negative Negative    Negative         PMH, PSH, Soc Hx, Fam Hx, Meds, and allergies reviewed in Epic.    OBJECTIVE:     /60 (BP Location: Right arm, Patient Position: Chair, Cuff Size: Adult Regular)   Ht 1.727 m (5' 8\")   Wt 93 kg (205 lb)   LMP 2023 (Approximate)   Breastfeeding No   BMI 31.17 kg/m      Gen: Healthy appearing female, no acute distress, comfortable      Test Results:  [facility]     ULTRASOUND - PELVIC GYN- Transabdominal and Transvaginal     Referring MD: Carmen Mckeon MD     ===================================     CLINICAL INFORMATION     Indications for ultrasound: pelvic pain     LMP: 12 May 2023    Hormones: none     Measurements:  Uterus: 10.2 x 9.8 x 9.4 cm     Position is anteverted.  Contour is irregular w/ myomata:   1) posterior 6.5 x 5.8 cm     Endo cav: 13.7 mm        Cervix: wnl     Right ovary: Nv   Left ovary:  Nv     Cul de sac: no free fluid     ===================================  Complete pelvic ultrasound using realtime "   transabdominal and transvaginal scanning.  Bladder appears normal.     Large posterior uterine fibroid, not obviously affecting the uterine cavity.  This is essentially unchanged in size from prior ultrasound and CT scans.  Normal endometrial lining  No free fluid in the cul de sac     Large posterior uterine fibroid, unchanged from prior imaging.       ASSESSMENT/PLAN:                                                      1. Uterine leiomyoma, unspecified location  Reviewed ultrasound images and benign nature of fibroids.  We again discussed the procedure of robot-assisted myomectomy.  We discussed risks of the procedure including bleeding, infection, injury to surrounding organs, risk of requiring a larger incision, and additionally the risk of hysterectomy if life-threatening bleeding were to occur during the procedure.  We also briefly reviewed other options for management of fibroids, with typically directed management for pain or bleeding which are not her primary concerns.  We also discussed that people with bulk symptoms and dyspareunia who desire future childbearing are best advised to pursue myomectomy, but that if she is not interested in future childbearing a uterine artery embolization may also be considered.  At the conclusion of our discussion she is still not certain what way she would like to go with treatment.  I discussed that this is a lifestyle choice and there is nothing that is forcing her to have surgery, however I do believe it is the best way to relieve her symptoms with the location and size of the fibroid in addition to her primary complaint.  If she decides in the next 2 to 3 months to pursue a robot-assisted myomectomy, she may call our office and we can start to get this scheduled for her.  If, however she waits longer than this period of time, I have recommended that she return to clinic so we can discuss the procedure again.  At this time she has had all of her questions answered,  and her  has had his questions answered as well and they appear to have a good understanding of the nature of the problem and the procedure.    2. Pelvic pain in female  See #1      Carmen Mckeon MD, MPH  Obstetrics and Gynecology

## 2023-08-31 ENCOUNTER — TRANSFERRED RECORDS (OUTPATIENT)
Dept: HEALTH INFORMATION MANAGEMENT | Facility: CLINIC | Age: 43
End: 2023-08-31
Payer: COMMERCIAL

## 2023-09-14 ENCOUNTER — OFFICE VISIT (OUTPATIENT)
Dept: FAMILY MEDICINE | Facility: CLINIC | Age: 43
End: 2023-09-14
Payer: COMMERCIAL

## 2023-09-14 VITALS
RESPIRATION RATE: 18 BRPM | WEIGHT: 203.5 LBS | SYSTOLIC BLOOD PRESSURE: 104 MMHG | OXYGEN SATURATION: 100 % | HEART RATE: 86 BPM | TEMPERATURE: 97.7 F | HEIGHT: 68 IN | BODY MASS INDEX: 30.84 KG/M2 | DIASTOLIC BLOOD PRESSURE: 72 MMHG

## 2023-09-14 DIAGNOSIS — E55.9 VITAMIN D DEFICIENCY: ICD-10-CM

## 2023-09-14 DIAGNOSIS — E01.0 THYROMEGALY: Primary | ICD-10-CM

## 2023-09-14 DIAGNOSIS — R53.82 CHRONIC FATIGUE: ICD-10-CM

## 2023-09-14 DIAGNOSIS — D25.9 UTERINE LEIOMYOMA, UNSPECIFIED LOCATION: ICD-10-CM

## 2023-09-14 DIAGNOSIS — R30.0 DYSURIA: ICD-10-CM

## 2023-09-14 DIAGNOSIS — N92.4 EXCESSIVE BLEEDING IN PREMENOPAUSAL PERIOD: ICD-10-CM

## 2023-09-14 DIAGNOSIS — D50.8 IRON DEFICIENCY ANEMIA SECONDARY TO INADEQUATE DIETARY IRON INTAKE: ICD-10-CM

## 2023-09-14 DIAGNOSIS — N39.0 URINARY TRACT INFECTION WITH HEMATURIA, SITE UNSPECIFIED: ICD-10-CM

## 2023-09-14 DIAGNOSIS — R31.9 URINARY TRACT INFECTION WITH HEMATURIA, SITE UNSPECIFIED: ICD-10-CM

## 2023-09-14 LAB
ALBUMIN UR-MCNC: NEGATIVE MG/DL
APPEARANCE UR: CLEAR
BACTERIA #/AREA URNS HPF: ABNORMAL /HPF
BILIRUB UR QL STRIP: NEGATIVE
COLOR UR AUTO: YELLOW
ERYTHROCYTE [DISTWIDTH] IN BLOOD BY AUTOMATED COUNT: 18.9 % (ref 10–15)
FERRITIN SERPL-MCNC: 6 NG/ML (ref 6–175)
GLUCOSE UR STRIP-MCNC: NEGATIVE MG/DL
HCT VFR BLD AUTO: 30.4 % (ref 35–47)
HGB BLD-MCNC: 9.3 G/DL (ref 11.7–15.7)
HGB UR QL STRIP: ABNORMAL
IRON BINDING CAPACITY (ROCHE): 405 UG/DL (ref 240–430)
IRON SATN MFR SERPL: 3 % (ref 15–46)
IRON SERPL-MCNC: 14 UG/DL (ref 37–145)
KETONES UR STRIP-MCNC: NEGATIVE MG/DL
LEUKOCYTE ESTERASE UR QL STRIP: ABNORMAL
MAGNESIUM SERPL-MCNC: 2.2 MG/DL (ref 1.7–2.3)
MCH RBC QN AUTO: 20.5 PG (ref 26.5–33)
MCHC RBC AUTO-ENTMCNC: 30.6 G/DL (ref 31.5–36.5)
MCV RBC AUTO: 67 FL (ref 78–100)
NITRATE UR QL: NEGATIVE
PH UR STRIP: 6 [PH] (ref 5–7)
PLATELET # BLD AUTO: 462 10E3/UL (ref 150–450)
RBC # BLD AUTO: 4.53 10E6/UL (ref 3.8–5.2)
RBC #/AREA URNS AUTO: ABNORMAL /HPF
SP GR UR STRIP: <=1.005 (ref 1–1.03)
SQUAMOUS #/AREA URNS AUTO: ABNORMAL /LPF
T4 FREE SERPL-MCNC: 1.2 NG/DL (ref 0.9–1.7)
TSH SERPL DL<=0.005 MIU/L-ACNC: 2.31 UIU/ML (ref 0.3–4.2)
UROBILINOGEN UR STRIP-ACNC: 0.2 E.U./DL
VIT B12 SERPL-MCNC: 571 PG/ML (ref 232–1245)
WBC # BLD AUTO: 8.9 10E3/UL (ref 4–11)
WBC #/AREA URNS AUTO: ABNORMAL /HPF

## 2023-09-14 PROCEDURE — 82728 ASSAY OF FERRITIN: CPT | Performed by: INTERNAL MEDICINE

## 2023-09-14 PROCEDURE — 84443 ASSAY THYROID STIM HORMONE: CPT | Performed by: INTERNAL MEDICINE

## 2023-09-14 PROCEDURE — 84439 ASSAY OF FREE THYROXINE: CPT | Performed by: INTERNAL MEDICINE

## 2023-09-14 PROCEDURE — 83735 ASSAY OF MAGNESIUM: CPT | Performed by: INTERNAL MEDICINE

## 2023-09-14 PROCEDURE — 81001 URINALYSIS AUTO W/SCOPE: CPT | Performed by: INTERNAL MEDICINE

## 2023-09-14 PROCEDURE — 82607 VITAMIN B-12: CPT | Performed by: INTERNAL MEDICINE

## 2023-09-14 PROCEDURE — 83550 IRON BINDING TEST: CPT | Performed by: INTERNAL MEDICINE

## 2023-09-14 PROCEDURE — 99214 OFFICE O/P EST MOD 30 MIN: CPT | Performed by: INTERNAL MEDICINE

## 2023-09-14 PROCEDURE — 36415 COLL VENOUS BLD VENIPUNCTURE: CPT | Performed by: INTERNAL MEDICINE

## 2023-09-14 PROCEDURE — 85027 COMPLETE CBC AUTOMATED: CPT | Performed by: INTERNAL MEDICINE

## 2023-09-14 PROCEDURE — 83540 ASSAY OF IRON: CPT | Performed by: INTERNAL MEDICINE

## 2023-09-14 PROCEDURE — 82306 VITAMIN D 25 HYDROXY: CPT | Performed by: INTERNAL MEDICINE

## 2023-09-14 RX ORDER — NITROFURANTOIN 25; 75 MG/1; MG/1
100 CAPSULE ORAL 2 TIMES DAILY
Qty: 10 CAPSULE | Refills: 0 | Status: SHIPPED | OUTPATIENT
Start: 2023-09-14 | End: 2023-09-19

## 2023-09-14 ASSESSMENT — PAIN SCALES - GENERAL: PAINLEVEL: MODERATE PAIN (5)

## 2023-09-14 NOTE — PROGRESS NOTES
Subjective   Randee is a 43 year old, presenting for the following health issues:    HPI         Chief Complaint:     Follow up on multiple concerns including iron deficiency anemia, thyromegaly    HPI:   Patient Randee Millard is a very pleasant 43 year old female with history of thyromegaly, iron deficiency anemia who presents to Internal Medicine clinic today for follow up on multiple concerns including iron deficiency anemia, thyromegaly.           Current Medications:     Current Outpatient Medications   Medication Sig Dispense Refill    famotidine (PEPCID) 20 MG tablet       ferrous sulfate (FE TABS) 325 (65 Fe) MG EC tablet Take 1 tablet (325 mg) by mouth daily 90 tablet 1         Allergies:      Allergies   Allergen Reactions    Amoxicillin Rash    Clindamycin Rash            Past Medical History:     Past Medical History:   Diagnosis Date    Anemia     Fibroid uterus     Kidney infection 05/30/2021    Reflux esophagitis          Past Surgical History:     Past Surgical History:   Procedure Laterality Date    US BREAST CYST ASPIRATION INITIAL RT           Family Medical History:     Family History   Problem Relation Age of Onset    Hypertension Father     Diabetes Father 63    Family History Negative Mother     No Known Problems Brother     No Known Problems Sister     No Known Problems Son     No Known Problems Daughter          Social History:     Social History     Socioeconomic History    Marital status:      Spouse name: Not on file    Number of children: Not on file    Years of education: Not on file    Highest education level: Not on file   Occupational History    Occupation:  full time   Tobacco Use    Smoking status: Never    Smokeless tobacco: Never   Vaping Use    Vaping Use: Never used   Substance and Sexual Activity    Alcohol use: No     Alcohol/week: 0.0 standard drinks of alcohol    Drug use: No    Sexual activity: Yes     Partners: Male     Birth control/protection: Condom  "  Other Topics Concern    Parent/sibling w/ CABG, MI or angioplasty before 65F 55M? Not Asked   Social History Narrative    Not on file     Social Determinants of Health     Financial Resource Strain: Not on file   Food Insecurity: Not on file   Transportation Needs: Not on file   Physical Activity: Not on file   Stress: Not on file   Social Connections: Not on file   Intimate Partner Violence: Not on file   Housing Stability: Not on file           Review of System:     Constitutional: Negative for fever or chills, positive for chronic fatigue  Skin: Negative for rashes  Ears/Nose/Throat: Negative for nasal congestion, sore throat  Respiratory: No shortness of breath, dyspnea on exertion, cough, or hemoptysis  Cardiovascular: Negative for chest pain  Gastrointestinal: Negative for nausea, vomiting  Genitourinary: positive for menorrhagia due to uterine fibroids  Musculoskeletal: Negative for myalgias  Neurologic: Negative for headaches  Psychiatric: Negative for depression, anxiety  Hematologic/Lymphatic/Immunologic: positive for iron deficiency anemia  Endocrine: positive for known thyromegaly  Behavioral: Negative for tobacco use       Physical Exam:   /72 (BP Location: Right arm, Patient Position: Sitting, Cuff Size: Adult Large)   Pulse 86   Temp 97.7  F (36.5  C) (Temporal)   Resp 18   Ht 1.727 m (5' 8\")   Wt 92.3 kg (203 lb 8 oz)   LMP 08/30/2023 (Approximate)   SpO2 100%   BMI 30.94 kg/m      GENERAL: alert and no distress  EYES: eyes grossly normal to inspection, and conjunctivae and sclerae normal  HENT: Normocephalic atraumatic. Nose and mouth without ulcers or lesions  NECK: thyromegaly symptoms present  RESP: lungs clear to auscultation   CV: regular rate and rhythm, normal S1 S2  LYMPH: no peripheral edema   ABDOMEN: nondistended  MS: no gross musculoskeletal defects noted  SKIN: no suspicious lesions or rashes  NEURO: Alert & Oriented x 3.   PSYCH: mentation appears normal, affect " normal        Diagnostic Test Results:     Diagnostic Test Results:  Labs reviewed in Epic    ASSESSMENT/PLAN:       Randee was seen today for fatigue.    Diagnoses and all orders for this visit:    Thyromegaly  -     TSH with free T4 reflex; Future  -     Adult Endocrinology  Referral; Future  -     T4, free; Future  -     Cancel: TSH with free T4 reflex  -     T4, free  -     TSH    Uterine leiomyoma, unspecified location  -     Ob/Gyn Referral; Future    Iron deficiency anemia secondary to inadequate dietary iron intake  -     Hemoglobin; Future  -     CBC with platelets  -     Iron and iron binding capacity  -     Ferritin  -     Cancel: Hemoglobin    Vitamin D deficiency  -     Vitamin D Deficiency; Future  -     Vitamin D Deficiency    Chronic fatigue  -     Vitamin B12; Future  -     Magnesium; Future  -     Vitamin B12  -     Magnesium    Excessive bleeding in premenopausal period  -     Ob/Gyn Referral; Future    Dysuria  -     UA with Microscopic reflex to Culture - lab collect; Future  -     UA with Microscopic reflex to Culture - lab collect  -     UA Microscopic with Reflex to Culture  -     nitroFURantoin macrocrystal-monohydrate (MACROBID) 100 MG capsule; Take 1 capsule (100 mg) by mouth 2 times daily for 5 days    Urinary tract infection with hematuria, site unspecified  -     nitroFURantoin macrocrystal-monohydrate (MACROBID) 100 MG capsule; Take 1 capsule (100 mg) by mouth 2 times daily for 5 days            Follow Up Plan:     Patient is instructed to return to Internal Medicine clinic for follow-up visit in 1 month.        Debbi Sanz MD  Internal Medicine  [facility]

## 2023-09-18 LAB — DEPRECATED CALCIDIOL+CALCIFEROL SERPL-MC: 25 UG/L (ref 20–75)

## 2023-09-21 ENCOUNTER — TELEPHONE (OUTPATIENT)
Dept: INTERNAL MEDICINE | Facility: CLINIC | Age: 43
End: 2023-09-21
Payer: COMMERCIAL

## 2023-09-21 DIAGNOSIS — N39.0 URINARY TRACT INFECTION WITHOUT HEMATURIA, SITE UNSPECIFIED: Primary | ICD-10-CM

## 2023-09-21 RX ORDER — CIPROFLOXACIN 500 MG/1
500 TABLET, FILM COATED ORAL 2 TIMES DAILY
Qty: 10 TABLET | Refills: 1 | Status: SHIPPED | OUTPATIENT
Start: 2023-09-21 | End: 2023-09-26

## 2023-09-21 NOTE — TELEPHONE ENCOUNTER
Patient seen 9/14/2023.     Patient  continues with pain and burning with urination. Today , she also has flank pain.     She thinks she may have a fever. She has nausea.    Patient has not started the Macrobid. She believes she has taken this medication before and is allergic. She did not  the RX because she thought she was allergic to the medication.       New RX has to be prescribed for possible UTI.       [facility] in [place].       Sallie MartinezRN  [facility]

## 2023-09-21 NOTE — TELEPHONE ENCOUNTER
Patient Contact    Attempt # 1    Was call answered?  Yes. Writer relayed provider's message below, patient expressed verbal understanding.     Neda Mckee RN  [facility]

## 2023-09-28 ENCOUNTER — TELEPHONE (OUTPATIENT)
Dept: FAMILY MEDICINE | Facility: CLINIC | Age: 43
End: 2023-09-28
Payer: COMMERCIAL

## 2023-09-28 NOTE — TELEPHONE ENCOUNTER
Reason for Call:  Request for results:    Name of test or procedure: Blood work    Date of test of procedure: 9-14-23    Location of the test or procedure: [facility]    OK to leave the result message on voice mail or with a family member? YES    Phone number Patient can be reached at:  Cell number on file:    Telephone Information:   Mobile [phone]       Additional comments: Please call with results    Call taken on 9/28/2023 at 11:21 AM by Taylor Hernandez

## 2023-09-29 NOTE — TELEPHONE ENCOUNTER
Please review/advise on recent labs from 9/14/23    Rupal DIANE, Triage RN  [facility]

## 2023-10-18 ENCOUNTER — PATIENT OUTREACH (OUTPATIENT)
Dept: CARE COORDINATION | Facility: CLINIC | Age: 43
End: 2023-10-18
Payer: COMMERCIAL

## 2023-10-30 ENCOUNTER — OFFICE VISIT (OUTPATIENT)
Dept: OBGYN | Facility: CLINIC | Age: 43
End: 2023-10-30
Payer: COMMERCIAL

## 2023-10-30 VITALS
SYSTOLIC BLOOD PRESSURE: 112 MMHG | TEMPERATURE: 98.2 F | DIASTOLIC BLOOD PRESSURE: 86 MMHG | WEIGHT: 202.6 LBS | BODY MASS INDEX: 30.81 KG/M2

## 2023-10-30 DIAGNOSIS — R10.2 PELVIC PAIN IN FEMALE: Primary | ICD-10-CM

## 2023-10-30 DIAGNOSIS — Z11.3 SCREEN FOR STD (SEXUALLY TRANSMITTED DISEASE): ICD-10-CM

## 2023-10-30 DIAGNOSIS — D25.9 UTERINE LEIOMYOMA, UNSPECIFIED LOCATION: ICD-10-CM

## 2023-10-30 DIAGNOSIS — B37.31 YEAST INFECTION OF THE VAGINA: ICD-10-CM

## 2023-10-30 LAB
ALBUMIN UR-MCNC: NEGATIVE MG/DL
APPEARANCE UR: CLEAR
BACTERIA #/AREA URNS HPF: ABNORMAL /HPF
BACTERIAL VAGINOSIS VAG-IMP: NEGATIVE
BILIRUB UR QL STRIP: NEGATIVE
C TRACH DNA SPEC QL NAA+PROBE: NEGATIVE
CANDIDA DNA VAG QL NAA+PROBE: DETECTED
CANDIDA GLABRATA / CANDIDA KRUSEI DNA: NOT DETECTED
COLOR UR AUTO: YELLOW
GLUCOSE UR STRIP-MCNC: NEGATIVE MG/DL
HGB UR QL STRIP: ABNORMAL
KETONES UR STRIP-MCNC: NEGATIVE MG/DL
LEUKOCYTE ESTERASE UR QL STRIP: ABNORMAL
N GONORRHOEA DNA SPEC QL NAA+PROBE: NEGATIVE
NITRATE UR QL: NEGATIVE
PH UR STRIP: 6 [PH] (ref 5–7)
RBC #/AREA URNS AUTO: ABNORMAL /HPF
SP GR UR STRIP: <=1.005 (ref 1–1.03)
SQUAMOUS #/AREA URNS AUTO: ABNORMAL /LPF
T VAGINALIS DNA VAG QL NAA+PROBE: NOT DETECTED
UROBILINOGEN UR STRIP-ACNC: 0.2 E.U./DL
WBC #/AREA URNS AUTO: ABNORMAL /HPF

## 2023-10-30 PROCEDURE — 81001 URINALYSIS AUTO W/SCOPE: CPT | Performed by: STUDENT IN AN ORGANIZED HEALTH CARE EDUCATION/TRAINING PROGRAM

## 2023-10-30 PROCEDURE — 87591 N.GONORRHOEAE DNA AMP PROB: CPT | Performed by: STUDENT IN AN ORGANIZED HEALTH CARE EDUCATION/TRAINING PROGRAM

## 2023-10-30 PROCEDURE — 87491 CHLMYD TRACH DNA AMP PROBE: CPT | Performed by: STUDENT IN AN ORGANIZED HEALTH CARE EDUCATION/TRAINING PROGRAM

## 2023-10-30 PROCEDURE — 99214 OFFICE O/P EST MOD 30 MIN: CPT | Performed by: STUDENT IN AN ORGANIZED HEALTH CARE EDUCATION/TRAINING PROGRAM

## 2023-10-30 PROCEDURE — 87086 URINE CULTURE/COLONY COUNT: CPT | Performed by: STUDENT IN AN ORGANIZED HEALTH CARE EDUCATION/TRAINING PROGRAM

## 2023-10-30 PROCEDURE — 0352U MULTIPLEX VAGINAL PANEL BY PCR: CPT | Performed by: STUDENT IN AN ORGANIZED HEALTH CARE EDUCATION/TRAINING PROGRAM

## 2023-10-30 RX ORDER — FLUCONAZOLE 150 MG/1
150 TABLET ORAL ONCE
Qty: 1 TABLET | Refills: 0 | Status: SHIPPED | OUTPATIENT
Start: 2023-10-30 | End: 2023-10-30

## 2023-10-30 NOTE — PATIENT INSTRUCTIONS
Take Ibuprofen and Tylenol for pain:  - Take 600mg of Ibuprofen (3 regular strength tablets) every 6 hours. Do not take more than 2,400 mg/day.  - Take 325-650mg (1-2 regular strength tablets) of Tylenol every 6 hours. Do not take more than 3,000 mg/day.     If pain not improving in a few days come to the emergency room    Get a pelvic ultrasound    We will follow-up on lab tests

## 2023-10-30 NOTE — PROGRESS NOTES
[facility]ITH[facility]  Gynecology Office Visit    CC: Abdominal and Pelvic Pain    S:  Randee Millard is a 43 year old  with known history of uterine fibroid who presents for the above.    - Pelvic and abdominal pain started 2 weeks ago. Also has low back pain. Has not had pain like this before. It is constant. Points to llq and rlq as pain location  - Tylenol and ibuprofen not helping. Taking 200mg ibuprofen q8h and 625 tylenol q6h  - Last period was 2 weeks ago.   - Known fibroid, pain with that is usually coming and going.   - Vaginal burning  - Pain and burining with urination, and going to bathroom a lot  - No constipation or diarrhea.   - Nausea, some chills. Decreased PO.   - No CP, SOB. Nobody sick around her.   - No recent concerning meals  - No new medications  - Hx of kidney stones, does not feel like that.    O:  LMP 2023 (Approximate)     Exam:  GEN: Appears well, NAD  BACK: No CVA tenderness, midline or paraspinal tenderness  ABD: Soft, ttp across hypogastric region, moreso with abdominal flexion, non-distended, BS+  : Vulva w/ absent labia minora. Otherwise normal external genitalia. Vagina and pelvic floor globally tender on exam. Vagina with white discharge. Cervix normal no lesions. No lesions or bleeding. Bimanual w/o CMT, uterus ttp, no adnexal masses. Exam chaperoned by Erika Villalobos MA.    A/P:  Randee Millard is a 43 year old  here for pelvic pain, has known uterine fibroid. Also has vaginal burning, urinary burning, back pain, nausea.    #Pelvic pain  #Uterine fibroid  - Etiology not clear. Possibly an infection (low concern for PID), pyelo, degenerating fibroid, GI related, MSK  - Multiplex swab GC/CT, UA  - Repeat pelvic US  - Scheduled ibuprofen 600 q6h and tylenol 650 q6h  - If not improving a few days come to ED    Fibroid decay?    Indio Adam MD MPH  10/29/2023 9:04 PM

## 2023-10-30 NOTE — NURSING NOTE
"Chief Complaint   Patient presents with    Follow Up     Follow up Pelvic pain, itching, burning and Urinary frequency    OV  & 2023  Dr Mckeon    U/S  2023 Fibroid        Initial /86 (BP Location: Left arm, Cuff Size: Adult Regular)   Wt 91.9 kg (202 lb 9.6 oz)   LMP 10/16/2023   BMI 30.81 kg/m   Estimated body mass index is 30.81 kg/m  as calculated from the following:    Height as of 23: 1.727 m (5' 8\").    Weight as of this encounter: 91.9 kg (202 lb 9.6 oz).  BP completed using cuff size: regular    Questioned patient about current smoking habits.  Pt. has never smoked.          The following HM Due: NONE      Erika Villalobos, FRANCISCO on 10/30/2023 at 11:03 AM       "

## 2023-11-01 ENCOUNTER — PATIENT OUTREACH (OUTPATIENT)
Dept: CARE COORDINATION | Facility: CLINIC | Age: 43
End: 2023-11-01

## 2023-11-01 LAB — BACTERIA UR CULT: NORMAL

## 2023-11-15 ENCOUNTER — ANCILLARY PROCEDURE (OUTPATIENT)
Dept: ULTRASOUND IMAGING | Facility: CLINIC | Age: 43
End: 2023-11-15
Attending: STUDENT IN AN ORGANIZED HEALTH CARE EDUCATION/TRAINING PROGRAM
Payer: COMMERCIAL

## 2023-11-15 PROCEDURE — 76856 US EXAM PELVIC COMPLETE: CPT | Performed by: OBSTETRICS & GYNECOLOGY

## 2023-11-15 PROCEDURE — 76830 TRANSVAGINAL US NON-OB: CPT | Performed by: OBSTETRICS & GYNECOLOGY

## 2023-11-22 ENCOUNTER — APPOINTMENT (OUTPATIENT)
Dept: CT IMAGING | Facility: CLINIC | Age: 43
End: 2023-11-22
Attending: EMERGENCY MEDICINE
Payer: COMMERCIAL

## 2023-11-22 ENCOUNTER — HOSPITAL ENCOUNTER (EMERGENCY)
Facility: CLINIC | Age: 43
Discharge: HOME | End: 2023-11-22
Attending: EMERGENCY MEDICINE | Admitting: EMERGENCY MEDICINE
Payer: COMMERCIAL

## 2023-11-22 VITALS
TEMPERATURE: 97.9 F | OXYGEN SATURATION: 100 % | DIASTOLIC BLOOD PRESSURE: 71 MMHG | HEART RATE: 66 BPM | SYSTOLIC BLOOD PRESSURE: 111 MMHG | RESPIRATION RATE: 16 BRPM

## 2023-11-22 DIAGNOSIS — D25.9 UTERINE LEIOMYOMA, UNSPECIFIED LOCATION: ICD-10-CM

## 2023-11-22 DIAGNOSIS — R10.33 PERIUMBILICAL ABDOMINAL PAIN: ICD-10-CM

## 2023-11-22 LAB
ALBUMIN SERPL BCG-MCNC: 3.8 G/DL (ref 3.5–5.2)
ALBUMIN UR-MCNC: NEGATIVE MG/DL
ALP SERPL-CCNC: 60 U/L (ref 40–150)
ALT SERPL W P-5'-P-CCNC: 10 U/L (ref 0–50)
ANION GAP SERPL CALCULATED.3IONS-SCNC: 9 MMOL/L (ref 7–15)
APPEARANCE UR: CLEAR
AST SERPL W P-5'-P-CCNC: 22 U/L (ref 0–45)
BASOPHILS # BLD AUTO: 0 10E3/UL (ref 0–0.2)
BASOPHILS NFR BLD AUTO: 0 %
BILIRUB SERPL-MCNC: <0.2 MG/DL
BILIRUB UR QL STRIP: NEGATIVE
BUN SERPL-MCNC: 7.9 MG/DL (ref 6–20)
CALCIUM SERPL-MCNC: 8.5 MG/DL (ref 8.6–10)
CHLORIDE SERPL-SCNC: 107 MMOL/L (ref 98–107)
COLOR UR AUTO: ABNORMAL
CREAT SERPL-MCNC: 0.65 MG/DL (ref 0.51–0.95)
DEPRECATED HCO3 PLAS-SCNC: 23 MMOL/L (ref 22–29)
EGFRCR SERPLBLD CKD-EPI 2021: >90 ML/MIN/1.73M2
EOSINOPHIL # BLD AUTO: 0.2 10E3/UL (ref 0–0.7)
EOSINOPHIL NFR BLD AUTO: 2 %
ERYTHROCYTE [DISTWIDTH] IN BLOOD BY AUTOMATED COUNT: 19.9 % (ref 10–15)
GLUCOSE SERPL-MCNC: 98 MG/DL (ref 70–99)
GLUCOSE UR STRIP-MCNC: NEGATIVE MG/DL
HCG SER QL IA.RAPID: NEGATIVE
HCT VFR BLD AUTO: 26.7 % (ref 35–47)
HGB BLD-MCNC: 7.7 G/DL (ref 11.7–15.7)
HGB UR QL STRIP: NEGATIVE
IMM GRANULOCYTES # BLD: 0.1 10E3/UL
IMM GRANULOCYTES NFR BLD: 1 %
KETONES UR STRIP-MCNC: NEGATIVE MG/DL
LEUKOCYTE ESTERASE UR QL STRIP: ABNORMAL
LIPASE SERPL-CCNC: 24 U/L (ref 13–60)
LYMPHOCYTES # BLD AUTO: 4.6 10E3/UL (ref 0.8–5.3)
LYMPHOCYTES NFR BLD AUTO: 43 %
MCH RBC QN AUTO: 19.4 PG (ref 26.5–33)
MCHC RBC AUTO-ENTMCNC: 28.8 G/DL (ref 31.5–36.5)
MCV RBC AUTO: 67 FL (ref 78–100)
MONOCYTES # BLD AUTO: 0.6 10E3/UL (ref 0–1.3)
MONOCYTES NFR BLD AUTO: 6 %
MUCOUS THREADS #/AREA URNS LPF: PRESENT /LPF
NEUTROPHILS # BLD AUTO: 5.3 10E3/UL (ref 1.6–8.3)
NEUTROPHILS NFR BLD AUTO: 48 %
NITRATE UR QL: NEGATIVE
NRBC # BLD AUTO: 0 10E3/UL
NRBC BLD AUTO-RTO: 0 /100
PH UR STRIP: 5.5 [PH] (ref 5–7)
PLATELET # BLD AUTO: 372 10E3/UL (ref 150–450)
POTASSIUM SERPL-SCNC: 4 MMOL/L (ref 3.4–5.3)
PROT SERPL-MCNC: 6.7 G/DL (ref 6.4–8.3)
RBC # BLD AUTO: 3.96 10E6/UL (ref 3.8–5.2)
RBC URINE: 3 /HPF
SODIUM SERPL-SCNC: 139 MMOL/L (ref 135–145)
SP GR UR STRIP: 1.01 (ref 1–1.03)
SQUAMOUS EPITHELIAL: 21 /HPF
UROBILINOGEN UR STRIP-MCNC: NORMAL MG/DL
WBC # BLD AUTO: 10.9 10E3/UL (ref 4–11)
WBC URINE: 4 /HPF

## 2023-11-22 PROCEDURE — 81001 URINALYSIS AUTO W/SCOPE: CPT | Performed by: EMERGENCY MEDICINE

## 2023-11-22 PROCEDURE — 99285 EMERGENCY DEPT VISIT HI MDM: CPT | Mod: 25

## 2023-11-22 PROCEDURE — 84703 CHORIONIC GONADOTROPIN ASSAY: CPT

## 2023-11-22 PROCEDURE — 80053 COMPREHEN METABOLIC PANEL: CPT | Performed by: EMERGENCY MEDICINE

## 2023-11-22 PROCEDURE — 96374 THER/PROPH/DIAG INJ IV PUSH: CPT | Mod: 59

## 2023-11-22 PROCEDURE — 36415 COLL VENOUS BLD VENIPUNCTURE: CPT | Performed by: EMERGENCY MEDICINE

## 2023-11-22 PROCEDURE — 85025 COMPLETE CBC W/AUTO DIFF WBC: CPT | Performed by: EMERGENCY MEDICINE

## 2023-11-22 PROCEDURE — 87086 URINE CULTURE/COLONY COUNT: CPT | Performed by: EMERGENCY MEDICINE

## 2023-11-22 PROCEDURE — 250N000011 HC RX IP 250 OP 636: Mod: JZ | Performed by: EMERGENCY MEDICINE

## 2023-11-22 PROCEDURE — 74177 CT ABD & PELVIS W/CONTRAST: CPT

## 2023-11-22 PROCEDURE — 250N000011 HC RX IP 250 OP 636: Performed by: EMERGENCY MEDICINE

## 2023-11-22 PROCEDURE — 83690 ASSAY OF LIPASE: CPT | Performed by: EMERGENCY MEDICINE

## 2023-11-22 RX ORDER — OXYCODONE HYDROCHLORIDE 5 MG/1
5 TABLET ORAL EVERY 6 HOURS PRN
Qty: 12 TABLET | Refills: 0 | Status: SHIPPED | OUTPATIENT
Start: 2023-11-22 | End: 2023-11-25

## 2023-11-22 RX ORDER — KETOROLAC TROMETHAMINE 15 MG/ML
15 INJECTION, SOLUTION INTRAMUSCULAR; INTRAVENOUS ONCE
Status: COMPLETED | OUTPATIENT
Start: 2023-11-22 | End: 2023-11-22

## 2023-11-22 RX ORDER — MORPHINE SULFATE 4 MG/ML
4 INJECTION, SOLUTION INTRAMUSCULAR; INTRAVENOUS
Status: DISCONTINUED | OUTPATIENT
Start: 2023-11-22 | End: 2023-11-22 | Stop reason: HOSPADM

## 2023-11-22 RX ORDER — IOPAMIDOL 755 MG/ML
500 INJECTION, SOLUTION INTRAVASCULAR ONCE
Status: COMPLETED | OUTPATIENT
Start: 2023-11-22 | End: 2023-11-22

## 2023-11-22 RX ORDER — ONDANSETRON 2 MG/ML
4 INJECTION INTRAMUSCULAR; INTRAVENOUS EVERY 30 MIN PRN
Status: DISCONTINUED | OUTPATIENT
Start: 2023-11-22 | End: 2023-11-22 | Stop reason: HOSPADM

## 2023-11-22 RX ADMIN — KETOROLAC TROMETHAMINE 15 MG: 15 INJECTION, SOLUTION INTRAMUSCULAR; INTRAVENOUS at 03:12

## 2023-11-22 RX ADMIN — IOPAMIDOL 100 ML: 755 INJECTION, SOLUTION INTRAVENOUS at 04:11

## 2023-11-22 ASSESSMENT — ACTIVITIES OF DAILY LIVING (ADL)
ADLS_ACUITY_SCORE: 37
ADLS_ACUITY_SCORE: 37

## 2023-11-22 NOTE — ED TRIAGE NOTES
Here for bilateral lower abdominal pain started 20 minutes ago prior to arrival associated with nausea. ABCs intact.     Triage Assessment (Adult)       Row Name 11/22/23 0220          Triage Assessment    Airway WDL WDL        Respiratory WDL    Respiratory WDL WDL        Cardiac WDL    Cardiac WDL WDL

## 2023-11-22 NOTE — ED PROVIDER NOTES
History     Chief Complaint:  Abdominal Pain       HPI   Randee Millard is a 43 year old female who presents with lower abdominal pain that began around 0200 today. She has associated nausea. She has not taken any oral analgesia at home. She denies vomiting, diarrhea, fevers, chills, dysuria, vaginal bleeding, vaginal discharge, chest pain, cough, or back pain. She still has her appendix.      Independent Historian:   None - Patient Only    Review of External Notes:   11/15 US - Shows a large uterine fibroid  10/30/23 OB visit for pelvic pain      Medications:    Diflucan    Past Medical History:    Reflux esophagitis  Pyelonephritis  Anemia  Uterine fibroids    Past Surgical History:    Breast cyst excision    Physical Exam   Patient Vitals for the past 24 hrs:   BP Temp Temp src Pulse Resp SpO2   11/22/23 0221 127/77 97.9  F (36.6  C) Temporal 84 16 98 %        Physical Exam  General: Patient is awake, alert and interactive when I enter the room. Appears uncomfortable.   Head: The scalp, face, and head appear normal  Eyes: Conjunctivae and sclerae are normal  Neck: Normal range of motion.   CV: Regular rate.   Resp:  No respiratory distress.   GI: suprapubic tenderness but abdomen is soft, no rigidity. No evidence of pulsatile mass. No fluid waves or evidence of ascites. No distension. No hernias or bruising are noted in detailed exam. No CVA tenderness.    MS: Normal tone.   Skin: Normal capillary refill noted  Neuro: Speech is normal and fluent. Face is symmetric. Moving all extremities.   Psych:  Normal affect.  Appropriate interactions.    Emergency Department Course     Imaging:  CT Abdomen Pelvis w Contrast   Final Result   IMPRESSION:       1.  Motion limited study.      2.  Although the appendiceal tip appears slightly prominent, there is no significant adjacent fat stranding, therefore presumed to be motion artifact.      3.  Increased size of a large uterine fibroid in the posterior uterine body.            Laboratory:  Labs Ordered and Resulted from Time of ED Arrival to Time of ED Departure   COMPREHENSIVE METABOLIC PANEL - Abnormal       Result Value    Sodium 139      Potassium 4.0      Carbon Dioxide (CO2) 23      Anion Gap 9      Urea Nitrogen 7.9      Creatinine 0.65      GFR Estimate >90      Calcium 8.5 (*)     Chloride 107      Glucose 98      Alkaline Phosphatase 60      AST 22      ALT 10      Protein Total 6.7      Albumin 3.8      Bilirubin Total <0.2     ROUTINE UA WITH MICROSCOPIC REFLEX TO CULTURE - Abnormal    Color Urine Light Yellow      Appearance Urine Clear      Glucose Urine Negative      Bilirubin Urine Negative      Ketones Urine Negative      Specific Gravity Urine 1.015      Blood Urine Negative      pH Urine 5.5      Protein Albumin Urine Negative      Urobilinogen Urine Normal      Nitrite Urine Negative      Leukocyte Esterase Urine Moderate (*)     Mucus Urine Present (*)     RBC Urine 3 (*)     WBC Urine 4      Squamous Epithelials Urine 21 (*)    CBC WITH PLATELETS AND DIFFERENTIAL - Abnormal    WBC Count 10.9      RBC Count 3.96      Hemoglobin 7.7 (*)     Hematocrit 26.7 (*)     MCV 67 (*)     MCH 19.4 (*)     MCHC 28.8 (*)     RDW 19.9 (*)     Platelet Count 372      % Neutrophils 48      % Lymphocytes 43      % Monocytes 6      % Eosinophils 2      % Basophils 0      % Immature Granulocytes 1      NRBCs per 100 WBC 0      Absolute Neutrophils 5.3      Absolute Lymphocytes 4.6      Absolute Monocytes 0.6      Absolute Eosinophils 0.2      Absolute Basophils 0.0      Absolute Immature Granulocytes 0.1      Absolute NRBCs 0.0     LIPASE - Normal    Lipase 24     ISTAT HCG QUALITATIVE PREGNANCY POCT - Normal    HCG Qualitative POCT Negative     URINE CULTURE        Emergency Department Course & Assessments:    Interventions:  Medications   ondansetron (ZOFRAN) injection 4 mg (has no administration in time range)   morphine (PF) injection 4 mg (has no administration in time range)    ketorolac (TORADOL) injection 15 mg (15 mg Intravenous $Given 11/22/23 0312)   iopamidol (ISOVUE-370) solution 500 mL (100 mLs Intravenous $Given 11/22/23 0411)   sodium chloride (PF) 0.9% PF flush 100 mL (65 mLs Intravenous $Given 11/22/23 0411)        Assessments:  0251 I obtained history and examined the patient, as noted above.  0513 I rechecked and updated the patient.    Independent Interpretation (X-rays, CTs, rhythm strip):  None    Consultations/Discussion of Management or Tests:  None        Social Determinants of Health affecting care:   None    Disposition:  The patient was discharged to home.     Impression & Plan      Medical Decision Making:  Patient is a 43-year-old female who presents emerged part with lower abdominal pain that began around 2 AM.  In review of her chart she has had recent OB/GYN visits for ongoing pelvic pain which showed a large uterine fibroid.  Most recently an ultrasound on 1115 redemonstrated this.  On initial evaluation here she is hemodynamically stable with normal vital signs.  She is afebrile and oxygenating well on room air.  On physical exam she is some mild periumbilical tenderness without any significant guarding, rebound or rigidity.  CT scan of the abdomen pelvis was obtained which shows no acute pathology but again redemonstrates uterine fibroid.  The remainder of her workup is reassuring.  My suspicion is that her uterine fibroid is likely playing a role in her abdominal pain.  No other acute cause was noted on workup here today.  Recommended close follow-up with her OB/GYN and return the emergency room with new or worsening symptoms.  Will be discharged home with short course of oxycodone to use as needed.    Diagnosis:    ICD-10-CM    1. Periumbilical abdominal pain  R10.33       2. Uterine leiomyoma, unspecified location  D25.9            Discharge Medications:  New Prescriptions    OXYCODONE (ROXICODONE) 5 MG TABLET    Take 1 tablet (5 mg) by mouth every 6  hours as needed for breakthrough pain          Scribe Disclosure:  I, Fam Coleman, am serving as a scribe at 2:51 AM on 11/22/2023 to document services personally performed by Smith Colunga MD based on my observations and the provider's statements to me.        Smith Colunga MD  11/22/23 0656

## 2023-11-22 NOTE — DISCHARGE INSTRUCTIONS

## 2023-11-23 LAB — BACTERIA UR CULT: NORMAL

## 2024-02-22 ENCOUNTER — OFFICE VISIT (OUTPATIENT)
Dept: FAMILY MEDICINE | Facility: CLINIC | Age: 44
End: 2024-02-22
Payer: COMMERCIAL

## 2024-02-22 VITALS
HEART RATE: 91 BPM | RESPIRATION RATE: 16 BRPM | OXYGEN SATURATION: 100 % | WEIGHT: 197.5 LBS | HEIGHT: 68 IN | BODY MASS INDEX: 29.93 KG/M2 | SYSTOLIC BLOOD PRESSURE: 118 MMHG | TEMPERATURE: 98.6 F | DIASTOLIC BLOOD PRESSURE: 70 MMHG

## 2024-02-22 DIAGNOSIS — R53.83 FATIGUE, UNSPECIFIED TYPE: Primary | ICD-10-CM

## 2024-02-22 DIAGNOSIS — N93.8 DUB (DYSFUNCTIONAL UTERINE BLEEDING): ICD-10-CM

## 2024-02-22 DIAGNOSIS — D25.9 UTERINE LEIOMYOMA, UNSPECIFIED LOCATION: ICD-10-CM

## 2024-02-22 DIAGNOSIS — D50.8 IRON DEFICIENCY ANEMIA SECONDARY TO INADEQUATE DIETARY IRON INTAKE: ICD-10-CM

## 2024-02-22 DIAGNOSIS — D64.9 LOW HEMOGLOBIN: ICD-10-CM

## 2024-02-22 LAB
ERYTHROCYTE [DISTWIDTH] IN BLOOD BY AUTOMATED COUNT: 20.2 % (ref 10–15)
FERRITIN SERPL-MCNC: 6 NG/ML (ref 6–175)
HCT VFR BLD AUTO: 26.4 % (ref 35–47)
HGB BLD-MCNC: 7.6 G/DL (ref 11.7–15.7)
MCH RBC QN AUTO: 18.4 PG (ref 26.5–33)
MCHC RBC AUTO-ENTMCNC: 28.8 G/DL (ref 31.5–36.5)
MCV RBC AUTO: 64 FL (ref 78–100)
PLATELET # BLD AUTO: 453 10E3/UL (ref 150–450)
RBC # BLD AUTO: 4.14 10E6/UL (ref 3.8–5.2)
TSH SERPL DL<=0.005 MIU/L-ACNC: 2.31 UIU/ML (ref 0.3–4.2)
WBC # BLD AUTO: 8.6 10E3/UL (ref 4–11)

## 2024-02-22 PROCEDURE — 85027 COMPLETE CBC AUTOMATED: CPT | Performed by: PHYSICIAN ASSISTANT

## 2024-02-22 PROCEDURE — 82728 ASSAY OF FERRITIN: CPT | Performed by: PHYSICIAN ASSISTANT

## 2024-02-22 PROCEDURE — 99214 OFFICE O/P EST MOD 30 MIN: CPT | Performed by: PHYSICIAN ASSISTANT

## 2024-02-22 PROCEDURE — 84443 ASSAY THYROID STIM HORMONE: CPT | Performed by: PHYSICIAN ASSISTANT

## 2024-02-22 PROCEDURE — 36415 COLL VENOUS BLD VENIPUNCTURE: CPT | Performed by: PHYSICIAN ASSISTANT

## 2024-02-22 RX ORDER — ALBUTEROL SULFATE 90 UG/1
1-2 AEROSOL, METERED RESPIRATORY (INHALATION)
Status: CANCELLED
Start: 2024-02-29

## 2024-02-22 RX ORDER — HEPARIN SODIUM (PORCINE) LOCK FLUSH IV SOLN 100 UNIT/ML 100 UNIT/ML
5 SOLUTION INTRAVENOUS
Status: CANCELLED | OUTPATIENT
Start: 2024-02-29

## 2024-02-22 RX ORDER — HEPARIN SODIUM,PORCINE 10 UNIT/ML
5-20 VIAL (ML) INTRAVENOUS DAILY PRN
Status: CANCELLED | OUTPATIENT
Start: 2024-02-29

## 2024-02-22 RX ORDER — ALBUTEROL SULFATE 0.83 MG/ML
2.5 SOLUTION RESPIRATORY (INHALATION)
Status: CANCELLED | OUTPATIENT
Start: 2024-02-29

## 2024-02-22 RX ORDER — FERROUS SULFATE 325(65) MG
325 TABLET, DELAYED RELEASE (ENTERIC COATED) ORAL DAILY
Qty: 90 TABLET | Refills: 1 | Status: SHIPPED | OUTPATIENT
Start: 2024-02-22

## 2024-02-22 RX ORDER — METHYLPREDNISOLONE SODIUM SUCCINATE 125 MG/2ML
125 INJECTION, POWDER, LYOPHILIZED, FOR SOLUTION INTRAMUSCULAR; INTRAVENOUS
Status: CANCELLED
Start: 2024-02-29

## 2024-02-22 RX ORDER — MEPERIDINE HYDROCHLORIDE 25 MG/ML
25 INJECTION INTRAMUSCULAR; INTRAVENOUS; SUBCUTANEOUS EVERY 30 MIN PRN
Status: CANCELLED | OUTPATIENT
Start: 2024-02-29

## 2024-02-22 RX ORDER — DIPHENHYDRAMINE HYDROCHLORIDE 50 MG/ML
50 INJECTION INTRAMUSCULAR; INTRAVENOUS
Status: CANCELLED
Start: 2024-02-29

## 2024-02-22 RX ORDER — EPINEPHRINE 1 MG/ML
0.3 INJECTION, SOLUTION, CONCENTRATE INTRAVENOUS EVERY 5 MIN PRN
Status: CANCELLED | OUTPATIENT
Start: 2024-02-29

## 2024-02-22 NOTE — PROGRESS NOTES
"  Assessment & Plan     HGB is 7.  Advised will need orin infusion and follow-up with ob/gyn to discuss plan to remove fibriod.    Fatigue, unspecified type    - CBC with platelets  - TSH with free T4 reflex  - Ferritin    Low hemoglobin    - CBC with platelets  - Ferritin    DUB (dysfunctional uterine bleeding)    - CBC with platelets  - Ob/Gyn  Referral; Future    Uterine leiomyoma, unspecified location    - Ob/Gyn  Referral; Future    Iron deficiency anemia secondary to inadequate dietary iron intake    - ferrous sulfate (FE TABS) 325 (65 Fe) MG EC tablet; Take 1 tablet (325 mg) by mouth daily        BMI  Estimated body mass index is 30.03 kg/m  as calculated from the following:    Height as of this encounter: 1.727 m (5' 8\").    Weight as of this encounter: 89.6 kg (197 lb 8 oz).             Dez Jeff is a 43 year old, presenting for the following health issues:  Generalized Body Aches (For the past month, all over body aches, chills, headaches)        2/22/2024     9:10 AM   Additional Questions   Roomed by GÓMEZ Elam   Accompanied by Self       Here today to discuss headaches and pain all over her body.  This has been worsening over the last month and she has 5 children and finding it difficult to get out of bed.  She was found to have a large fibroid that has caused very heavy menses and was in ED on November 22, 2023 for this and advised to follow-up with OB/GYN to discuss removing fibroid but she has not done that yet.  She was told to take iron supplementation and does have iron tablets at home but states she does not remember to take them     History of Present Illness       Reason for visit:  I have pain all over my body and feeling very tire  Symptom onset:  More than a month  Symptoms include:  Pain all over the body and tireness  Symptom intensity:  Moderate  Symptom progression:  Worsening  Had these symptoms before:  No  What makes it worse:  No  What makes it " "better:  No    She eats 0-1 servings of fruits and vegetables daily.She consumes 0 sweetened beverage(s) daily.She exercises with enough effort to increase her heart rate 20 to 29 minutes per day.  She exercises with enough effort to increase her heart rate 3 or less days per week.   She is not taking prescribed medications regularly due to remembering to take.           Review of Systems  Constitutional, HEENT, cardiovascular, pulmonary, gi and gu systems are negative, except as otherwise noted.      Objective    /70 (BP Location: Right arm, Patient Position: Sitting, Cuff Size: Adult Large)   Pulse 91   Temp 98.6  F (37  C) (Oral)   Resp 16   Ht 1.727 m (5' 8\")   Wt 89.6 kg (197 lb 8 oz)   LMP 02/16/2024 (Exact Date)   SpO2 100%   Breastfeeding No   BMI 30.03 kg/m    Body mass index is 30.03 kg/m .  Physical Exam   GENERAL: alert and no distress  NECK: no adenopathy, no asymmetry, masses, or scars  RESP: lungs clear to auscultation - no rales, rhonchi or wheezes  CV: regular rate and rhythm, normal S1 S2, no S3 or S4, no murmur, click or rub, no peripheral edema  ABDOMEN: soft, nontender, no hepatosplenomegaly, no masses and bowel sounds normal  MS: no gross musculoskeletal defects noted, no edema            Signed Electronically by: Ramona Ann Aaseby-Aguilera, PA-C    Answers submitted by the patient for this visit:  General Questionnaire (Submitted on 2/22/2024)  Chief Complaint: Chronic problems general questions HPI Form  How many servings of fruits and vegetables do you eat daily?: 0-1  On average, how many sweetened beverages do you drink each day (Examples: soda, juice, sweet tea, etc.  Do NOT count diet or artificially sweetened beverages)?: 0  How many minutes a day do you exercise enough to make your heart beat faster?: 20 to 29  How many days a week do you exercise enough to make your heart beat faster?: 3 or less  How many days per week do you miss taking your medication?: 3  What makes " it hard for you to take your medication every day?: remembering to take  General Concern (Submitted on 2/22/2024)  Chief Complaint: Chronic problems general questions HPI Form  What is the reason for your visit today?: i have pain all over my body and feeling very tire  When did your symptoms begin?: More than a month  What are your symptoms?: pain all over the body and tireness  How would you describe these symptoms?: Moderate  Are your symptoms:: Worsening  Have you had these symptoms before?: No  Is there anything that makes you feel worse?: no  Is there anything that makes you feel better?: no

## 2024-03-14 ENCOUNTER — INFUSION THERAPY VISIT (OUTPATIENT)
Dept: INFUSION THERAPY | Facility: CLINIC | Age: 44
End: 2024-03-14
Attending: INTERNAL MEDICINE
Payer: COMMERCIAL

## 2024-03-14 VITALS
DIASTOLIC BLOOD PRESSURE: 71 MMHG | RESPIRATION RATE: 16 BRPM | TEMPERATURE: 98.4 F | SYSTOLIC BLOOD PRESSURE: 123 MMHG | OXYGEN SATURATION: 100 % | HEART RATE: 83 BPM

## 2024-03-14 DIAGNOSIS — D50.8 IRON DEFICIENCY ANEMIA SECONDARY TO INADEQUATE DIETARY IRON INTAKE: Primary | ICD-10-CM

## 2024-03-14 PROCEDURE — 250N000011 HC RX IP 250 OP 636: Performed by: PHYSICIAN ASSISTANT

## 2024-03-14 PROCEDURE — 258N000003 HC RX IP 258 OP 636: Performed by: PHYSICIAN ASSISTANT

## 2024-03-14 PROCEDURE — 96365 THER/PROPH/DIAG IV INF INIT: CPT

## 2024-03-14 RX ORDER — DIPHENHYDRAMINE HYDROCHLORIDE 50 MG/ML
50 INJECTION INTRAMUSCULAR; INTRAVENOUS
Status: CANCELLED
Start: 2024-03-16

## 2024-03-14 RX ORDER — METHYLPREDNISOLONE SODIUM SUCCINATE 125 MG/2ML
125 INJECTION, POWDER, LYOPHILIZED, FOR SOLUTION INTRAMUSCULAR; INTRAVENOUS
Status: CANCELLED
Start: 2024-03-16

## 2024-03-14 RX ORDER — HEPARIN SODIUM,PORCINE 10 UNIT/ML
5-20 VIAL (ML) INTRAVENOUS DAILY PRN
Status: CANCELLED | OUTPATIENT
Start: 2024-03-16

## 2024-03-14 RX ORDER — MEPERIDINE HYDROCHLORIDE 25 MG/ML
25 INJECTION INTRAMUSCULAR; INTRAVENOUS; SUBCUTANEOUS EVERY 30 MIN PRN
Status: CANCELLED | OUTPATIENT
Start: 2024-03-16

## 2024-03-14 RX ORDER — HEPARIN SODIUM (PORCINE) LOCK FLUSH IV SOLN 100 UNIT/ML 100 UNIT/ML
5 SOLUTION INTRAVENOUS
Status: CANCELLED | OUTPATIENT
Start: 2024-03-16

## 2024-03-14 RX ORDER — ALBUTEROL SULFATE 90 UG/1
1-2 AEROSOL, METERED RESPIRATORY (INHALATION)
Status: CANCELLED
Start: 2024-03-16

## 2024-03-14 RX ORDER — ALBUTEROL SULFATE 0.83 MG/ML
2.5 SOLUTION RESPIRATORY (INHALATION)
Status: CANCELLED | OUTPATIENT
Start: 2024-03-16

## 2024-03-14 RX ORDER — EPINEPHRINE 1 MG/ML
0.3 INJECTION, SOLUTION INTRAMUSCULAR; SUBCUTANEOUS EVERY 5 MIN PRN
Status: CANCELLED | OUTPATIENT
Start: 2024-03-16

## 2024-03-14 RX ADMIN — IRON SUCROSE 300 MG: 20 INJECTION, SOLUTION INTRAVENOUS at 13:30

## 2024-03-14 RX ADMIN — SODIUM CHLORIDE 250 ML: 9 INJECTION, SOLUTION INTRAVENOUS at 13:28

## 2024-03-14 NOTE — PROGRESS NOTES
Infusion Nursing Note:  Randee Millard presents today for venofer #1/3.    Patient seen by provider today: No   present during visit today: Not Applicable.    Note: Reviewed medication handout with pt, questions answered.      Intravenous Access:  Peripheral IV placed.    Treatment Conditions:  Not Applicable.      Post Infusion Assessment:  Patient tolerated infusion without incident.  Blood return noted pre and post infusion.  Site patent and intact, free from redness, edema or discomfort.  No evidence of extravasations.  Access discontinued per protocol.       Discharge Plan:   Discharge instructions reviewed with: Patient.  Patient and/or family verbalized understanding of discharge instructions and all questions answered.  AVS to patient via [employer].  Patient will return 3/18/24 for next appointment.   Patient discharged in stable condition accompanied by: self.  Departure Mode: Ambulatory.      Joselin Bergman RN

## 2024-03-18 ENCOUNTER — INFUSION THERAPY VISIT (OUTPATIENT)
Dept: INFUSION THERAPY | Facility: CLINIC | Age: 44
End: 2024-03-18
Attending: INTERNAL MEDICINE
Payer: COMMERCIAL

## 2024-03-18 VITALS — OXYGEN SATURATION: 99 % | RESPIRATION RATE: 16 BRPM

## 2024-03-18 DIAGNOSIS — D50.8 IRON DEFICIENCY ANEMIA SECONDARY TO INADEQUATE DIETARY IRON INTAKE: Primary | ICD-10-CM

## 2024-03-18 PROCEDURE — 258N000003 HC RX IP 258 OP 636: Performed by: PHYSICIAN ASSISTANT

## 2024-03-18 PROCEDURE — 96365 THER/PROPH/DIAG IV INF INIT: CPT

## 2024-03-18 PROCEDURE — 250N000011 HC RX IP 250 OP 636: Performed by: PHYSICIAN ASSISTANT

## 2024-03-18 RX ORDER — DIPHENHYDRAMINE HYDROCHLORIDE 50 MG/ML
50 INJECTION INTRAMUSCULAR; INTRAVENOUS
Status: CANCELLED
Start: 2024-03-20

## 2024-03-18 RX ORDER — ALBUTEROL SULFATE 0.83 MG/ML
2.5 SOLUTION RESPIRATORY (INHALATION)
Status: CANCELLED | OUTPATIENT
Start: 2024-03-20

## 2024-03-18 RX ORDER — EPINEPHRINE 1 MG/ML
0.3 INJECTION, SOLUTION INTRAMUSCULAR; SUBCUTANEOUS EVERY 5 MIN PRN
Status: CANCELLED | OUTPATIENT
Start: 2024-03-20

## 2024-03-18 RX ORDER — METHYLPREDNISOLONE SODIUM SUCCINATE 125 MG/2ML
125 INJECTION, POWDER, LYOPHILIZED, FOR SOLUTION INTRAMUSCULAR; INTRAVENOUS
Status: CANCELLED
Start: 2024-03-20

## 2024-03-18 RX ORDER — ALBUTEROL SULFATE 90 UG/1
1-2 AEROSOL, METERED RESPIRATORY (INHALATION)
Status: CANCELLED
Start: 2024-03-20

## 2024-03-18 RX ORDER — HEPARIN SODIUM (PORCINE) LOCK FLUSH IV SOLN 100 UNIT/ML 100 UNIT/ML
5 SOLUTION INTRAVENOUS
Status: CANCELLED | OUTPATIENT
Start: 2024-03-20

## 2024-03-18 RX ORDER — HEPARIN SODIUM,PORCINE 10 UNIT/ML
5-20 VIAL (ML) INTRAVENOUS DAILY PRN
Status: CANCELLED | OUTPATIENT
Start: 2024-03-20

## 2024-03-18 RX ORDER — MEPERIDINE HYDROCHLORIDE 25 MG/ML
25 INJECTION INTRAMUSCULAR; INTRAVENOUS; SUBCUTANEOUS EVERY 30 MIN PRN
Status: CANCELLED | OUTPATIENT
Start: 2024-03-20

## 2024-03-18 RX ADMIN — IRON SUCROSE 300 MG: 20 INJECTION, SOLUTION INTRAVENOUS at 11:45

## 2024-03-18 RX ADMIN — SODIUM CHLORIDE 250 ML: 9 INJECTION, SOLUTION INTRAVENOUS at 11:45

## 2024-03-18 NOTE — PROGRESS NOTES
Latest Reference Range & Units 02/22/24 09:29   Hemoglobin 11.7 - 15.7 g/dL 7.6 (LL)   (LL): Data is critically low    Infusion Nursing Note:  Randee Millard presents today for venofer, dose 2.     present during visit today: Not Applicable.    Note: morning after her 1st iron infusion, she vomited.  Will plan to run 250 ml bag NS along with Venofer today. She will report to RN next time she returns.    Intravenous Access:  Peripheral IV placed.    Treatment Conditions:  See above    Post Lab Assessment:  Patient tolerated infusion   Blood return noted pre and post infusion.  Site patent and intact, free from redness, edema or discomfort.  No evidence of extravasations.  Access (discontinued)     Discharge Plan:   Patient and/or family verbalized understanding of  instructions and all questions answered.  Patient  to lobby in stable condition accompanied by: self and .  Patient to see provider today: No  Departure Mode: Ambulatory.  Johanna Adorno RN

## 2024-03-21 ENCOUNTER — INFUSION THERAPY VISIT (OUTPATIENT)
Dept: INFUSION THERAPY | Facility: CLINIC | Age: 44
End: 2024-03-21
Attending: INTERNAL MEDICINE
Payer: COMMERCIAL

## 2024-03-21 VITALS
TEMPERATURE: 97.9 F | HEART RATE: 80 BPM | RESPIRATION RATE: 16 BRPM | OXYGEN SATURATION: 99 % | SYSTOLIC BLOOD PRESSURE: 117 MMHG | DIASTOLIC BLOOD PRESSURE: 73 MMHG

## 2024-03-21 DIAGNOSIS — D50.8 IRON DEFICIENCY ANEMIA SECONDARY TO INADEQUATE DIETARY IRON INTAKE: Primary | ICD-10-CM

## 2024-03-21 PROCEDURE — 250N000011 HC RX IP 250 OP 636: Performed by: PHYSICIAN ASSISTANT

## 2024-03-21 PROCEDURE — 96365 THER/PROPH/DIAG IV INF INIT: CPT

## 2024-03-21 PROCEDURE — 96366 THER/PROPH/DIAG IV INF ADDON: CPT

## 2024-03-21 PROCEDURE — 258N000003 HC RX IP 258 OP 636: Performed by: PHYSICIAN ASSISTANT

## 2024-03-21 RX ORDER — HEPARIN SODIUM (PORCINE) LOCK FLUSH IV SOLN 100 UNIT/ML 100 UNIT/ML
5 SOLUTION INTRAVENOUS
Status: CANCELLED | OUTPATIENT
Start: 2024-03-22

## 2024-03-21 RX ORDER — ALBUTEROL SULFATE 90 UG/1
1-2 AEROSOL, METERED RESPIRATORY (INHALATION)
Status: CANCELLED
Start: 2024-03-22

## 2024-03-21 RX ORDER — EPINEPHRINE 1 MG/ML
0.3 INJECTION, SOLUTION INTRAMUSCULAR; SUBCUTANEOUS EVERY 5 MIN PRN
Status: CANCELLED | OUTPATIENT
Start: 2024-03-22

## 2024-03-21 RX ORDER — HEPARIN SODIUM,PORCINE 10 UNIT/ML
5-20 VIAL (ML) INTRAVENOUS DAILY PRN
Status: CANCELLED | OUTPATIENT
Start: 2024-03-22

## 2024-03-21 RX ORDER — DIPHENHYDRAMINE HYDROCHLORIDE 50 MG/ML
50 INJECTION INTRAMUSCULAR; INTRAVENOUS
Status: CANCELLED
Start: 2024-03-22

## 2024-03-21 RX ORDER — MEPERIDINE HYDROCHLORIDE 25 MG/ML
25 INJECTION INTRAMUSCULAR; INTRAVENOUS; SUBCUTANEOUS EVERY 30 MIN PRN
Status: CANCELLED | OUTPATIENT
Start: 2024-03-22

## 2024-03-21 RX ORDER — ALBUTEROL SULFATE 0.83 MG/ML
2.5 SOLUTION RESPIRATORY (INHALATION)
Status: CANCELLED | OUTPATIENT
Start: 2024-03-22

## 2024-03-21 RX ORDER — METHYLPREDNISOLONE SODIUM SUCCINATE 125 MG/2ML
125 INJECTION, POWDER, LYOPHILIZED, FOR SOLUTION INTRAMUSCULAR; INTRAVENOUS
Status: CANCELLED
Start: 2024-03-22

## 2024-03-21 RX ADMIN — IRON SUCROSE 300 MG: 20 INJECTION, SOLUTION INTRAVENOUS at 12:18

## 2024-03-21 RX ADMIN — SODIUM CHLORIDE 250 ML: 9 INJECTION, SOLUTION INTRAVENOUS at 12:18

## 2024-03-21 ASSESSMENT — PAIN SCALES - GENERAL: PAINLEVEL: NO PAIN (0)

## 2024-03-21 NOTE — PROGRESS NOTES
Infusion Nursing Note:  Randee Millard presents today for Venofer #3 of 3.    Patient seen by provider today: No   present during visit today: Not Applicable.    Note: Indicates she has had nausea and emesis x1 after her last 2 Venofer infusions.  The nausea can last 24H.  Encouraged her to contact her provider and request something for nausea if this gets worse.  Verbalized understanding.      Intravenous Access:  Peripheral IV placed.    Treatment Conditions:  Not Applicable.      Post Infusion Assessment:  Patient tolerated infusion without incident.  Blood return noted pre and post infusion.  Site patent and intact, free from redness, edema or discomfort.  No evidence of extravasations.  Access discontinued per protocol.       Discharge Plan:   Discharge instructions reviewed with: Patient.  Patient discharged in stable condition accompanied by: family.  Departure Mode: Ambulatory.      LIS VELAZQUEZ RN

## 2024-04-02 ENCOUNTER — OFFICE VISIT (OUTPATIENT)
Dept: FAMILY MEDICINE | Facility: CLINIC | Age: 44
End: 2024-04-02
Payer: COMMERCIAL

## 2024-04-02 VITALS
DIASTOLIC BLOOD PRESSURE: 74 MMHG | OXYGEN SATURATION: 100 % | HEIGHT: 67 IN | RESPIRATION RATE: 16 BRPM | SYSTOLIC BLOOD PRESSURE: 135 MMHG | WEIGHT: 196.8 LBS | HEART RATE: 75 BPM | BODY MASS INDEX: 30.89 KG/M2 | TEMPERATURE: 98.6 F

## 2024-04-02 DIAGNOSIS — D50.8 IRON DEFICIENCY ANEMIA SECONDARY TO INADEQUATE DIETARY IRON INTAKE: Primary | ICD-10-CM

## 2024-04-02 LAB
ERYTHROCYTE [DISTWIDTH] IN BLOOD BY AUTOMATED COUNT: ABNORMAL %
FERRITIN SERPL-MCNC: NORMAL NG/ML
HCT VFR BLD AUTO: 35.6 % (ref 35–47)
HGB BLD-MCNC: 10.5 G/DL (ref 11.7–15.7)
MCH RBC QN AUTO: 21.6 PG (ref 26.5–33)
MCHC RBC AUTO-ENTMCNC: 29.5 G/DL (ref 31.5–36.5)
MCV RBC AUTO: 73 FL (ref 78–100)
PLATELET # BLD AUTO: 298 10E3/UL (ref 150–450)
RBC # BLD AUTO: 4.86 10E6/UL (ref 3.8–5.2)
WBC # BLD AUTO: 6.6 10E3/UL (ref 4–11)

## 2024-04-02 PROCEDURE — 85027 COMPLETE CBC AUTOMATED: CPT | Performed by: PHYSICIAN ASSISTANT

## 2024-04-02 PROCEDURE — 36415 COLL VENOUS BLD VENIPUNCTURE: CPT | Performed by: PHYSICIAN ASSISTANT

## 2024-04-02 PROCEDURE — 99213 OFFICE O/P EST LOW 20 MIN: CPT | Performed by: PHYSICIAN ASSISTANT

## 2024-04-02 PROCEDURE — 82728 ASSAY OF FERRITIN: CPT | Performed by: PHYSICIAN ASSISTANT

## 2024-04-02 ASSESSMENT — PATIENT HEALTH QUESTIONNAIRE - PHQ9
SUM OF ALL RESPONSES TO PHQ QUESTIONS 1-9: 6
10. IF YOU CHECKED OFF ANY PROBLEMS, HOW DIFFICULT HAVE THESE PROBLEMS MADE IT FOR YOU TO DO YOUR WORK, TAKE CARE OF THINGS AT HOME, OR GET ALONG WITH OTHER PEOPLE: SOMEWHAT DIFFICULT
SUM OF ALL RESPONSES TO PHQ QUESTIONS 1-9: 6

## 2024-04-02 ASSESSMENT — PAIN SCALES - GENERAL: PAINLEVEL: MODERATE PAIN (5)

## 2024-04-02 NOTE — PROGRESS NOTES
"  Assessment & Plan         Iron deficiency anemia secondary to inadequate dietary iron intake  Has had 3 transfusions in the last month.  Will recheck hgb.  Has also been taking iron supplementation.  Has follow-up with ob/gyn for heavy menses.   - CBC with platelets  - Ferritin                Subjective   Randee is a 43 year old, presenting for the following health issues:  RECHECK (Follow up iron, anemia)        4/2/2024    11:00 AM   Additional Questions   Accompanied by daughter Luisana     Has a history of uterine fibroids and very heavy menses.  She has follow-up scheduled with ob/gyn in May for further eval and plan. Hgb was as low as 7 and she has had 3 iron transfusions since last visit and states she is feeling much improved.       History of Present Illness       Reason for visit:  Folow up    She eats 0-1 servings of fruits and vegetables daily.She consumes 1 sweetened beverage(s) daily.She exercises with enough effort to increase her heart rate 9 or less minutes per day.  She exercises with enough effort to increase her heart rate 3 or less days per week. She is missing 3 dose(s) of medications per week.  She is not taking prescribed medications regularly due to remembering to take.               Review of Systems  Constitutional, HEENT, cardiovascular, pulmonary, gi and gu systems are negative, except as otherwise noted.      Objective    /74 (BP Location: Right arm, Patient Position: Sitting, Cuff Size: Adult Large)   Pulse 75   Temp 98.6  F (37  C) (Oral)   Resp 16   Ht 1.708 m (5' 7.25\")   Wt 89.3 kg (196 lb 12.8 oz)   LMP 03/19/2024 (Approximate)   SpO2 100%   Breastfeeding No   BMI 30.59 kg/m    Body mass index is 30.59 kg/m .  Physical Exam   GENERAL: alert and no distress  HENT: ear canals and TM's normal, nose and mouth without ulcers or lesions  RESP: lungs clear to auscultation - no rales, rhonchi or wheezes  CV: regular rate and rhythm, normal S1 S2, no S3 or S4, no murmur, " click or rub, no peripheral edema   MS: no gross musculoskeletal defects noted, no edema            Signed Electronically by: Ramona Ann Aaseby-Aguilera, PA-C

## 2024-04-02 NOTE — LETTER
April 9, 2024      Randee Millard  [address] [facility] [address]        Dear ,    We are writing to inform you of your test results.    Your anemia has improved.  Please continue to iron supplement and follow-up with ob/gyn.     Resulted Orders   CBC with platelets   Result Value Ref Range    WBC Count 6.6 4.0 - 11.0 10e3/uL    RBC Count 4.86 3.80 - 5.20 10e6/uL    Hemoglobin 10.5 (L) 11.7 - 15.7 g/dL    Hematocrit 35.6 35.0 - 47.0 %    MCV 73 (L) 78 - 100 fL    MCH 21.6 (L) 26.5 - 33.0 pg    MCHC 29.5 (L) 31.5 - 36.5 g/dL    RDW        Comment:      Dimorphic Population - Unable to Calculate    Platelet Count 298 150 - 450 10e3/uL   Ferritin   Result Value Ref Range    Ferritin        Comment:      Unsatisfactory specimen - hemolyzed         If you have any questions or concerns, please call the clinic at the number listed above.       Sincerely,      Ramona Ann Aaseby-Aguilera, PA-C

## 2024-04-18 ENCOUNTER — OFFICE VISIT (OUTPATIENT)
Dept: URGENT CARE | Facility: URGENT CARE | Age: 44
End: 2024-04-18
Payer: COMMERCIAL

## 2024-04-18 VITALS
DIASTOLIC BLOOD PRESSURE: 72 MMHG | OXYGEN SATURATION: 98 % | SYSTOLIC BLOOD PRESSURE: 116 MMHG | HEART RATE: 104 BPM | TEMPERATURE: 101.9 F | RESPIRATION RATE: 22 BRPM

## 2024-04-18 DIAGNOSIS — R50.9 FEVER AND CHILLS: ICD-10-CM

## 2024-04-18 DIAGNOSIS — J06.9 VIRAL URI WITH COUGH: Primary | ICD-10-CM

## 2024-04-18 DIAGNOSIS — R11.0 NAUSEA: ICD-10-CM

## 2024-04-18 DIAGNOSIS — R30.0 DYSURIA: ICD-10-CM

## 2024-04-18 DIAGNOSIS — N94.9 VAGINAL DISCOMFORT: ICD-10-CM

## 2024-04-18 DIAGNOSIS — R07.0 THROAT PAIN: ICD-10-CM

## 2024-04-18 DIAGNOSIS — Z20.822 SUSPECTED 2019 NOVEL CORONAVIRUS INFECTION: ICD-10-CM

## 2024-04-18 LAB
ALBUMIN UR-MCNC: NEGATIVE MG/DL
APPEARANCE UR: CLEAR
BILIRUB UR QL STRIP: NEGATIVE
CLUE CELLS: ABNORMAL
COLOR UR AUTO: YELLOW
DEPRECATED S PYO AG THROAT QL EIA: NEGATIVE
FLUAV AG SPEC QL IA: NEGATIVE
FLUBV AG SPEC QL IA: NEGATIVE
GLUCOSE UR STRIP-MCNC: NEGATIVE MG/DL
HGB UR QL STRIP: NEGATIVE
KETONES UR STRIP-MCNC: NEGATIVE MG/DL
LEUKOCYTE ESTERASE UR QL STRIP: NEGATIVE
NITRATE UR QL: NEGATIVE
PH UR STRIP: 6 [PH] (ref 5–7)
SP GR UR STRIP: 1.01 (ref 1–1.03)
TRICHOMONAS, WET PREP: ABNORMAL
UROBILINOGEN UR STRIP-ACNC: 0.2 E.U./DL
WBC'S/HIGH POWER FIELD, WET PREP: ABNORMAL
YEAST, WET PREP: ABNORMAL

## 2024-04-18 PROCEDURE — 99214 OFFICE O/P EST MOD 30 MIN: CPT | Performed by: NURSE PRACTITIONER

## 2024-04-18 PROCEDURE — 87651 STREP A DNA AMP PROBE: CPT | Performed by: NURSE PRACTITIONER

## 2024-04-18 PROCEDURE — 81003 URINALYSIS AUTO W/O SCOPE: CPT | Performed by: NURSE PRACTITIONER

## 2024-04-18 PROCEDURE — 87635 SARS-COV-2 COVID-19 AMP PRB: CPT | Performed by: NURSE PRACTITIONER

## 2024-04-18 PROCEDURE — 87210 SMEAR WET MOUNT SALINE/INK: CPT | Performed by: NURSE PRACTITIONER

## 2024-04-18 PROCEDURE — 87804 INFLUENZA ASSAY W/OPTIC: CPT | Performed by: NURSE PRACTITIONER

## 2024-04-18 RX ORDER — ONDANSETRON 4 MG/1
4 TABLET, ORALLY DISINTEGRATING ORAL EVERY 8 HOURS PRN
Qty: 21 TABLET | Refills: 0 | Status: SHIPPED | OUTPATIENT
Start: 2024-04-18 | End: 2024-04-24

## 2024-04-18 RX ORDER — ACETAMINOPHEN 500 MG
1000 TABLET ORAL ONCE
Status: COMPLETED | OUTPATIENT
Start: 2024-04-18 | End: 2024-04-18

## 2024-04-18 RX ORDER — ONDANSETRON 4 MG/1
4 TABLET, ORALLY DISINTEGRATING ORAL ONCE
Status: COMPLETED | OUTPATIENT
Start: 2024-04-18 | End: 2024-04-18

## 2024-04-18 RX ADMIN — Medication 1000 MG: at 20:14

## 2024-04-18 RX ADMIN — ONDANSETRON 4 MG: 4 TABLET, ORALLY DISINTEGRATING ORAL at 20:18

## 2024-04-19 LAB
GROUP A STREP BY PCR: NOT DETECTED
SARS-COV-2 RNA RESP QL NAA+PROBE: NEGATIVE

## 2024-04-19 NOTE — PROGRESS NOTES
ICD-10-CM    1. Viral URI with cough  J06.9       2. Dysuria  R30.0 UA Macroscopic with reflex to Microscopic and Culture - Clinic Collect     Wet prep - Clinic Collect      3. Throat pain  R07.0 Streptococcus A Rapid Screen w/Reflex to PCR - Clinic Collect     Influenza A & B Antigen - Clinic Collect     Symptomatic COVID-19 Virus (Coronavirus) by PCR Nose     Group A Streptococcus PCR Throat Swab      4. Fever and chills  R50.9 Influenza A & B Antigen - Clinic Collect     Symptomatic COVID-19 Virus (Coronavirus) by PCR Nose     acetaminophen (TYLENOL) tablet 1,000 mg      5. Suspected 2019 novel coronavirus infection  Z20.822 Symptomatic COVID-19 Virus (Coronavirus) by PCR Nose      6. Vaginal discomfort  N94.9 Wet prep - Clinic Collect      7. Nausea  R11.0 ondansetron (ZOFRAN ODT) ODT tab 4 mg     ondansetron (ZOFRAN ODT) 4 MG ODT tab        Rest.  Fluids.  Delsym for cough suppression at night.  Mucinex as desired during the day.  Tylenol or ibuprofen as needed for fever or pain.  Ondansetron as needed for nausea.  Warm sitz bath's 3-4 times a day.  Recheck in 10 days if symptoms have not improved, sooner if they worsen.  Decongestant as needed.    Red flag warning signs and when to go to the emergency room discussed.  Reviewed potential adverse reactions to medications.    Labs:  Results for orders placed or performed in visit on 04/18/24 (from the past 24 hour(s))   UA Macroscopic with reflex to Microscopic and Culture - Clinic Collect    Specimen: Urine, Midstream   Result Value Ref Range    Color Urine Yellow Colorless, Straw, Light Yellow, Yellow    Appearance Urine Clear Clear    Glucose Urine Negative Negative mg/dL    Bilirubin Urine Negative Negative    Ketones Urine Negative Negative mg/dL    Specific Gravity Urine 1.010 1.003 - 1.035    Blood Urine Negative Negative    pH Urine 6.0 5.0 - 7.0    Protein Albumin Urine Negative Negative mg/dL    Urobilinogen Urine 0.2 0.2, 1.0 E.U./dL    Nitrite Urine  Negative Negative    Leukocyte Esterase Urine Negative Negative    Narrative    Microscopic not indicated   Streptococcus A Rapid Screen w/Reflex to PCR - Clinic Collect    Specimen: Throat; Swab   Result Value Ref Range    Group A Strep antigen Negative Negative   Influenza A & B Antigen - Clinic Collect    Specimen: Nose; Swab   Result Value Ref Range    Influenza A antigen Negative Negative    Influenza B antigen Negative Negative    Narrative    Test results must be correlated with clinical data. If necessary, results should be confirmed by a molecular assay or viral culture.   Wet prep - Clinic Collect    Specimen: Vagina; Swab   Result Value Ref Range    Trichomonas Absent Absent    Yeast Absent Absent    Clue Cells Absent Absent    WBCs/high power field 1+ (A) None       SUBJECTIVE:   Randee Millard is a 43 year old female presenting with a chief complaint of   Chief Complaint   Patient presents with    Urgent Care     Pt presents with throat pain, fever, body aches, constipation and cough X 3 days. Also pt reports urinary frequency.   Last BM yesterday    Tried Tylenol with only minor relief but as soon as it wears off the fever comes back.    Review of systems is negative except for as noted in the HPI.    OBJECTIVE  /72   Pulse 104   Temp (!) 101.9  F (38.8  C) (Tympanic)   Resp 22   LMP 03/19/2024 (Approximate)   SpO2 98%       GENERAL: Alert, mild distress  SKIN: skin is clear, no rash or abnormal pigmentation  HEAD: The head is normocephalic.   EYES: The eyes are normal. The conjunctivae and cornea normal.   NECK: The neck is supple and thyroid is normal, no masses; LYMPH NODES: No adenopathy  HENT: Bilateral tympanic membranes and canals appear normal, small amount of clear rhinorrhea is present nasal passages, mild erythema of pharynx  LUNGS: The lung fields are clear to auscultation, no rales, rhonchi, wheezing or retractions  CV: Rhythm is regular. S1 and S2 are normal. No  murmurs.  Abdomen: Soft, nontender, normal bowel sounds  EXTREMITIES: Symmetric extremities no deformities    BLANCA Curran, CNP  [facility] Provider    The use of [employer] dictation services may have been used to construct the content in this note; any grammatical or spelling errors are non-intentional. Please contact the author of this note directly if you are in need of any clarification.

## 2024-04-19 NOTE — PATIENT INSTRUCTIONS
Be sure you are drinking plenty of water. Rest as much as possible.    Sit in a tub of warm water 3-4 times a day for 15 minutes.    Acetaminophen (Tylenol) may be taken up to 4000mg daily (3000mg if over 65) which would be 2 regular strength tablets (325mg) or two extra strength tablets(500mg) up to 4 times a day (3 times a day if over 65).       Check for acetaminophen in other OTC or prescription medications and be sure you add this in the maximum amount you take every day.    Too much acetaminophen can lead to serious liver damage. DO NOT TAKE if you have a history of liver disease.    Ibuprofen 200mg tablets may be taken up to 4 pills 4 times a day(three times a day if over 65)  to the maximum of 3200mg,  (2400mg if >65)  daily as needed for pain or fever.   Take with food.  Don't take with aspirin, Aleve or other antiinflammatory medication or with warfarin. DO NOT TAKE if you have a history of kidney disease.     Viral Respiratory Infection: Care Instructions  Overview     A viral respiratory infection is an infection of the nose, sinuses, or throat caused by a virus. Colds and the flu are common types of viral respiratory infections.  The symptoms of a viral respiratory infection often start quickly. They include a fever, sore throat, and runny nose. You may also just not feel well. Or you may not want to eat much.  Most viral infections can be treated with home care. This may include drinking lots of fluids and taking over-the-counter pain medicine. You will probably feel better in 4 to 10 days.  Antibiotics are not used to treat a viral infection. Antibiotics don't kill viruses, so they won't help cure a viral illness.  In some cases, a doctor may prescribe antiviral medicine to help your body fight a serious viral infection.  Follow-up care is a key part of your treatment and safety. Be sure to make and go to all appointments, and call your doctor if you are having problems. It's also a good idea to know  your test results and keep a list of the medicines you take.  How can you care for yourself at home?  To prevent dehydration, drink plenty of fluids. Choose water and other clear liquids until you feel better. If you have kidney, heart, or liver disease and have to limit fluids, talk with your doctor before you increase the amount of fluids you drink.  Ask your doctor if you can take an over-the-counter pain medicine, such as acetaminophen (Tylenol), ibuprofen (Advil, Motrin), or naproxen (Aleve). Be safe with medicines. Read and follow all instructions on the label. No one younger than 20 should take aspirin. It has been linked to Reye syndrome, a serious illness.  Be careful when taking over-the-counter cold or flu medicines and Tylenol at the same time. Many of these medicines have acetaminophen, which is Tylenol. Read the labels to make sure that you are not taking more than the recommended dose. Too much acetaminophen (Tylenol) can be harmful.  Get plenty of rest.  Use saline (saltwater) nasal washes to help keep your nasal passages open and wash out mucus and allergens. You can buy saline nose sprays at a grocery store or drugstore. Follow the instructions on the package. Or you can make your own at home. Add 1 teaspoon of non-iodized salt and 1 teaspoon of baking soda to 2 cups of distilled or boiled and cooled water. Fill a squeeze bottle or neti pot with the nasal wash. Then put the tip into your nostril, and lean over the sink. With your mouth open, gently squirt the liquid. Repeat on the other side.  Use a vaporizer or humidifier to add moisture to your bedroom. Follow the instructions for cleaning the machine.  Do not smoke or allow others to smoke around you. If you need help quitting, talk to your doctor about stop-smoking programs and medicines. These can increase your chances of quitting for good.  When should you call for help?   Call 911 anytime you think you may need emergency care. For example,  call if:    You have severe trouble breathing.   Call your doctor now or seek immediate medical care if:    You have a new or higher fever.     Your fever lasts more than 48 hours.     You have trouble breathing.     You have a fever with a stiff neck or a severe headache.     You are sensitive to light.     You feel very sleepy or confused.   Watch closely for changes in your health, and be sure to contact your doctor if:    You do not get better as expected.

## 2024-04-24 ENCOUNTER — OFFICE VISIT (OUTPATIENT)
Dept: ENDOCRINOLOGY | Facility: CLINIC | Age: 44
End: 2024-04-24
Payer: COMMERCIAL

## 2024-04-24 VITALS
RESPIRATION RATE: 16 BRPM | BODY MASS INDEX: 31.11 KG/M2 | HEIGHT: 67 IN | HEART RATE: 88 BPM | SYSTOLIC BLOOD PRESSURE: 105 MMHG | WEIGHT: 198.2 LBS | OXYGEN SATURATION: 95 % | DIASTOLIC BLOOD PRESSURE: 73 MMHG | TEMPERATURE: 99.4 F

## 2024-04-24 DIAGNOSIS — E01.0 THYROMEGALY: Primary | ICD-10-CM

## 2024-04-24 PROCEDURE — 99204 OFFICE O/P NEW MOD 45 MIN: CPT | Performed by: INTERNAL MEDICINE

## 2024-04-24 PROCEDURE — G2211 COMPLEX E/M VISIT ADD ON: HCPCS | Performed by: INTERNAL MEDICINE

## 2024-04-24 NOTE — PATIENT INSTRUCTIONS
[facility]  Dr Mack, Endocrinology Department    [facility]   [address]  [place] [address]  Appointment [phone]  Fax: [phone]  [place]: Monday - Thursday      Please check the cost coverage and copay with insurance before recommended tests, services and medications (especially if new medications are prescribed).     If ordered, please get blood work done 1 week prior to your next appointment so they will be available to Dr. Mack at your visit.    Thyroid Ultrasound with radiology.   If stable-- repeat labs and ultrasound and follow up in 1 year.  Watch for compressive symptoms.     [facility] radiology scheduleing   [place]  [phone]   [facility] Radiology scheduling  [place]  [phone]     Please call and schedule the recommended test as discussed in clinic visit. These are the numbers to call.

## 2024-04-24 NOTE — LETTER
4/24/2024         RE: Randee Millard  [address] [place] [address]        Dear Colleague,    Thank you for referring your patient, Randee Millard, to the [facility]. Please see a copy of my visit note below.    Name: Randee Millard  Seen in consultation with Debbi Sanz MD for thyromegaly.  HPI:  Randee Millard is a 43 year old female who presents for the evaluation of Thyromegaly    has a past medical history of Anemia, Fibroid uterus, Kidney infection (05/30/2021), and Reflux esophagitis.  She was seen in 2017 by endocrinology for thyroid dysfunction.    Thyroid US 2018:  The right lobe of the thyroid gland measures 5.1 x 1.6 x 1.7  cm and the left lobe measures 3.7 x 0.9 x 0.8 cm.  No  solid nodule or mass is demonstrated.  Lost follow up after that.    Struggling with sore throat. Was seen at urgent care.  Before the infection-- she reports that  sometimes having gagging sensation in throat.  Sometimes checking on food  ( once a week )and feels like something is stuck in throat.    No FH of thyroid cancer  No history of radiation  No compressive s/s  Thyroid labs in acceptable range. Has +TPO.  She is not on thyroid hormone replacement.  No other major risk factors.    Was diagnosed with Thyroid problem ? at age 27 and plan was to continue to monitor labs at that time.  This was done in [place].  When in [place] she also had thyroid US and plan was to continue to monitor.  She reports that her provider at [place] was checking her blood test every 3 months and was getting ultrasound every 6 months.  I discussed that as long as her thyroid labs are in normal range, she is not on thyroid medication there is no need to check thyroid labs every 3 months.  Ultrasound can be obtained every year or sooner if there are any concerns.  Never been on thyroid hormone replacement.   Feels tired.     Palpitations:  Yes: sometimes. On and off. She has anemia.  Changes to hair or skin: +  hair loss  Diarrhea/Constipation:Yes: + chronic constipation  Changes in menses: Yes: regular. 5 kids. No problems with pregnancies.  Changes in vision:No  Diplopia/Blurryness:No  Dysphagia or Shortness of breath: no SOB  Muscle aches or pain:Yes: sometimes  Tremors:No  Difficulty sleeping:not sleeping much.  Changes in weight: Yes: + wt gain  Wt Readings from Last 2 Encounters:   04/24/24 89.9 kg (198 lb 3.2 oz)   04/02/24 89.3 kg (196 lb 12.8 oz)      Heat or cold intolerance: + cold intolerance  History of Lithium or Amiodarone use:No  Head or neck surgery/radiation:No    PMH/PSH:  Past Medical History:   Diagnosis Date     Anemia      Fibroid uterus      Kidney infection 05/30/2021     Reflux esophagitis      Past Surgical History:   Procedure Laterality Date     US BREAST CYST ASPIRATION INITIAL RT       Family Hx:  Family History   Problem Relation Age of Onset     Hypertension Father      Diabetes Father 63     Family History Negative Mother      No Known Problems Brother      No Known Problems Sister      No Known Problems Son      No Known Problems Daughter                Social Hx:  Social History     Socioeconomic History     Marital status:      Spouse name: Not on file     Number of children: Not on file     Years of education: Not on file     Highest education level: Not on file   Occupational History     Occupation:  full time   Tobacco Use     Smoking status: Never     Smokeless tobacco: Never   Vaping Use     Vaping status: Never Used   Substance and Sexual Activity     Alcohol use: No     Alcohol/week: 0.0 standard drinks of alcohol     Drug use: No     Sexual activity: Yes     Partners: Male     Birth control/protection: Condom   Other Topics Concern     Parent/sibling w/ CABG, MI or angioplasty before 65F 55M? Not Asked   Social History Narrative     Not on file     Social Determinants of Health     Financial Resource Strain: Not on file   Food Insecurity: Not on file  "  Transportation Needs: Not on file   Physical Activity: Not on file   Stress: Not on file   Social Connections: Not on file   Interpersonal Safety: Not on file   Housing Stability: Not on file          MEDICATIONS:  has a current medication list which includes the following prescription(s): ferrous sulfate.    Review of Systems  10 point ROS neg other than the symptoms noted above in the HPI.    Physical Exam   VS: /73 (BP Location: Left arm, Patient Position: Chair, Cuff Size: Adult Large)   Pulse 88   Temp 99.4  F (37.4  C) (Tympanic)   Resp 16   Ht 1.708 m (5' 7.24\")   Wt 89.9 kg (198 lb 3.2 oz)   LMP 04/10/2024 (Approximate)   SpO2 95%   Breastfeeding No   BMI 30.82 kg/m    GENERAL: healthy, alert and no distress  EYES: Eyes grossly normal to inspection, conjunctivae and sclerae normal  ENT: no nose swelling, nasal discharge.  Thyroid: no apparent thyroid nodules. + Enlarged thyroid gland.  Nontender  CV: RRR, no rubs, gallops, no murmurs  RESP: CTAB, no wheezes, rales, or ronchi  ABDO: +BS  EXTREMITIES: no hand tremors.  NEURO: Cranial nerves grossly intact, mentation intact and speech normal  SKIN: No apparent skin lesions, rash or edema seen   PSYCH: mentation appears normal, affect normal/bright, judgement and insight intact, normal speech and appearance well-groomed      LABS:  TFTs:  TSH   Date Value Ref Range Status   02/22/2024 2.31 0.30 - 4.20 uIU/mL Final   05/26/2022 1.81 0.40 - 4.00 mU/L Final   08/19/2020 2.75 0.40 - 4.00 mU/L Final       Thyroid Ultrasound 2018:  FINDINGS: The right lobe of the thyroid gland measures 5.1 x 1.6 x 1.7  cm and the left lobe measures 3.7 x 0.9 x 0.8 cm. The isthmus measures  0.2 cm. There is a slightly heterogeneous appearance of the thyroid.  There are a few small cystic areas, all measuring less than 0.5 cm. No  solid nodule or mass is demonstrated.  IMPRESSION: Unremarkable examination. No solid thyroid nodule or mass  is seen.     All pertinent " notes, labs, and images personally reviewed by me.     A/P  Ms.Randee Millard is a 43 year old here for the evaluation of thyroid nodule:    Thyromegaly:  Noted thyromegaly on physical examination and thyroid ultrasound 2018 showing enlarged thyroid gland but no discrete thyroid nodules.  She is having some swallowing problem but it is not interfering with her lifestyle.  No difficulty with breathing.    Thyroid labs in acceptable range.  She is not on thyroid hormone replacement.  No other major risk factors.   Plan:  Discussed diagnosis, pathophysiology, management and treatment options of condition with pt.  No recent thyroid ultrasound to review.  Last ultrasound was from 2018.  In the setting of ongoing swallowing problems recommend repeat thyroid ultrasound.  If stable then plan to continue monitor and repeat ultrasound in 1 year.  I discussed compressive symptoms to monitor.  At this time she reports that compressive symptoms are not interfering in her lifestyle and monitoring is appropriate.  I discussed that if there are major compressive symptoms then surgery needs to be considered.    Discussed indications, risks and benefits of all medications prescribed, and answered questions to patient's satisfaction.  The longitudinal plan of care for the diagnosis(es)/condition(s) as documented were addressed during this visit. Due to the added complexity in care, I will continue to support Randee in the subsequent management and with ongoing continuity of care.  All questions were answered.  The patient indicates understanding of the above issues and agrees with the plan set forth.    Follow-up:  As noted in AVS    Poly Mack MD  Endocrinology   [facility]    Cc: Abe Hou    Addendum to above note and clinic visit:    Labs reviewed.    See result note/telephone encounter.           Again, thank you for allowing me to participate in the care of your patient.         Sincerely,        Poly Mack MD

## 2024-04-24 NOTE — PROGRESS NOTES
Name: Randee Millard  Seen in consultation with Debbi Sanz MD for thyromegaly.  HPI:  Randee Millard is a 43 year old female who presents for the evaluation of Thyromegaly    has a past medical history of Anemia, Fibroid uterus, Kidney infection (05/30/2021), and Reflux esophagitis.  She was seen in 2017 by endocrinology for thyroid dysfunction.    Thyroid US 2018:  The right lobe of the thyroid gland measures 5.1 x 1.6 x 1.7  cm and the left lobe measures 3.7 x 0.9 x 0.8 cm.  No  solid nodule or mass is demonstrated.  Lost follow up after that.    Struggling with sore throat. Was seen at urgent care.  Before the infection-- she reports that  sometimes having gagging sensation in throat.  Sometimes checking on food  ( once a week )and feels like something is stuck in throat.    No FH of thyroid cancer  No history of radiation  No compressive s/s  Thyroid labs in acceptable range. Has +TPO.  She is not on thyroid hormone replacement.  No other major risk factors.    Was diagnosed with Thyroid problem ? at age 27 and plan was to continue to monitor labs at that time.  This was done in [place].  When in [place] she also had thyroid US and plan was to continue to monitor.  She reports that her provider at [place] was checking her blood test every 3 months and was getting ultrasound every 6 months.  I discussed that as long as her thyroid labs are in normal range, she is not on thyroid medication there is no need to check thyroid labs every 3 months.  Ultrasound can be obtained every year or sooner if there are any concerns.  Never been on thyroid hormone replacement.   Feels tired.     Palpitations:  Yes: sometimes. On and off. She has anemia.  Changes to hair or skin: + hair loss  Diarrhea/Constipation:Yes: + chronic constipation  Changes in menses: Yes: regular. 5 kids. No problems with pregnancies.  Changes in vision:No  Diplopia/Blurryness:No  Dysphagia or Shortness of breath: no SOB  Muscle aches or pain:Yes:  sometimes  Tremors:No  Difficulty sleeping:not sleeping much.  Changes in weight: Yes: + wt gain  Wt Readings from Last 2 Encounters:   04/24/24 89.9 kg (198 lb 3.2 oz)   04/02/24 89.3 kg (196 lb 12.8 oz)      Heat or cold intolerance: + cold intolerance  History of Lithium or Amiodarone use:No  Head or neck surgery/radiation:No    PMH/PSH:  Past Medical History:   Diagnosis Date    Anemia     Fibroid uterus     Kidney infection 05/30/2021    Reflux esophagitis      Past Surgical History:   Procedure Laterality Date    US BREAST CYST ASPIRATION INITIAL RT       Family Hx:  Family History   Problem Relation Age of Onset    Hypertension Father     Diabetes Father 63    Family History Negative Mother     No Known Problems Brother     No Known Problems Sister     No Known Problems Son     No Known Problems Daughter                Social Hx:  Social History     Socioeconomic History    Marital status:      Spouse name: Not on file    Number of children: Not on file    Years of education: Not on file    Highest education level: Not on file   Occupational History    Occupation:  full time   Tobacco Use    Smoking status: Never    Smokeless tobacco: Never   Vaping Use    Vaping status: Never Used   Substance and Sexual Activity    Alcohol use: No     Alcohol/week: 0.0 standard drinks of alcohol    Drug use: No    Sexual activity: Yes     Partners: Male     Birth control/protection: Condom   Other Topics Concern    Parent/sibling w/ CABG, MI or angioplasty before 65F 55M? Not Asked   Social History Narrative    Not on file     Social Determinants of Health     Financial Resource Strain: Not on file   Food Insecurity: Not on file   Transportation Needs: Not on file   Physical Activity: Not on file   Stress: Not on file   Social Connections: Not on file   Interpersonal Safety: Not on file   Housing Stability: Not on file          MEDICATIONS:  has a current medication list which includes the following  "prescription(s): ferrous sulfate.    Review of Systems  10 point ROS neg other than the symptoms noted above in the HPI.    Physical Exam   VS: /73 (BP Location: Left arm, Patient Position: Chair, Cuff Size: Adult Large)   Pulse 88   Temp 99.4  F (37.4  C) (Tympanic)   Resp 16   Ht 1.708 m (5' 7.24\")   Wt 89.9 kg (198 lb 3.2 oz)   LMP 04/10/2024 (Approximate)   SpO2 95%   Breastfeeding No   BMI 30.82 kg/m    GENERAL: healthy, alert and no distress  EYES: Eyes grossly normal to inspection, conjunctivae and sclerae normal  ENT: no nose swelling, nasal discharge.  Thyroid: no apparent thyroid nodules. + Enlarged thyroid gland.  Nontender  CV: RRR, no rubs, gallops, no murmurs  RESP: CTAB, no wheezes, rales, or ronchi  ABDO: +BS  EXTREMITIES: no hand tremors.  NEURO: Cranial nerves grossly intact, mentation intact and speech normal  SKIN: No apparent skin lesions, rash or edema seen   PSYCH: mentation appears normal, affect normal/bright, judgement and insight intact, normal speech and appearance well-groomed      LABS:  TFTs:  TSH   Date Value Ref Range Status   02/22/2024 2.31 0.30 - 4.20 uIU/mL Final   05/26/2022 1.81 0.40 - 4.00 mU/L Final   08/19/2020 2.75 0.40 - 4.00 mU/L Final       Thyroid Ultrasound 2018:  FINDINGS: The right lobe of the thyroid gland measures 5.1 x 1.6 x 1.7  cm and the left lobe measures 3.7 x 0.9 x 0.8 cm. The isthmus measures  0.2 cm. There is a slightly heterogeneous appearance of the thyroid.  There are a few small cystic areas, all measuring less than 0.5 cm. No  solid nodule or mass is demonstrated.  IMPRESSION: Unremarkable examination. No solid thyroid nodule or mass  is seen.     All pertinent notes, labs, and images personally reviewed by me.     A/P  Ms.Nimo JUSTIN Millard is a 43 year old here for the evaluation of thyroid nodule:    Thyromegaly:  Noted thyromegaly on physical examination and thyroid ultrasound 2018 showing enlarged thyroid gland but no discrete thyroid " nodules.  She is having some swallowing problem but it is not interfering with her lifestyle.  No difficulty with breathing.    Thyroid labs in acceptable range.  She is not on thyroid hormone replacement.  No other major risk factors.   Plan:  Discussed diagnosis, pathophysiology, management and treatment options of condition with pt.  No recent thyroid ultrasound to review.  Last ultrasound was from 2018.  In the setting of ongoing swallowing problems recommend repeat thyroid ultrasound.  If stable then plan to continue monitor and repeat ultrasound in 1 year.  I discussed compressive symptoms to monitor.  At this time she reports that compressive symptoms are not interfering in her lifestyle and monitoring is appropriate.  I discussed that if there are major compressive symptoms then surgery needs to be considered.    Discussed indications, risks and benefits of all medications prescribed, and answered questions to patient's satisfaction.  The longitudinal plan of care for the diagnosis(es)/condition(s) as documented were addressed during this visit. Due to the added complexity in care, I will continue to support Randee in the subsequent management and with ongoing continuity of care.  All questions were answered.  The patient indicates understanding of the above issues and agrees with the plan set forth.    Follow-up:  As noted in AVS    Poly Mack MD  Endocrinology   [facility]    Cc: Abe Hou    Addendum to above note and clinic visit:    Labs reviewed.    See result note/telephone encounter.

## 2024-05-02 ENCOUNTER — HOSPITAL ENCOUNTER (OUTPATIENT)
Dept: ULTRASOUND IMAGING | Facility: CLINIC | Age: 44
Discharge: HOME | End: 2024-05-02
Attending: INTERNAL MEDICINE | Admitting: INTERNAL MEDICINE
Payer: COMMERCIAL

## 2024-05-02 DIAGNOSIS — E01.0 THYROMEGALY: ICD-10-CM

## 2024-05-02 PROCEDURE — 76536 US EXAM OF HEAD AND NECK: CPT

## 2024-05-02 NOTE — LETTER
May 9, 2024      Randee Millard  [address] [place] [facility] [address]        Dear ,    We are writing to inform you of your test results.    Labs/ Imaging studies done with endocrinology are attached.  As compared to previous ultrasound, size of thyroid gland appears stable.  There are 2 small nodules identified on liver ultrasound.  Both nodules are small and biopsy is not indicated at this time.  Recommend repeat ultrasound in 1 year (before next appointment)    Resulted Orders   US Thyroid    Narrative    US THYROID 5/2/2024 3:50 PM    CLINICAL HISTORY: Thyromegaly  TECHNIQUE: Thyroid ultrasound.     COMPARISON: 1/10/2018     FINDINGS:  RIGHT lobe: 4.8 x 1.4 x 1.6 cm. Homogeneous echotexture.  Isthmus: 4 mm.  LEFT lobe: 3.6 x 0.8 x 0.9  cm. Homogeneous echotexture.    NODULES:    Nodule 1: A 0.6 x 0.5 x 0.5 cm nodule in the midright lobe, not  previously visualized.   Composition: Mixed cystic and solid, 1 point   Echogenicity: Hyperechoic or isoechoic, 1 point   Shape: Not taller than wide, 0 points   Margin: Smooth, 0 points   Echogenic Foci: None, or large comet-tail artifacts, 0 points   Point Total: 1-2 points. TI-RADS 2. No FNA.      Nodule 2: A 0.6 x 0.5 x 0.5 cm nodule in the left lobe not previously  visualized.  Composition: Solid or almost completely solid, 2 points   Echogenicity: Hyperechoic or isoechoic, 1 point   Shape: Not taller than wide, 0 points   Margin: Smooth, 0 points   Echogenic Foci: None, or large comet-tail artifacts, 0 points   Point Total: 3 points. TI-RADS 3. If 2.5 cm or larger, recommend FNA;  if 1.5 cm or larger, recommend follow up US at 1, 3, and 5 years.      Impression    IMPRESSION:  1.  Two new 6 mm thyroid nodules as discussed.    Nodules are characterized per  ACR Thyroid Imaging, Reporting and Data System (TI-RADS): White Paper  of the ACR TI-RADS Committee  Leonardo Butler et al. Journal of the American College of  Radiology 2017. Volume 14 (2017), Issue 5,  262-522.     SYLVESTER PELLETIER MD         SYSTEM ID:  [id number]       If you have any questions or concerns, please call the clinic at the number listed above.       Sincerely,      Poly Mack MD

## 2024-05-07 NOTE — RESULT ENCOUNTER NOTE
Endo staff- can you please place orders for thyroid ultrasound to be done few days prior to next appointment ?  Thank you.      [facility]    Labs/ Imaging studies done with endocrinology are attached.  As compared to previous ultrasound, size of thyroid gland appears stable.  There are 2 small nodules identified on liver ultrasound.  Both nodules are small and biopsy is not indicated at this time.  Recommend repeat ultrasound in 1 year (before next appointment)    Please call endocrinology clinic if questions.    Please send a letter with above lab/test results and above comments.    Thank you.    Poly Mack MD

## 2024-05-09 DIAGNOSIS — E01.0 THYROMEGALY: Primary | ICD-10-CM

## 2024-05-10 ENCOUNTER — TELEPHONE (OUTPATIENT)
Dept: INTERNAL MEDICINE | Facility: CLINIC | Age: 44
End: 2024-05-10

## 2024-05-10 NOTE — TELEPHONE ENCOUNTER
I called LM for the pt to c/b to inform that the US should be schedule 1-2 weeks before her appt next year. Please inform the pt to reschedule.

## 2024-05-10 NOTE — TELEPHONE ENCOUNTER
Patient state US scheduling called her to schedule another US so she scheduled 5/15/24     Patient states she already had an US 5/2     Reviewed 5/2 result note states have US before next appointment   Next appointment is April 2025   Please clarify-routing to endocrine provider  and RN on 5/2 US result note. Lizet Sewell RN     Future Appointments 5/10/2024 - 11/6/2024        Date Visit Type Length Department Provider     5/13/2024 10:45 AM RETURN LONG (NON-OB) 30 min [facility] OB[facility]GYN Carmen Mckeon MD              5/15/2024  2:00 PM US THYROID 30 min RH ULTRASOUND [id number]    Location Instructions:     [facility] [address] [place] [address]  Parking Imaging customers can park either in the lot to the right side of the driveway (opposite the parking ramp) as you approach the [facility], or in the parking ramp.  Entrance and check-in location Enter at the front entrance (pictured to the right). As you enter the lobby, the Imaging Center is on the first floor, just past the elevators. Please check-in for your appointment at the desk in the Imaging Center waiting area, [address]

## 2024-05-15 ENCOUNTER — PATIENT OUTREACH (OUTPATIENT)
Dept: CARE COORDINATION | Facility: CLINIC | Age: 44
End: 2024-05-15
Payer: COMMERCIAL

## 2024-05-16 ENCOUNTER — TRANSFERRED RECORDS (OUTPATIENT)
Dept: HEALTH INFORMATION MANAGEMENT | Facility: CLINIC | Age: 44
End: 2024-05-16
Payer: COMMERCIAL

## 2024-07-10 NOTE — TELEPHONE ENCOUNTER
Reviewed open encounter.     Patient has future appointment and new order for thyroid US.   4/24/25    Closing. Message was left for patient per note.

## 2024-10-08 ENCOUNTER — OFFICE VISIT (OUTPATIENT)
Dept: FAMILY MEDICINE | Facility: CLINIC | Age: 44
End: 2024-10-08
Payer: COMMERCIAL

## 2024-10-08 VITALS
HEIGHT: 67 IN | BODY MASS INDEX: 31.92 KG/M2 | SYSTOLIC BLOOD PRESSURE: 123 MMHG | OXYGEN SATURATION: 99 % | DIASTOLIC BLOOD PRESSURE: 78 MMHG | WEIGHT: 203.4 LBS | HEART RATE: 85 BPM | RESPIRATION RATE: 18 BRPM

## 2024-10-08 DIAGNOSIS — Z12.31 VISIT FOR SCREENING MAMMOGRAM: ICD-10-CM

## 2024-10-08 DIAGNOSIS — R30.0 DYSURIA: ICD-10-CM

## 2024-10-08 DIAGNOSIS — G44.89 OTHER HEADACHE SYNDROME: ICD-10-CM

## 2024-10-08 DIAGNOSIS — D50.0 IRON DEFICIENCY ANEMIA DUE TO CHRONIC BLOOD LOSS: Primary | ICD-10-CM

## 2024-10-08 DIAGNOSIS — M54.41 ACUTE MIDLINE LOW BACK PAIN WITH RIGHT-SIDED SCIATICA: ICD-10-CM

## 2024-10-08 DIAGNOSIS — Z12.11 SCREEN FOR COLON CANCER: ICD-10-CM

## 2024-10-08 LAB
ALBUMIN UR-MCNC: NEGATIVE MG/DL
APPEARANCE UR: CLEAR
BILIRUB UR QL STRIP: NEGATIVE
COLOR UR AUTO: YELLOW
ERYTHROCYTE [DISTWIDTH] IN BLOOD BY AUTOMATED COUNT: 14.7 % (ref 10–15)
FERRITIN SERPL-MCNC: 13 NG/ML (ref 6–175)
GLUCOSE UR STRIP-MCNC: NEGATIVE MG/DL
HCT VFR BLD AUTO: 34 % (ref 35–47)
HGB BLD-MCNC: 10.6 G/DL (ref 11.7–15.7)
HGB UR QL STRIP: ABNORMAL
KETONES UR STRIP-MCNC: NEGATIVE MG/DL
LEUKOCYTE ESTERASE UR QL STRIP: NEGATIVE
MCH RBC QN AUTO: 24.5 PG (ref 26.5–33)
MCHC RBC AUTO-ENTMCNC: 31.2 G/DL (ref 31.5–36.5)
MCV RBC AUTO: 79 FL (ref 78–100)
NITRATE UR QL: NEGATIVE
PH UR STRIP: 6 [PH] (ref 5–7)
PLATELET # BLD AUTO: 353 10E3/UL (ref 150–450)
RBC # BLD AUTO: 4.32 10E6/UL (ref 3.8–5.2)
RBC #/AREA URNS AUTO: ABNORMAL /HPF
SP GR UR STRIP: <=1.005 (ref 1–1.03)
SQUAMOUS #/AREA URNS AUTO: ABNORMAL /LPF
UROBILINOGEN UR STRIP-ACNC: 0.2 E.U./DL
WBC # BLD AUTO: 9.1 10E3/UL (ref 4–11)
WBC #/AREA URNS AUTO: ABNORMAL /HPF

## 2024-10-08 PROCEDURE — 81001 URINALYSIS AUTO W/SCOPE: CPT | Performed by: PHYSICIAN ASSISTANT

## 2024-10-08 PROCEDURE — 99214 OFFICE O/P EST MOD 30 MIN: CPT | Performed by: PHYSICIAN ASSISTANT

## 2024-10-08 PROCEDURE — 82728 ASSAY OF FERRITIN: CPT | Performed by: PHYSICIAN ASSISTANT

## 2024-10-08 PROCEDURE — G2211 COMPLEX E/M VISIT ADD ON: HCPCS | Performed by: PHYSICIAN ASSISTANT

## 2024-10-08 PROCEDURE — 36415 COLL VENOUS BLD VENIPUNCTURE: CPT | Performed by: PHYSICIAN ASSISTANT

## 2024-10-08 PROCEDURE — 85027 COMPLETE CBC AUTOMATED: CPT | Performed by: PHYSICIAN ASSISTANT

## 2024-10-08 RX ORDER — FERROUS SULFATE 325(65) MG
325 TABLET, DELAYED RELEASE (ENTERIC COATED) ORAL DAILY
Qty: 90 TABLET | Refills: 1 | Status: SHIPPED | OUTPATIENT
Start: 2024-10-08

## 2024-10-08 RX ORDER — CYCLOBENZAPRINE HCL 10 MG
10 TABLET ORAL 3 TIMES DAILY PRN
Qty: 30 TABLET | Refills: 1 | Status: SHIPPED | OUTPATIENT
Start: 2024-10-08 | End: 2024-10-31

## 2024-10-08 NOTE — PROGRESS NOTES
"  Assessment & Plan     Other headache syndrome  Is taking tylenol and helps.  Don't recommend NSAIDS due to heavy menses and anemia as a result.     Acute midline low back pain with right-sided sciatica  Advised flexeril at night   - cyclobenzaprine (FLEXERIL) 10 MG tablet; Take 1 tablet (10 mg) by mouth 3 times daily as needed for muscle spasms.    Iron deficiency anemia secondary to inadequate dietary iron intake  Will recheck   has follow-up with ob/gyn to discuss hysterectomy due to heavy menses.   - Ferritin  - CBC with platelets  - ferrous sulfate (FE TABS) 325 (65 Fe) MG EC tablet; Take 1 tablet (325 mg) by mouth daily.    Dysuria    - UA Macroscopic with reflex to Microscopic and Culture - Lab Collect; Future  - UA Macroscopic with reflex to Microscopic and Culture - Lab Collect  - UA Microscopic with Reflex to Culture    Visit for screening mammogram    - MA Screening Bilateral w/ Carroll; Future    Screen for colon cancer    - Colonoscopy Screening  Referral; Future          BMI  Estimated body mass index is 31.62 kg/m  as calculated from the following:    Height as of this encounter: 1.708 m (5' 7.25\").    Weight as of this encounter: 92.3 kg (203 lb 6.4 oz).             Dez Jeff is a 44 year old, presenting for the following health issues:  Back Pain and Headache        10/8/2024     1:19 PM   Additional Questions   Roomed by Ronda     Has low back pain on right that radiates to right thigh. Has a history of low ferritin and hgb due to heavy menses and fibroid.  Has follow-up with ob/gyn end of October. Takes iron supplement daily.        Has menses right now on day 4.     History of Present Illness       Back Pain:  She presents for follow up of back pain. Patient's back pain is a new problem.    Original cause of back pain: turning/bending  First noticed back pain: 1-4 weeks ago  Patient feels back pain: constantlyLocation of back pain:  Right lower back  Description of back pain: " "stabbing  Back pain spreads: right thigh    Since patient first noticed back pain, pain is: unchanged  Does back pain interfere with her job:  No  On a scale of 1-10 (10 being the worst), patient describes pain as:  5  What makes back pain worse: certain positions   Acupuncture: not tried  Acetaminophen: helpful  Activity or exercise: not tried  Chiropractor:  Not tried  Cold: not helpful  Heat: not helpful  Massage: not tried  Muscle relaxants: not tried  NSAIDS: not tried  Opioids: not tried  Physical Therapy: not tried  Rest: not helpful  Steroid Injection: not tried  Stretching: not helpful  Surgery: not tried  TENS unit: not tried  Topical pain relievers: not tried  Other healthcare providers patient is seeing for back pain: None    Headaches:   Since the patient's last clinic visit, headaches are: no change  The patient is getting headaches:  A week 3times  She is not able to do normal daily activities when she has a migraine.  The patient is taking the following rescue/relief medications:  Tylenol   Patient states \"I get some relief\" from the rescue/relief medications.   The patient is taking the following medications to prevent migraines:  No medications to prevent migraines  In the past 4 weeks, the patient has gone to an Urgent Care or Emergency Room 0 times times due to headaches.    Reason for visit:  I was been having a pain al over my body and very tire    She eats 0-1 servings of fruits and vegetables daily.She consumes 1 sweetened beverage(s) daily.She exercises with enough effort to increase her heart rate 10 to 19 minutes per day.  She exercises with enough effort to increase her heart rate 3 or less days per week. She is missing 5 dose(s) of medications per week.  She is not taking prescribed medications regularly due to remembering to take.         Has felt like she has bladder infection with frequent urination and burning.   Genitourinary - Female  Onset/Duration: 2 weeks  Description:   Painful " "urination (Dysuria): YES           Frequency: YES  Blood in urine (Hematuria): No  Delay in urine (Hesitency): YES  Intensity:   Progression of Symptoms:    Accompanying Signs & Symptoms:  Fever/chills: No  Flank pain: YES-   Nausea and vomiting: YES- nausea  Vaginal symptoms: itching  Abdominal/Pelvic Pain: YES  History:   History of frequent UTI s: YES  History of kidney stones: YES  Sexually Active: YES  Possibility of pregnancy: No  Precipitating or alleviating factors: None  Therapies tried and outcome:  tylenol       Review of Systems  Constitutional, HEENT, cardiovascular, pulmonary, GI, , musculoskeletal, neuro, skin, endocrine and psych systems are negative, except as otherwise noted.      Objective    /78   Pulse 85   Resp 18   Ht 1.708 m (5' 7.25\")   Wt 92.3 kg (203 lb 6.4 oz)   LMP 10/04/2024 (Exact Date)   SpO2 99%   BMI 31.62 kg/m    Body mass index is 31.62 kg/m .  Physical Exam   GENERAL: alert and no distress  EYES: Eyes grossly normal to inspection, PERRL and conjunctivae and sclerae normal  HENT: ear canals and TM's normal, nose and mouth without ulcers or lesions  RESP: lungs clear to auscultation - no rales, rhonchi or wheezes  CV: regular rate and rhythm, normal S1 S2, no S3 or S4, no murmur, click or rub, no peripheral edema   ABDOMEN: soft, nontender, no hepatosplenomegaly, no masses and bowel sounds normal  NEURO: Normal strength and tone, mentation intact and speech normal  Comprehensive back pain exam:  No tenderness, Pain limits the following motions: flexion, extention, and Straight leg raise negative bilaterally  PSYCH: mentation appears normal, affect normal/bright            Signed Electronically by: Ramona Ann Aaseby-Aguilera, PA-C    "

## 2024-10-08 NOTE — LETTER
October 17, 2024      Randee Millard  [address] [facility] [address]        Dear ,    We are writing to inform you of your test results.    Your Hemoglobin is still mildly low and ferritin is low but in normal range. Please continue to take iron supplement daily     Resulted Orders   UA Macroscopic with reflex to Microscopic and Culture - Lab Collect   Result Value Ref Range    Color Urine Yellow Colorless, Straw, Light Yellow, Yellow    Appearance Urine Clear Clear    Glucose Urine Negative Negative mg/dL    Bilirubin Urine Negative Negative    Ketones Urine Negative Negative mg/dL    Specific Gravity Urine <=1.005 1.003 - 1.035    Blood Urine Large (A) Negative    pH Urine 6.0 5.0 - 7.0    Protein Albumin Urine Negative Negative mg/dL    Urobilinogen Urine 0.2 0.2, 1.0 E.U./dL    Nitrite Urine Negative Negative    Leukocyte Esterase Urine Negative Negative   UA Microscopic with Reflex to Culture   Result Value Ref Range    RBC Urine 2-5 (A) 0-2 /HPF /HPF    WBC Urine 0-5 0-5 /HPF /HPF    Squamous Epithelials Urine Moderate (A) None Seen /LPF    Narrative    Urine Culture not indicated   Ferritin   Result Value Ref Range    Ferritin 13 6 - 175 ng/mL   CBC with platelets   Result Value Ref Range    WBC Count 9.1 4.0 - 11.0 10e3/uL    RBC Count 4.32 3.80 - 5.20 10e6/uL    Hemoglobin 10.6 (L) 11.7 - 15.7 g/dL    Hematocrit 34.0 (L) 35.0 - 47.0 %    MCV 79 78 - 100 fL    MCH 24.5 (L) 26.5 - 33.0 pg    MCHC 31.2 (L) 31.5 - 36.5 g/dL    RDW 14.7 10.0 - 15.0 %    Platelet Count 353 150 - 450 10e3/uL       If you have any questions or concerns, please call the clinic at the number listed above.       Sincerely,      Ramona Ann Aaseby-Aguilera, PA-C

## 2024-10-09 ENCOUNTER — PATIENT OUTREACH (OUTPATIENT)
Dept: CARE COORDINATION | Facility: CLINIC | Age: 44
End: 2024-10-09
Payer: COMMERCIAL

## 2024-10-31 ENCOUNTER — OFFICE VISIT (OUTPATIENT)
Dept: OBGYN | Facility: CLINIC | Age: 44
End: 2024-10-31
Payer: COMMERCIAL

## 2024-10-31 VITALS — BODY MASS INDEX: 31.65 KG/M2 | WEIGHT: 203.6 LBS | SYSTOLIC BLOOD PRESSURE: 122 MMHG | DIASTOLIC BLOOD PRESSURE: 70 MMHG

## 2024-10-31 DIAGNOSIS — D25.1 INTRAMURAL LEIOMYOMA OF UTERUS: ICD-10-CM

## 2024-10-31 DIAGNOSIS — N94.6 DYSMENORRHEA: ICD-10-CM

## 2024-10-31 DIAGNOSIS — N92.0 MENORRHAGIA WITH REGULAR CYCLE: Primary | ICD-10-CM

## 2024-10-31 PROCEDURE — 36415 COLL VENOUS BLD VENIPUNCTURE: CPT | Performed by: OBSTETRICS & GYNECOLOGY

## 2024-10-31 PROCEDURE — 80053 COMPREHEN METABOLIC PANEL: CPT | Performed by: OBSTETRICS & GYNECOLOGY

## 2024-10-31 PROCEDURE — 99214 OFFICE O/P EST MOD 30 MIN: CPT | Performed by: OBSTETRICS & GYNECOLOGY

## 2024-10-31 RX ORDER — NORETHINDRONE 5 MG/1
5 TABLET ORAL DAILY
Qty: 90 TABLET | Refills: 4 | Status: SHIPPED | OUTPATIENT
Start: 2024-10-31

## 2024-10-31 NOTE — PROGRESS NOTES
SUBJECTIVE:   CC: pelvic pain, fibroid uterus, HUB                                               Randee Millard is a 44 year old  female who presents to clinic today for the above. Pain previously more on the left side, now on right. Bleeding increasingly heavy, sometimes changing pads hour. Patient's last menstrual period was 10/10/2024 (exact date).   - pain x2 months, 6/10, constant.  - new onset urinary frequency, does believe she empties completely.   - will overflow a large pad, bleeding much heavier in the last 5 months  - using condoms for contraception  - severe dysmenorrhea    Hgb 10.6 10/8/24. Required iron infusion 8 months ago for anemia, hgb 7.6 at that time    TVUS 11/15/23  Measurements:  Uterus:  10.3  x 8.2 8.5  cm     Position is anteverted.  Contour is irregular w/ myomata: 1 Posterior 7.6 x 6.7 x 8.4 cm.     Endo cav: 14.67 mm       Prominent     Right ovary: 4.4 x 2.6 x 3.5  cm  Wnl  Left ovary:   nv     Problem list and histories reviewed & adjusted, as indicated.  Additional history: as documented.    Patient Active Problem List   Diagnosis    Iron deficiency anemia secondary to inadequate dietary iron intake    Vitamin D deficiency    Chronic fatigue    Thyromegaly    Uterine leiomyoma, unspecified location     Past Surgical History:   Procedure Laterality Date    US BREAST CYST ASPIRATION INITIAL RT        Social History     Tobacco Use    Smoking status: Never    Smokeless tobacco: Never   Substance Use Topics    Alcohol use: No     Alcohol/week: 0.0 standard drinks of alcohol      Problem (# of Occurrences) Relation (Name,Age of Onset)    Diabetes (1) Father (63)    Hypertension (1) Father    Family History Negative (1) Mother    No Known Problems (4) Brother (6 brothers), Sister (2 sisters), Son (3 sons), Daughter (1 daughter)              Current Outpatient Medications   Medication Sig Dispense Refill    ferrous sulfate (FE TABS) 325 (65 Fe) MG EC tablet Take 1 tablet (325 mg) by  mouth daily. 90 tablet 1     No current facility-administered medications for this visit.     Allergies   Allergen Reactions    Amoxicillin Rash    Clindamycin Rash       OBJECTIVE:   /70   Wt 92.4 kg (203 lb 9.6 oz)   LMP 10/10/2024 (Exact Date)   BMI 31.65 kg/m     Const: sitting in chair in no acute distress, comfortable  Eyes: no scleral icterus, EOMI  CV: regular rate, well perfused  Pulm: no increased work of breathing, no cough  Skin: warm and dry, no rashes/lesions  Psych: mood stable, appropriate affect  Abd: diffusely tender without guarding  Neuro: A+Ox3   : External genitalia normal well-estrogenized, healthy tissue, infibulation.  No obvious excoriations, lesions, or rashes. Bartholins, urethra, normal.  Normal moist pink vaginal mucosa.  SSE: Normal cervix, normal physiologic discharge.   Bimanual: No CMT, large posterior fibroid palpated vaginally, minim al mobility but some lateral movement, 16w sized uterus    ASSESSMENT/PLAN:                                                    Randee Millard is a 44 year old female  here for the following concenrs:    1. Menorrhagia with regular cycle (Primary)  Suspect fibroids as primary source of heavy bleeding. Plan aygestin as she is not ready to consider surgical intervention. If inadequate improvement with 5mg daily increase to 10mg daily  - US Pelvic Transabdominal and Transvaginal; Future  - norethindrone (AYGESTIN) 5 MG tablet; Take 1 tablet (5 mg) by mouth daily.  Dispense: 90 tablet; Refill: 4  - Comprehensive metabolic panel (BMP + Alb, Alk Phos, ALT, AST, Total. Bili, TP); Future  - Comprehensive metabolic panel (BMP + Alb, Alk Phos, ALT, AST, Total. Bili, TP)    2. Dysmenorrhea  Dysmenorrhea:    - We reviewed causes of dysmenorrhea, including prostaglandin mediated cramping, structural abnormalities (fibroids, polyps, adenomyosis), and endometriosis.   - Scheduled Ibuprofen 600mg every 4 hours or 800mg every 6 hours OR Aleve 2 tabs  every 8-12 hours throughout menses and starting the night before bleeding. Additionally, heating pads to the lower abdomen can be helpful.  -  Reviewed options for hormonal birth control, including oral contraceptive pills, patches, vaginal ring, implant, shot, IUD (hormonal), as well as hysteroscopy D&C, endometrial ablation or hysterectomy, if refractory to the above methods.     - will obtain pelvic US and review management options when results are available.    - norethindrone (AYGESTIN) 5 MG tablet; Take 1 tablet (5 mg) by mouth daily.  Dispense: 90 tablet; Refill: 4    3. Intramural leiomyoma of uterus  - reviewed management options for fibroid uterus including expectant management, myomectomy, hysterectomy, and UAE.  I do believe she is experiencing bulk symptoms which would be improved with hysterectomy. However, she is not yet prepared to consider this option. Will start with pelvic US and proceed with trial of aygestin.   - US Pelvic Transabdominal and Transvaginal; Future  - norethindrone (AYGESTIN) 5 MG tablet; Take 1 tablet (5 mg) by mouth daily.  Dispense: 90 tablet; Refill: 4           Neelam Maldonado MD  Obstetrics and Gynecology   [facility]

## 2024-10-31 NOTE — NURSING NOTE
"Chief Complaint   Patient presents with    Follow Up     For fibroids. Last pelvic US  was 11/15/2023. Patient reports she used to have pain on left side, now pain has switched to the right side. Her periods are very painful with heavy bleeding. Passes a lot of blood clots. Periods last 7 days, and changes her pads hourly.        Initial /70   Wt 92.4 kg (203 lb 9.6 oz)   LMP 10/10/2024 (Exact Date)   BMI 31.65 kg/m   Estimated body mass index is 31.65 kg/m  as calculated from the following:    Height as of 10/8/24: 1.708 m (5' 7.25\").    Weight as of this encounter: 92.4 kg (203 lb 9.6 oz).  BP completed using cuff size: large    Questioned patient about current smoking habits.  Pt. has never smoked.          The following HM Due: NONE    Eron Akhtar CMA               "

## 2024-10-31 NOTE — PATIENT INSTRUCTIONS
Dysmenorrhea (painful periods)    - Causes of dysmenorrhea include prostaglandin mediated cramping, structural abnormalities (fibroids, polyps, adenomyosis), and endometriosis.   - Scheduled Ibuprofen 600mg every 4 hours or 800mg every 6 hours OR Aleve 2 tabs every 8-12 hours throughout menses and starting the night before bleeding. Additionally, heating pads to the lower abdomen can be helpful.  -  Reviewed options for hormonal birth control, including oral contraceptive pills, patches, vaginal ring, implant, shot, IUD (hormonal), as well as hysteroscopy D&C, endometrial ablation or hysterectomy, if refractory to the above methods.     - Obtain pelvic US       Your labs are consistent with anemia (low hemoglobin, which can be caused by low iron).      I recommend increasing your intake of iron rich foods (meat, eggs, spinach, enriched breads and cereals) AND adding an iron supplement of ferrous sulfate 325mg every other day (take supplement with orange juice first thing in the morning for best absorption).      The most common side effect of iron supplementation is constipation, so you can also add a stool softener or other foods high in fiber if this becomes a problem with the supplement.

## 2024-11-01 LAB
ALBUMIN SERPL BCG-MCNC: 4.2 G/DL (ref 3.5–5.2)
ALP SERPL-CCNC: 68 U/L (ref 40–150)
ALT SERPL W P-5'-P-CCNC: 13 U/L (ref 0–50)
ANION GAP SERPL CALCULATED.3IONS-SCNC: 11 MMOL/L (ref 7–15)
AST SERPL W P-5'-P-CCNC: 19 U/L (ref 0–45)
BILIRUB SERPL-MCNC: 0.2 MG/DL
BUN SERPL-MCNC: 6.4 MG/DL (ref 6–20)
CALCIUM SERPL-MCNC: 9.3 MG/DL (ref 8.8–10.4)
CHLORIDE SERPL-SCNC: 106 MMOL/L (ref 98–107)
CREAT SERPL-MCNC: 0.63 MG/DL (ref 0.51–0.95)
EGFRCR SERPLBLD CKD-EPI 2021: >90 ML/MIN/1.73M2
GLUCOSE SERPL-MCNC: 78 MG/DL (ref 70–99)
HCO3 SERPL-SCNC: 23 MMOL/L (ref 22–29)
POTASSIUM SERPL-SCNC: 4.4 MMOL/L (ref 3.4–5.3)
PROT SERPL-MCNC: 7.4 G/DL (ref 6.4–8.3)
SODIUM SERPL-SCNC: 140 MMOL/L (ref 135–145)

## 2024-11-07 ENCOUNTER — ANCILLARY PROCEDURE (OUTPATIENT)
Dept: ULTRASOUND IMAGING | Facility: CLINIC | Age: 44
End: 2024-11-07
Attending: OBSTETRICS & GYNECOLOGY
Payer: COMMERCIAL

## 2024-11-07 DIAGNOSIS — D25.1 INTRAMURAL LEIOMYOMA OF UTERUS: ICD-10-CM

## 2024-11-07 DIAGNOSIS — N92.0 MENORRHAGIA WITH REGULAR CYCLE: ICD-10-CM

## 2024-11-07 PROCEDURE — 76856 US EXAM PELVIC COMPLETE: CPT | Performed by: OBSTETRICS & GYNECOLOGY

## 2024-11-07 PROCEDURE — 76830 TRANSVAGINAL US NON-OB: CPT | Performed by: OBSTETRICS & GYNECOLOGY

## 2024-11-07 NOTE — RESULT ENCOUNTER NOTE
Please call patient and notify her that her uterus and fibroids are about the same size as before. I do still believe that the bulk of the uterus is causing her to be symptomatic and she would feel better with a hysterectomy. Happy to meet with her any time to discuss.     Neelam Maldonado MD

## 2025-02-03 ENCOUNTER — NURSE TRIAGE (OUTPATIENT)
Dept: INTERNAL MEDICINE | Facility: CLINIC | Age: 45
End: 2025-02-03
Payer: COMMERCIAL

## 2025-02-03 NOTE — TELEPHONE ENCOUNTER
"Reason for Disposition   Patient wants to be seen    Additional Information   Negative: Difficult to awaken or acting confused (e.g., disoriented, slurred speech)   Negative: Weakness of the face, arm or leg on one side of the body and new-onset   Negative: Numbness of the face, arm or leg on one side of the body and new-onset   Negative: Loss of speech or garbled speech and new-onset   Negative: Passed out (e.g., fainted, lost consciousness, blacked out and was not responding)   Negative: Sounds like a life-threatening emergency to the triager   Negative: Followed a head injury within last 3 days   Negative: Traumatic Brain Injury (TBI) is suspected   Negative: Sinus pain or congestion is main symptom(s)   Negative: Influenza suspected (i.e., cough, fever, other respiratory symptoms; probable influenza exposure)   Negative: Pregnant   Negative: Unable to walk without falling   Negative: Stiff neck (can't touch chin to chest)   Negative: Other family members (or people in same household) with headaches and possibility of carbon monoxide exposure   Negative: SEVERE headache, states 'worst headache' of life   Negative: SEVERE headache, sudden-onset (i.e., reaching maximum intensity within seconds to 1 hour)   Negative: Severe pain in one eye   Negative: Loss of vision or double vision (Exception: Same as previously diagnosed migraines.)   Negative: Patient sounds very sick or weak to the triager   Negative: Fever > 103 F (39.4 C)   Negative: Fever > 100 F (37.8 C) and has diabetes mellitus or a weak immune system (e.g., HIV positive, cancer chemotherapy, organ transplant, splenectomy, chronic steroids)   Negative: SEVERE headache (e.g., excruciating) and has had severe headaches before   Negative: SEVERE headache and not relieved by pain meds   Negative: SEVERE headache and vomiting   Negative: SEVERE headache and fever    Answer Assessment - Initial Assessment Questions  1. LOCATION: \"Where does it hurt?\"       Back " "of head and sides  2. ONSET: \"When did the headache start?\" (e.g., minutes, hours, days)       10 days  3. PATTERN: \"Does the pain come and go, or has it been constant since it started?\"      intermittent  4. SEVERITY: \"How bad is the pain?\" and \"What does it keep you from doing?\"  (e.g., Scale 1-10; mild, moderate, or severe)      7/10  5. RECURRENT SYMPTOM: \"Have you ever had headaches before?\" If Yes, ask: \"When was the last time?\" and \"What happened that time?\"       no  6. CAUSE: \"What do you think is causing the headache?\"      Unsure.  Has lots of symptoms  7. MIGRAINE: \"Have you been diagnosed with migraine headaches?\" If Yes, ask: \"Is this headache similar?\"       Yes.  Not the same   8. HEAD INJURY: \"Has there been any recent injury to the head?\"       no  9. OTHER SYMPTOMS: \"Do you have any other symptoms?\" (e.g., fever, stiff neck, eye pain, sore throat, cold symptoms)      Sore throat, cold symptoms  10. PREGNANCY: \"Is there any chance you are pregnant?\" \"When was your last menstrual period?\"        no    Protocols used: Headache-A-OH    "

## 2025-02-04 ENCOUNTER — OFFICE VISIT (OUTPATIENT)
Dept: INTERNAL MEDICINE | Facility: CLINIC | Age: 45
End: 2025-02-04
Payer: COMMERCIAL

## 2025-02-04 ENCOUNTER — OFFICE VISIT (OUTPATIENT)
Dept: PEDIATRICS | Facility: CLINIC | Age: 45
End: 2025-02-04
Payer: COMMERCIAL

## 2025-02-04 ENCOUNTER — HOSPITAL ENCOUNTER (OUTPATIENT)
Dept: ULTRASOUND IMAGING | Facility: CLINIC | Age: 45
Discharge: HOME | End: 2025-02-04
Attending: NURSE PRACTITIONER
Payer: COMMERCIAL

## 2025-02-04 VITALS
TEMPERATURE: 98.4 F | OXYGEN SATURATION: 100 % | BODY MASS INDEX: 32.26 KG/M2 | HEART RATE: 85 BPM | SYSTOLIC BLOOD PRESSURE: 113 MMHG | WEIGHT: 206 LBS | RESPIRATION RATE: 18 BRPM | DIASTOLIC BLOOD PRESSURE: 68 MMHG

## 2025-02-04 VITALS
SYSTOLIC BLOOD PRESSURE: 99 MMHG | DIASTOLIC BLOOD PRESSURE: 69 MMHG | TEMPERATURE: 98.1 F | WEIGHT: 206 LBS | RESPIRATION RATE: 20 BRPM | HEART RATE: 73 BPM | OXYGEN SATURATION: 100 % | BODY MASS INDEX: 32.33 KG/M2 | HEIGHT: 67 IN

## 2025-02-04 DIAGNOSIS — R10.11 RUQ ABDOMINAL PAIN: ICD-10-CM

## 2025-02-04 DIAGNOSIS — J02.9 SORE THROAT: Primary | ICD-10-CM

## 2025-02-04 DIAGNOSIS — R68.89 FLU-LIKE SYMPTOMS: ICD-10-CM

## 2025-02-04 LAB
ALBUMIN SERPL BCG-MCNC: 3.7 G/DL (ref 3.5–5.2)
ALP SERPL-CCNC: 67 U/L (ref 40–150)
ALT SERPL W P-5'-P-CCNC: 9 U/L (ref 0–50)
ANION GAP SERPL CALCULATED.3IONS-SCNC: 11 MMOL/L (ref 7–15)
AST SERPL W P-5'-P-CCNC: 15 U/L (ref 0–45)
BASOPHILS # BLD AUTO: 0 10E3/UL (ref 0–0.2)
BASOPHILS NFR BLD AUTO: 0 %
BILIRUB SERPL-MCNC: 0.2 MG/DL
BUN SERPL-MCNC: 6 MG/DL (ref 6–20)
CALCIUM SERPL-MCNC: 8.5 MG/DL (ref 8.8–10.4)
CHLORIDE SERPL-SCNC: 105 MMOL/L (ref 98–107)
CREAT SERPL-MCNC: 0.59 MG/DL (ref 0.51–0.95)
CRP SERPL-MCNC: 15.86 MG/L
DEPRECATED S PYO AG THROAT QL EIA: NEGATIVE
EGFRCR SERPLBLD CKD-EPI 2021: >90 ML/MIN/1.73M2
EOSINOPHIL # BLD AUTO: 0.1 10E3/UL (ref 0–0.7)
EOSINOPHIL NFR BLD AUTO: 1 %
ERYTHROCYTE [DISTWIDTH] IN BLOOD BY AUTOMATED COUNT: 17.3 % (ref 10–15)
FLUAV RNA SPEC QL NAA+PROBE: NEGATIVE
FLUBV RNA RESP QL NAA+PROBE: NEGATIVE
GLUCOSE SERPL-MCNC: 81 MG/DL (ref 70–99)
HCO3 SERPL-SCNC: 22 MMOL/L (ref 22–29)
HCT VFR BLD AUTO: 30.4 % (ref 35–47)
HGB BLD-MCNC: 9.3 G/DL (ref 11.7–15.7)
IMM GRANULOCYTES # BLD: 0 10E3/UL
IMM GRANULOCYTES NFR BLD: 0 %
LYMPHOCYTES # BLD AUTO: 4.2 10E3/UL (ref 0.8–5.3)
LYMPHOCYTES NFR BLD AUTO: 40 %
MCH RBC QN AUTO: 22 PG (ref 26.5–33)
MCHC RBC AUTO-ENTMCNC: 30.6 G/DL (ref 31.5–36.5)
MCV RBC AUTO: 72 FL (ref 78–100)
MONOCYTES # BLD AUTO: 0.8 10E3/UL (ref 0–1.3)
MONOCYTES NFR BLD AUTO: 7 %
NEUTROPHILS # BLD AUTO: 5.4 10E3/UL (ref 1.6–8.3)
NEUTROPHILS NFR BLD AUTO: 51 %
NRBC # BLD AUTO: 0 10E3/UL
NRBC BLD AUTO-RTO: 0 /100
PLATELET # BLD AUTO: 346 10E3/UL (ref 150–450)
POTASSIUM SERPL-SCNC: 3.8 MMOL/L (ref 3.4–5.3)
PROT SERPL-MCNC: 6.8 G/DL (ref 6.4–8.3)
RBC # BLD AUTO: 4.23 10E6/UL (ref 3.8–5.2)
RSV RNA SPEC NAA+PROBE: NEGATIVE
S PYO DNA THROAT QL NAA+PROBE: NOT DETECTED
SARS-COV-2 RNA RESP QL NAA+PROBE: NEGATIVE
SODIUM SERPL-SCNC: 138 MMOL/L (ref 135–145)
WBC # BLD AUTO: 10.6 10E3/UL (ref 4–11)

## 2025-02-04 PROCEDURE — 85025 COMPLETE CBC W/AUTO DIFF WBC: CPT | Performed by: NURSE PRACTITIONER

## 2025-02-04 PROCEDURE — 80053 COMPREHEN METABOLIC PANEL: CPT | Performed by: NURSE PRACTITIONER

## 2025-02-04 PROCEDURE — 87637 SARSCOV2&INF A&B&RSV AMP PRB: CPT

## 2025-02-04 PROCEDURE — 87651 STREP A DNA AMP PROBE: CPT

## 2025-02-04 PROCEDURE — 99207 REFERRAL TO ACUTE AND DIAGNOSTIC SERVICES: CPT

## 2025-02-04 PROCEDURE — 86140 C-REACTIVE PROTEIN: CPT | Performed by: NURSE PRACTITIONER

## 2025-02-04 PROCEDURE — 76705 ECHO EXAM OF ABDOMEN: CPT

## 2025-02-04 PROCEDURE — 36415 COLL VENOUS BLD VENIPUNCTURE: CPT | Performed by: NURSE PRACTITIONER

## 2025-02-04 PROCEDURE — 99214 OFFICE O/P EST MOD 30 MIN: CPT | Performed by: NURSE PRACTITIONER

## 2025-02-04 ASSESSMENT — PAIN SCALES - GENERAL: PAINLEVEL_OUTOF10: NO PAIN (0)

## 2025-02-04 NOTE — PROGRESS NOTES
Acute and Diagnostic Services Clinic Visit    Assessment & Plan     RUQ abdominal pain  - Referral to Acute and Diagnostic Services (Day of diagnostic / First order acute)  - CBC with platelets differential; Future  - Comprehensive metabolic panel; Future  - CRP inflammation; Future  - US Abdomen Limited; Future  - CBC with platelets differential  - Comprehensive metabolic panel  - CRP inflammation      Patient Instructions     Results for orders placed or performed during the hospital encounter of 02/04/25   US Abdomen Limited     Status: None    Narrative    EXAM: US ABDOMEN LIMITED  LOCATION: [facility]  DATE: 2/4/2025    INDICATION:  RUQ abdominal pain  COMPARISON: CT abdomen pelvis 11/22/2023.  TECHNIQUE: Limited abdominal ultrasound.    LIMITATIONS: Upper abdominal structures are partially obscured by bowel gas.    FINDINGS:    GALLBLADDER: Normal. No gallstones, wall thickening, or pericholecystic fluid. Negative sonographic [place]'s sign.    BILE DUCTS: No biliary dilatation. The common duct measures 3 mm.    LIVER: Normal parenchyma with smooth contour. No focal mass.    RIGHT KIDNEY: No hydronephrosis.    PANCREAS: The visualized portions are normal. Partially obscured by bowel gas.    No ascites.      Impression    IMPRESSION:  No cholelithiasis or sonographic findings of acute cholecystitis.       Results for orders placed or performed in visit on 02/04/25   Comprehensive metabolic panel     Status: Abnormal   Result Value Ref Range    Sodium 138 135 - 145 mmol/L    Potassium 3.8 3.4 - 5.3 mmol/L    Carbon Dioxide (CO2) 22 22 - 29 mmol/L    Anion Gap 11 7 - 15 mmol/L    Urea Nitrogen 6.0 6.0 - 20.0 mg/dL    Creatinine 0.59 0.51 - 0.95 mg/dL    GFR Estimate >90 >60 mL/min/1.73m2    Calcium 8.5 (L) 8.8 - 10.4 mg/dL    Chloride 105 98 - 107 mmol/L    Glucose 81 70 - 99 mg/dL    Alkaline Phosphatase 67 40 - 150 U/L    AST 15 0 - 45 U/L    ALT 9 0 - 50 U/L    Protein Total 6.8 6.4 - 8.3  g/dL    Albumin 3.7 3.5 - 5.2 g/dL    Bilirubin Total 0.2 <=1.2 mg/dL   CRP inflammation     Status: Abnormal   Result Value Ref Range    CRP Inflammation 15.86 (H) <5.00 mg/L   CBC with platelets and differential     Status: Abnormal   Result Value Ref Range    WBC Count 10.6 4.0 - 11.0 10e3/uL    RBC Count 4.23 3.80 - 5.20 10e6/uL    Hemoglobin 9.3 (L) 11.7 - 15.7 g/dL    Hematocrit 30.4 (L) 35.0 - 47.0 %    MCV 72 (L) 78 - 100 fL    MCH 22.0 (L) 26.5 - 33.0 pg    MCHC 30.6 (L) 31.5 - 36.5 g/dL    RDW 17.3 (H) 10.0 - 15.0 %    Platelet Count 346 150 - 450 10e3/uL    % Neutrophils 51 %    % Lymphocytes 40 %    % Monocytes 7 %    % Eosinophils 1 %    % Basophils 0 %    % Immature Granulocytes 0 %    NRBCs per 100 WBC 0 <1 /100    Absolute Neutrophils 5.4 1.6 - 8.3 10e3/uL    Absolute Lymphocytes 4.2 0.8 - 5.3 10e3/uL    Absolute Monocytes 0.8 0.0 - 1.3 10e3/uL    Absolute Eosinophils 0.1 0.0 - 0.7 10e3/uL    Absolute Basophils 0.0 0.0 - 0.2 10e3/uL    Absolute Immature Granulocytes 0.0 <=0.4 10e3/uL    Absolute NRBCs 0.0 10e3/uL   CBC with platelets differential     Status: Abnormal    Narrative    The following orders were created for panel order CBC with platelets differential.  Procedure                               Abnormality         Status                     ---------                               -----------         ------                     CBC with platelets and d...[[id number]]  Abnormal            Final result                 Please view results for these tests on the individual orders.   Results for orders placed or performed in visit on 02/04/25   Streptococcus A Rapid Screen w/Reflex to PCR - Clinic Collect     Status: Normal    Specimen: Throat; Swab   Result Value Ref Range    Group A Strep antigen Negative Negative     US normal, no acute findings.    Offered and pt declined CT at this time.    Continue to monitor    Return in about 1 week (around 2/11/2025) for with regular provider if symptoms  persist.    Dez   Randee is a 44 year old, presenting for the following health issues:  Abdominal Pain (RUQ pain X 1.5 weeks)    HPI     Abdominal/Flank Pain  Onset/Duration: X 1.5 weeks   Description:   Character: Sharp  Location: right upper quadrant  Radiation: None  Intensity: 7/10  Progression of Symptoms:  same  Accompanying Signs & Symptoms:  Fever/chills: YES- chills   Gas/Bloating: YES  Nausea: YES  Vomitting: no   Diarrhea: no   Constipation:YES  Dysuria: no            Hematuria: no            Frequency: no            Incontinence of urine: no   History:            Last bowel movement: yesterday-normal   Trauma: no   Previous similar pain: no    Previous tests done: none           Previous Abdominal surgery: no   Precipitating factors:   Does the pain change with:     Food: YES- worsening pain      Bowel Movement: no     Urination: no              Other factors: no   Therapies tried and outcome:  Tylenol PRN for pain, this doesn't help     When food last eaten: 2/3/2024        Review of Systems  Constitutional, neuro, ENT, endocrine, pulmonary, cardiac, gastrointestinal, genitourinary, musculoskeletal, integument and psychiatric systems are negative, except as otherwise noted.      Objective    /68 (BP Location: Right arm, Patient Position: Chair, Cuff Size: Adult Large)   Pulse 85   Temp 98.4  F (36.9  C) (Oral)   Resp 18   Wt 93.4 kg (206 lb)   LMP 01/28/2024 (Exact Date)   SpO2 100%   BMI 32.26 kg/m    Body mass index is 32.26 kg/m .  Physical Exam   GENERAL: alert and no distress  NECK: no adenopathy, no asymmetry, masses, or scars  RESP: lungs clear to auscultation - no rales, rhonchi or wheezes  CV: regular rate and rhythm, normal S1 S2, no S3 or S4, no murmur, click or rub, no peripheral edema  ABDOMEN: tenderness epigastric and RUQ and bowel sounds normal  MS: no gross musculoskeletal defects noted, no edema          Signed Electronically by: BLANCA Means CNP

## 2025-02-04 NOTE — PROGRESS NOTES
"  Assessment & Plan     Sore throat  Patient presents to the clinic for a chief complaint of a sore throat for the last week and a half.  Will order a strep rapid PCR to rule out infection.  Strep swab negative.  No antibiotics needed.  Likely viral.  Advised over-the-counter Tylenol and Advil to help with discomfort.  - Streptococcus A Rapid Screen w/Reflex to PCR - Clinic Collect  - Group A Streptococcus PCR Throat Swab    Flu-like symptoms  Given ongoing flulike symptoms we will test patient for influenza A and B, RSV and COVID.  Patient agrees with the plan.  We are outside the window to treat her with Tamiflu or Paxlovid.  - Influenza A/B, RSV and SARS-CoV2 PCR (COVID-19); Future  - Influenza A/B, RSV and SARS-CoV2 PCR (COVID-19) Nose    RUQ abdominal pain  Given patient's ongoing ill feeling and right upper quadrant pain upon exam and ongoing nausea after eating meals clinical picture suggest possibly cholecystitis.  We will refer patient to acute diagnostic services later today to rule out etiology.  Patient agrees with the plan.  I did advise that the patient should remain n.p.o. until after she is seen in the acute diagnostic services department.  Patient verbalized understanding.  - Referral to Acute and Diagnostic Services (Day of diagnostic / First order acute); Future        30 minutes spent by me on the date of the encounter doing chart review, patient visit, documentation, and discussion with other provider(s)     BMI  Estimated body mass index is 32.26 kg/m  as calculated from the following:    Height as of this encounter: 1.702 m (5' 7\").    Weight as of this encounter: 93.4 kg (206 lb).   Weight management plan: Patient was referred to their PCP to discuss a diet and exercise plan.          Dez Jeff is a 44 year old, presenting for the following health issues:  Patient presents to the clinic for a compliant of a sore throat, headache, nausea and pain in her right upper epigastric area " "under the right breast when she eats. Started with sore throat, then moved to headache and then nausea.   She is tired all the time.   Period: One week ago.   No Vomiting.   No diarrhea.   No cough  No fever - but has had some chills.   Last time she ate and drank- she had a a little bit of water in clinic. No food.   No exposures to any sickness. Nobody else has been sick at home     Headache and Pharyngitis    HPI               Review of Systems  Constitutional, HEENT, cardiovascular, pulmonary, gi and gu systems are negative, except as otherwise noted.      Objective    BP 99/69   Pulse 73   Temp 98.1  F (36.7  C)   Resp 20   Ht 1.702 m (5' 7\")   Wt 93.4 kg (206 lb)   SpO2 100%   Breastfeeding No   BMI 32.26 kg/m    Body mass index is 32.26 kg/m .  Physical Exam   GENERAL: alert and no distress  NECK: no adenopathy, no asymmetry, masses, or scars  RESP: lungs clear to auscultation - no rales, rhonchi or wheezes  CV: regular rate and rhythm, normal S1 S2, no S3 or S4, no murmur, click or rub, no peripheral edema  ABDOMEN: soft, nontender, without hepatosplenomegaly or masses and tenderness RUQ  MS: no gross musculoskeletal defects noted, no edema            Signed Electronically by: BLANCA Felix CNP    "

## 2025-02-04 NOTE — PATIENT INSTRUCTIONS
Results for orders placed or performed during the hospital encounter of 02/04/25   US Abdomen Limited     Status: None    Narrative    EXAM: US ABDOMEN LIMITED  LOCATION: [facility]  DATE: 2/4/2025    INDICATION:  RUQ abdominal pain  COMPARISON: CT abdomen pelvis 11/22/2023.  TECHNIQUE: Limited abdominal ultrasound.    LIMITATIONS: Upper abdominal structures are partially obscured by bowel gas.    FINDINGS:    GALLBLADDER: Normal. No gallstones, wall thickening, or pericholecystic fluid. Negative sonographic [place]'s sign.    BILE DUCTS: No biliary dilatation. The common duct measures 3 mm.    LIVER: Normal parenchyma with smooth contour. No focal mass.    RIGHT KIDNEY: No hydronephrosis.    PANCREAS: The visualized portions are normal. Partially obscured by bowel gas.    No ascites.      Impression    IMPRESSION:  No cholelithiasis or sonographic findings of acute cholecystitis.       Results for orders placed or performed in visit on 02/04/25   Comprehensive metabolic panel     Status: Abnormal   Result Value Ref Range    Sodium 138 135 - 145 mmol/L    Potassium 3.8 3.4 - 5.3 mmol/L    Carbon Dioxide (CO2) 22 22 - 29 mmol/L    Anion Gap 11 7 - 15 mmol/L    Urea Nitrogen 6.0 6.0 - 20.0 mg/dL    Creatinine 0.59 0.51 - 0.95 mg/dL    GFR Estimate >90 >60 mL/min/1.73m2    Calcium 8.5 (L) 8.8 - 10.4 mg/dL    Chloride 105 98 - 107 mmol/L    Glucose 81 70 - 99 mg/dL    Alkaline Phosphatase 67 40 - 150 U/L    AST 15 0 - 45 U/L    ALT 9 0 - 50 U/L    Protein Total 6.8 6.4 - 8.3 g/dL    Albumin 3.7 3.5 - 5.2 g/dL    Bilirubin Total 0.2 <=1.2 mg/dL   CRP inflammation     Status: Abnormal   Result Value Ref Range    CRP Inflammation 15.86 (H) <5.00 mg/L   CBC with platelets and differential     Status: Abnormal   Result Value Ref Range    WBC Count 10.6 4.0 - 11.0 10e3/uL    RBC Count 4.23 3.80 - 5.20 10e6/uL    Hemoglobin 9.3 (L) 11.7 - 15.7 g/dL    Hematocrit 30.4 (L) 35.0 - 47.0 %    MCV 72 (L) 78 -  100 fL    MCH 22.0 (L) 26.5 - 33.0 pg    MCHC 30.6 (L) 31.5 - 36.5 g/dL    RDW 17.3 (H) 10.0 - 15.0 %    Platelet Count 346 150 - 450 10e3/uL    % Neutrophils 51 %    % Lymphocytes 40 %    % Monocytes 7 %    % Eosinophils 1 %    % Basophils 0 %    % Immature Granulocytes 0 %    NRBCs per 100 WBC 0 <1 /100    Absolute Neutrophils 5.4 1.6 - 8.3 10e3/uL    Absolute Lymphocytes 4.2 0.8 - 5.3 10e3/uL    Absolute Monocytes 0.8 0.0 - 1.3 10e3/uL    Absolute Eosinophils 0.1 0.0 - 0.7 10e3/uL    Absolute Basophils 0.0 0.0 - 0.2 10e3/uL    Absolute Immature Granulocytes 0.0 <=0.4 10e3/uL    Absolute NRBCs 0.0 10e3/uL   CBC with platelets differential     Status: Abnormal    Narrative    The following orders were created for panel order CBC with platelets differential.  Procedure                               Abnormality         Status                     ---------                               -----------         ------                     CBC with platelets and d...[[id number]]  Abnormal            Final result                 Please view results for these tests on the individual orders.   Results for orders placed or performed in visit on 02/04/25   Streptococcus A Rapid Screen w/Reflex to PCR - Clinic Collect     Status: Normal    Specimen: Throat; Swab   Result Value Ref Range    Group A Strep antigen Negative Negative     US normal, no acute findings.    Offered and pt declined CT at this time.    Continue to monitor

## 2025-02-26 ENCOUNTER — OFFICE VISIT (OUTPATIENT)
Dept: FAMILY MEDICINE | Facility: CLINIC | Age: 45
End: 2025-02-26
Payer: COMMERCIAL

## 2025-02-26 ENCOUNTER — ANCILLARY PROCEDURE (OUTPATIENT)
Dept: GENERAL RADIOLOGY | Facility: CLINIC | Age: 45
End: 2025-02-26
Payer: COMMERCIAL

## 2025-02-26 VITALS
HEART RATE: 82 BPM | OXYGEN SATURATION: 100 % | BODY MASS INDEX: 31.07 KG/M2 | RESPIRATION RATE: 12 BRPM | WEIGHT: 205 LBS | DIASTOLIC BLOOD PRESSURE: 75 MMHG | TEMPERATURE: 97.9 F | HEIGHT: 68 IN | SYSTOLIC BLOOD PRESSURE: 119 MMHG

## 2025-02-26 DIAGNOSIS — R53.83 OTHER FATIGUE: ICD-10-CM

## 2025-02-26 DIAGNOSIS — M79.10 MYALGIA: ICD-10-CM

## 2025-02-26 DIAGNOSIS — R53.83 OTHER FATIGUE: Primary | ICD-10-CM

## 2025-02-26 LAB
BASOPHILS # BLD AUTO: 0 10E3/UL (ref 0–0.2)
BASOPHILS NFR BLD AUTO: 0 %
EOSINOPHIL # BLD AUTO: 0.1 10E3/UL (ref 0–0.7)
EOSINOPHIL NFR BLD AUTO: 1 %
ERYTHROCYTE [DISTWIDTH] IN BLOOD BY AUTOMATED COUNT: 17.1 % (ref 10–15)
FLUAV AG SPEC QL IA: NEGATIVE
FLUBV AG SPEC QL IA: NEGATIVE
HCT VFR BLD AUTO: 28.4 % (ref 35–47)
HGB BLD-MCNC: 8.9 G/DL (ref 11.7–15.7)
IMM GRANULOCYTES # BLD: 0 10E3/UL
IMM GRANULOCYTES NFR BLD: 0 %
LYMPHOCYTES # BLD AUTO: 4.8 10E3/UL (ref 0.8–5.3)
LYMPHOCYTES NFR BLD AUTO: 41 %
MCH RBC QN AUTO: 22 PG (ref 26.5–33)
MCHC RBC AUTO-ENTMCNC: 31.3 G/DL (ref 31.5–36.5)
MCV RBC AUTO: 70 FL (ref 78–100)
MONOCYTES # BLD AUTO: 0.9 10E3/UL (ref 0–1.3)
MONOCYTES NFR BLD AUTO: 7 %
NEUTROPHILS # BLD AUTO: 6 10E3/UL (ref 1.6–8.3)
NEUTROPHILS NFR BLD AUTO: 50 %
PLATELET # BLD AUTO: 396 10E3/UL (ref 150–450)
RBC # BLD AUTO: 4.04 10E6/UL (ref 3.8–5.2)
WBC # BLD AUTO: 11.8 10E3/UL (ref 4–11)

## 2025-02-26 PROCEDURE — 83735 ASSAY OF MAGNESIUM: CPT

## 2025-02-26 PROCEDURE — 86140 C-REACTIVE PROTEIN: CPT

## 2025-02-26 PROCEDURE — G2211 COMPLEX E/M VISIT ADD ON: HCPCS

## 2025-02-26 PROCEDURE — 84443 ASSAY THYROID STIM HORMONE: CPT

## 2025-02-26 PROCEDURE — 83540 ASSAY OF IRON: CPT

## 2025-02-26 PROCEDURE — 87804 INFLUENZA ASSAY W/OPTIC: CPT

## 2025-02-26 PROCEDURE — 87635 SARS-COV-2 COVID-19 AMP PRB: CPT

## 2025-02-26 PROCEDURE — 85025 COMPLETE CBC W/AUTO DIFF WBC: CPT

## 2025-02-26 PROCEDURE — 3074F SYST BP LT 130 MM HG: CPT

## 2025-02-26 PROCEDURE — 82306 VITAMIN D 25 HYDROXY: CPT

## 2025-02-26 PROCEDURE — 71046 X-RAY EXAM CHEST 2 VIEWS: CPT | Mod: TC | Performed by: RADIOLOGY

## 2025-02-26 PROCEDURE — 1125F AMNT PAIN NOTED PAIN PRSNT: CPT

## 2025-02-26 PROCEDURE — 3078F DIAST BP <80 MM HG: CPT

## 2025-02-26 PROCEDURE — 80053 COMPREHEN METABOLIC PANEL: CPT

## 2025-02-26 PROCEDURE — 36415 COLL VENOUS BLD VENIPUNCTURE: CPT

## 2025-02-26 PROCEDURE — 99214 OFFICE O/P EST MOD 30 MIN: CPT

## 2025-02-26 PROCEDURE — 83550 IRON BINDING TEST: CPT

## 2025-02-26 ASSESSMENT — PAIN SCALES - GENERAL: PAINLEVEL_OUTOF10: SEVERE PAIN (7)

## 2025-02-26 NOTE — PROGRESS NOTES
"  Assessment & Plan     Other fatigue  Patient has history of anemia and is supposed to be supplementing with oral iron, however she and her daughter both reports she is inconsistent with supplementation.  She has had iron infusions in the past.  Could consider this if iron counts are concerningly low, especially given that she is symptomatic.  Will also rule out other physiologic causes for fatigue including kidney dysfunction, thyroid dysfunction.  Plan for patient follow-up to discuss lab work and management plan within the next 3 to 4 weeks.  Could consider iron infusions in the meantime if indicated, but would still recommend follow-up to ensure her symptoms are improving.  Per chart review, patient seems to go between various clinics and providers.  I recommend she follow-up with me or another PCP consistently, at least while we determine how to manage the underlying cause of her anemia.  She does report very heavy periods and patient was seen in gynecology in October 2020 for and prescribed norethindrone, which she has subsequently stopped taking.  Would recommend she go back on this to manage heavy menstrual bleeding, or follow-up with gynecologist for further evaluation and management if she is not going to take her norethindrone.  Lung sounds are slightly diminished on the right base, so ordered chest x-ray to rule out pneumonia or other infectious cause of fatigue.  Chest x-ray was without abnormal findings, radiologist read \"negative chest \".  - CBC with platelets and differential  - Comprehensive metabolic panel (BMP + Alb, Alk Phos, ALT, AST, Total. Bili, TP)  - TSH with free T4 reflex  - Iron and iron binding capacity  - CRP, inflammation  - COVID-19 Virus (Coronavirus) by PCR  - Influenza A & B Antigen  - XR Chest 2 Views    Myalgia  Suspect infectious causes.  She does report being sick a couple of weeks ago.  Will rule out COVID and flu as causes of exacerbation of fatigue.  - CBC with platelets " "and differential  - Comprehensive metabolic panel (BMP + Alb, Alk Phos, ALT, AST, Total. Bili, TP)  - CRP, inflammation  - Magnesium  - XR Chest 2 Views      The longitudinal plan of care for the diagnosis(es)/condition(s) as documented were addressed during this visit. Due to the added complexity in care, I will continue to support Randee in the subsequent management and with ongoing continuity of care.              Subjective   Randee is a 44 year old, presenting for the following health issues:  Pain (Pt reports feeling pain in all over her body and having headaches as well. Started a week ago. Pt reports taking tylenol for the pain but pt states it is not helping her. Pt states she feels like laying down and having little energy to do things.  ) and Dizziness (A week ago started along with the other symptoms )        2/26/2025     1:57 PM   Additional Questions   Roomed by Kiersten Arias MA Learner   Accompanied by Daughter     History of Present Illness       Reason for visit:  I have a pain in all over my body    She eats 0-1 servings of fruits and vegetables daily.She consumes 2 sweetened beverage(s) daily.She exercises with enough effort to increase her heart rate 10 to 19 minutes per day.  She exercises with enough effort to increase her heart rate 3 or less days per week. She is missing 2 dose(s) of medications per week.  She is not taking prescribed medications regularly due to remembering to take.         Dizziness  Onset/Duration: 1 week ago  Description:     Patient here with sudden onset fatigue, dizziness, myalgias. Denies fever, cough.         Objective    /75 (BP Location: Right arm, Patient Position: Sitting, Cuff Size: Adult Large)   Pulse 82   Temp 97.9  F (36.6  C) (Oral)   Resp 12   Ht 1.727 m (5' 8\")   Wt 93 kg (205 lb)   LMP 02/13/2024 (Exact Date)   SpO2 100%   BMI 31.17 kg/m    Body mass index is 31.17 kg/m .  Physical Exam   GENERAL: alert and no distress  EYES: Eyes " grossly normal to inspection, PERRL and conjunctivae and sclerae normal  HENT: normal cephalic/atraumatic, ear canals and TM's normal, oropharynx clear, oral mucous membranes moist, and pale MM  RESP: lungs clear to auscultation - no rales, rhonchi or wheezes  CV: regular rate and rhythm, normal S1 S2, no S3 or S4, no murmur, click or rub, no peripheral edema   ABDOMEN: soft, nontender, no hepatosplenomegaly, no masses and bowel sounds normal  SKIN: no suspicious lesions or rashes  NEURO: Normal strength and tone, mentation intact and speech normal  PSYCH: mentation appears normal, affect normal/bright    Results for orders placed or performed in visit on 02/26/25   XR Chest 2 Views     Status: None    Narrative    EXAM: XR CHEST 2 VIEWS  LOCATION: [facility]  DATE: 2/26/2025    INDICATION:  Other fatigue, Myalgia  COMPARISON: May 3, 2022      Impression    IMPRESSION: Negative chest.   Results for orders placed or performed in visit on 02/26/25   CBC with platelets and differential     Status: Abnormal   Result Value Ref Range    WBC Count 11.8 (H) 4.0 - 11.0 10e3/uL    RBC Count 4.04 3.80 - 5.20 10e6/uL    Hemoglobin 8.9 (L) 11.7 - 15.7 g/dL    Hematocrit 28.4 (L) 35.0 - 47.0 %    MCV 70 (L) 78 - 100 fL    MCH 22.0 (L) 26.5 - 33.0 pg    MCHC 31.3 (L) 31.5 - 36.5 g/dL    RDW 17.1 (H) 10.0 - 15.0 %    Platelet Count 396 150 - 450 10e3/uL    % Neutrophils 50 %    % Lymphocytes 41 %    % Monocytes 7 %    % Eosinophils 1 %    % Basophils 0 %    % Immature Granulocytes 0 %    Absolute Neutrophils 6.0 1.6 - 8.3 10e3/uL    Absolute Lymphocytes 4.8 0.8 - 5.3 10e3/uL    Absolute Monocytes 0.9 0.0 - 1.3 10e3/uL    Absolute Eosinophils 0.1 0.0 - 0.7 10e3/uL    Absolute Basophils 0.0 0.0 - 0.2 10e3/uL    Absolute Immature Granulocytes 0.0 <=0.4 10e3/uL   Influenza A & B Antigen     Status: Normal    Specimen: Nose; Swab   Result Value Ref Range    Influenza A antigen Negative Negative    Influenza B  antigen Negative Negative    Narrative    Test results must be correlated with clinical data. If necessary, results should be confirmed by a molecular assay or viral culture.   CBC with platelets and differential     Status: Abnormal    Narrative    The following orders were created for panel order CBC with platelets and differential.  Procedure                               Abnormality         Status                     ---------                               -----------         ------                     CBC with platelets and d...[[id number]]  Abnormal            Final result                 Please view results for these tests on the individual orders.     No results found for this visit on 02/26/25.        Signed Electronically by: BLANCA Mock CNP

## 2025-02-26 NOTE — PATIENT INSTRUCTIONS
Plan to follow up if your labs are normal, if we have a treatment plan from your labs, cancel your follow up  Take your iron.   Keep using tylenol as needed. Drink lots of water.

## 2025-02-27 LAB
ALBUMIN SERPL BCG-MCNC: 4 G/DL (ref 3.5–5.2)
ALP SERPL-CCNC: 68 U/L (ref 40–150)
ALT SERPL W P-5'-P-CCNC: 12 U/L (ref 0–50)
ANION GAP SERPL CALCULATED.3IONS-SCNC: 11 MMOL/L (ref 7–15)
AST SERPL W P-5'-P-CCNC: 21 U/L (ref 0–45)
BILIRUB SERPL-MCNC: <0.2 MG/DL
BUN SERPL-MCNC: 6.1 MG/DL (ref 6–20)
CALCIUM SERPL-MCNC: 9.4 MG/DL (ref 8.8–10.4)
CHLORIDE SERPL-SCNC: 104 MMOL/L (ref 98–107)
CREAT SERPL-MCNC: 0.58 MG/DL (ref 0.51–0.95)
CRP SERPL-MCNC: 17.3 MG/L
EGFRCR SERPLBLD CKD-EPI 2021: >90 ML/MIN/1.73M2
GLUCOSE SERPL-MCNC: 72 MG/DL (ref 70–99)
HCO3 SERPL-SCNC: 23 MMOL/L (ref 22–29)
IRON BINDING CAPACITY (ROCHE): 387 UG/DL (ref 240–430)
IRON SATN MFR SERPL: 4 % (ref 15–46)
IRON SERPL-MCNC: 14 UG/DL (ref 37–145)
MAGNESIUM SERPL-MCNC: 2.1 MG/DL (ref 1.7–2.3)
POTASSIUM SERPL-SCNC: 3.9 MMOL/L (ref 3.4–5.3)
PROT SERPL-MCNC: 6.9 G/DL (ref 6.4–8.3)
SARS-COV-2 RNA RESP QL NAA+PROBE: NEGATIVE
SODIUM SERPL-SCNC: 138 MMOL/L (ref 135–145)
TSH SERPL DL<=0.005 MIU/L-ACNC: 2.43 UIU/ML (ref 0.3–4.2)
VIT D+METAB SERPL-MCNC: 22 NG/ML (ref 20–50)

## 2025-03-05 DIAGNOSIS — D50.9 IRON DEFICIENCY ANEMIA, UNSPECIFIED IRON DEFICIENCY ANEMIA TYPE: Primary | ICD-10-CM

## 2025-03-05 RX ORDER — FERROUS SULFATE 325(65) MG
325 TABLET ORAL
Qty: 180 TABLET | Refills: 0 | Status: SHIPPED | OUTPATIENT
Start: 2025-03-05

## 2025-03-12 ENCOUNTER — OFFICE VISIT (OUTPATIENT)
Dept: FAMILY MEDICINE | Facility: CLINIC | Age: 45
End: 2025-03-12
Payer: COMMERCIAL

## 2025-03-12 VITALS
TEMPERATURE: 98.1 F | DIASTOLIC BLOOD PRESSURE: 77 MMHG | RESPIRATION RATE: 18 BRPM | SYSTOLIC BLOOD PRESSURE: 116 MMHG | HEIGHT: 68 IN | BODY MASS INDEX: 30.66 KG/M2 | WEIGHT: 202.3 LBS | OXYGEN SATURATION: 99 % | HEART RATE: 83 BPM

## 2025-03-12 DIAGNOSIS — M79.10 MYALGIA: Primary | ICD-10-CM

## 2025-03-12 DIAGNOSIS — N92.0 MENORRHAGIA WITH REGULAR CYCLE: ICD-10-CM

## 2025-03-12 DIAGNOSIS — Z12.11 SCREEN FOR COLON CANCER: ICD-10-CM

## 2025-03-12 DIAGNOSIS — D50.9 IRON DEFICIENCY ANEMIA, UNSPECIFIED IRON DEFICIENCY ANEMIA TYPE: ICD-10-CM

## 2025-03-12 LAB
BASOPHILS # BLD AUTO: 0 10E3/UL (ref 0–0.2)
BASOPHILS NFR BLD AUTO: 0 %
EOSINOPHIL # BLD AUTO: 0.1 10E3/UL (ref 0–0.7)
EOSINOPHIL NFR BLD AUTO: 1 %
ERYTHROCYTE [SEDIMENTATION RATE] IN BLOOD BY WESTERGREN METHOD: 41 MM/HR (ref 0–20)
IMM GRANULOCYTES # BLD: 0 10E3/UL
IMM GRANULOCYTES NFR BLD: 0 %
LYMPHOCYTES # BLD AUTO: 3.6 10E3/UL (ref 0.8–5.3)
LYMPHOCYTES NFR BLD AUTO: 40 %
MONOCYTES # BLD AUTO: 0.7 10E3/UL (ref 0–1.3)
MONOCYTES NFR BLD AUTO: 7 %
NEUTROPHILS # BLD AUTO: 4.6 10E3/UL (ref 1.6–8.3)
NEUTROPHILS NFR BLD AUTO: 51 %
WBC # BLD AUTO: 9 10E3/UL (ref 4–11)

## 2025-03-12 PROCEDURE — 3074F SYST BP LT 130 MM HG: CPT

## 2025-03-12 PROCEDURE — 85048 AUTOMATED LEUKOCYTE COUNT: CPT

## 2025-03-12 PROCEDURE — 3078F DIAST BP <80 MM HG: CPT

## 2025-03-12 PROCEDURE — 85652 RBC SED RATE AUTOMATED: CPT

## 2025-03-12 PROCEDURE — 86140 C-REACTIVE PROTEIN: CPT

## 2025-03-12 PROCEDURE — 1125F AMNT PAIN NOTED PAIN PRSNT: CPT

## 2025-03-12 PROCEDURE — 36415 COLL VENOUS BLD VENIPUNCTURE: CPT

## 2025-03-12 PROCEDURE — 99214 OFFICE O/P EST MOD 30 MIN: CPT

## 2025-03-12 PROCEDURE — 85004 AUTOMATED DIFF WBC COUNT: CPT

## 2025-03-12 ASSESSMENT — ENCOUNTER SYMPTOMS: BACK PAIN: 1

## 2025-03-12 ASSESSMENT — PAIN SCALES - GENERAL: PAINLEVEL_OUTOF10: SEVERE PAIN (7)

## 2025-03-12 NOTE — PROGRESS NOTES
Assessment & Plan     Myalgia  Suspect autoimmune cause given recent elevated WBC and CRP. Plan for repeat with referral as indicated by labs. Could send to rheumatology if continue to be elevated for further workup. No signs of IBD, so RA or other is most likely cause.   - CRP, inflammation  - ESR: Erythrocyte sedimentation rate  - WBC with Diff  - CRP, inflammation  - ESR: Erythrocyte sedimentation rate  - WBC with Diff    Iron deficiency anemia, unspecified iron deficiency anemia type  Menorrhagia with regular cycle  Suspect menorrhagia as large contributor, but is due for colonoscopy, so recommend she get this as well. Referral placed to gynecology after reviewing contraceptive use for control of menstrual bleeding, she would like hormonal IUD placed.   - Colonoscopy Screening  Referral  - Ob/Gyn  Referral    Screen for colon cancer  - Colonoscopy Screening  Referral                    Subjective   Randee is a 44 year old, presenting for the following health issues:  Follow Up (Pt here to follow up body pain. Pt reports still feel sore and has not gotten better. Pt report taking tylenol and it helps a little bit. ) and Back Pain (Pt reports having a middle back pain. Pt thinks is from giving birth in the past. Pt states is been three days. 7 level of pain. )        3/12/2025    10:12 AM   Additional Questions   Roomed by Kiersten Arias MA Learner   Accompanied by Daughter     History of Present Illness       Reason for visit:  Follow up appointment    She eats 0-1 servings of fruits and vegetables daily.She consumes 1 sweetened beverage(s) daily.She exercises with enough effort to increase her heart rate 9 or less minutes per day.  She exercises with enough effort to increase her heart rate 3 or less days per week. She is missing 3 dose(s) of medications per week.  She is not taking prescribed medications regularly due to remembering to take.            Here for follow up and  "further evaluation. Notes full body myalgia. Tylenol helps with this. Continues to have fatigue. Reports taking iron supplement about 3 days a week due to forgetting.   Denies fever, malaise. No weight changes. No hematochezia or bowel changes. Notes very heavy menstrual periods. Is interested in treatment for this.         Objective    /77 (BP Location: Right arm, Patient Position: Sitting, Cuff Size: Adult Large)   Pulse 83   Temp 98.1  F (36.7  C) (Oral)   Resp 18   Ht 1.727 m (5' 8\")   Wt 91.8 kg (202 lb 4.8 oz)   LMP 03/11/2024 (Exact Date)   SpO2 99%   BMI 30.76 kg/m    Body mass index is 30.76 kg/m .  Physical Exam  Constitutional:       General: She is not in acute distress.     Appearance: Normal appearance. She is not ill-appearing, toxic-appearing or diaphoretic.   HENT:      Head: Normocephalic and atraumatic.      Nose: Nose normal.      Mouth/Throat:      Mouth: Mucous membranes are moist.   Eyes:      Extraocular Movements: Extraocular movements intact.      Pupils: Pupils are equal, round, and reactive to light.   Neck:      Vascular: No carotid bruit.   Cardiovascular:      Rate and Rhythm: Normal rate and regular rhythm.      Pulses: Normal pulses.      Heart sounds: Normal heart sounds. No murmur heard.     No friction rub. No gallop.   Pulmonary:      Effort: Pulmonary effort is normal.      Breath sounds: Normal breath sounds.   Abdominal:      General: Abdomen is flat.      Palpations: Abdomen is soft.   Musculoskeletal:         General: Tenderness (bilateral arms and legs, fingers tender to pressure) present. No swelling, deformity or signs of injury. Normal range of motion.      Cervical back: Normal, normal range of motion and neck supple. No rigidity or tenderness.      Thoracic back: Tenderness (over latisimus dorsi bilaterally near spine) present. No bony tenderness. Normal range of motion.      Lumbar back: Normal. No bony tenderness.      Right lower leg: No edema.      " Left lower leg: No edema.   Lymphadenopathy:      Cervical: No cervical adenopathy.   Skin:     General: Skin is warm and dry.      Capillary Refill: Capillary refill takes less than 2 seconds.   Neurological:      General: No focal deficit present.      Mental Status: She is alert.   Psychiatric:         Mood and Affect: Mood normal.         Behavior: Behavior normal.         Thought Content: Thought content normal.         Judgment: Judgment normal.                    Signed Electronically by: BLANCA Mock CNP

## 2025-03-13 LAB — CRP SERPL-MCNC: 11.1 MG/L

## 2025-03-17 ENCOUNTER — TELEPHONE (OUTPATIENT)
Dept: INTERNAL MEDICINE | Facility: CLINIC | Age: 45
End: 2025-03-17
Payer: COMMERCIAL

## 2025-03-17 NOTE — TELEPHONE ENCOUNTER
Reason for Call:  Appointment Request    Patient requesting this type of appt:  office visit    Requested provider: Abe Hou    Reason patient unable to be scheduled: Not within requested timeframe    When does patient want to be seen/preferred time: 3-7 days    Comments: Patient was seen on 03/12/25 and is requesting follow up with primary    Could we send this information to you in [facility] or would you prefer to receive a phone call?:   Patient would prefer a phone call   Okay to leave a detailed message?: Yes at Cell number on file:    Telephone Information:   Mobile [phone]       Call taken on 3/17/2025 at 12:28 PM by Fidelia Mcdonald

## 2025-03-18 NOTE — TELEPHONE ENCOUNTER
Patient was called and a voice message was left. Upon call back pls assist patient in making an aptp

## 2025-03-18 NOTE — TELEPHONE ENCOUNTER
I have not seen this patient since 2022, please advise pt to schedule with any provider in the clinic

## 2025-03-20 NOTE — TELEPHONE ENCOUNTER
Called and spoke with patient. She wanted MD only and asked for Dr Hou. Patient declined other Md providers. Aptp was made for discuss labs take with [place] provider. Patient also declined to have PCP be in [place].

## 2025-04-07 ENCOUNTER — OFFICE VISIT (OUTPATIENT)
Dept: INTERNAL MEDICINE | Facility: CLINIC | Age: 45
End: 2025-04-07
Payer: COMMERCIAL

## 2025-04-07 VITALS
DIASTOLIC BLOOD PRESSURE: 62 MMHG | TEMPERATURE: 98.2 F | OXYGEN SATURATION: 99 % | HEART RATE: 102 BPM | HEIGHT: 68 IN | SYSTOLIC BLOOD PRESSURE: 118 MMHG | BODY MASS INDEX: 30.83 KG/M2 | WEIGHT: 203.4 LBS | RESPIRATION RATE: 16 BRPM

## 2025-04-07 DIAGNOSIS — R79.82 ELEVATED C-REACTIVE PROTEIN (CRP): ICD-10-CM

## 2025-04-07 DIAGNOSIS — M79.10 MYALGIA: ICD-10-CM

## 2025-04-07 DIAGNOSIS — J02.9 SORE THROAT: ICD-10-CM

## 2025-04-07 DIAGNOSIS — D50.9 IRON DEFICIENCY ANEMIA, UNSPECIFIED IRON DEFICIENCY ANEMIA TYPE: ICD-10-CM

## 2025-04-07 DIAGNOSIS — Z71.2 ENCOUNTER TO DISCUSS TEST RESULTS: Primary | ICD-10-CM

## 2025-04-07 DIAGNOSIS — M25.50 ARTHRALGIA, UNSPECIFIED JOINT: ICD-10-CM

## 2025-04-07 LAB
DEPRECATED S PYO AG THROAT QL EIA: NEGATIVE
S PYO DNA THROAT QL NAA+PROBE: NOT DETECTED

## 2025-04-07 PROCEDURE — 3078F DIAST BP <80 MM HG: CPT | Performed by: INTERNAL MEDICINE

## 2025-04-07 PROCEDURE — 87651 STREP A DNA AMP PROBE: CPT | Performed by: INTERNAL MEDICINE

## 2025-04-07 PROCEDURE — 3074F SYST BP LT 130 MM HG: CPT | Performed by: INTERNAL MEDICINE

## 2025-04-07 PROCEDURE — 99214 OFFICE O/P EST MOD 30 MIN: CPT | Performed by: INTERNAL MEDICINE

## 2025-04-07 PROCEDURE — 86039 ANTINUCLEAR ANTIBODIES (ANA): CPT | Performed by: INTERNAL MEDICINE

## 2025-04-07 PROCEDURE — 86038 ANTINUCLEAR ANTIBODIES: CPT | Performed by: INTERNAL MEDICINE

## 2025-04-07 PROCEDURE — G2211 COMPLEX E/M VISIT ADD ON: HCPCS | Performed by: INTERNAL MEDICINE

## 2025-04-07 NOTE — NURSING NOTE
"/62   Pulse 102   Temp 98.2  F (36.8  C) (Tympanic)   Resp 16   Ht 1.727 m (5' 8\")   Wt 92.3 kg (203 lb 6.4 oz)   LMP 03/09/2024 (Exact Date)   SpO2 99%   BMI 30.93 kg/m      "

## 2025-04-07 NOTE — PROGRESS NOTES
Assessment & Plan     (Z71.2) Encounter to discuss test results    Plan; Discussed recent test results with patient has elevated CRP and elevated ESR       (R79.82) Elevated C-reactive protein (CRP)  Plan: check Anti Nuclear Snadhya IgG by IFA with Reflex, pt was referred to rheumatologist for further evaluation and management             (M79.10) Myalgia  (M25.50) Arthralgia, unspecified joint  Plan:check  Anti Nuclear Sandhya IgG by IFA with Reflex and referred  to rheumatologist for further evaluation and management.   Adult Rheumatology  Referral       (D50.9) Iron deficiency anemia, unspecified iron deficiency anemia type  Plan: last Hgb 8.4, pt was advised to increase Ferous sulphate 325 mg ot bid. Consume iron rich foods and rpt CBC and Iron studies in 3 months , patient was advised to follow-up with OB/GYN to manage menorrhagia      (J02.9) Sore throat  Plan: Streptococcus A Rapid Screen w/Reflex to PCR Clinic Collect, negative today , Group A Streptococcus PCR Throat Swab, patient was advised to take over-the-counter Tylenol as needed, warm water gargling and throat/changes.Call or return to clinic prn if these symtoms worsen, fail to improve as anticipated, or if new symptoms develop.  -        Review of the result(s) of each unique test - Strep   30  minutes spent by me on the date of the encounter doing chart review, history and exam, documentation and further activities per the note,      The longitudinal plan of care for the diagnosis(es)/condition(s) as documented were addressed during this visit. Due to the added complexity in care, I will continue to support Randee in the subsequent management and with ongoing continuity of care.      Subjective   Randee is a 44 year old, presenting for the following health issues:  Follow Up (Labs)      4/7/2025     1:11 PM   Additional Questions   Roomed by dalton   Accompanied by self         4/7/2025     1:11 PM   Patient Reported Additional Medications   Patient  "reports taking the following new medications none     HPI         Pt is a 44 year old female who is seen here to day with c/o myalgias and arthralgias since 2 months, also c/o pain in all joints including finger joints, elbow joint , knee and ankle joints. No fever/chills.  Patient was seen by [facility] and had labs done which showed elevated CRP, elevated ESR and patient was advised to follow-up with PCP.    Pt also has h/o iron deficiency anemia possibly due to menorrhagia currently taking ferrous sulfate 1 tablet daily-patient has seen OB/GYN and was recommended hysterectomy but patient has not decided yet.    Patient also complains of sore throat since 4 days - no fever/chills,occasional cough, no nasal congestion or sinus symptoms.      Past Medical History:   Diagnosis Date    Anemia     Fibroid uterus     Kidney infection 05/30/2021    Reflux esophagitis        Current Outpatient Medications   Medication Sig Dispense Refill    ferrous sulfate (FEROSUL) 325 (65 Fe) MG tablet Take 1 tablet (325 mg) by mouth daily (with breakfast). 180 tablet 0            Review of Systems  CONSTITUTIONAL: NEGATIVE for fever, chills,    ENT/MOUTH: sore throat  RESP: NEGATIVE for significant cough or SOB  CV: NEGATIVE for chest pain, palpitations or peripheral edema  MUSCULOSKELETAL: Myalgias and arthralgias  NEURO: NEGATIVE for weakness, dizziness or paresthesias  PSYCHIATRIC: NEGATIVE for changes in mood or affect      Objective    /62   Pulse 102   Temp 98.2  F (36.8  C) (Tympanic)   Resp 16   Ht 1.727 m (5' 8\")   Wt 92.3 kg (203 lb 6.4 oz)   LMP 03/09/2024 (Exact Date)   SpO2 99%   BMI 30.93 kg/m    There is no height or weight on file to calculate BMI.  Physical Exam   GENERAL: alert and no distress  EYES: Eyes grossly normal to inspection, PERRL and conjunctivae and sclerae normal  HENT: ear canals and TM's normal, nose and mouth without ulcers or lesions  RESP: lungs clear to auscultation - no rales, " rhonchi or wheezes  CV: regular rate and rhythm, normal S1 S2,    MS: no swelling or tenderness in any joint at this time, no LE edema or calf tenderness   NEURO: Normal strength and tone, mentation intact and speech normal    Results for orders placed or performed in visit on 04/07/25 (from the past 24 hours)   Streptococcus A Rapid Screen w/Reflex to PCR - Clinic Collect    Specimen: Throat; Swab   Result Value Ref Range    Group A Strep antigen Negative Negative           Signed Electronically by: Abe Hou MD

## 2025-04-08 LAB
ANA PAT SER IF-IMP: ABNORMAL
ANA SER QL IF: ABNORMAL
ANA TITR SER IF: ABNORMAL {TITER}

## 2025-04-09 ENCOUNTER — HOSPITAL ENCOUNTER (OUTPATIENT)
Dept: ULTRASOUND IMAGING | Facility: CLINIC | Age: 45
Discharge: HOME | End: 2025-04-09
Attending: INTERNAL MEDICINE
Payer: COMMERCIAL

## 2025-04-09 DIAGNOSIS — E01.0 THYROMEGALY: ICD-10-CM

## 2025-04-09 PROCEDURE — 76536 US EXAM OF HEAD AND NECK: CPT

## 2025-04-24 ENCOUNTER — OFFICE VISIT (OUTPATIENT)
Dept: ENDOCRINOLOGY | Facility: CLINIC | Age: 45
End: 2025-04-24
Payer: COMMERCIAL

## 2025-04-24 VITALS
SYSTOLIC BLOOD PRESSURE: 112 MMHG | TEMPERATURE: 99.5 F | DIASTOLIC BLOOD PRESSURE: 77 MMHG | OXYGEN SATURATION: 99 % | WEIGHT: 204.4 LBS | HEIGHT: 68 IN | RESPIRATION RATE: 16 BRPM | HEART RATE: 88 BPM | BODY MASS INDEX: 30.98 KG/M2

## 2025-04-24 DIAGNOSIS — E01.0 THYROMEGALY: Primary | ICD-10-CM

## 2025-04-24 DIAGNOSIS — E04.1 THYROID NODULE: ICD-10-CM

## 2025-04-24 NOTE — PATIENT INSTRUCTIONS
-[facility]  Dr Mack, Endocrinology Department    [facility]   [id number]  [place] [address]  Appointment [phone]  Fax: [phone]  [place]: Monday - Thursday      Please check the cost coverage and copay with insurance before recommended tests, services and medications (especially if new medications are prescribed).     If ordered, please get blood work done 1 week prior to your next appointment so they will be available to Dr. Mack at your visit.    Thyroid labs, thyroid US in 1 year.  Please make a lab appointment for blood work and follow up clinic appointment in 1 week after that to discuss results.     [facility] radiology scheduleing   [place]  [phone]   [facility] Radiology scheduling  [place]  [phone]     Please call and schedule the recommended test as discussed in clinic visit. These are the numbers to call.

## 2025-04-24 NOTE — PROGRESS NOTES
Name: Randee Millard  Seen for follow up of  thyromegaly/thyroid nodule.  HPI:  Randee Millard is a 44 year old female who presents for the evaluation of Thyromegaly    has a past medical history of Anemia, Fibroid uterus, Kidney infection (05/30/2021), and Reflux esophagitis.  She was seen in 2017 by endocrinology for thyroid dysfunction.    Thyroid US 2018:  The right lobe of the thyroid gland measures 5.1 x 1.6 x 1.7  cm and the left lobe measures 3.7 x 0.9 x 0.8 cm.  No  solid nodule or mass is demonstrated.  Lost follow up after that.  US 2024: Two new 6 mm thyroid nodules . FNA not indicated.  US 2025: Stable appearance of bilateral thyroid nodules. ( 2 small subcm nodules)    No FH of thyroid cancer  No history of radiation  No compressive s/s  Thyroid labs in acceptable range. Has +TPO.  She is not on thyroid hormone replacement.  No other major risk factors.  When she gets cold- she has swallowing problem/ feels swelling in neck area.    Was diagnosed with Thyroid problem ? at age 27 and plan was to continue to monitor labs at that time.  This was done in [place].  When in [place] she also had thyroid US and plan was to continue to monitor.  She reports that her provider at [place] was checking her blood test every 3 months and was getting ultrasound every 6 months.  I discussed that as long as her thyroid labs are in normal range, she is not on thyroid medication there is no need to check thyroid labs every 3 months.  Ultrasound can be obtained every year or sooner if there are any concerns.  Never been on thyroid hormone replacement.     Feels tired.  Palpitations:  Yes: sometimes. On and off. She has anemia.  Changes to hair or skin: + hair loss  Diarrhea/Constipation:Yes: + chronic constipation  Changes in menses: Yes: regular. 5 kids. No problems with pregnancies.  Changes in vision:No  Diplopia/Blurryness:No  Dysphagia or Shortness of breath: no SOB  Muscle aches or pain:Yes:  sometimes  Tremors:No  Difficulty sleeping:not sleeping much.  Changes in weight: Yes: + wt gain  Wt Readings from Last 2 Encounters:   04/24/25 92.7 kg (204 lb 6.4 oz)   04/07/25 92.3 kg (203 lb 6.4 oz)      Heat or cold intolerance: + cold intolerance  History of Lithium or Amiodarone use:No  Head or neck surgery/radiation:No    PMH/PSH:  Past Medical History:   Diagnosis Date    Anemia     Fibroid uterus     Kidney infection 05/30/2021    Reflux esophagitis      Past Surgical History:   Procedure Laterality Date    US BREAST CYST ASPIRATION INITIAL RT       Family Hx:  Family History   Problem Relation Age of Onset    Hypertension Father     Diabetes Father 63    Family History Negative Mother     No Known Problems Brother     No Known Problems Sister     No Known Problems Son     No Known Problems Daughter                Social Hx:  Social History     Socioeconomic History    Marital status:      Spouse name: Not on file    Number of children: Not on file    Years of education: Not on file    Highest education level: Not on file   Occupational History    Occupation:  full time   Tobacco Use    Smoking status: Never    Smokeless tobacco: Never   Vaping Use    Vaping status: Never Used   Substance and Sexual Activity    Alcohol use: No     Alcohol/week: 0.0 standard drinks of alcohol    Drug use: No    Sexual activity: Yes     Partners: Male     Birth control/protection: Condom   Other Topics Concern    Parent/sibling w/ CABG, MI or angioplasty before 65F 55M? Not Asked   Social History Narrative    Not on file     Social Drivers of Health     Financial Resource Strain: Not on file   Food Insecurity: Not on file   Transportation Needs: Not on file   Physical Activity: Not on file   Stress: Not on file   Social Connections: Not on file   Interpersonal Safety: Low Risk  (4/7/2025)    Interpersonal Safety     Do you feel physically and emotionally safe where you currently live?: Yes     Within the  "past 12 months, have you been hit, slapped, kicked or otherwise physically hurt by someone?: No     Within the past 12 months, have you been humiliated or emotionally abused in other ways by your partner or ex-partner?: No   Housing Stability: Not on file          MEDICATIONS:  has a current medication list which includes the following prescription(s): ferrous sulfate.    Review of Systems  10 point ROS neg other than the symptoms noted above in the HPI.    Physical Exam   VS: /77 (BP Location: Left arm, Patient Position: Chair, Cuff Size: Adult Large)   Pulse 88   Temp 99.5  F (37.5  C) (Tympanic)   Resp 16   Ht 1.727 m (5' 7.99\")   Wt 92.7 kg (204 lb 6.4 oz)   LMP 04/02/2025 (Exact Date)   SpO2 99%   Breastfeeding No   BMI 31.09 kg/m    GENERAL: healthy, alert and no distress  EYES: Eyes grossly normal to inspection, conjunctivae and sclerae normal  ENT: no nose swelling, nasal discharge.  Thyroid: no apparent thyroid nodules. + Enlarged thyroid gland.  Nontender  CV: RRR, no rubs, gallops, no murmurs  RESP: CTAB, no wheezes, rales, or ronchi  ABDO: +BS  EXTREMITIES: no hand tremors.  NEURO: Cranial nerves grossly intact, mentation intact and speech normal  SKIN: No apparent skin lesions, rash or edema seen   PSYCH: mentation appears normal, affect normal/bright, judgement and insight intact, normal speech and appearance well-groomed      LABS:  TFTs:  TSH   Date Value Ref Range Status   02/26/2025 2.43 0.30 - 4.20 uIU/mL Final   05/26/2022 1.81 0.40 - 4.00 mU/L Final   08/19/2020 2.75 0.40 - 4.00 mU/L Final       Thyroid Ultrasound 2018:  FINDINGS: The right lobe of the thyroid gland measures 5.1 x 1.6 x 1.7  cm and the left lobe measures 3.7 x 0.9 x 0.8 cm. The isthmus measures  0.2 cm. There is a slightly heterogeneous appearance of the thyroid.  There are a few small cystic areas, all measuring less than 0.5 cm. No  solid nodule or mass is demonstrated.  IMPRESSION: Unremarkable examination. No " solid thyroid nodule or mass  is seen.     Thyroid US 4/2025:  IMPRESSION:  1.  Stable appearance of bilateral thyroid nodules.    All pertinent notes, labs, and images personally reviewed by me.     A/P  Ms.Nimo JUSTIN Millard is a 44 year old here for the evaluation of thyroid nodule:    Thyromegaly/Thyroid nodule:  Noted thyromegaly on physical examination and thyroid ultrasound.  2 small subcm nodules seen on thyroid US and appear stable.  No major compressive symptoms.  No dysphagia.  No difficulty with breathing.    Thyroid labs in acceptable range.  She is not on thyroid hormone replacement.  No other major risk factors.   No FH of thyroid cancer  No history of radiation  Plan:  Discussed diagnosis, pathophysiology, management and treatment options of condition with pt.  In the setting of no major clinical concerns, no major compressive symptoms and thyroid ultrasound showing stable appearing nodules, plan to continue to monitor..  Thyroid labs, thyroid US in 1 year.  Please make a lab appointment for blood work and follow up clinic appointment in 1 week after that to discuss results.    Plan: TSH with free T4 reflex, US Thyroid     Discussed indications, risks and benefits of all medications prescribed, and answered questions to patient's satisfaction.  The longitudinal plan of care for the diagnosis(es)/condition(s) as documented were addressed during this visit. Due to the added complexity in care, I will continue to support Randee in the subsequent management and with ongoing continuity of care.  All questions were answered.  The patient indicates understanding of the above issues and agrees with the plan set forth.    Follow-up:  As noted in AVS    Poly Mack MD  Endocrinology   [facility]    Cc: Abe Hou    Addendum to above note and clinic visit:    Labs reviewed.    See result note/telephone encounter.

## 2025-04-24 NOTE — LETTER
4/24/2025      Randee Millard  [address] [facility] [address]      Dear Colleague,    Thank you for referring your patient, Randee Millard, to the [facility]. Please see a copy of my visit note below.    Name: Randee Millard  Seen for follow up of  thyromegaly/thyroid nodule.  HPI:  Randee Millard is a 44 year old female who presents for the evaluation of Thyromegaly    has a past medical history of Anemia, Fibroid uterus, Kidney infection (05/30/2021), and Reflux esophagitis.  She was seen in 2017 by endocrinology for thyroid dysfunction.    Thyroid US 2018:  The right lobe of the thyroid gland measures 5.1 x 1.6 x 1.7  cm and the left lobe measures 3.7 x 0.9 x 0.8 cm.  No  solid nodule or mass is demonstrated.  Lost follow up after that.  US 2024: Two new 6 mm thyroid nodules . FNA not indicated.  US 2025: Stable appearance of bilateral thyroid nodules. ( 2 small subcm nodules)    No FH of thyroid cancer  No history of radiation  No compressive s/s  Thyroid labs in acceptable range. Has +TPO.  She is not on thyroid hormone replacement.  No other major risk factors.  When she gets cold- she has swallowing problem/ feels swelling in neck area.    Was diagnosed with Thyroid problem ? at age 27 and plan was to continue to monitor labs at that time.  This was done in [place].  When in [place] she also had thyroid US and plan was to continue to monitor.  She reports that her provider at [place] was checking her blood test every 3 months and was getting ultrasound every 6 months.  I discussed that as long as her thyroid labs are in normal range, she is not on thyroid medication there is no need to check thyroid labs every 3 months.  Ultrasound can be obtained every year or sooner if there are any concerns.  Never been on thyroid hormone replacement.     Feels tired.  Palpitations:  Yes: sometimes. On and off. She has anemia.  Changes to hair or skin: + hair loss  Diarrhea/Constipation:Yes: +  chronic constipation  Changes in menses: Yes: regular. 5 kids. No problems with pregnancies.  Changes in vision:No  Diplopia/Blurryness:No  Dysphagia or Shortness of breath: no SOB  Muscle aches or pain:Yes: sometimes  Tremors:No  Difficulty sleeping:not sleeping much.  Changes in weight: Yes: + wt gain  Wt Readings from Last 2 Encounters:   04/24/25 92.7 kg (204 lb 6.4 oz)   04/07/25 92.3 kg (203 lb 6.4 oz)      Heat or cold intolerance: + cold intolerance  History of Lithium or Amiodarone use:No  Head or neck surgery/radiation:No    PMH/PSH:  Past Medical History:   Diagnosis Date     Anemia      Fibroid uterus      Kidney infection 05/30/2021     Reflux esophagitis      Past Surgical History:   Procedure Laterality Date     US BREAST CYST ASPIRATION INITIAL RT       Family Hx:  Family History   Problem Relation Age of Onset     Hypertension Father      Diabetes Father 63     Family History Negative Mother      No Known Problems Brother      No Known Problems Sister      No Known Problems Son      No Known Problems Daughter                Social Hx:  Social History     Socioeconomic History     Marital status:      Spouse name: Not on file     Number of children: Not on file     Years of education: Not on file     Highest education level: Not on file   Occupational History     Occupation:  full time   Tobacco Use     Smoking status: Never     Smokeless tobacco: Never   Vaping Use     Vaping status: Never Used   Substance and Sexual Activity     Alcohol use: No     Alcohol/week: 0.0 standard drinks of alcohol     Drug use: No     Sexual activity: Yes     Partners: Male     Birth control/protection: Condom   Other Topics Concern     Parent/sibling w/ CABG, MI or angioplasty before 65F 55M? Not Asked   Social History Narrative     Not on file     Social Drivers of Health     Financial Resource Strain: Not on file   Food Insecurity: Not on file   Transportation Needs: Not on file   Physical Activity:  "Not on file   Stress: Not on file   Social Connections: Not on file   Interpersonal Safety: Low Risk  (4/7/2025)    Interpersonal Safety      Do you feel physically and emotionally safe where you currently live?: Yes      Within the past 12 months, have you been hit, slapped, kicked or otherwise physically hurt by someone?: No      Within the past 12 months, have you been humiliated or emotionally abused in other ways by your partner or ex-partner?: No   Housing Stability: Not on file          MEDICATIONS:  has a current medication list which includes the following prescription(s): ferrous sulfate.    Review of Systems  10 point ROS neg other than the symptoms noted above in the HPI.    Physical Exam   VS: /77 (BP Location: Left arm, Patient Position: Chair, Cuff Size: Adult Large)   Pulse 88   Temp 99.5  F (37.5  C) (Tympanic)   Resp 16   Ht 1.727 m (5' 7.99\")   Wt 92.7 kg (204 lb 6.4 oz)   LMP 04/02/2025 (Exact Date)   SpO2 99%   Breastfeeding No   BMI 31.09 kg/m    GENERAL: healthy, alert and no distress  EYES: Eyes grossly normal to inspection, conjunctivae and sclerae normal  ENT: no nose swelling, nasal discharge.  Thyroid: no apparent thyroid nodules. + Enlarged thyroid gland.  Nontender  CV: RRR, no rubs, gallops, no murmurs  RESP: CTAB, no wheezes, rales, or ronchi  ABDO: +BS  EXTREMITIES: no hand tremors.  NEURO: Cranial nerves grossly intact, mentation intact and speech normal  SKIN: No apparent skin lesions, rash or edema seen   PSYCH: mentation appears normal, affect normal/bright, judgement and insight intact, normal speech and appearance well-groomed      LABS:  TFTs:  TSH   Date Value Ref Range Status   02/26/2025 2.43 0.30 - 4.20 uIU/mL Final   05/26/2022 1.81 0.40 - 4.00 mU/L Final   08/19/2020 2.75 0.40 - 4.00 mU/L Final       Thyroid Ultrasound 2018:  FINDINGS: The right lobe of the thyroid gland measures 5.1 x 1.6 x 1.7  cm and the left lobe measures 3.7 x 0.9 x 0.8 cm. The " isthmus measures  0.2 cm. There is a slightly heterogeneous appearance of the thyroid.  There are a few small cystic areas, all measuring less than 0.5 cm. No  solid nodule or mass is demonstrated.  IMPRESSION: Unremarkable examination. No solid thyroid nodule or mass  is seen.     Thyroid US 4/2025:  IMPRESSION:  1.  Stable appearance of bilateral thyroid nodules.    All pertinent notes, labs, and images personally reviewed by me.     A/P  Ms.Nimo JUSTIN Millard is a 44 year old here for the evaluation of thyroid nodule:    Thyromegaly/Thyroid nodule:  Noted thyromegaly on physical examination and thyroid ultrasound.  2 small subcm nodules seen on thyroid US and appear stable.  No major compressive symptoms.  No dysphagia.  No difficulty with breathing.    Thyroid labs in acceptable range.  She is not on thyroid hormone replacement.  No other major risk factors.   No FH of thyroid cancer  No history of radiation  Plan:  Discussed diagnosis, pathophysiology, management and treatment options of condition with pt.  In the setting of no major clinical concerns, no major compressive symptoms and thyroid ultrasound showing stable appearing nodules, plan to continue to monitor..  Thyroid labs, thyroid US in 1 year.  Please make a lab appointment for blood work and follow up clinic appointment in 1 week after that to discuss results.    Plan: TSH with free T4 reflex, US Thyroid     Discussed indications, risks and benefits of all medications prescribed, and answered questions to patient's satisfaction.  The longitudinal plan of care for the diagnosis(es)/condition(s) as documented were addressed during this visit. Due to the added complexity in care, I will continue to support Randee in the subsequent management and with ongoing continuity of care.  All questions were answered.  The patient indicates understanding of the above issues and agrees with the plan set forth.    Follow-up:  As noted in JOSE ARMANDO Pang  MD Martina  Endocrinology   [facility]    Cc: Abe Hou    Addendum to above note and clinic visit:    Labs reviewed.    See result note/telephone encounter.           Again, thank you for allowing me to participate in the care of your patient.        Sincerely,        Poly Mack MD    Electronically signed

## 2025-04-28 ENCOUNTER — ANCILLARY PROCEDURE (OUTPATIENT)
Dept: ULTRASOUND IMAGING | Facility: CLINIC | Age: 45
End: 2025-04-28
Attending: ADVANCED PRACTICE MIDWIFE
Payer: COMMERCIAL

## 2025-04-28 DIAGNOSIS — D25.9 UTERINE LEIOMYOMA, UNSPECIFIED LOCATION: ICD-10-CM

## 2025-04-28 PROCEDURE — 76830 TRANSVAGINAL US NON-OB: CPT | Performed by: FAMILY MEDICINE

## 2025-04-28 PROCEDURE — 76856 US EXAM PELVIC COMPLETE: CPT | Performed by: FAMILY MEDICINE

## 2025-06-11 ENCOUNTER — PATIENT OUTREACH (OUTPATIENT)
Dept: CARE COORDINATION | Facility: CLINIC | Age: 45
End: 2025-06-11
Payer: COMMERCIAL

## 2025-07-01 NOTE — TELEPHONE ENCOUNTER
Pt calls back and states that she would like to try the birth control for her fibroids and aub.      Guadalupe DAVID R.N.             Opt out

## 2025-07-07 ENCOUNTER — OFFICE VISIT (OUTPATIENT)
Dept: OBGYN | Facility: CLINIC | Age: 45
End: 2025-07-07
Payer: COMMERCIAL

## 2025-07-07 VITALS — DIASTOLIC BLOOD PRESSURE: 72 MMHG | SYSTOLIC BLOOD PRESSURE: 122 MMHG | BODY MASS INDEX: 31.34 KG/M2 | WEIGHT: 204.6 LBS

## 2025-07-07 DIAGNOSIS — N92.0 MENORRHAGIA WITH REGULAR CYCLE: ICD-10-CM

## 2025-07-07 DIAGNOSIS — D25.9 UTERINE LEIOMYOMA, UNSPECIFIED LOCATION: Primary | ICD-10-CM

## 2025-07-07 DIAGNOSIS — D50.9 IRON DEFICIENCY ANEMIA, UNSPECIFIED IRON DEFICIENCY ANEMIA TYPE: ICD-10-CM

## 2025-07-07 PROCEDURE — G2211 COMPLEX E/M VISIT ADD ON: HCPCS | Performed by: OBSTETRICS & GYNECOLOGY

## 2025-07-07 PROCEDURE — 3074F SYST BP LT 130 MM HG: CPT | Performed by: OBSTETRICS & GYNECOLOGY

## 2025-07-07 PROCEDURE — 99214 OFFICE O/P EST MOD 30 MIN: CPT | Performed by: OBSTETRICS & GYNECOLOGY

## 2025-07-07 PROCEDURE — 3078F DIAST BP <80 MM HG: CPT | Performed by: OBSTETRICS & GYNECOLOGY

## 2025-07-07 NOTE — NURSING NOTE
"Chief Complaint   Patient presents with    Pelvic Pain     Pelvic pain   Pap 22 NIL    OV 25  U/S  25        Initial /72 (BP Location: Right arm, Cuff Size: Adult Regular)   Wt 92.8 kg (204 lb 9.6 oz)   LMP 2025 (Exact Date)   BMI 31.34 kg/m   Estimated body mass index is 31.34 kg/m  as calculated from the following:    Height as of 25: 1.721 m (5' 7.75\").    Weight as of this encounter: 92.8 kg (204 lb 9.6 oz).  BP completed using cuff size: large    Questioned patient about current smoking habits.  Pt. has never smoked.          The following HM Due: NONE        Erika Villalobos CMA on 2025 at 1:24 PM   "

## 2025-07-07 NOTE — PROGRESS NOTES
SUBJECTIVE:                                                   CC:  Patient presents with:  Pelvic Pain: Pelvic pain   Pap 22 NIL    OV 25  U/S  25       HPI:  Randee Millard is a 45 year old  with history of fibroids and abdominal pain who has previously considered DV myomectomy who presents again today to discuss options.  History of anemia, bulk symptoms, AUB, and pain.      History of Present Illness-  Randee Millard, 45-year-old female  - History of uterine fibroids with prior left-sided pain, now experiencing pain on both left and right sides  - Reports cramping and significant pain, sometimes occurring outside of menstrual periods  - Reports heavy menstrual bleeding  - Previous ultrasounds performed, most recent showing multiple large fibroids and enlarged uterus  - Previously discussed and trialed scheduled ibuprofen and hormone therapy (Agestin/norethindrone) for symptom management  - Five prior vaginal births  - No prior fibroid-related surgery reported  - No mention of anemia or other systemic symptoms by patient     Gyn History:  Patient's last menstrual period was 2025 (exact date).        Last 3 Pap and HPV Results:       Latest Ref Rng & Units 2022     2:34 PM 3/11/2019     3:35 PM 3/11/2019     3:30 PM   PAP / HPV   PAP  Negative for Intraepithelial Lesion or Malignancy (NILM)      PAP (Historical)   NIL     HPV 16 DNA Negative Negative   Negative    HPV 18 DNA Negative Negative   Negative    Other HR HPV Negative Negative   Negative        PMH, PSH, Soc Hx, Fam Hx, Meds, and allergies reviewed in Epic.    OBJECTIVE:     /72 (BP Location: Right arm, Cuff Size: Adult Regular)   Wt 92.8 kg (204 lb 9.6 oz)   LMP 2025 (Exact Date)   BMI 31.34 kg/m      Gen: Healthy appearing female, no acute distress, comfortable  Psych: mentation appears normal and affect bright    Physical Exam- ABDOMEN: Palpable uterine enlargement.  Mass palpable at 1 below  umbilicus    Test Results:    Results- Ultrasound: Uterus enlarged with multiple fibroids; largest fibroid measures 10 x 8 x 9 cm.     [facility]     ULTRASOUND - PELVIC GYN- Transabdominal and Transvaginal     Referring MD: Salma Oliveira CNM     ===================================     CLINICAL INFORMATION     Indications for ultrasound:   Miscellaneous - Uterine leiomyomas      LMP: 04/04/2025    Hormones: none     Measurements:  Uterus:  14.72 x 10.17 x 8.90 cm     Position is anteverted.  Contour is irregular w/ myomata: 1 Midline Submucosal 10.02 x 7.91 x 9.05 cm, and 2 Fundal Intramural 2.62 x 2.04 x 2.45 cm,     Endo cav: obscured by fibroid     Right ovary: 3.83 x 3.29 x 2.86 cm  Wnl  Left ovary:   NV     Cul de sac: no free fluid     Technique: Transvaginal Imaging performed  Transabdominal Imaging performed        ===================================  Complete pelvic ultrasound using realtime   transabdominal and transvaginal scanning  Bladder appears normal      Myomatous uterus: Endometrial cavity affected.  Submucosal fibroid increased to 10 cm and intramural fibroid potentially decreased to 2.6 cm, although MRI may   Be better modality for imaging based on difficulty with imaging due to shadowing today.      Endometrium obscured by fibroids.      Normal right ovary,   Non-visualized left ovary        ASSESSMENT/PLAN:                                                      1. Iron deficiency anemia, unspecified iron deficiency anemia type  - Ob/Gyn  Referral  - leuprolide (LUPRON DEPOT) kit 11.25 mg    2. Menorrhagia with regular cycle  - Ob/Gyn  Referral  - leuprolide (LUPRON DEPOT) kit 11.25 mg    3. Uterine leiomyoma, unspecified location (Primary)  Discussed dx and options for mgmt of fibroids.  Fibroids are a benign condition of overgrowth of the muscle cells of the uterus.  They can cause problems when they contribute to heavy bleeding,  dysmenorrhea, or bulk symptoms.  There are several treatment strategies beginning with medical mgmt (OCP, ppx NSAIDs, lupron, other newer hormone suppressive medications with add-back therapy, even LNG IUD can be helpful with certain symptoms), non invasive surgical options (UFE, hysteroscopic myomectomy), more advanced surgical options (RFA, abdominal myomectomy) or definitive treatment (hysterectomy).  Fibroids tend to be hormonally responsive, so can grow throughout the reproductive life span, but tend to become quiescent and can even shrink in menopause.     - leuprolide (LUPRON DEPOT) kit 11.25 mg      Assessment & Plan  Fibroids:  - Large fibroids causing pain, cramping, and pressure symptoms. Fibroids are benign and responsive to hormones, causing increased pain and growth with hormonal fluctuations.  - Discussed various management options including medication, hormone therapy, uterine artery embolization, Accessa (which could be done at [facility] with that group of OB/Gyns) and surgical options such as myomectomy and hysterectomy. Patient considering lupron to induce temporary menopause to shrink fibroids before potential surgery. Shot requires prior authorization and will be ordered. Patient to discuss with  and decide on surgery. Surgery scheduling requires at least 6 weeks notice.  - Risks and side effects: Risks of surgery include bleeding, infection, and injury to surrounding organs. Risks of the shot include menopause-like symptoms such as hot flashes.    Menorrhagia with regular cycle:  - Heavy menstrual bleeding likely due to fibroids.  - Management options discussed in the context of fibroid treatment.     Based on our discussion, I have outlined the following instructions for you:      - We talked about different ways to manage your fibroids, like using medication, hormone therapy, a procedure to block blood flow to the fibroids, or surgeries like removing just the fibroids or the whole  uterus.  - You are thinking about getting a shot that will temporarily stop your periods and help shrink the fibroids before any possible surgery.  - The shot needs approval from your insurance, and it will be ordered.  - Please talk with your  to decide if you want to go ahead with the surgery.  - If you decide on surgery, remember that it needs to be scheduled at least 6 weeks in advance.    Thank you again for your visit, and we look forward to supporting you in your journey to better health.     Carmen Mckeon MD, MPH  Obstetrics and Gynecology

## 2025-08-04 ENCOUNTER — NURSE TRIAGE (OUTPATIENT)
Dept: INTERNAL MEDICINE | Facility: CLINIC | Age: 45
End: 2025-08-04
Payer: COMMERCIAL

## 2025-08-13 ENCOUNTER — TRANSFERRED RECORDS (OUTPATIENT)
Dept: HEALTH INFORMATION MANAGEMENT | Facility: CLINIC | Age: 45
End: 2025-08-13
Payer: COMMERCIAL

## (undated) RX ORDER — IBUPROFEN 200 MG
TABLET ORAL
Status: DISPENSED
Start: 2017-01-11

## (undated) RX ORDER — BUPIVACAINE HYDROCHLORIDE AND EPINEPHRINE 5; 5 MG/ML; UG/ML
INJECTION, SOLUTION PERINEURAL
Status: DISPENSED
Start: 2018-11-27